# Patient Record
Sex: FEMALE | Race: WHITE | NOT HISPANIC OR LATINO | Employment: UNEMPLOYED | URBAN - METROPOLITAN AREA
[De-identification: names, ages, dates, MRNs, and addresses within clinical notes are randomized per-mention and may not be internally consistent; named-entity substitution may affect disease eponyms.]

---

## 2017-05-11 ENCOUNTER — OFFICE VISIT (OUTPATIENT)
Dept: NEUROLOGY | Facility: CLINIC | Age: 47
End: 2017-05-11

## 2017-05-11 VITALS
BODY MASS INDEX: 22.2 KG/M2 | HEART RATE: 61 BPM | WEIGHT: 130 LBS | HEIGHT: 64 IN | OXYGEN SATURATION: 99 % | DIASTOLIC BLOOD PRESSURE: 78 MMHG | SYSTOLIC BLOOD PRESSURE: 118 MMHG

## 2017-05-11 DIAGNOSIS — G40.909 SEIZURE DISORDER (HCC): ICD-10-CM

## 2017-05-11 DIAGNOSIS — G43.009 MIGRAINE WITHOUT AURA AND WITHOUT STATUS MIGRAINOSUS, NOT INTRACTABLE: Primary | ICD-10-CM

## 2017-05-11 PROCEDURE — 99213 OFFICE O/P EST LOW 20 MIN: CPT | Performed by: PSYCHIATRY & NEUROLOGY

## 2017-05-11 RX ORDER — SUMATRIPTAN 100 MG/1
100 TABLET, FILM COATED ORAL ONCE AS NEEDED
Qty: 9 TABLET | Refills: 11 | Status: SHIPPED | OUTPATIENT
Start: 2017-05-11 | End: 2018-05-16 | Stop reason: SDUPTHER

## 2017-05-11 RX ORDER — PANTOPRAZOLE SODIUM 40 MG/1
TABLET, DELAYED RELEASE ORAL
Refills: 0 | COMMUNITY
Start: 2017-04-19 | End: 2018-11-06

## 2017-05-11 RX ORDER — TOPIRAMATE 100 MG/1
100 TABLET, FILM COATED ORAL DAILY
Qty: 30 TABLET | Refills: 11 | Status: SHIPPED | OUTPATIENT
Start: 2017-05-11 | End: 2018-05-16 | Stop reason: SDUPTHER

## 2017-05-11 RX ORDER — ESTRADIOL 2 MG/1
TABLET ORAL
Refills: 1 | COMMUNITY
Start: 2017-05-07 | End: 2017-06-30 | Stop reason: SDUPTHER

## 2017-05-11 RX ORDER — LEVETIRACETAM 500 MG/1
500 TABLET ORAL 2 TIMES DAILY
Qty: 60 TABLET | Refills: 11 | Status: SHIPPED | OUTPATIENT
Start: 2017-05-11 | End: 2018-05-16 | Stop reason: SDUPTHER

## 2017-05-25 ENCOUNTER — OFFICE VISIT (OUTPATIENT)
Dept: FAMILY MEDICINE CLINIC | Facility: CLINIC | Age: 47
End: 2017-05-25

## 2017-05-25 VITALS
TEMPERATURE: 98.4 F | HEIGHT: 64 IN | HEART RATE: 76 BPM | SYSTOLIC BLOOD PRESSURE: 117 MMHG | RESPIRATION RATE: 16 BRPM | WEIGHT: 137 LBS | DIASTOLIC BLOOD PRESSURE: 65 MMHG | OXYGEN SATURATION: 100 % | BODY MASS INDEX: 23.39 KG/M2

## 2017-05-25 DIAGNOSIS — H10.32 ACUTE BACTERIAL CONJUNCTIVITIS OF LEFT EYE: Primary | ICD-10-CM

## 2017-05-25 PROCEDURE — 99213 OFFICE O/P EST LOW 20 MIN: CPT | Performed by: INTERNAL MEDICINE

## 2017-05-25 RX ORDER — POLYMYXIN B SULFATE AND TRIMETHOPRIM 1; 10000 MG/ML; [USP'U]/ML
1 SOLUTION OPHTHALMIC EVERY 4 HOURS
Qty: 10 ML | Refills: 0 | Status: SHIPPED | OUTPATIENT
Start: 2017-05-25 | End: 2017-11-15

## 2017-06-30 ENCOUNTER — TELEPHONE (OUTPATIENT)
Dept: OBSTETRICS AND GYNECOLOGY | Facility: CLINIC | Age: 47
End: 2017-06-30

## 2017-06-30 RX ORDER — ESTRADIOL 2 MG/1
2 TABLET ORAL DAILY
Qty: 30 TABLET | Refills: 2 | Status: SHIPPED | OUTPATIENT
Start: 2017-06-30 | End: 2017-09-07

## 2017-06-30 NOTE — TELEPHONE ENCOUNTER
----- Message from Modesta Villanueva sent at 6/30/2017  9:39 AM EDT -----  Contact: patient  Pt called and needed refills on Estradiol 2mg. I scheduled her annual for September. She will be out of pills on Monday.      [place]      [facility]

## 2017-09-07 ENCOUNTER — OFFICE VISIT (OUTPATIENT)
Dept: OBSTETRICS AND GYNECOLOGY | Facility: CLINIC | Age: 47
End: 2017-09-07

## 2017-09-07 VITALS
HEIGHT: 64 IN | WEIGHT: 138 LBS | SYSTOLIC BLOOD PRESSURE: 108 MMHG | BODY MASS INDEX: 23.56 KG/M2 | DIASTOLIC BLOOD PRESSURE: 70 MMHG

## 2017-09-07 DIAGNOSIS — N95.1 MENOPAUSAL SYMPTOMS: Primary | ICD-10-CM

## 2017-09-07 DIAGNOSIS — Z01.419 ENCOUNTER FOR GYNECOLOGICAL EXAMINATION WITHOUT ABNORMAL FINDING: ICD-10-CM

## 2017-09-07 PROCEDURE — 99396 PREV VISIT EST AGE 40-64: CPT | Performed by: OBSTETRICS & GYNECOLOGY

## 2017-09-07 RX ORDER — ESTRADIOL 0.1 MG/D
1 PATCH, EXTENDED RELEASE TRANSDERMAL 2 TIMES WEEKLY
Qty: 8 PATCH | Refills: 12 | Status: SHIPPED | OUTPATIENT
Start: 2017-09-07 | End: 2017-11-07

## 2017-09-07 NOTE — PROGRESS NOTES
Subjective  Chief Complaint   Patient presents with   • Gynecologic Exam     Last pap 2014 WNL   Last Mammogram 2016      Patient is 47 y.o.  here for annual examination.  Pt doing well other than complaints of anxiety, hot flashes, night sweats, and insomnia.  Pt taking estradiol in the morning.  Pt has tried taking at night but reports worsening of anxiety during day.  Pt had been on patch in past and did well.  Pt due for MMG.    History  Past Medical History:   Diagnosis Date   • Abdominal pain, epigastric    • Acute sinusitis    • Acute UTI (urinary tract infection)    • Angina, intestinal    • Anxiety    • Arthritis    • Back pain    • Breast mass, right    • Chronic hepatitis C virus infection    • Depression    • Diarrhea    • Elevated LFTs    • Fatigue    • History of endometriosis    • History of Papanicolaou smear of cervix 2014    NORMAL    • IBS (irritable bowel syndrome)    • Insomnia    • Menopausal symptoms    • Migraine headache    • Nausea & vomiting    • Neck pain    • Pelvic adhesive disease    • Radicular pain of left lower extremity    • Restless leg syndrome    • Seizure disorder    • Tobacco abuse    • Trochanteric bursitis    • Vitamin B 12 deficiency      Current Outpatient Prescriptions on File Prior to Visit   Medication Sig Dispense Refill   • dicyclomine (BENTYL) 20 MG tablet Take  by mouth.     • levETIRAcetam (KEPPRA) 500 MG tablet Take 1 tablet by mouth 2 (Two) Times a Day. 60 tablet 11   • oxyCODONE-acetaminophen (PERCOCET)  MG per tablet Take  by mouth every 12 (twelve) hours.     • pantoprazole (PROTONIX) 40 MG EC tablet take 1 tablet by mouth every morning  0   • SUMAtriptan (IMITREX) 100 MG tablet Take 1 tablet by mouth 1 (One) Time As Needed for migraine for up to 1 dose. 9 tablet 11   • topiramate (TOPAMAX) 100 MG tablet Take 1 tablet by mouth Daily. 30 tablet 11   • trimethoprim-polymyxin b (POLYTRIM) 92180-6.1 UNIT/ML-% ophthalmic solution Administer  1 drop into the left eye Every 4 (Four) Hours. 10 mL 0   • [DISCONTINUED] estradiol (ESTRACE) 2 MG tablet Take 1 tablet by mouth Daily. 30 tablet 2   • cyanocobalamin (VITAMIN B-12) 2500 MCG tablet tablet Place 1 tablet under the tongue daily.     • [DISCONTINUED] clonazePAM (KlonoPIN) 1 MG tablet Take 1 tablet by mouth 2 (two) times a day as needed for seizures. 60 tablet 2   • [DISCONTINUED] diclofenac (VOLTAREN) 1 % gel gel Place  on the skin.     • [DISCONTINUED] ergocalciferol (ERGOCALCIFEROL) 20610 UNITS capsule Take  by mouth.     • [DISCONTINUED] Ibuprofen 200 MG capsule Take  by mouth.       No current facility-administered medications on file prior to visit.      No Known Allergies  Past Surgical History:   Procedure Laterality Date   • BILATERAL SALPINGO OOPHORECTOMY  03/10/2015    DR NATALY THOMPSON   • BREAST LUMPECTOMY     •  SECTION     • HYSTERECTOMY  03/10/2015    [facility] (DR NATALY THOMPSON)   • INGUINAL HERNIA REPAIR     • LYSIS OF ABDOMINAL ADHESIONS  03/10/2015    DR NATALY THOMPSON   • TUBAL ABDOMINAL LIGATION       Family History   Problem Relation Age of Onset   • Alzheimer's disease Other    • Cancer Other    • Dementia Other    • Hypertension Other    • Parkinsonism Other    • Osteoporosis Mother    • Diabetes Mother    • Breast cancer Maternal Grandmother    • Osteoporosis Maternal Grandmother    • Diabetes Maternal Grandmother      Social History     Social History   • Marital status:      Spouse name: N/A   • Number of children: N/A   • Years of education: N/A     Social History Main Topics   • Smoking status: Current Every Day Smoker   • Smokeless tobacco: Never Used      Comment:  1/2 PACK A DAY   • Alcohol use Yes   • Drug use: No   • Sexual activity: Defer     Other Topics Concern   • None     Social History Narrative     Review of Systems  All systems were reviewed and negative except for:  Constitution:  positive for night sweats and trouble sleeping  Genitourinary: postivie for   "frequency and urinary urgency  Endocrine: positive for  hot flashes, night sweats, temperature intolerance     Objective  Vitals:    09/07/17 1155   BP: 108/70   Weight: 138 lb (62.6 kg)   Height: 64\" (162.6 cm)     Physical Exam:  General Appearance: alert, appears stated age and cooperative  Head: normocephalic, without obvious abnormality and atraumatic  Eyes: lids and lashes normal, conjunctivae and sclerae normal, no icterus, no pallor, corneas clear and PERRLA  Ears: ears appear intact with no abnormalities noted  Nose: nares normal, septum midline, mucosa normal and no drainage  Neck: suppple, trachea midline and no thyromegaly  Lungs: clear to auscultation, respirations regular, respirations even and respirations unlabored  Heart: regular rhythm and normal rate, normal S1, S2, no murmur, gallop, or rubs and no click  Breasts: Examined in supine position  Symmetric without masses or skin dimpling  Nipples normal without inversion, lesions or discharge  There are no palpable axillary nodes  Abdomen: normal bowel sounds, no masses, no hepatomegaly, no splenomegaly, soft non-tender, no guarding and no rebound tenderness  Pelvic: Clinical staff was present for exam  External genitalia:  normal appearance of the external genitalia including Bartholin's and [place]'s glands.  :  urethral meatus normal;  Vaginal:  normal pink mucosa without prolapse or lesions.  Cervix:  absent.  Uterus:  absent.  Adnexa:  normal bimanual exam of the adnexa.  Extremities: moves extremities well, no edema, no cyanosis and no redness  Skin: no bleeding, bruising or rash and no lesions noted  Lymph Nodes: no palpable adenopathy  Psych: normal mood and affect, oriented to person, time and place, thought content organized and appropriate judgment    Lab Review   No data reviewed    Imaging   No data reviewed    Assessment/Plan    Problem List Items Addressed This Visit     None      Visit Diagnoses     Menopausal symptoms    -  " Primary  Various options discussed with patient.  Plan trial with patch.  Rx given.  Instructions and precautions given.    Relevant Orders    Mammo Screening Digital Tomosynthesis Bilateral With CAD    Pap IG, Rfx HPV ASCU    Encounter for gynecological examination without abnormal finding      I explained to Marguerite that the Pap smears are no longer recommended in patients after hysterectomy unless the patient has a history of ERNA 2 or higher in the past 20 years or have a history of cervical cancer.    I stressed to Marguerite that she still should be seen to be seen yearly for a full physical including breast and pelvic exam. In women with no risk factors as noted, cytology screening has a small chance of detecting an abnormality and primary vaginal cancer is a rare gynecologic malignancy.  For this reason, Marguerite has elected pap smear screening this year.  It is recommended per ACOG, for women at average risk to start annual mammogram screening at the age of 40 until the age of 75 and an individualized decision be made for women after age 75.  She was encouraged to continue getting yearly mammograms.  She should report any palpable breast lump(s) or skin changes regardless of mammographic findings.  I explained to Marguerite that notification regarding her mammogram results will come from the center performing the study.  Our office will not be routinely calling with mammogram results.  It is her responsibility to make sure that the results from the mammogram are communicated to her by the breast center.  If she has any questions about the results, she is welcome to call our office anytime.  Patient to schedule.        Follow up as directed     This note was electronically signed.  Sandra Rangel M.D.

## 2017-09-21 DIAGNOSIS — N95.1 MENOPAUSAL SYMPTOMS: ICD-10-CM

## 2017-09-25 ENCOUNTER — TELEPHONE (OUTPATIENT)
Dept: OBSTETRICS AND GYNECOLOGY | Facility: CLINIC | Age: 47
End: 2017-09-25

## 2017-09-25 ENCOUNTER — HOSPITAL ENCOUNTER (EMERGENCY)
Facility: HOSPITAL | Age: 47
Discharge: HOME | End: 2017-09-25
Attending: EMERGENCY MEDICINE | Admitting: EMERGENCY MEDICINE

## 2017-09-25 VITALS
HEIGHT: 64 IN | RESPIRATION RATE: 18 BRPM | SYSTOLIC BLOOD PRESSURE: 118 MMHG | HEART RATE: 59 BPM | BODY MASS INDEX: 23.56 KG/M2 | OXYGEN SATURATION: 96 % | DIASTOLIC BLOOD PRESSURE: 62 MMHG | TEMPERATURE: 98.6 F | WEIGHT: 138 LBS

## 2017-09-25 DIAGNOSIS — H10.31 ACUTE CONJUNCTIVITIS OF RIGHT EYE, UNSPECIFIED ACUTE CONJUNCTIVITIS TYPE: Primary | ICD-10-CM

## 2017-09-25 PROCEDURE — 99283 EMERGENCY DEPT VISIT LOW MDM: CPT

## 2017-09-25 RX ORDER — METRONIDAZOLE 500 MG/1
TABLET ORAL
Qty: 4 TABLET | Refills: 0 | Status: SHIPPED | OUTPATIENT
Start: 2017-09-25 | End: 2017-11-15

## 2017-09-25 RX ORDER — TETRACAINE HYDROCHLORIDE 5 MG/ML
2 SOLUTION OPHTHALMIC ONCE
Status: COMPLETED | OUTPATIENT
Start: 2017-09-25 | End: 2017-09-25

## 2017-09-25 RX ORDER — CIPROFLOXACIN HYDROCHLORIDE 3.5 MG/ML
1 SOLUTION/ DROPS TOPICAL EVERY 4 HOURS
Qty: 1 EACH | Refills: 0 | Status: SHIPPED | OUTPATIENT
Start: 2017-09-25 | End: 2017-10-02

## 2017-09-25 RX ADMIN — TETRACAINE HYDROCHLORIDE 2 DROP: 5 SOLUTION OPHTHALMIC at 16:04

## 2017-09-25 RX ADMIN — FLUORESCEIN SODIUM 1 STRIP: 1 STRIP OPHTHALMIC at 16:04

## 2017-09-25 NOTE — ED PROVIDER NOTES
Subjective   History of Present Illness  This is a 47-year-old she started having some irritation to her right eye and felt so she was getting conjunctivitis.  She states that the pain continued and progressively got worse throughout the day.  She removed her contact and started using antibiotic ointment that she had from an old infection.  She states that she has used it for the last few days and has not seen any results.  She denies any Ever stations on her lashes.  She denies any morning thickened secretions from her eye.  She does report pain, blurred vision, and clear discharge.  She also reports minimal swelling around the eye.  She denies any fever.  Review of Systems   Constitutional: Negative.    HENT: Negative.    Eyes: Positive for photophobia, pain, discharge, redness, itching and visual disturbance.   Respiratory: Negative.    Cardiovascular: Negative.    Gastrointestinal: Negative.    Genitourinary: Negative.    Skin: Negative.    Neurological: Negative.    Psychiatric/Behavioral: Negative.    All other systems reviewed and are negative.      Past Medical History:   Diagnosis Date   • Abdominal pain, epigastric    • Acute sinusitis    • Acute UTI (urinary tract infection)    • Angina, intestinal    • Anxiety    • Arthritis    • Back pain    • Breast mass, right    • Chronic hepatitis C virus infection    • Depression    • Diarrhea    • Elevated LFTs    • Fatigue    • History of endometriosis    • History of Papanicolaou smear of cervix 04/02/2014    NORMAL    • IBS (irritable bowel syndrome)    • Insomnia    • Menopausal symptoms    • Migraine headache    • Nausea & vomiting    • Neck pain    • Pelvic adhesive disease    • Radicular pain of left lower extremity    • Restless leg syndrome    • Seizure disorder    • Tobacco abuse    • Trochanteric bursitis    • Vitamin B 12 deficiency        No Known Allergies    Past Surgical History:   Procedure Laterality Date   • BILATERAL SALPINGO OOPHORECTOMY   03/10/2015    DR NATALY THOMPSON   • BREAST LUMPECTOMY     •  SECTION     • HYSTERECTOMY  03/10/2015    [facility] (DR NATALY THOMPSON)   • INGUINAL HERNIA REPAIR     • LYSIS OF ABDOMINAL ADHESIONS  03/10/2015    DR NATALY THOMPSON   • TUBAL ABDOMINAL LIGATION         Family History   Problem Relation Age of Onset   • Alzheimer's disease Other    • Cancer Other    • Dementia Other    • Hypertension Other    • Parkinsonism Other    • Osteoporosis Mother    • Diabetes Mother    • Breast cancer Maternal Grandmother    • Osteoporosis Maternal Grandmother    • Diabetes Maternal Grandmother        Social History     Social History   • Marital status:      Spouse name: N/A   • Number of children: N/A   • Years of education: N/A     Social History Main Topics   • Smoking status: Current Every Day Smoker   • Smokeless tobacco: Never Used      Comment:  1/2 PACK A DAY   • Alcohol use Yes   • Drug use: No   • Sexual activity: Defer     Other Topics Concern   • None     Social History Narrative           Objective   Physical Exam   Constitutional: She appears well-developed and well-nourished.   Eyes: EOM are normal. Pupils are equal, round, and reactive to light. Right eye exhibits discharge. Right eye exhibits no exudate and no hordeolum. No foreign body present in the right eye. Right conjunctiva is injected. Right conjunctiva has no hemorrhage. Left conjunctiva is injected. Left conjunctiva has a hemorrhage. No scleral icterus.   Nursing note and vitals reviewed.  GEN: No acute distress  Head: Normocephalic, atraumatic  Eyes: Pupils equal round reactive to light  ENT: Posterior pharynx normal in appearance, oral mucosa is moist  Chest: Nontender to palpation  Cardiovascular: Regular rate  Lungs: Clear to auscultation bilaterally  Abdomen: Soft, nontender, nondistended, no peritoneal signs  Extremities: No edema, normal appearance  Neuro: GCS 15  Psych: Mood and affect are appropriate      Procedures  Eye exam. Tetracaine drops to  right eye. Immediate relief of symptoms. fluorescein stain and evaluate with woods lamp. No abrasions , lesions or fb or globe rupture noted. Tolerated well.       ED Course  ED Course                  MDM  Number of Diagnoses or Management Options      Final diagnoses:   Acute conjunctivitis of right eye, unspecified acute conjunctivitis type            Reshma Oneil, APRN  09/25/17 1606

## 2017-09-25 NOTE — TELEPHONE ENCOUNTER
----- Message from Sandra Rangel MD sent at 9/25/2017  8:56 AM EDT -----  Okay to inform pt pap normal other than trich.  Needs rx flagyl 2 grams po now and partner needs to be treated.

## 2017-11-07 ENCOUNTER — TELEPHONE (OUTPATIENT)
Dept: OBSTETRICS AND GYNECOLOGY | Facility: CLINIC | Age: 47
End: 2017-11-07

## 2017-11-07 RX ORDER — ESTRADIOL 0.1 MG/D
1 FILM, EXTENDED RELEASE TRANSDERMAL 2 TIMES WEEKLY
Qty: 8 PATCH | Refills: 11 | Status: SHIPPED | OUTPATIENT
Start: 2017-11-09 | End: 2018-11-06

## 2017-11-07 NOTE — TELEPHONE ENCOUNTER
----- Message from Modesta Villanueva sent at 11/7/2017  9:09 AM EST -----  Contact: patient  Pt called and wants to have her patch changed Estradiol patch 0.1. Vivelle is a 30.00 and the estradiol is only 10.00    [facility]

## 2017-11-13 ENCOUNTER — HOSPITAL ENCOUNTER (EMERGENCY)
Facility: HOSPITAL | Age: 47
Discharge: HOME | End: 2017-11-13
Attending: EMERGENCY MEDICINE | Admitting: EMERGENCY MEDICINE

## 2017-11-13 VITALS
RESPIRATION RATE: 19 BRPM | SYSTOLIC BLOOD PRESSURE: 106 MMHG | DIASTOLIC BLOOD PRESSURE: 64 MMHG | BODY MASS INDEX: 24.41 KG/M2 | TEMPERATURE: 97.6 F | HEIGHT: 64 IN | OXYGEN SATURATION: 98 % | HEART RATE: 52 BPM | WEIGHT: 143 LBS

## 2017-11-13 DIAGNOSIS — R42 GIDDINESS: Primary | ICD-10-CM

## 2017-11-13 DIAGNOSIS — R07.89 CHEST HEAVINESS: ICD-10-CM

## 2017-11-13 LAB
ALBUMIN SERPL-MCNC: 4.2 G/DL (ref 3.5–5)
ALBUMIN/GLOB SERPL: 1.6 G/DL (ref 1–2)
ALP SERPL-CCNC: 53 U/L (ref 38–126)
ALT SERPL W P-5'-P-CCNC: 24 U/L (ref 13–69)
ANION GAP SERPL CALCULATED.3IONS-SCNC: 13.9 MMOL/L
AST SERPL-CCNC: 17 U/L (ref 15–46)
BACTERIA UR QL AUTO: ABNORMAL /HPF
BASOPHILS # BLD AUTO: 0.05 10*3/MM3 (ref 0–0.2)
BASOPHILS NFR BLD AUTO: 0.5 % (ref 0–2.5)
BILIRUB SERPL-MCNC: 0.3 MG/DL (ref 0.2–1.3)
BILIRUB UR QL STRIP: NEGATIVE
BUN BLD-MCNC: 9 MG/DL (ref 7–20)
BUN/CREAT SERPL: 15 (ref 7.1–23.5)
CALCIUM SPEC-SCNC: 8.9 MG/DL (ref 8.4–10.2)
CHLORIDE SERPL-SCNC: 109 MMOL/L (ref 98–107)
CLARITY UR: CLEAR
CO2 SERPL-SCNC: 22 MMOL/L (ref 26–30)
COLOR UR: YELLOW
CREAT BLD-MCNC: 0.6 MG/DL (ref 0.6–1.3)
DEPRECATED RDW RBC AUTO: 43.5 FL (ref 37–54)
EOSINOPHIL # BLD AUTO: 0.15 10*3/MM3 (ref 0–0.7)
EOSINOPHIL NFR BLD AUTO: 1.4 % (ref 0–7)
ERYTHROCYTE [DISTWIDTH] IN BLOOD BY AUTOMATED COUNT: 12.3 % (ref 11.5–14.5)
GFR SERPL CREATININE-BSD FRML MDRD: 107 ML/MIN/1.73
GLOBULIN UR ELPH-MCNC: 2.6 GM/DL
GLUCOSE BLD-MCNC: 103 MG/DL (ref 74–98)
GLUCOSE UR STRIP-MCNC: NEGATIVE MG/DL
HCT VFR BLD AUTO: 39.5 % (ref 37–47)
HGB BLD-MCNC: 13.2 G/DL (ref 12–16)
HGB UR QL STRIP.AUTO: ABNORMAL
HYALINE CASTS UR QL AUTO: ABNORMAL /LPF
IMM GRANULOCYTES # BLD: 0.03 10*3/MM3 (ref 0–0.06)
IMM GRANULOCYTES NFR BLD: 0.3 % (ref 0–0.6)
KETONES UR QL STRIP: NEGATIVE
LEUKOCYTE ESTERASE UR QL STRIP.AUTO: NEGATIVE
LYMPHOCYTES # BLD AUTO: 2.59 10*3/MM3 (ref 0.6–3.4)
LYMPHOCYTES NFR BLD AUTO: 24.6 % (ref 10–50)
MCH RBC QN AUTO: 31.7 PG (ref 27–31)
MCHC RBC AUTO-ENTMCNC: 33.4 G/DL (ref 30–37)
MCV RBC AUTO: 95 FL (ref 81–99)
MONOCYTES # BLD AUTO: 0.51 10*3/MM3 (ref 0–0.9)
MONOCYTES NFR BLD AUTO: 4.8 % (ref 0–12)
MUCOUS THREADS URNS QL MICRO: ABNORMAL /HPF
NEUTROPHILS # BLD AUTO: 7.19 10*3/MM3 (ref 2–6.9)
NEUTROPHILS NFR BLD AUTO: 68.4 % (ref 37–80)
NITRITE UR QL STRIP: NEGATIVE
NRBC BLD MANUAL-RTO: 0 /100 WBC (ref 0–0)
PH UR STRIP.AUTO: 5.5 [PH] (ref 5–8)
PLATELET # BLD AUTO: 323 10*3/MM3 (ref 130–400)
PMV BLD AUTO: 9.2 FL (ref 6–12)
POTASSIUM BLD-SCNC: 3.9 MMOL/L (ref 3.5–5.1)
PROT SERPL-MCNC: 6.8 G/DL (ref 6.3–8.2)
PROT UR QL STRIP: NEGATIVE
RBC # BLD AUTO: 4.16 10*6/MM3 (ref 4.2–5.4)
RBC # UR: ABNORMAL /HPF
REF LAB TEST METHOD: ABNORMAL
SODIUM BLD-SCNC: 141 MMOL/L (ref 137–145)
SP GR UR STRIP: >=1.03 (ref 1–1.03)
SQUAMOUS #/AREA URNS HPF: ABNORMAL /HPF
TROPONIN I SERPL-MCNC: <0.012 NG/ML (ref 0–0.03)
TROPONIN I SERPL-MCNC: <0.012 NG/ML (ref 0–0.03)
TSH SERPL DL<=0.05 MIU/L-ACNC: 1.11 MIU/ML (ref 0.47–4.68)
UROBILINOGEN UR QL STRIP: ABNORMAL
WBC NRBC COR # BLD: 10.52 10*3/MM3 (ref 4.8–10.8)
WBC UR QL AUTO: ABNORMAL /HPF

## 2017-11-13 PROCEDURE — 93005 ELECTROCARDIOGRAM TRACING: CPT | Performed by: NURSE PRACTITIONER

## 2017-11-13 PROCEDURE — 80053 COMPREHEN METABOLIC PANEL: CPT | Performed by: NURSE PRACTITIONER

## 2017-11-13 PROCEDURE — 81001 URINALYSIS AUTO W/SCOPE: CPT | Performed by: NURSE PRACTITIONER

## 2017-11-13 PROCEDURE — 84484 ASSAY OF TROPONIN QUANT: CPT | Performed by: NURSE PRACTITIONER

## 2017-11-13 PROCEDURE — 84443 ASSAY THYROID STIM HORMONE: CPT | Performed by: NURSE PRACTITIONER

## 2017-11-13 PROCEDURE — 99283 EMERGENCY DEPT VISIT LOW MDM: CPT

## 2017-11-13 PROCEDURE — 85025 COMPLETE CBC W/AUTO DIFF WBC: CPT | Performed by: NURSE PRACTITIONER

## 2017-11-13 RX ORDER — NITROGLYCERIN 0.4 MG/1
0.4 TABLET SUBLINGUAL
Status: DISCONTINUED | OUTPATIENT
Start: 2017-11-13 | End: 2017-11-13 | Stop reason: HOSPADM

## 2017-11-13 RX ORDER — SODIUM CHLORIDE 0.9 % (FLUSH) 0.9 %
10 SYRINGE (ML) INJECTION AS NEEDED
Status: DISCONTINUED | OUTPATIENT
Start: 2017-11-13 | End: 2017-11-13 | Stop reason: HOSPADM

## 2017-11-13 RX ORDER — ASPIRIN 325 MG
325 TABLET ORAL ONCE
Status: COMPLETED | OUTPATIENT
Start: 2017-11-13 | End: 2017-11-13

## 2017-11-13 RX ORDER — HYDROXYZINE PAMOATE 50 MG/1
50 CAPSULE ORAL ONCE
Status: COMPLETED | OUTPATIENT
Start: 2017-11-13 | End: 2017-11-13

## 2017-11-13 RX ORDER — HYDROXYZINE PAMOATE 50 MG/1
50 CAPSULE ORAL 3 TIMES DAILY PRN
Qty: 10 CAPSULE | Refills: 0 | Status: SHIPPED | OUTPATIENT
Start: 2017-11-13 | End: 2018-11-06

## 2017-11-13 RX ADMIN — ASPIRIN 325 MG ORAL TABLET 325 MG: 325 PILL ORAL at 14:26

## 2017-11-13 RX ADMIN — HYDROXYZINE PAMOATE 50 MG: 50 CAPSULE ORAL at 16:38

## 2017-11-13 RX ADMIN — NITROGLYCERIN 0.4 MG: 0.4 TABLET SUBLINGUAL at 14:27

## 2017-11-15 ENCOUNTER — OFFICE VISIT (OUTPATIENT)
Dept: FAMILY MEDICINE CLINIC | Facility: CLINIC | Age: 47
End: 2017-11-15

## 2017-11-15 VITALS
SYSTOLIC BLOOD PRESSURE: 117 MMHG | RESPIRATION RATE: 16 BRPM | BODY MASS INDEX: 24.92 KG/M2 | HEIGHT: 64 IN | OXYGEN SATURATION: 100 % | WEIGHT: 146 LBS | TEMPERATURE: 97.9 F | HEART RATE: 60 BPM | DIASTOLIC BLOOD PRESSURE: 69 MMHG

## 2017-11-15 DIAGNOSIS — R07.89 OTHER CHEST PAIN: ICD-10-CM

## 2017-11-15 DIAGNOSIS — Z79.890 POSTMENOPAUSAL HRT (HORMONE REPLACEMENT THERAPY): ICD-10-CM

## 2017-11-15 DIAGNOSIS — E53.8 COBALAMIN DEFICIENCY: Primary | ICD-10-CM

## 2017-11-15 DIAGNOSIS — F17.200 TOBACCO DEPENDENCE SYNDROME: ICD-10-CM

## 2017-11-15 DIAGNOSIS — I10 ESSENTIAL HYPERTENSION: ICD-10-CM

## 2017-11-15 PROCEDURE — 99214 OFFICE O/P EST MOD 30 MIN: CPT | Performed by: INTERNAL MEDICINE

## 2017-11-15 RX ORDER — ATORVASTATIN CALCIUM 10 MG/1
10 TABLET, FILM COATED ORAL DAILY
Qty: 30 TABLET | Refills: 11 | Status: SHIPPED | OUTPATIENT
Start: 2017-11-15 | End: 2019-08-16

## 2017-11-15 RX ORDER — LISINOPRIL 10 MG/1
10 TABLET ORAL DAILY
Qty: 90 TABLET | Refills: 3 | Status: SHIPPED | OUTPATIENT
Start: 2017-11-15 | End: 2018-12-04 | Stop reason: SDUPTHER

## 2017-11-15 RX ORDER — ASPIRIN 81 MG/1
81 TABLET ORAL DAILY
Qty: 90 TABLET | Refills: 3 | Status: SHIPPED | OUTPATIENT
Start: 2017-11-15 | End: 2018-12-19

## 2017-11-15 RX ORDER — VARENICLINE TARTRATE 1 MG/1
1 TABLET, FILM COATED ORAL 2 TIMES DAILY
Qty: 60 TABLET | Refills: 2 | Status: SHIPPED | OUTPATIENT
Start: 2017-11-15 | End: 2018-11-06

## 2017-11-15 NOTE — PROGRESS NOTES
Subjective     Patient ID: Marguerite Fink is a 47 y.o. female. Patient is here for management of multiple medical problems.     Chief Complaint   Patient presents with   • ER follow-up     patient went to the ER on 11/13/17 for chest pain, patient states she is still having some anxious feelings even after taking the Vistaril this morning at 9:00 am.           • Shortness of Breath     patient states she is still having shortness of breath and chest tightness, difficulty getting a deep breath     History of Present Illness   Cp and er visit on 11/13/17.  bp 144/87 and running a bit higher.  No increased stress.       The following portions of the patient's history were reviewed and updated as appropriate: allergies, current medications, past family history, past medical history, past social history, past surgical history and problem list.    Review of Systems   Constitutional: Positive for fatigue.   Respiratory: Positive for shortness of breath.    Cardiovascular: Positive for chest pain. Negative for palpitations.   All other systems reviewed and are negative.      Current Outpatient Prescriptions:   •  dicyclomine (BENTYL) 20 MG tablet, Take  by mouth., Disp: , Rfl:   •  estradiol (MINIVELLE, VIVELLE-DOT) 0.1 MG/24HR patch, Place 1 patch on the skin 2 (Two) Times a Week., Disp: 8 patch, Rfl: 11  •  hydrOXYzine (VISTARIL) 50 MG capsule, Take 1 capsule by mouth 3 (Three) Times a Day As Needed for Itching or Anxiety., Disp: 10 capsule, Rfl: 0  •  levETIRAcetam (KEPPRA) 500 MG tablet, Take 1 tablet by mouth 2 (Two) Times a Day., Disp: 60 tablet, Rfl: 11  •  oxyCODONE-acetaminophen (PERCOCET)  MG per tablet, Take  by mouth every 12 (twelve) hours., Disp: , Rfl:   •  pantoprazole (PROTONIX) 40 MG EC tablet, take 1 tablet by mouth every morning, Disp: , Rfl: 0  •  topiramate (TOPAMAX) 100 MG tablet, Take 1 tablet by mouth Daily., Disp: 30 tablet, Rfl: 11  •  aspirin 81 MG EC tablet, Take 1 tablet by mouth Daily.,  "Disp: 90 tablet, Rfl: 3  •  atorvastatin (LIPITOR) 10 MG tablet, Take 1 tablet by mouth Daily., Disp: 30 tablet, Rfl: 11  •  lisinopril (PRINIVIL,ZESTRIL) 10 MG tablet, Take 1 tablet by mouth Daily., Disp: 90 tablet, Rfl: 3  •  SUMAtriptan (IMITREX) 100 MG tablet, Take 1 tablet by mouth 1 (One) Time As Needed for migraine for up to 1 dose., Disp: 9 tablet, Rfl: 11  •  varenicline (CHANTIX CONTINUING MONTH PAK) 1 MG tablet, Take 1 tablet by mouth 2 (Two) Times a Day., Disp: 60 tablet, Rfl: 2  •  varenicline (CHANTIX STARTING MONTH PAK) 0.5 MG X 11 & 1 MG X 42 tablet, Take 0.5 mg one daily on days 1-2 and 0.5 mg twice daily on days 4-7. Then 1 mg twice daily for a total of 12 weeks., Disp: 53 tablet, Rfl: 0    Objective      Blood pressure 117/69, pulse 60, temperature 97.9 °F (36.6 °C), temperature source Temporal Artery , resp. rate 16, height 64\" (162.6 cm), weight 146 lb (66.2 kg), SpO2 100 %.    Physical Exam     General Appearance:    Alert, cooperative, no distress, appears stated age   Head:    Normocephalic, without obvious abnormality, atraumatic   Eyes:    PERRL, conjunctiva/corneas clear, EOM's intact   Ears:    Normal TM's and external ear canals, both ears   Nose:   Nares normal, septum midline, mucosa normal, no drainage   or sinus tenderness   Throat:   Lips, mucosa, and tongue normal; teeth and gums normal   Neck:   Supple, symmetrical, trachea midline, no adenopathy;        thyroid:  No enlargement/tenderness/nodules; no carotid    bruit or JVD   Back:     Symmetric, no curvature, ROM normal, no CVA tenderness   Lungs:     Clear to auscultation bilaterally, respirations unlabored   Chest wall:    No tenderness or deformity   Heart:    Regular rate and rhythm, S1 and S2 normal, no murmur,        rub or gallop   Abdomen:     Soft, non-tender, bowel sounds active all four quadrants,     no masses, no organomegaly   Extremities:   Extremities normal, atraumatic, no cyanosis or edema   Pulses:   2+ and " symmetric all extremities   Skin:   Skin color, texture, turgor normal, no rashes or lesions   Lymph nodes:   Cervical, supraclavicular, and axillary nodes normal   Neurologic:   CNII-XII intact. Normal strength, sensation and reflexes       throughout      Results for orders placed or performed during the hospital encounter of 11/13/17   Comprehensive Metabolic Panel   Result Value Ref Range    Glucose 103 (H) 74 - 98 mg/dL    BUN 9 7 - 20 mg/dL    Creatinine 0.60 0.60 - 1.30 mg/dL    Sodium 141 137 - 145 mmol/L    Potassium 3.9 3.5 - 5.1 mmol/L    Chloride 109 (H) 98 - 107 mmol/L    CO2 22.0 (L) 26.0 - 30.0 mmol/L    Calcium 8.9 8.4 - 10.2 mg/dL    Total Protein 6.8 6.3 - 8.2 g/dL    Albumin 4.20 3.50 - 5.00 g/dL    ALT (SGPT) 24 13 - 69 U/L    AST (SGOT) 17 15 - 46 U/L    Alkaline Phosphatase 53 38 - 126 U/L    Total Bilirubin 0.3 0.2 - 1.3 mg/dL    eGFR Non African Amer 107 >60 mL/min/1.73    Globulin 2.6 gm/dL    A/G Ratio 1.6 1.0 - 2.0 g/dL    BUN/Creatinine Ratio 15.0 7.1 - 23.5    Anion Gap 13.9 mmol/L   Urinalysis With / Culture If Indicated - Urine, Clean Catch   Result Value Ref Range    Color, UA Yellow Yellow, Straw    Appearance, UA Clear Clear    pH, UA 5.5 5.0 - 8.0    Specific Gravity, UA >=1.030 1.005 - 1.030    Glucose, UA Negative Negative    Ketones, UA Negative Negative    Bilirubin, UA Negative Negative    Blood, UA Small (1+) (A) Negative    Protein, UA Negative Negative    Leuk Esterase, UA Negative Negative    Nitrite, UA Negative Negative    Urobilinogen, UA 0.2 E.U./dL 0.2 - 1.0 E.U./dL   Troponin   Result Value Ref Range    Troponin I <0.012 0.000 - 0.034 ng/mL   TSH   Result Value Ref Range    TSH 1.110 0.470 - 4.680 mIU/mL   CBC Auto Differential   Result Value Ref Range    WBC 10.52 4.80 - 10.80 10*3/mm3    RBC 4.16 (L) 4.20 - 5.40 10*6/mm3    Hemoglobin 13.2 12.0 - 16.0 g/dL    Hematocrit 39.5 37.0 - 47.0 %    MCV 95.0 81.0 - 99.0 fL    MCH 31.7 (H) 27.0 - 31.0 pg    MCHC 33.4 30.0 -  37.0 g/dL    RDW 12.3 11.5 - 14.5 %    RDW-SD 43.5 37.0 - 54.0 fl    MPV 9.2 6.0 - 12.0 fL    Platelets 323 130 - 400 10*3/mm3    Neutrophil % 68.4 37.0 - 80.0 %    Lymphocyte % 24.6 10.0 - 50.0 %    Monocyte % 4.8 0.0 - 12.0 %    Eosinophil % 1.4 0.0 - 7.0 %    Basophil % 0.5 0.0 - 2.5 %    Immature Grans % 0.3 0.0 - 0.6 %    Neutrophils, Absolute 7.19 (H) 2.00 - 6.90 10*3/mm3    Lymphocytes, Absolute 2.59 0.60 - 3.40 10*3/mm3    Monocytes, Absolute 0.51 0.00 - 0.90 10*3/mm3    Eosinophils, Absolute 0.15 0.00 - 0.70 10*3/mm3    Basophils, Absolute 0.05 0.00 - 0.20 10*3/mm3    Immature Grans, Absolute 0.03 0.00 - 0.06 10*3/mm3    nRBC 0.0 0.0 - 0.0 /100 WBC   Urinalysis, Microscopic Only - Urine, Clean Catch   Result Value Ref Range    RBC, UA 6-12 (A) None Seen /HPF    WBC, UA None Seen None Seen /HPF    Bacteria, UA None Seen None Seen /HPF    Squamous Epithelial Cells, UA None Seen None Seen, 0-2 /HPF    Hyaline Casts, UA None Seen None Seen /LPF    Mucus, UA Moderate/2+ (A) None Seen, Trace /HPF    Methodology Manual Light Microscopy    Troponin   Result Value Ref Range    Troponin I <0.012 0.000 - 0.034 ng/mL         Assessment/Plan       Marguerite was seen today for er follow-up and shortness of breath.    Diagnoses and all orders for this visit:    Cobalamin deficiency    Tobacco dependence syndrome  -     varenicline (CHANTIX CONTINUING MONTH NISSA) 1 MG tablet; Take 1 tablet by mouth 2 (Two) Times a Day.    Other chest pain  -     aspirin 81 MG EC tablet; Take 1 tablet by mouth Daily.    Postmenopausal HRT (hormone replacement therapy)    Essential hypertension  -     atorvastatin (LIPITOR) 10 MG tablet; Take 1 tablet by mouth Daily.  -     lisinopril (PRINIVIL,ZESTRIL) 10 MG tablet; Take 1 tablet by mouth Daily.    Other orders  -     varenicline (CHANTIX STARTING MONTH NISSA) 0.5 MG X 11 & 1 MG X 42 tablet; Take 0.5 mg one daily on days 1-2 and 0.5 mg twice daily on days 4-7. Then 1 mg twice daily for a total of  12 weeks.    Return in about 8 weeks (around 1/10/2018).          There are no Patient Instructions on file for this visit.     Juan Clemens MD    Assessment/Plan

## 2018-01-31 ENCOUNTER — TELEPHONE (OUTPATIENT)
Dept: OBSTETRICS AND GYNECOLOGY | Facility: CLINIC | Age: 48
End: 2018-01-31

## 2018-02-01 RX ORDER — ESTRADIOL 2 MG/1
2 TABLET ORAL DAILY
Qty: 60 TABLET | Refills: 1 | Status: SHIPPED | OUTPATIENT
Start: 2018-02-01 | End: 2018-06-02 | Stop reason: SDUPTHER

## 2018-05-16 ENCOUNTER — OFFICE VISIT (OUTPATIENT)
Dept: NEUROLOGY | Facility: CLINIC | Age: 48
End: 2018-05-16

## 2018-05-16 ENCOUNTER — APPOINTMENT (OUTPATIENT)
Dept: LAB | Facility: HOSPITAL | Age: 48
End: 2018-05-16

## 2018-05-16 VITALS
SYSTOLIC BLOOD PRESSURE: 108 MMHG | WEIGHT: 145 LBS | DIASTOLIC BLOOD PRESSURE: 60 MMHG | OXYGEN SATURATION: 99 % | BODY MASS INDEX: 24.75 KG/M2 | HEIGHT: 64 IN | HEART RATE: 64 BPM

## 2018-05-16 DIAGNOSIS — G40.909 SEIZURE DISORDER (HCC): ICD-10-CM

## 2018-05-16 DIAGNOSIS — G43.009 MIGRAINE WITHOUT AURA AND WITHOUT STATUS MIGRAINOSUS, NOT INTRACTABLE: ICD-10-CM

## 2018-05-16 DIAGNOSIS — J30.2 CHRONIC SEASONAL ALLERGIC RHINITIS, UNSPECIFIED TRIGGER: ICD-10-CM

## 2018-05-16 LAB
ALBUMIN SERPL-MCNC: 4.1 G/DL (ref 3.2–4.8)
ALBUMIN/GLOB SERPL: 2 G/DL (ref 1.5–2.5)
ALP SERPL-CCNC: 53 U/L (ref 25–100)
ALT SERPL W P-5'-P-CCNC: 12 U/L (ref 7–40)
ANION GAP SERPL CALCULATED.3IONS-SCNC: 3 MMOL/L (ref 3–11)
AST SERPL-CCNC: 17 U/L (ref 0–33)
BASOPHILS # BLD AUTO: 0.05 10*3/MM3 (ref 0–0.2)
BASOPHILS NFR BLD AUTO: 0.5 % (ref 0–1)
BILIRUB SERPL-MCNC: 0.3 MG/DL (ref 0.3–1.2)
BUN BLD-MCNC: 6 MG/DL (ref 9–23)
BUN/CREAT SERPL: 10 (ref 7–25)
CALCIUM SPEC-SCNC: 8.7 MG/DL (ref 8.7–10.4)
CHLORIDE SERPL-SCNC: 109 MMOL/L (ref 99–109)
CO2 SERPL-SCNC: 30 MMOL/L (ref 20–31)
CREAT BLD-MCNC: 0.6 MG/DL (ref 0.6–1.3)
DEPRECATED RDW RBC AUTO: 45.2 FL (ref 37–54)
EOSINOPHIL # BLD AUTO: 0.17 10*3/MM3 (ref 0–0.3)
EOSINOPHIL NFR BLD AUTO: 1.9 % (ref 0–3)
ERYTHROCYTE [DISTWIDTH] IN BLOOD BY AUTOMATED COUNT: 12.9 % (ref 11.3–14.5)
GFR SERPL CREATININE-BSD FRML MDRD: 107 ML/MIN/1.73
GLOBULIN UR ELPH-MCNC: 2.1 GM/DL
GLUCOSE BLD-MCNC: 78 MG/DL (ref 70–100)
HCT VFR BLD AUTO: 39.5 % (ref 34.5–44)
HGB BLD-MCNC: 13 G/DL (ref 11.5–15.5)
IMM GRANULOCYTES # BLD: 0.02 10*3/MM3 (ref 0–0.03)
IMM GRANULOCYTES NFR BLD: 0.2 % (ref 0–0.6)
LYMPHOCYTES # BLD AUTO: 3.47 10*3/MM3 (ref 0.6–4.8)
LYMPHOCYTES NFR BLD AUTO: 37.8 % (ref 24–44)
MCH RBC QN AUTO: 31.4 PG (ref 27–31)
MCHC RBC AUTO-ENTMCNC: 32.9 G/DL (ref 32–36)
MCV RBC AUTO: 95.4 FL (ref 80–99)
MONOCYTES # BLD AUTO: 0.51 10*3/MM3 (ref 0–1)
MONOCYTES NFR BLD AUTO: 5.6 % (ref 0–12)
NEUTROPHILS # BLD AUTO: 4.98 10*3/MM3 (ref 1.5–8.3)
NEUTROPHILS NFR BLD AUTO: 54.2 % (ref 41–71)
PLATELET # BLD AUTO: 256 10*3/MM3 (ref 150–450)
PMV BLD AUTO: 10.3 FL (ref 6–12)
POTASSIUM BLD-SCNC: 4 MMOL/L (ref 3.5–5.5)
PROT SERPL-MCNC: 6.2 G/DL (ref 5.7–8.2)
RBC # BLD AUTO: 4.14 10*6/MM3 (ref 3.89–5.14)
SODIUM BLD-SCNC: 142 MMOL/L (ref 132–146)
WBC NRBC COR # BLD: 9.18 10*3/MM3 (ref 3.5–10.8)

## 2018-05-16 PROCEDURE — 80177 DRUG SCRN QUAN LEVETIRACETAM: CPT | Performed by: NURSE PRACTITIONER

## 2018-05-16 PROCEDURE — 36415 COLL VENOUS BLD VENIPUNCTURE: CPT | Performed by: NURSE PRACTITIONER

## 2018-05-16 PROCEDURE — 99214 OFFICE O/P EST MOD 30 MIN: CPT | Performed by: NURSE PRACTITIONER

## 2018-05-16 PROCEDURE — 80053 COMPREHEN METABOLIC PANEL: CPT | Performed by: NURSE PRACTITIONER

## 2018-05-16 PROCEDURE — 85025 COMPLETE CBC W/AUTO DIFF WBC: CPT | Performed by: NURSE PRACTITIONER

## 2018-05-16 RX ORDER — LEVETIRACETAM 500 MG/1
500 TABLET ORAL EVERY 12 HOURS SCHEDULED
Qty: 60 TABLET | Refills: 11 | Status: SHIPPED | OUTPATIENT
Start: 2018-05-16 | End: 2019-03-27 | Stop reason: SDUPTHER

## 2018-05-16 RX ORDER — FLUTICASONE PROPIONATE 50 MCG
1 SPRAY, SUSPENSION (ML) NASAL DAILY
Qty: 1 BOTTLE | Refills: 2 | Status: SHIPPED | OUTPATIENT
Start: 2018-05-16 | End: 2019-05-16

## 2018-05-16 RX ORDER — TOPIRAMATE 100 MG/1
100 TABLET, FILM COATED ORAL DAILY
Qty: 30 TABLET | Refills: 11 | Status: SHIPPED | OUTPATIENT
Start: 2018-05-16 | End: 2019-03-27 | Stop reason: SDUPTHER

## 2018-05-16 RX ORDER — SUMATRIPTAN 100 MG/1
100 TABLET, FILM COATED ORAL ONCE AS NEEDED
Qty: 9 TABLET | Refills: 11 | Status: SHIPPED | OUTPATIENT
Start: 2018-05-16 | End: 2019-03-27 | Stop reason: SDUPTHER

## 2018-05-16 NOTE — PROGRESS NOTES
Subjective:     Patient ID: Marguerite Fink is a 48 y.o. female.    CC:   Chief Complaint   Patient presents with   • Migraine       HPI:   History of Present Illness     Ms Fink is here today for follow-up on her migraines and seizure disorder.  She reports she's having increased episodes of spacing out over the past 6-7 months, she has noticed a strange smoky smell before these episodes.  She is currently taking Keppra 500 mg twice a day as well as Topamax 100 mg per day.  She is also noticed a slight increase in her headaches in the last month or so, she has been struggling with some allergies and chronic nasal congestion as well.  The following portions of the patient's history were reviewed and updated as appropriate: allergies, current medications, past family history, past medical history, past social history, past surgical history and problem list.    Past Medical History:   Diagnosis Date   • Abdominal pain, epigastric    • Acute sinusitis    • Acute UTI (urinary tract infection)    • Angina, intestinal    • Anxiety    • Arthritis    • Back pain    • Breast mass, right    • Chronic hepatitis C virus infection    • Depression    • Diarrhea    • Elevated LFTs    • Fatigue    • History of endometriosis    • History of Papanicolaou smear of cervix 2014    NORMAL    • IBS (irritable bowel syndrome)    • Insomnia    • Menopausal symptoms    • Migraine headache    • Nausea & vomiting    • Neck pain    • Pelvic adhesive disease    • Radicular pain of left lower extremity    • Restless leg syndrome    • Seizure disorder    • Tobacco abuse    • Trochanteric bursitis    • Vitamin B 12 deficiency        Past Surgical History:   Procedure Laterality Date   • BILATERAL SALPINGO OOPHORECTOMY  03/10/2015    DR NATALY THOMPSON   • BREAST LUMPECTOMY     •  SECTION     • HYSTERECTOMY  03/10/2015    [facility] (DR NATALY THOMPSON)   • INGUINAL HERNIA REPAIR     • LYSIS OF ABDOMINAL ADHESIONS  03/10/2015    DR NATALY THOMPSON   • TUBAL  ABDOMINAL LIGATION         Social History     Social History   • Marital status:      Spouse name: N/A   • Number of children: N/A   • Years of education: N/A     Occupational History   • Not on file.     Social History Main Topics   • Smoking status: Current Every Day Smoker     Packs/day: 0.50     Types: Cigarettes   • Smokeless tobacco: Never Used      Comment:  1/2 PACK A DAY   • Alcohol use Yes   • Drug use: No   • Sexual activity: Defer     Other Topics Concern   • Not on file     Social History Narrative   • No narrative on file       Family History   Problem Relation Age of Onset   • Alzheimer's disease Other    • Cancer Other    • Dementia Other    • Hypertension Other    • Parkinsonism Other    • Osteoporosis Mother    • Diabetes Mother    • Breast cancer Maternal Grandmother    • Osteoporosis Maternal Grandmother    • Diabetes Maternal Grandmother         Review of Systems   Constitutional: Positive for unexpected weight change.   Eyes: Negative.    Respiratory: Negative.    Cardiovascular: Negative.    Gastrointestinal: Negative.    Endocrine: Negative.    Genitourinary: Negative.    Musculoskeletal: Negative.    Skin: Negative.    Allergic/Immunologic: Negative.    Neurological: Positive for seizures (staring spells, notonic-clonic seizure activity), numbness and headaches. Negative for dizziness, tremors, syncope, facial asymmetry, speech difficulty, weakness and light-headedness.   Hematological: Negative.    Psychiatric/Behavioral: Positive for decreased concentration and sleep disturbance. The patient is nervous/anxious.         Objective:    Neurologic Exam     Mental Status   Oriented to person, place, and time.   Attention: normal. Concentration: normal.   Speech: speech is normal   Level of consciousness: alert  Knowledge: consistent with education.   Able to read. Able to write. Normal comprehension.     Cranial Nerves   Cranial nerves II through XII intact.     CN III, IV, VI   Pupils  are equal, round, and reactive to light.  Extraocular motions are normal.     Motor Exam   Muscle bulk: normal  Overall muscle tone: normal    Strength   Strength 5/5 throughout.     Gait, Coordination, and Reflexes     Gait  Gait: normal    Coordination   Romberg: negative  Finger to nose coordination: normal    Reflexes   Right brachioradialis: 2+  Left brachioradialis: 2+  Right biceps: 2+  Left biceps: 2+  Right triceps: 2+  Left triceps: 2+  Right patellar: 2+  Left patellar: 2+  Right achilles: 2+  Left achilles: 2+  Right : 2+  Left : 2+      Physical Exam   Constitutional: She is oriented to person, place, and time. She appears well-developed and well-nourished. No distress.   HENT:   Head: Normocephalic and atraumatic.   Nasal turbinate swelling   Eyes: EOM are normal. Pupils are equal, round, and reactive to light.   Neck: Normal range of motion. Neck supple.   Neurological: She is alert and oriented to person, place, and time. She has normal strength. She displays normal reflexes. No cranial nerve deficit or sensory deficit. She exhibits normal muscle tone. She has a normal Finger-Nose-Finger Test and a normal Romberg Test. Gait normal. Coordination normal.   Reflex Scores:       Tricep reflexes are 2+ on the right side and 2+ on the left side.       Bicep reflexes are 2+ on the right side and 2+ on the left side.       Brachioradialis reflexes are 2+ on the right side and 2+ on the left side.       Patellar reflexes are 2+ on the right side and 2+ on the left side.       Achilles reflexes are 2+ on the right side and 2+ on the left side.  Skin: Skin is warm.   Psychiatric: She has a normal mood and affect. Her speech is normal and behavior is normal. Judgment and thought content normal.   Vitals reviewed.      Assessment/Plan:       Marguerite was seen today for migraine.    Diagnoses and all orders for this visit:    Migraine without aura and without status migrainosus, not intractable  -      SUMAtriptan (IMITREX) 100 MG tablet; Take 1 tablet by mouth 1 (One) Time As Needed for Migraine for up to 1 dose.    Seizure disorder  -     levETIRAcetam (KEPPRA) 500 MG tablet; Take 1 tablet by mouth Every 12 (Twelve) Hours.  -     Levetiracetam Level (Keppra)  -     CBC & Differential  -     Comprehensive Metabolic Panel  -     CBC Auto Differential  -     EEG Awake or Drowsy Routine    Chronic seasonal allergic rhinitis, unspecified trigger    Other orders  -     fluticasone (FLONASE) 50 MCG/ACT nasal spray; 1 spray into each nostril Daily.  -     topiramate (TOPAMAX) 100 MG tablet; Take 1 tablet by mouth Daily.    Mrs. Fink is reporting episodic staring with a smoke olfactory aura.  We will obtain Keppra levels today, she admits that she has missed a few dosages of Topamax so this was not drawn today.  EEG is pending as well as blood work as noted above.  She is complaining of a mild increase in headaches with recent weather changes, she struggling with some allergies as well, I have send her in Flonase, if these headaches do not improve she will let us know.  Reviewed medications, potential side effects and signs and symptoms to report. Discussed risk versus benefits of treatment plan with patient and/or family-including medications, labs and radiology that may be ordered. Addressed questions and concerns during visit. Patient and/or family verbalized understanding and agree with plan.  Patient instructions include: No driving or operating heavy machinery for 3 months from onset of most recent seizure. Minimize stress as much as possible. Recommended 7-8 hours of sleep each night. Abstain from alcohol intake. Educated on Antiepileptic medications with possible side effects and signs and symptoms to report if prescribed during visit. Instructed to take seizure medication daily if prescribed. Reviewed potential seizure risk factors. Instructed to call 911 or our office if another seizure does occur.  EMR  Dragon/Transcription Disclaimer:  Much of this encounter note is an electronic transcription of spoken language to printed text. Electronic transcription of spoken language may permit erroneous words or phrases to be inadvertently transcribed. Although I have reviewed the note for such errors, some may still exist in this documentation.           Ginny Pace, APRN  5/17/2018

## 2018-05-20 LAB — LEVETIRACETAM SERPL-MCNC: 11.7 UG/ML (ref 10–40)

## 2018-05-21 ENCOUNTER — TELEPHONE (OUTPATIENT)
Dept: NEUROLOGY | Facility: CLINIC | Age: 48
End: 2018-05-21

## 2018-05-21 NOTE — TELEPHONE ENCOUNTER
----- Message from CHRIS Motley sent at 5/19/2018  3:04 PM EDT -----  Let her know labs stable so far, keppra level is still pending

## 2018-05-22 ENCOUNTER — TELEPHONE (OUTPATIENT)
Dept: NEUROLOGY | Facility: CLINIC | Age: 48
End: 2018-05-22

## 2018-05-22 NOTE — PROGRESS NOTES
Marybeth let her know her Keppra level was in normal range, low end of normal but normal.  We will get EEG as ordered and go from there.  Keep follow-up

## 2018-05-22 NOTE — TELEPHONE ENCOUNTER
----- Message from CHRIS Motley sent at 5/22/2018  2:53 PM EDT -----  Marybeth let her know her Keppra level was in normal range, low end of normal but normal.  We will get EEG as ordered and go from there.  Keep follow-up

## 2018-06-04 RX ORDER — ESTRADIOL 2 MG/1
TABLET ORAL
Qty: 60 TABLET | Refills: 1 | Status: SHIPPED | OUTPATIENT
Start: 2018-06-04 | End: 2018-12-19

## 2018-11-06 ENCOUNTER — OFFICE VISIT (OUTPATIENT)
Dept: INTERNAL MEDICINE | Facility: CLINIC | Age: 48
End: 2018-11-06

## 2018-11-06 VITALS
TEMPERATURE: 98.1 F | RESPIRATION RATE: 16 BRPM | SYSTOLIC BLOOD PRESSURE: 103 MMHG | WEIGHT: 143 LBS | OXYGEN SATURATION: 100 % | DIASTOLIC BLOOD PRESSURE: 68 MMHG | HEART RATE: 67 BPM | HEIGHT: 64 IN | BODY MASS INDEX: 24.41 KG/M2

## 2018-11-06 DIAGNOSIS — E78.00 HYPERCHOLESTEREMIA: ICD-10-CM

## 2018-11-06 DIAGNOSIS — Z00.00 ROUTINE GENERAL MEDICAL EXAMINATION AT A HEALTH CARE FACILITY: ICD-10-CM

## 2018-11-06 DIAGNOSIS — F41.9 ANXIETY: ICD-10-CM

## 2018-11-06 DIAGNOSIS — R00.2 PALPITATION: ICD-10-CM

## 2018-11-06 DIAGNOSIS — R06.02 SHORT OF BREATH ON EXERTION: Primary | ICD-10-CM

## 2018-11-06 DIAGNOSIS — R56.9 SEIZURE (HCC): ICD-10-CM

## 2018-11-06 DIAGNOSIS — K29.40 ATROPHIC GASTRITIS WITHOUT HEMORRHAGE: ICD-10-CM

## 2018-11-06 DIAGNOSIS — B18.2 CHRONIC HEPATITIS C WITHOUT HEPATIC COMA (HCC): ICD-10-CM

## 2018-11-06 DIAGNOSIS — Z72.0 TOBACCO ABUSE: ICD-10-CM

## 2018-11-06 DIAGNOSIS — C44.202 CANCER OF SKIN OF RIGHT EAR: ICD-10-CM

## 2018-11-06 DIAGNOSIS — J43.1 PANLOBULAR EMPHYSEMA (HCC): ICD-10-CM

## 2018-11-06 DIAGNOSIS — Z23 NEED FOR VACCINATION: ICD-10-CM

## 2018-11-06 PROCEDURE — 90472 IMMUNIZATION ADMIN EACH ADD: CPT | Performed by: INTERNAL MEDICINE

## 2018-11-06 PROCEDURE — 90632 HEPA VACCINE ADULT IM: CPT | Performed by: INTERNAL MEDICINE

## 2018-11-06 PROCEDURE — 90674 CCIIV4 VAC NO PRSV 0.5 ML IM: CPT | Performed by: INTERNAL MEDICINE

## 2018-11-06 PROCEDURE — 90471 IMMUNIZATION ADMIN: CPT | Performed by: INTERNAL MEDICINE

## 2018-11-06 PROCEDURE — 99214 OFFICE O/P EST MOD 30 MIN: CPT | Performed by: INTERNAL MEDICINE

## 2018-11-06 RX ORDER — ESCITALOPRAM OXALATE 10 MG/1
10 TABLET ORAL DAILY
Qty: 30 TABLET | Refills: 11 | Status: SHIPPED | OUTPATIENT
Start: 2018-11-06 | End: 2019-11-25

## 2018-11-06 RX ORDER — CHOLECALCIFEROL (VITAMIN D3) 125 MCG
1 CAPSULE ORAL DAILY
Qty: 90 TABLET | Refills: 3 | Status: SHIPPED | OUTPATIENT
Start: 2018-11-06 | End: 2019-11-25 | Stop reason: SDDI

## 2018-11-06 RX ORDER — VARENICLINE TARTRATE 1 MG/1
1 TABLET, FILM COATED ORAL
Qty: 60 TABLET | Refills: 2 | Status: SHIPPED | OUTPATIENT
Start: 2018-11-06 | End: 2018-12-19

## 2018-11-06 NOTE — PROGRESS NOTES
Subjective     Patient ID: Marguerite Fink is a 48 y.o. female. Patient is here for management of multiple medical problems.     Chief Complaint   Patient presents with   • Increased Anxiety     patient states she has been having anxiety issues,  her blood pressure has been elevated, sweating, shaking, nausea, vomiting, heart racing, increased anxiety   • Insomnia     patient also having difficulty sleeping     History of Present Illness     PT thinks panic attacks getting worse.   More noticeable and causing problems.  Palpitation with sweats and knots in stomach.    Increased episodes of vomiting.   Yesterday on the way to work had anxiety and loss of bowel. Diarrhea. Had to stay home from work after that.   Last vistit with me 11/2017.      Tried to get her to quit smoking in 11/2017 visit.  Pt still estrogen till wed and ran out.    Short of breath with panic attacks.  Some soa with exercise. Tries to walk daily but only 2-3 x a week.  Minimal daily activity.  Wt is up.  Wakes up tires if getts sleep. Poor sleep quality.                The following portions of the patient's history were reviewed and updated as appropriate: allergies, current medications, past family history, past medical history, past social history, past surgical history and problem list.    Review of Systems   Constitutional: Positive for fatigue.   HENT: Negative for congestion and rhinorrhea.    Gastrointestinal: Negative for abdominal distention and abdominal pain.   Musculoskeletal: Negative for arthralgias, back pain, gait problem, joint swelling and myalgias.   Psychiatric/Behavioral: Negative for sleep disturbance.       Current Outpatient Prescriptions:   •  aspirin 81 MG EC tablet, Take 1 tablet by mouth Daily., Disp: 90 tablet, Rfl: 3  •  atorvastatin (LIPITOR) 10 MG tablet, Take 1 tablet by mouth Daily., Disp: 30 tablet, Rfl: 11  •  estradiol (ESTRACE) 2 MG tablet, take 1 tablet by mouth once daily, Disp: 60 tablet, Rfl: 1  •   "fluticasone (FLONASE) 50 MCG/ACT nasal spray, 1 spray into each nostril Daily., Disp: 1 bottle, Rfl: 2  •  levETIRAcetam (KEPPRA) 500 MG tablet, Take 1 tablet by mouth Every 12 (Twelve) Hours., Disp: 60 tablet, Rfl: 11  •  lisinopril (PRINIVIL,ZESTRIL) 10 MG tablet, Take 1 tablet by mouth Daily., Disp: 90 tablet, Rfl: 3  •  oxyCODONE-acetaminophen (PERCOCET)  MG per tablet, Take  by mouth every 12 (twelve) hours., Disp: , Rfl:   •  SUMAtriptan (IMITREX) 100 MG tablet, Take 1 tablet by mouth 1 (One) Time As Needed for Migraine for up to 1 dose., Disp: 9 tablet, Rfl: 11  •  topiramate (TOPAMAX) 100 MG tablet, Take 1 tablet by mouth Daily., Disp: 30 tablet, Rfl: 11  •  B-12, Methylcobalamin, 1000 MCG sublingual tablet, Place 1 tablet under the tongue Daily., Disp: 90 tablet, Rfl: 3  •  escitalopram (LEXAPRO) 10 MG tablet, Take 1 tablet by mouth Daily., Disp: 30 tablet, Rfl: 11  •  varenicline (CHANTIX CONTINUING MONTH PAK) 1 MG tablet, Take 1 tablet by mouth 2 (Two) Times a Day., Disp: 60 tablet, Rfl: 2  •  varenicline (CHANTIX STARTING MONTH PAK) 0.5 MG X 11 & 1 MG X 42 tablet, Take 0.5 mg one daily on days 1-3 and and 0.5 mg twice daily on days 4-7.Then 1 mg twice daily for a total of 12 weeks., Disp: 53 tablet, Rfl: 0    Objective      Blood pressure 103/68, pulse 67, temperature 98.1 °F (36.7 °C), temperature source Oral, resp. rate 16, height 162.6 cm (64\"), weight 64.9 kg (143 lb), SpO2 100 %.    Physical Exam     General Appearance:    Alert, cooperative, no distress, appears stated age   Head:    Normocephalic, without obvious abnormality, atraumatic   Eyes:    PERRL, conjunctiva/corneas clear, EOM's intact   Ears:    Normal TM's and external ear canals, both ears   Nose:   Nares normal, septum midline, mucosa normal, no drainage   or sinus tenderness   Throat:   Lips, mucosa, and tongue normal; teeth and gums normal   Neck:   Supple, symmetrical, trachea midline, no adenopathy;        thyroid:  No " enlargement/tenderness/nodules; no carotid    bruit or JVD   Back:     Symmetric, no curvature, ROM normal, no CVA tenderness   Lungs:     Hyperinflated. Clear to auscultation bilaterally, respirations unlabored   Chest wall:    No tenderness or deformity   Heart:    Regular rate and rhythm, S1 and S2 normal, no murmur,        rub or gallop   Abdomen:     Soft, non-tender, bowel sounds active all four quadrants,     no masses, no organomegaly   Extremities:   Extremities normal, atraumatic, no cyanosis or edema   Pulses:   2+ and symmetric all extremities   Skin:   Large skin lesion on top of right ear.     Lymph nodes:   Cervical, supraclavicular, and axillary nodes normal   Neurologic:   CNII-XII intact. Normal strength, sensation and reflexes       throughout      Results for orders placed or performed in visit on 05/16/18   Levetiracetam Level (Keppra)   Result Value Ref Range    Levetiracetam 11.7 10.0 - 40.0 ug/mL   Comprehensive Metabolic Panel   Result Value Ref Range    Glucose 78 70 - 100 mg/dL    BUN 6 (L) 9 - 23 mg/dL    Creatinine 0.60 0.60 - 1.30 mg/dL    Sodium 142 132 - 146 mmol/L    Potassium 4.0 3.5 - 5.5 mmol/L    Chloride 109 99 - 109 mmol/L    CO2 30.0 20.0 - 31.0 mmol/L    Calcium 8.7 8.7 - 10.4 mg/dL    Total Protein 6.2 5.7 - 8.2 g/dL    Albumin 4.10 3.20 - 4.80 g/dL    ALT (SGPT) 12 7 - 40 U/L    AST (SGOT) 17 0 - 33 U/L    Alkaline Phosphatase 53 25 - 100 U/L    Total Bilirubin 0.3 0.3 - 1.2 mg/dL    eGFR Non African Amer 107 >60 mL/min/1.73    Globulin 2.1 gm/dL    A/G Ratio 2.0 1.5 - 2.5 g/dL    BUN/Creatinine Ratio 10.0 7.0 - 25.0    Anion Gap 3.0 3.0 - 11.0 mmol/L   CBC Auto Differential   Result Value Ref Range    WBC 9.18 3.50 - 10.80 10*3/mm3    RBC 4.14 3.89 - 5.14 10*6/mm3    Hemoglobin 13.0 11.5 - 15.5 g/dL    Hematocrit 39.5 34.5 - 44.0 %    MCV 95.4 80.0 - 99.0 fL    MCH 31.4 (H) 27.0 - 31.0 pg    MCHC 32.9 32.0 - 36.0 g/dL    RDW 12.9 11.3 - 14.5 %    RDW-SD 45.2 37.0 - 54.0 fl     MPV 10.3 6.0 - 12.0 fL    Platelets 256 150 - 450 10*3/mm3    Neutrophil % 54.2 41.0 - 71.0 %    Lymphocyte % 37.8 24.0 - 44.0 %    Monocyte % 5.6 0.0 - 12.0 %    Eosinophil % 1.9 0.0 - 3.0 %    Basophil % 0.5 0.0 - 1.0 %    Immature Grans % 0.2 0.0 - 0.6 %    Neutrophils, Absolute 4.98 1.50 - 8.30 10*3/mm3    Lymphocytes, Absolute 3.47 0.60 - 4.80 10*3/mm3    Monocytes, Absolute 0.51 0.00 - 1.00 10*3/mm3    Eosinophils, Absolute 0.17 0.00 - 0.30 10*3/mm3    Basophils, Absolute 0.05 0.00 - 0.20 10*3/mm3    Immature Grans, Absolute 0.02 0.00 - 0.03 10*3/mm3         Assessment/Plan     Causes to gastritis: smoking and opiates. Consider other etiology.        Marguerite was seen today for increased anxiety and insomnia.    Diagnoses and all orders for this visit:    Short of breath on exertion  -     Overnight Sleep Oximetry Study; Future  -     Ambulatory Referral to Cardiology    Chronic hepatitis C without hepatic coma (CMS/HCC)  -     Hepatitis C RNA, Quantitative, PCR (graph)    Seizure (CMS/HCC)    Tobacco abuse  -     varenicline (CHANTIX STARTING MONTH NISSA) 0.5 MG X 11 & 1 MG X 42 tablet; Take 0.5 mg one daily on days 1-3 and and 0.5 mg twice daily on days 4-7.Then 1 mg twice daily for a total of 12 weeks.  -     varenicline (CHANTIX CONTINUING MONTH NISSA) 1 MG tablet; Take 1 tablet by mouth 2 (Two) Times a Day.    Anxiety    Panlobular emphysema (CMS/HCC)  -     Overnight Sleep Oximetry Study; Future    Cancer of skin of right ear  -     Ambulatory Referral to Dermatology    Atrophic gastritis without hemorrhage  -     Hepatitis C RNA, Quantitative, PCR (graph)  -     H. Pylori Antibody, IgA  -     IgG  -     IgM  -     IgA  -     H. Pylori Antibody, IgG    Hypercholesteremia  -     TSH  -     T4, Free  -     Comprehensive Metabolic Panel  -     Vitamin B12  -     CBC & Differential  -     Lipid Panel    Routine general medical examination at a health care facility  -     Hepatitis A Vaccine Adult  IM    Palpitation  -     Ambulatory Referral to Cardiology    Other orders  -     B-12, Methylcobalamin, 1000 MCG sublingual tablet; Place 1 tablet under the tongue Daily.  -     escitalopram (LEXAPRO) 10 MG tablet; Take 1 tablet by mouth Daily.      Return in about 4 weeks (around 12/4/2018).          There are no Patient Instructions on file for this visit.     Juan Clemens MD    Assessment/Plan        fall

## 2018-11-20 ENCOUNTER — CONSULT (OUTPATIENT)
Dept: CARDIOLOGY | Facility: CLINIC | Age: 48
End: 2018-11-20

## 2018-11-20 VITALS
HEIGHT: 64 IN | OXYGEN SATURATION: 99 % | DIASTOLIC BLOOD PRESSURE: 60 MMHG | BODY MASS INDEX: 24.14 KG/M2 | SYSTOLIC BLOOD PRESSURE: 104 MMHG | HEART RATE: 80 BPM | WEIGHT: 141.4 LBS | RESPIRATION RATE: 18 BRPM

## 2018-11-20 DIAGNOSIS — Z72.0 TOBACCO ABUSE: ICD-10-CM

## 2018-11-20 DIAGNOSIS — I20.8 ANGINAL EQUIVALENT (HCC): Primary | ICD-10-CM

## 2018-11-20 DIAGNOSIS — J43.1 PANLOBULAR EMPHYSEMA (HCC): ICD-10-CM

## 2018-11-20 DIAGNOSIS — E78.49 OTHER HYPERLIPIDEMIA: ICD-10-CM

## 2018-11-20 DIAGNOSIS — I10 ESSENTIAL HYPERTENSION: ICD-10-CM

## 2018-11-20 DIAGNOSIS — R00.2 PALPITATIONS: ICD-10-CM

## 2018-11-20 DIAGNOSIS — B18.2 CHRONIC HEPATITIS C WITHOUT HEPATIC COMA (HCC): ICD-10-CM

## 2018-11-20 DIAGNOSIS — R06.02 SHORT OF BREATH ON EXERTION: ICD-10-CM

## 2018-11-20 PROBLEM — I20.89 ANGINAL EQUIVALENT: Status: ACTIVE | Noted: 2018-11-20

## 2018-11-20 PROCEDURE — 99243 OFF/OP CNSLTJ NEW/EST LOW 30: CPT | Performed by: INTERNAL MEDICINE

## 2018-11-20 PROCEDURE — 93000 ELECTROCARDIOGRAM COMPLETE: CPT | Performed by: INTERNAL MEDICINE

## 2018-11-20 NOTE — PROGRESS NOTES
"    Subjective:     Encounter Date:11/20/2018      Patient ID: Marguerite Fink is a 48 y.o. female.    Chief Complaint: Palpitations  HPI  This is a 48-year-old female patient who began having palpitations in January 2018.  They seem to get better spontaneously and have subsequently worsened over the last one month.  She describes it as a sense that her heart is \"racing\".  It is associated with nausea dizziness lightheadedness vomiting diaphoresis and shortness of breath.  There is no associated syncope.  The patient has no history of documented arrhythmia.  The palpitations occur proximally 3-4 times per week and will last 5-10 minutes per episode.  She cannot identify any precipitating aggravating or alleviating features.  She has also noticed swelling of her feet and ankles if she's been on her feet for prolonged periods of time.  She's noticed increased fatigue.  She has a history of hepatitis C but has been treated with pharmacologic cure.  She has a history of hypertension and elevated cholesterol both of which are appropriately treated.  She is currently smoking one half pack cigarettes per day but is attempting to quit.  She has no chest discomfort at rest or with activity.  She does have shortness of breath with activity.  This occurs with activities such as climbing a flight of stairs and is relieved with rest.  Her family history is remarkable for premature coronary disease.  She has no personal history of diabetes.  There is no orthopnea or PND.  She has no history of syncope.  She has no history of myocardial infarction or coronary revascularization.  She's never had ischemic testing.  The following portions of the patient's history were reviewed and updated as appropriate: allergies, current medications, past family history, past medical history, past social history, past surgical history and problem  Review of Systems   Constitution: Positive for weakness and malaise/fatigue. Negative for chills, " diaphoresis, fever, weight gain and weight loss.   HENT: Negative for ear discharge, hearing loss, hoarse voice and nosebleeds.    Eyes: Negative for discharge, double vision, pain and photophobia.   Cardiovascular: Positive for dyspnea on exertion, leg swelling and palpitations. Negative for chest pain, claudication, cyanosis, irregular heartbeat, near-syncope, orthopnea, paroxysmal nocturnal dyspnea and syncope.   Respiratory: Positive for shortness of breath. Negative for cough, hemoptysis, sputum production and wheezing.    Endocrine: Negative for cold intolerance, heat intolerance, polydipsia, polyphagia and polyuria.   Hematologic/Lymphatic: Negative for adenopathy and bleeding problem. Does not bruise/bleed easily.   Skin: Negative for color change, flushing, itching and rash.   Musculoskeletal: Negative for muscle cramps, muscle weakness, myalgias and stiffness.   Gastrointestinal: Negative for abdominal pain, diarrhea, hematemesis, hematochezia, nausea and vomiting.   Genitourinary: Negative for dysuria, frequency and nocturia.   Neurological: Positive for dizziness and light-headedness. Negative for focal weakness, loss of balance, numbness, paresthesias and seizures.   Psychiatric/Behavioral: Negative for altered mental status, hallucinations and suicidal ideas.   Allergic/Immunologic: Negative for HIV exposure, hives and persistent infections.           Current Outpatient Medications:   •  aspirin 81 MG EC tablet, Take 1 tablet by mouth Daily., Disp: 90 tablet, Rfl: 3  •  atorvastatin (LIPITOR) 10 MG tablet, Take 1 tablet by mouth Daily., Disp: 30 tablet, Rfl: 11  •  B-12, Methylcobalamin, 1000 MCG sublingual tablet, Place 1 tablet under the tongue Daily., Disp: 90 tablet, Rfl: 3  •  escitalopram (LEXAPRO) 10 MG tablet, Take 1 tablet by mouth Daily., Disp: 30 tablet, Rfl: 11  •  estradiol (ESTRACE) 2 MG tablet, take 1 tablet by mouth once daily, Disp: 60 tablet, Rfl: 1  •  fluticasone (FLONASE) 50  MCG/ACT nasal spray, 1 spray into each nostril Daily., Disp: 1 bottle, Rfl: 2  •  levETIRAcetam (KEPPRA) 500 MG tablet, Take 1 tablet by mouth Every 12 (Twelve) Hours., Disp: 60 tablet, Rfl: 11  •  lisinopril (PRINIVIL,ZESTRIL) 10 MG tablet, Take 1 tablet by mouth Daily., Disp: 90 tablet, Rfl: 3  •  oxyCODONE-acetaminophen (PERCOCET)  MG per tablet, Take  by mouth every 12 (twelve) hours., Disp: , Rfl:   •  SUMAtriptan (IMITREX) 100 MG tablet, Take 1 tablet by mouth 1 (One) Time As Needed for Migraine for up to 1 dose., Disp: 9 tablet, Rfl: 11  •  topiramate (TOPAMAX) 100 MG tablet, Take 1 tablet by mouth Daily., Disp: 30 tablet, Rfl: 11  •  varenicline (CHANTIX CONTINUING MONTH NISSA) 1 MG tablet, Take 1 tablet by mouth 2 (Two) Times a Day., Disp: 60 tablet, Rfl: 2  •  varenicline (CHANTIX STARTING MONTH NISSA) 0.5 MG X 11 & 1 MG X 42 tablet, Take 0.5 mg one daily on days 1-3 and and 0.5 mg twice daily on days 4-7.Then 1 mg twice daily for a total of 12 weeks., Disp: 53 tablet, Rfl: 0     Objective:     Physical Exam   Constitutional: She is oriented to person, place, and time. She appears well-developed and well-nourished. No distress.   HENT:   Head: Normocephalic and atraumatic.   Mouth/Throat: Oropharynx is clear and moist.   Eyes: Conjunctivae and EOM are normal. Pupils are equal, round, and reactive to light. No scleral icterus.   Neck: Normal range of motion. Neck supple. No JVD present. No tracheal deviation present. No thyromegaly present.   Cardiovascular: Normal rate, regular rhythm, S1 normal, S2 normal, normal heart sounds, intact distal pulses and normal pulses. PMI is not displaced. Exam reveals no gallop and no friction rub.   No murmur heard.  Pulmonary/Chest: Effort normal and breath sounds normal. No respiratory distress. She has no wheezes. She has no rales.   Abdominal: Soft. Bowel sounds are normal. She exhibits no distension and no mass. There is no tenderness. There is no rebound and no  "guarding.   Musculoskeletal: Normal range of motion. She exhibits no edema or deformity.   Neurological: She is alert and oriented to person, place, and time. She displays normal reflexes. No cranial nerve deficit. Coordination normal.   Skin: Skin is warm and dry. No rash noted. She is not diaphoretic. No erythema.   Psychiatric: She has a normal mood and affect. Her behavior is normal. Thought content normal.     Blood pressure 104/60, pulse 80, resp. rate 18, height 162.6 cm (64\"), weight 64.1 kg (141 lb 6.4 oz), SpO2 99 %.   Lab Review:       Assessment:         1. Panlobular emphysema (CMS/HCC)  Tobacco-related lung disease.    2. Short of breath on exertion  This is multifactorial in etiology.  Some is certainly related to the patient's aerobic deconditioning.  There is an element of tobacco-related lung disease with ongoing tobacco abuse.  There may be an element related to hypertensive cardiac disease.  There may be an element of unrecognized congestive heart failure.  This could also represent an angina equivalent.  - Treadmill Stress Test  - Adult Transthoracic Echo Complete W/ Cont if Necessary Per Protocol    3. Chronic hepatitis C without hepatic coma (CMS/HCC)  The patient has been appropriately treated and is apparently now virus free.    4. Palpitations  Some of the patient's symptoms could be due to underlying arrhythmia and/or ectopy.  She has never worn an outpatient heart monitor.  - Adult Transthoracic Echo Complete W/ Cont if Necessary Per Protocol  - Thyroid Panel With TSH; Future  - Holter Monitor - 72 Hour Up To 21 Days    5. Essential hypertension  Excellent blood pressure control.  - Adult Transthoracic Echo Complete W/ Cont if Necessary Per Protocol    6. Other hyperlipidemia  On appropriate statin based cholesterol-lowering therapy.    7. Tobacco abuse  The patient is trying to quit.  She has been given a prescription for Chantix.    8. Anginal equivalent (CMS/HCC)  Shortness of breath " with activity that is relieved with rest is typical of the diagnosis of angina pectoris.  - Treadmill Stress Test  - Adult Transthoracic Echo Complete W/ Cont if Necessary Per Protocol      ECG 12 Lead  Date/Time: 11/20/2018 9:07 AM  Performed by: West Buckner MD  Authorized by: West Buckner MD   Rhythm: sinus rhythm  Rate: normal  QRS axis: normal  Clinical impression: normal ECG             Plan:       I have recommended a treadmill exercise stress test as well as an echocardiogram.  I have recommended an outpatient cardiac monitor.  I have recommended thyroid function studies.  The patient has been counseled regarding the essential need to discontinue cigarette smoking.  No changes to her medication profile have been made at today's visit.  Further recommendations will be predicated on the results of her outpatient testing.

## 2018-12-04 DIAGNOSIS — I10 ESSENTIAL HYPERTENSION: ICD-10-CM

## 2018-12-04 RX ORDER — LISINOPRIL 10 MG/1
10 TABLET ORAL DAILY
Qty: 90 TABLET | Refills: 3 | Status: SHIPPED | OUTPATIENT
Start: 2018-12-04 | End: 2019-08-16

## 2018-12-13 ENCOUNTER — TELEPHONE (OUTPATIENT)
Dept: CARDIOLOGY | Facility: CLINIC | Age: 48
End: 2018-12-13

## 2018-12-13 LAB
BH CV ECHO MEAS - % IVS THICK: 26.3 %
BH CV ECHO MEAS - % LVPW THICK: 53.8 %
BH CV ECHO MEAS - AO MAX PG (FULL): 1.8 MMHG
BH CV ECHO MEAS - AO MAX PG: 5 MMHG
BH CV ECHO MEAS - AO MEAN PG (FULL): 2 MMHG
BH CV ECHO MEAS - AO MEAN PG: 3 MMHG
BH CV ECHO MEAS - AO ROOT AREA: 6.6 CM^2
BH CV ECHO MEAS - AO ROOT DIAM: 2.9 CM
BH CV ECHO MEAS - AO V2 MAX: 116 CM/SEC
BH CV ECHO MEAS - AO V2 MEAN: 76.4 CM/SEC
BH CV ECHO MEAS - AO V2 VTI: 18.5 CM
BH CV ECHO MEAS - AVA(I,A): 2.7 CM^2
BH CV ECHO MEAS - AVA(I,D): 2.7 CM^2
BH CV ECHO MEAS - AVA(V,A): 2.7 CM^2
BH CV ECHO MEAS - AVA(V,D): 2.7 CM^2
BH CV ECHO MEAS - EDV(CUBED): 100.5 ML
BH CV ECHO MEAS - EDV(MOD-SP4): 76 ML
BH CV ECHO MEAS - EDV(TEICH): 99.8 ML
BH CV ECHO MEAS - EF(CUBED): 78.2 %
BH CV ECHO MEAS - EF(MOD-BP): 68 %
BH CV ECHO MEAS - EF(MOD-SP4): 59.2 %
BH CV ECHO MEAS - EF(TEICH): 70.4 %
BH CV ECHO MEAS - ESV(CUBED): 22 ML
BH CV ECHO MEAS - ESV(MOD-SP4): 31 ML
BH CV ECHO MEAS - ESV(TEICH): 29.6 ML
BH CV ECHO MEAS - FS: 39.8 %
BH CV ECHO MEAS - IVS/LVPW: 1.5
BH CV ECHO MEAS - IVSD: 0.95 CM
BH CV ECHO MEAS - IVSS: 1.2 CM
BH CV ECHO MEAS - LA DIMENSION: 3 CM
BH CV ECHO MEAS - LA/AO: 1
BH CV ECHO MEAS - LAT PEAK E' VEL: 11.9 CM/SEC
BH CV ECHO MEAS - LATERAL E/E' RATIO: 3.9
BH CV ECHO MEAS - LV MASS(C)D: 120.1 GRAMS
BH CV ECHO MEAS - LV MASS(C)S: 86.3 GRAMS
BH CV ECHO MEAS - LV MAX PG: 3.2 MMHG
BH CV ECHO MEAS - LV MEAN PG: 1 MMHG
BH CV ECHO MEAS - LV V1 MAX: 89.1 CM/SEC
BH CV ECHO MEAS - LV V1 MEAN: 50.9 CM/SEC
BH CV ECHO MEAS - LV V1 VTI: 14.3 CM
BH CV ECHO MEAS - LVIDD: 4.7 CM
BH CV ECHO MEAS - LVIDS: 2.8 CM
BH CV ECHO MEAS - LVLD AP4: 8.2 CM
BH CV ECHO MEAS - LVLS AP4: 6.8 CM
BH CV ECHO MEAS - LVOT AREA (M): 3.5 CM^2
BH CV ECHO MEAS - LVOT AREA: 3.5 CM^2
BH CV ECHO MEAS - LVOT DIAM: 2.1 CM
BH CV ECHO MEAS - LVPWD: 0.65 CM
BH CV ECHO MEAS - LVPWS: 1 CM
BH CV ECHO MEAS - MED PEAK E' VEL: 9.4 CM/SEC
BH CV ECHO MEAS - MEDIAL E/E' RATIO: 4.9
BH CV ECHO MEAS - MV A MAX VEL: 57.8 CM/SEC
BH CV ECHO MEAS - MV DEC SLOPE: 194 CM/SEC^2
BH CV ECHO MEAS - MV DEC TIME: 0.23 SEC
BH CV ECHO MEAS - MV E MAX VEL: 46.1 CM/SEC
BH CV ECHO MEAS - MV E/A: 0.8
BH CV ECHO MEAS - MV MAX PG: 1.8 MMHG
BH CV ECHO MEAS - MV MEAN PG: 1 MMHG
BH CV ECHO MEAS - MV P1/2T MAX VEL: 46.8 CM/SEC
BH CV ECHO MEAS - MV P1/2T: 70.7 MSEC
BH CV ECHO MEAS - MV V2 MAX: 66.2 CM/SEC
BH CV ECHO MEAS - MV V2 MEAN: 47.3 CM/SEC
BH CV ECHO MEAS - MV V2 VTI: 16.1 CM
BH CV ECHO MEAS - MVA P1/2T LCG: 4.7 CM^2
BH CV ECHO MEAS - MVA(P1/2T): 3.1 CM^2
BH CV ECHO MEAS - MVA(VTI): 3.1 CM^2
BH CV ECHO MEAS - PA MAX PG: 2.1 MMHG
BH CV ECHO MEAS - PA V2 MAX: 72.9 CM/SEC
BH CV ECHO MEAS - RAP SYSTOLE: 5 MMHG
BH CV ECHO MEAS - RVSP: 30 MMHG
BH CV ECHO MEAS - SV(AO): 122.2 ML
BH CV ECHO MEAS - SV(CUBED): 78.6 ML
BH CV ECHO MEAS - SV(LVOT): 49.5 ML
BH CV ECHO MEAS - SV(MOD-SP4): 45 ML
BH CV ECHO MEAS - SV(TEICH): 70.3 ML
BH CV ECHO MEAS - TR MAX PG: 25 MMHG
BH CV ECHO MEAS - TR MAX VEL: 251 CM/SEC
BH CV ECHO MEAS - TV MAX PG: 0.98 MMHG
BH CV ECHO MEAS - TV V2 MAX: 49.5 CM/SEC
BH CV ECHO MEASUREMENTS AVERAGE E/E' RATIO: 4.33
LV EF 2D ECHO EST: 68 %

## 2018-12-19 ENCOUNTER — OFFICE VISIT (OUTPATIENT)
Dept: INTERNAL MEDICINE | Facility: CLINIC | Age: 48
End: 2018-12-19

## 2018-12-19 ENCOUNTER — HOSPITAL ENCOUNTER (OUTPATIENT)
Dept: MRI IMAGING | Facility: HOSPITAL | Age: 48
Discharge: HOME | End: 2018-12-19
Attending: INTERNAL MEDICINE | Admitting: INTERNAL MEDICINE

## 2018-12-19 ENCOUNTER — APPOINTMENT (OUTPATIENT)
Dept: LAB | Facility: HOSPITAL | Age: 48
End: 2018-12-19
Attending: INTERNAL MEDICINE

## 2018-12-19 VITALS
BODY MASS INDEX: 23.9 KG/M2 | WEIGHT: 140 LBS | TEMPERATURE: 98.4 F | SYSTOLIC BLOOD PRESSURE: 136 MMHG | DIASTOLIC BLOOD PRESSURE: 79 MMHG | HEIGHT: 64 IN | RESPIRATION RATE: 16 BRPM | HEART RATE: 66 BPM | OXYGEN SATURATION: 99 %

## 2018-12-19 DIAGNOSIS — Z72.0 TOBACCO ABUSE: ICD-10-CM

## 2018-12-19 DIAGNOSIS — R07.89 CHEST TIGHTNESS: ICD-10-CM

## 2018-12-19 DIAGNOSIS — I20.8 ANGINA AT REST (HCC): ICD-10-CM

## 2018-12-19 DIAGNOSIS — J44.1 COPD EXACERBATION (HCC): ICD-10-CM

## 2018-12-19 DIAGNOSIS — J43.1 PANLOBULAR EMPHYSEMA (HCC): Primary | ICD-10-CM

## 2018-12-19 DIAGNOSIS — G45.9 TIA (TRANSIENT ISCHEMIC ATTACK): ICD-10-CM

## 2018-12-19 LAB
ALBUMIN SERPL-MCNC: 4.7 G/DL (ref 3.5–5)
ALBUMIN/GLOB SERPL: 1.7 G/DL (ref 1–2)
ALP SERPL-CCNC: 52 U/L (ref 38–126)
ALT SERPL W P-5'-P-CCNC: 32 U/L (ref 13–69)
ANION GAP SERPL CALCULATED.3IONS-SCNC: 10.7 MMOL/L (ref 10–20)
AST SERPL-CCNC: 26 U/L (ref 15–46)
BASOPHILS # BLD AUTO: 0.06 10*3/MM3 (ref 0–0.2)
BASOPHILS NFR BLD AUTO: 0.6 % (ref 0–2.5)
BILIRUB SERPL-MCNC: 0.4 MG/DL (ref 0.2–1.3)
BUN BLD-MCNC: 12 MG/DL (ref 7–20)
BUN/CREAT SERPL: 15 (ref 7.1–23.5)
CALCIUM SPEC-SCNC: 9.5 MG/DL (ref 8.4–10.2)
CHLORIDE SERPL-SCNC: 108 MMOL/L (ref 98–107)
CHOLEST SERPL-MCNC: 177 MG/DL (ref 0–199)
CO2 SERPL-SCNC: 26 MMOL/L (ref 26–30)
CREAT BLD-MCNC: 0.8 MG/DL (ref 0.6–1.3)
DEPRECATED RDW RBC AUTO: 43.1 FL (ref 37–54)
EOSINOPHIL # BLD AUTO: 0.08 10*3/MM3 (ref 0–0.7)
EOSINOPHIL NFR BLD AUTO: 0.8 % (ref 0–7)
ERYTHROCYTE [DISTWIDTH] IN BLOOD BY AUTOMATED COUNT: 12.4 % (ref 11.5–14.5)
GFR SERPL CREATININE-BSD FRML MDRD: 77 ML/MIN/1.73
GLOBULIN UR ELPH-MCNC: 2.7 GM/DL
GLUCOSE BLD-MCNC: 99 MG/DL (ref 74–98)
HCT VFR BLD AUTO: 40.4 % (ref 37–47)
HDLC SERPL-MCNC: 56 MG/DL (ref 40–60)
HGB BLD-MCNC: 13.8 G/DL (ref 12–16)
IMM GRANULOCYTES # BLD: 0.03 10*3/MM3 (ref 0–0.06)
IMM GRANULOCYTES NFR BLD: 0.3 % (ref 0–0.6)
LDLC SERPL CALC-MCNC: 108 MG/DL (ref 0–99)
LDLC/HDLC SERPL: 1.93 {RATIO}
LYMPHOCYTES # BLD AUTO: 3.62 10*3/MM3 (ref 0.6–3.4)
LYMPHOCYTES NFR BLD AUTO: 36.8 % (ref 10–50)
MCH RBC QN AUTO: 32 PG (ref 27–31)
MCHC RBC AUTO-ENTMCNC: 34.2 G/DL (ref 30–37)
MCV RBC AUTO: 93.7 FL (ref 81–99)
MONOCYTES # BLD AUTO: 0.47 10*3/MM3 (ref 0–0.9)
MONOCYTES NFR BLD AUTO: 4.8 % (ref 0–12)
NEUTROPHILS # BLD AUTO: 5.58 10*3/MM3 (ref 2–6.9)
NEUTROPHILS NFR BLD AUTO: 56.7 % (ref 37–80)
NRBC BLD MANUAL-RTO: 0 /100 WBC (ref 0–0)
PLATELET # BLD AUTO: 328 10*3/MM3 (ref 130–400)
PMV BLD AUTO: 9.6 FL (ref 6–12)
POTASSIUM BLD-SCNC: 4.7 MMOL/L (ref 3.5–5.1)
PROT SERPL-MCNC: 7.4 G/DL (ref 6.3–8.2)
RBC # BLD AUTO: 4.31 10*6/MM3 (ref 4.2–5.4)
SODIUM BLD-SCNC: 140 MMOL/L (ref 137–145)
T4 FREE SERPL-MCNC: 0.75 NG/DL (ref 0.78–2.19)
TRIGL SERPL-MCNC: 66 MG/DL
TSH SERPL DL<=0.05 MIU/L-ACNC: 1.86 MIU/ML (ref 0.47–4.68)
VIT B12 BLD-MCNC: 354 PG/ML (ref 239–931)
VLDLC SERPL-MCNC: 13.2 MG/DL
WBC NRBC COR # BLD: 9.84 10*3/MM3 (ref 4.8–10.8)

## 2018-12-19 PROCEDURE — 82784 ASSAY IGA/IGD/IGG/IGM EACH: CPT | Performed by: INTERNAL MEDICINE

## 2018-12-19 PROCEDURE — 84439 ASSAY OF FREE THYROXINE: CPT | Performed by: INTERNAL MEDICINE

## 2018-12-19 PROCEDURE — 84443 ASSAY THYROID STIM HORMONE: CPT | Performed by: INTERNAL MEDICINE

## 2018-12-19 PROCEDURE — 70551 MRI BRAIN STEM W/O DYE: CPT

## 2018-12-19 PROCEDURE — 80053 COMPREHEN METABOLIC PANEL: CPT | Performed by: INTERNAL MEDICINE

## 2018-12-19 PROCEDURE — 85025 COMPLETE CBC W/AUTO DIFF WBC: CPT | Performed by: INTERNAL MEDICINE

## 2018-12-19 PROCEDURE — 86677 HELICOBACTER PYLORI ANTIBODY: CPT | Performed by: INTERNAL MEDICINE

## 2018-12-19 PROCEDURE — 99214 OFFICE O/P EST MOD 30 MIN: CPT | Performed by: INTERNAL MEDICINE

## 2018-12-19 PROCEDURE — 36415 COLL VENOUS BLD VENIPUNCTURE: CPT | Performed by: INTERNAL MEDICINE

## 2018-12-19 PROCEDURE — 80061 LIPID PANEL: CPT | Performed by: INTERNAL MEDICINE

## 2018-12-19 PROCEDURE — 87522 HEPATITIS C REVRS TRNSCRPJ: CPT | Performed by: INTERNAL MEDICINE

## 2018-12-19 PROCEDURE — 82607 VITAMIN B-12: CPT | Performed by: INTERNAL MEDICINE

## 2018-12-19 RX ORDER — DILTIAZEM HYDROCHLORIDE 120 MG/1
120 CAPSULE, EXTENDED RELEASE ORAL 2 TIMES DAILY
Qty: 60 CAPSULE | Refills: 5 | Status: SHIPPED | OUTPATIENT
Start: 2018-12-19 | End: 2019-09-18

## 2018-12-19 NOTE — PROGRESS NOTES
Subjective     Patient ID: Marguerite Fink is a 48 y.o. female. Patient is here for management of multiple medical problems.     Chief Complaint   Patient presents with   • Anxiety     patient having issues with anxiety causing chest pain and chest pressure, patient wearing a heart monitor,    • Cancer     patient diagnosed with Basal Cell Carcinoma on her right ear, she may be loosing a large part of her ear    • Short Term Memory     patient concerned she can't remember names of coworkers     History of Present Illness     lexapro was helping.   admitted to [facility] for chest pain and chest tightness.  Seen Dr Buckner.  Has heart monitor.     Major chest tightness. heaviness on chest. Hard to get deep breath.      Pt had loss of train of though and difficulty finding words.    Pt still smoking.  Pt did well last hospitalization.      Overnight o2 sat check never occurred. Pt is hard to get a hold of.        The following portions of the patient's history were reviewed and updated as appropriate: allergies, current medications, past family history, past medical history, past social history, past surgical history and problem list.    Review of Systems   Constitutional: Negative for diaphoresis, fatigue and fever.   HENT: Negative for ear discharge and sinus pressure.    Cardiovascular: Negative for chest pain and leg swelling.       Current Outpatient Medications:   •  atorvastatin (LIPITOR) 10 MG tablet, Take 1 tablet by mouth Daily., Disp: 30 tablet, Rfl: 11  •  B-12, Methylcobalamin, 1000 MCG sublingual tablet, Place 1 tablet under the tongue Daily., Disp: 90 tablet, Rfl: 3  •  escitalopram (LEXAPRO) 10 MG tablet, Take 1 tablet by mouth Daily., Disp: 30 tablet, Rfl: 11  •  fluticasone (FLONASE) 50 MCG/ACT nasal spray, 1 spray into each nostril Daily., Disp: 1 bottle, Rfl: 2  •  levETIRAcetam (KEPPRA) 500 MG tablet, Take 1 tablet by mouth Every 12 (Twelve) Hours., Disp: 60 tablet, Rfl: 11  •  lisinopril  "(PRINIVIL,ZESTRIL) 10 MG tablet, Take 1 tablet by mouth Daily., Disp: 90 tablet, Rfl: 3  •  oxyCODONE-acetaminophen (PERCOCET)  MG per tablet, Take  by mouth every 12 (twelve) hours., Disp: , Rfl:   •  SUMAtriptan (IMITREX) 100 MG tablet, Take 1 tablet by mouth 1 (One) Time As Needed for Migraine for up to 1 dose., Disp: 9 tablet, Rfl: 11  •  topiramate (TOPAMAX) 100 MG tablet, Take 1 tablet by mouth Daily., Disp: 30 tablet, Rfl: 11  •  diltiaZEM SR (CARDIZEM SR) 120 MG 12 hr capsule, Take 1 capsule by mouth 2 (Two) Times a Day., Disp: 60 capsule, Rfl: 5  •  umeclidinium-vilanterol (ANORO ELLIPTA) 62.5-25 MCG/INH aerosol powder  inhaler, Inhale 1 puff Daily., Disp: 1 each, Rfl: 11    Objective      Blood pressure 136/79, pulse 66, temperature 98.4 °F (36.9 °C), temperature source Oral, resp. rate 16, height 162.6 cm (64\"), weight 63.5 kg (140 lb), SpO2 99 %.    Physical Exam     General Appearance:    Alert, cooperative, no distress, appears stated age   Head:    Normocephalic, without obvious abnormality, atraumatic   Eyes:    PERRL, conjunctiva/corneas clear, EOM's intact   Ears:    Normal TM's and external ear canals, both ears   Nose:   Nares normal, septum midline, mucosa normal, no drainage   or sinus tenderness   Throat:   Lips, mucosa, and tongue normal; teeth and gums normal   Neck:   Supple, symmetrical, trachea midline, no adenopathy;        thyroid:  No enlargement/tenderness/nodules; no carotid    bruit or JVD   Back:     Symmetric, no curvature, ROM normal, no CVA tenderness   Lungs:     Wheezing endexpiratory  to auscultation bilaterally, respirations unlabored   Chest wall:    No tenderness or deformity   Heart:    Regular rate and rhythm, S1 and S2 normal, no murmur,        rub or gallop   Abdomen:     Soft, non-tender, bowel sounds active all four quadrants,     no masses, no organomegaly   Extremities:   Extremities normal, atraumatic, no cyanosis or edema   Pulses:   2+ and symmetric all " extremities   Skin:   Skin color, texture, turgor normal, no rashes or lesions   Lymph nodes:   Cervical, supraclavicular, and axillary nodes normal   Neurologic:   CNII-XII intact. Normal strength, sensation and reflexes       Throughout.  Pt alert with no trouble work finding.          Results for orders placed or performed during the hospital encounter of 12/13/18   Treadmill Stress Test   Result Value Ref Range    BH CV STRESS PROTOCOL 1 Ron     Stage 1 1     Duration Min Stage 1 3     Duration Sec Stage 1 0     Grade Stage 1 10     Speed Stage 1 1.7     BH CV STRESS METS STAGE 1 5     Baseline HR 86 bpm    Baseline BP 98/60 mmHg    O2 sat rest 99 %    Target HR (85%) 146 bpm    Max. Pred. HR (100%) 172 bpm    HR Stage 1 111     BP Stage 1 106/64     O2 Stage 1 99     Stage 2 2     HR Stage 2 123     BP Stage 2 108/64     O2 Stage 2 100     Duration Min Stage 2 3     Duration Sec Stage 2 0     Grade Stage 2 12     Speed Stage 2 2.5     BH CV STRESS METS STAGE 2 7.5     Stage 3 3     HR Stage 3 156     BP Stage 3 124/64     O2 Stage 3 100     Duration Min Stage 3 3     Duration Sec Stage 3 0     Grade Stage 3 14     Speed Stage 3 3.4     BH CV STRESS METS STAGE 3 10.0     Peak  bpm    Percent Max Pred HR 90.70 %    Percent Target  %    Peak /64 mmHg    O2 sat peak 100 %    Recovery HR 87 bpm    Recovery BP 82/58 mmHg    Recovery O2 98 %    Exercise duration (min) 9 min    Estimated workload 10.1 METS         Assessment/Plan   complex hx. Hx of sz do. High risk for CVA, mi.  Consider Prinzmetal angina.   May need heart cath.  Last stress stress test 2 days ago.    Nitro low bp.    Pt will get on nicotine patches.    Insurance not covering chantix.     Marguerite was seen today for anxiety, cancer and short term memory.    Diagnoses and all orders for this visit:    Panlobular emphysema (CMS/HCC)  -     Overnight Sleep Oximetry Study    Chest tightness  -     diltiaZEM SR (CARDIZEM SR) 120 MG 12 hr  capsule; Take 1 capsule by mouth 2 (Two) Times a Day.    TIA (transient ischemic attack)  -     MRI Brain Without Contrast    Angina at rest (CMS/HCC)  -     diltiaZEM SR (CARDIZEM SR) 120 MG 12 hr capsule; Take 1 capsule by mouth 2 (Two) Times a Day.    Tobacco abuse  -     umeclidinium-vilanterol (ANORO ELLIPTA) 62.5-25 MCG/INH aerosol powder  inhaler; Inhale 1 puff Daily.    COPD exacerbation (CMS/[facility])  -     umeclidinium-vilanterol (ANORO ELLIPTA) 62.5-25 MCG/INH aerosol powder  inhaler; Inhale 1 puff Daily.      Return in about 4 weeks (around 1/16/2019).          There are no Patient Instructions on file for this visit.     Juan Clemens MD    Assessment/Plan

## 2018-12-20 LAB
H PYLORI IGA SER IA-ACNC: <9 UNITS (ref 0–8.9)
H PYLORI IGG SER IA-ACNC: 0.35 INDEX VALUE (ref 0–0.79)
IGA SERPL-MCNC: 153 MG/DL (ref 87–352)
IGG SERPL-MCNC: 775 MG/DL (ref 700–1600)
IGM SERPL-MCNC: 93 MG/DL (ref 26–217)

## 2018-12-21 LAB
HCV RNA SERPL NAA+PROBE-ACNC: NORMAL IU/ML
TEST INFORMATION: NORMAL

## 2018-12-27 ENCOUNTER — TELEPHONE (OUTPATIENT)
Dept: OBSTETRICS AND GYNECOLOGY | Facility: CLINIC | Age: 48
End: 2018-12-27

## 2018-12-27 RX ORDER — ESTRADIOL 2 MG/1
2 TABLET ORAL DAILY
Qty: 30 TABLET | Refills: 0 | Status: SHIPPED | OUTPATIENT
Start: 2018-12-27 | End: 2019-02-12

## 2018-12-27 NOTE — TELEPHONE ENCOUNTER
----- Message from Vida Dickey sent at 12/27/2018  2:29 PM EST -----  Contact: PT  THIS IS DR THOMPSON'S PT.  SHE MADE APPT FOR 1/4/19.  PLEASE SEND REFILL OF ESTRADIOL 2MG TABLET TO [facility] IN [place].  THANKS

## 2019-01-04 ENCOUNTER — TELEPHONE (OUTPATIENT)
Dept: INTERNAL MEDICINE | Facility: CLINIC | Age: 49
End: 2019-01-04

## 2019-01-04 NOTE — TELEPHONE ENCOUNTER
Patient called, and the connection had made it difficult to understand, but she states that she was able to use a cpap machine last night and she had to turn it back in today. She wanted to let the doctor know that it had worked really well for her and just wanted to report back to him.

## 2019-01-16 ENCOUNTER — OFFICE VISIT (OUTPATIENT)
Dept: INTERNAL MEDICINE | Facility: CLINIC | Age: 49
End: 2019-01-16

## 2019-01-16 VITALS
SYSTOLIC BLOOD PRESSURE: 110 MMHG | HEART RATE: 64 BPM | OXYGEN SATURATION: 100 % | HEIGHT: 64 IN | WEIGHT: 143 LBS | TEMPERATURE: 98.3 F | RESPIRATION RATE: 16 BRPM | BODY MASS INDEX: 24.41 KG/M2 | DIASTOLIC BLOOD PRESSURE: 61 MMHG

## 2019-01-16 DIAGNOSIS — B02.8 HERPES ZOSTER WITH OTHER COMPLICATION: Primary | ICD-10-CM

## 2019-01-16 PROCEDURE — 99213 OFFICE O/P EST LOW 20 MIN: CPT | Performed by: INTERNAL MEDICINE

## 2019-01-16 RX ORDER — VALACYCLOVIR HYDROCHLORIDE 1 G/1
1000 TABLET, FILM COATED ORAL 2 TIMES DAILY
Qty: 20 TABLET | Refills: 0 | Status: SHIPPED | OUTPATIENT
Start: 2019-01-16 | End: 2019-04-30

## 2019-01-16 NOTE — PROGRESS NOTES
Subjective     Patient ID: Marguerite Fink is a 48 y.o. female. Patient is here for management of multiple medical problems.     Chief Complaint   Patient presents with   • Panlobular Emphysema     4 week follow-up   • Rash     patient states she has a rash with blisters that sting and burn on her right buttock, rash very painful x 1 week, hurts to sit or lay down        • Sleep Apnea     patient here to discuss results of Overnight sleep study     History of Present Illness         Painful rash right butt.  Started last week wed.    Blister.  Pain and tingling prior to that.  Diarrhea and run down all prior to any of the problems.    Fever blister the prior to the other issues.  Started after zbx fo r ear surgery.    Using bandaids.          The following portions of the patient's history were reviewed and updated as appropriate: allergies, current medications, past family history, past medical history, past social history, past surgical history and problem list.    Review of Systems   Constitutional: Positive for fatigue.   Psychiatric/Behavioral: Negative for sleep disturbance. The patient is not nervous/anxious and is not hyperactive.    All other systems reviewed and are negative.      Current Outpatient Medications:   •  atorvastatin (LIPITOR) 10 MG tablet, Take 1 tablet by mouth Daily., Disp: 30 tablet, Rfl: 11  •  B-12, Methylcobalamin, 1000 MCG sublingual tablet, Place 1 tablet under the tongue Daily. (Patient taking differently: Place 2,000 mg under the tongue Daily.), Disp: 90 tablet, Rfl: 3  •  diltiaZEM SR (CARDIZEM SR) 120 MG 12 hr capsule, Take 1 capsule by mouth 2 (Two) Times a Day., Disp: 60 capsule, Rfl: 5  •  escitalopram (LEXAPRO) 10 MG tablet, Take 1 tablet by mouth Daily., Disp: 30 tablet, Rfl: 11  •  estradiol (ESTRACE) 2 MG tablet, Take 1 tablet by mouth Daily., Disp: 30 tablet, Rfl: 0  •  fluticasone (FLONASE) 50 MCG/ACT nasal spray, 1 spray into each nostril Daily., Disp: 1 bottle, Rfl:  "2  •  levETIRAcetam (KEPPRA) 500 MG tablet, Take 1 tablet by mouth Every 12 (Twelve) Hours., Disp: 60 tablet, Rfl: 11  •  lisinopril (PRINIVIL,ZESTRIL) 10 MG tablet, Take 1 tablet by mouth Daily., Disp: 90 tablet, Rfl: 3  •  SUMAtriptan (IMITREX) 100 MG tablet, Take 1 tablet by mouth 1 (One) Time As Needed for Migraine for up to 1 dose., Disp: 9 tablet, Rfl: 11  •  topiramate (TOPAMAX) 100 MG tablet, Take 1 tablet by mouth Daily., Disp: 30 tablet, Rfl: 11  •  umeclidinium-vilanterol (ANORO ELLIPTA) 62.5-25 MCG/INH aerosol powder  inhaler, Inhale 1 puff Daily., Disp: 1 each, Rfl: 11  •  valACYclovir (VALTREX) 1000 MG tablet, Take 1 tablet by mouth 2 (Two) Times a Day., Disp: 20 tablet, Rfl: 0    Objective      Blood pressure 110/61, pulse 64, temperature 98.3 °F (36.8 °C), temperature source Oral, resp. rate 16, height 162.6 cm (64\"), weight 64.9 kg (143 lb), SpO2 100 %.    Physical Exam     General Appearance:    Alert, cooperative, no distress, appears stated age   Head:    Normocephalic, without obvious abnormality, atraumatic   Eyes:    PERRL, conjunctiva/corneas clear, EOM's intact   Ears:    Normal TM's and external ear canals, both ears   Nose:   Nares normal, septum midline, mucosa normal, no drainage   or sinus tenderness   Throat:   Lips, mucosa, and tongue normal; teeth and gums normal   Neck:   Supple, symmetrical, trachea midline, no adenopathy;        thyroid:  No enlargement/tenderness/nodules; no carotid    bruit or JVD   Back:     Symmetric, no curvature, ROM normal, no CVA tenderness   Lungs:     Clear to auscultation bilaterally, respirations unlabored   Chest wall:    No tenderness or deformity   Heart:    Regular rate and rhythm, S1 and S2 normal, no murmur,        rub or gallop   Abdomen:     Soft, non-tender, bowel sounds active all four quadrants,     no masses, no organomegaly   Extremities:   Extremities normal, atraumatic, no cyanosis or edema   Pulses:   2+ and symmetric all extremities "   Skin:   Open bilster. Dark red involve dermatome over right buttox.     Lymph nodes:   Cervical, supraclavicular, and axillary nodes normal   Neurologic:   CNII-XII intact. Normal strength, sensation and reflexes       throughout      Results for orders placed or performed during the hospital encounter of 12/13/18   Treadmill Stress Test   Result Value Ref Range    BH CV STRESS PROTOCOL 1 Ron     Stage 1 1     Duration Min Stage 1 3     Duration Sec Stage 1 0     Grade Stage 1 10     Speed Stage 1 1.7     BH CV STRESS METS STAGE 1 5     Baseline HR 86 bpm    Baseline BP 98/60 mmHg    O2 sat rest 99 %    Target HR (85%) 146 bpm    Max. Pred. HR (100%) 172 bpm    HR Stage 1 111     BP Stage 1 106/64     O2 Stage 1 99     Stage 2 2     HR Stage 2 123     BP Stage 2 108/64     O2 Stage 2 100     Duration Min Stage 2 3     Duration Sec Stage 2 0     Grade Stage 2 12     Speed Stage 2 2.5     BH CV STRESS METS STAGE 2 7.5     Stage 3 3     HR Stage 3 156     BP Stage 3 124/64     O2 Stage 3 100     Duration Min Stage 3 3     Duration Sec Stage 3 0     Grade Stage 3 14     Speed Stage 3 3.4     BH CV STRESS METS STAGE 3 10.0     Peak  bpm    Percent Max Pred HR 90.70 %    Percent Target  %    Peak /64 mmHg    O2 sat peak 100 %    Recovery HR 87 bpm    Recovery BP 82/58 mmHg    Recovery O2 98 %    Exercise duration (min) 9 min    Estimated workload 10.1 METS         Assessment/Plan     shingels cream rx sent to [facility]    Marguerite was seen today for panlobular emphysema, rash and sleep apnea.    Diagnoses and all orders for this visit:    Herpes zoster with other complication    Other orders  -     valACYclovir (VALTREX) 1000 MG tablet; Take 1 tablet by mouth 2 (Two) Times a Day.      Return in about 4 weeks (around 2/13/2019).          There are no Patient Instructions on file for this visit.     Juan Clemens MD    Assessment/Plan

## 2019-01-17 ENCOUNTER — TELEPHONE (OUTPATIENT)
Dept: INTERNAL MEDICINE | Facility: CLINIC | Age: 49
End: 2019-01-17

## 2019-01-17 NOTE — TELEPHONE ENCOUNTER
Patient was in the office to see Dr. Clemens yesterday and has called to request her work excuse be revised for two days off work instead of the one.   The antibiotics she is taking for her shingles  is causing diarrhea.. She said this is normal when she is not use to taking a certain antibiotic. She didn't think about it yesterday but had to call into work today as well due to the side effect she is experiencing.   Please call patient when or if the note is ready.

## 2019-01-25 ENCOUNTER — OFFICE VISIT (OUTPATIENT)
Dept: OBSTETRICS AND GYNECOLOGY | Facility: CLINIC | Age: 49
End: 2019-01-25

## 2019-01-25 VITALS
WEIGHT: 143 LBS | HEIGHT: 64 IN | SYSTOLIC BLOOD PRESSURE: 116 MMHG | BODY MASS INDEX: 24.41 KG/M2 | DIASTOLIC BLOOD PRESSURE: 60 MMHG

## 2019-01-25 DIAGNOSIS — F41.9 ANXIETY: ICD-10-CM

## 2019-01-25 DIAGNOSIS — N95.1 MENOPAUSAL SYMPTOMS: Primary | ICD-10-CM

## 2019-01-25 PROCEDURE — 99214 OFFICE O/P EST MOD 30 MIN: CPT | Performed by: OBSTETRICS & GYNECOLOGY

## 2019-01-25 RX ORDER — ESTRADIOL 0.1 MG/D
1 FILM, EXTENDED RELEASE TRANSDERMAL 2 TIMES WEEKLY
Qty: 8 PATCH | Refills: 12 | Status: SHIPPED | OUTPATIENT
Start: 2019-01-28 | End: 2020-03-02 | Stop reason: SDUPTHER

## 2019-01-25 RX ORDER — PANTOPRAZOLE SODIUM 40 MG/1
40 TABLET, DELAYED RELEASE ORAL
COMMUNITY
End: 2019-04-30

## 2019-01-25 RX ORDER — CLONAZEPAM 1 MG/1
1 TABLET ORAL
COMMUNITY
Start: 2014-08-29 | End: 2019-04-30

## 2019-01-25 RX ORDER — OXYCODONE AND ACETAMINOPHEN 10; 325 MG/1; MG/1
1 TABLET ORAL DAILY
COMMUNITY
End: 2021-09-14 | Stop reason: ALTCHOICE

## 2019-01-25 RX ORDER — ROPINIROLE 0.25 MG/1
0.25 TABLET, FILM COATED ORAL
COMMUNITY
End: 2019-04-30

## 2019-01-25 NOTE — PROGRESS NOTES
Subjective  Chief Complaint   Patient presents with   • Consult     DISCUSS HORMONE REPLACEMENT THERAPY, PATIENT ADVISED HAVING A LOT OF HOT FLASHES, DIFFICULTY SLEEPING.      Patient is 49 y.o.  here for follow-up and discussion of her hormonal issues.  The patient had previously been on estrogen patch for approximately 1 year the patient then went off her hormone replacement therapy secondary to insurance issues.  The patient was off for 1-2 months with resumption of her menopausal symptoms.  The patient then was placed on estradiol 2 mg.  The patient reports having continued hot flashes, night sweats, and difficulty sleeping.  The patient also reports having significant anxiety.  The patient reports having palpitations as well as shortness of breath.  The patient denies any chest pain, dyspnea with exertion, or diaphoresis.  The patient currently sees Dr. Tripathi for her primary care.  The patient is also under the care of a psychiatrist.  The patient is on multiple medications as noted and reviewed today.    History  Past Medical History:   Diagnosis Date   • Abdominal pain, epigastric    • Acute sinusitis    • Acute UTI (urinary tract infection)    • Angina, intestinal (CMS/HCC)    • Anxiety    • Arthritis    • Back pain    • Breast mass, right    • Chronic hepatitis C virus infection (CMS/HCC)    • Depression    • Diarrhea    • Elevated LFTs    • Fatigue    • History of endometriosis    • History of Papanicolaou smear of cervix 2014    NORMAL    • HTN (hypertension)    • IBS (irritable bowel syndrome)    • Insomnia    • Menopausal symptoms    • Migraine headache    • Nausea & vomiting    • Neck pain    • Ovarian cyst    • Pelvic adhesive disease    • Radicular pain of left lower extremity    • Restless leg syndrome    • Seizure disorder (CMS/HCC)    • Tobacco abuse    • Trochanteric bursitis    • Vitamin B 12 deficiency      Current Outpatient Medications on File Prior to Visit   Medication Sig  Dispense Refill   • atorvastatin (LIPITOR) 10 MG tablet Take 1 tablet by mouth Daily. 30 tablet 11   • B-12, Methylcobalamin, 1000 MCG sublingual tablet Place 1 tablet under the tongue Daily. (Patient taking differently: Place 2,000 mg under the tongue Daily.) 90 tablet 3   • clonazePAM (KlonoPIN) 1 MG tablet Take 1 mg by mouth.     • diltiaZEM SR (CARDIZEM SR) 120 MG 12 hr capsule Take 1 capsule by mouth 2 (Two) Times a Day. 60 capsule 5   • escitalopram (LEXAPRO) 10 MG tablet Take 1 tablet by mouth Daily. 30 tablet 11   • estradiol (ESTRACE) 2 MG tablet Take 1 tablet by mouth Daily. 30 tablet 0   • fluticasone (FLONASE) 50 MCG/ACT nasal spray 1 spray into each nostril Daily. 1 bottle 2   • levETIRAcetam (KEPPRA) 500 MG tablet Take 1 tablet by mouth Every 12 (Twelve) Hours. 60 tablet 11   • lisinopril (PRINIVIL,ZESTRIL) 10 MG tablet Take 1 tablet by mouth Daily. 90 tablet 3   • oxyCODONE-acetaminophen (ENDOCET)  MG per tablet Every 6 (Six) Hours.     • pantoprazole (PROTONIX) 40 MG EC tablet Take 40 mg by mouth.     • rOPINIRole (REQUIP) 0.25 MG tablet Take 0.25 mg by mouth.     • SUMAtriptan (IMITREX) 100 MG tablet Take 1 tablet by mouth 1 (One) Time As Needed for Migraine for up to 1 dose. 9 tablet 11   • topiramate (TOPAMAX) 100 MG tablet Take 1 tablet by mouth Daily. 30 tablet 11   • umeclidinium-vilanterol (ANORO ELLIPTA) 62.5-25 MCG/INH aerosol powder  inhaler Inhale 1 puff Daily. 1 each 11   • valACYclovir (VALTREX) 1000 MG tablet Take 1 tablet by mouth 2 (Two) Times a Day. 20 tablet 0     No current facility-administered medications on file prior to visit.      No Known Allergies  Past Surgical History:   Procedure Laterality Date   • BILATERAL SALPINGO OOPHORECTOMY  03/10/2015    DR NATALY THOMPSON   • BREAST LUMPECTOMY     •  SECTION     • HYSTERECTOMY  03/10/2015    [facility] (DR NATALY THOMPSON)   • INGUINAL HERNIA REPAIR Right    • LYSIS OF ABDOMINAL ADHESIONS  03/10/2015    DR NATALY THOMPSON   • TUBAL  "ABDOMINAL LIGATION  2010     Family History   Problem Relation Age of Onset   • Alzheimer's disease Other    • Cancer Other    • Dementia Other    • Hypertension Other    • Parkinsonism Other    • Osteoporosis Mother    • Diabetes Mother    • Hypertension Mother    • Obesity Mother    • Breast cancer Maternal Grandmother    • Osteoporosis Maternal Grandmother    • Diabetes Maternal Grandmother      Social History     Socioeconomic History   • Marital status:      Spouse name: Not on file   • Number of children: Not on file   • Years of education: Not on file   • Highest education level: Not on file   Tobacco Use   • Smoking status: Current Every Day Smoker     Packs/day: 0.50     Types: Cigarettes   • Smokeless tobacco: Never Used   • Tobacco comment:  1/2 PACK A DAY   Substance and Sexual Activity   • Alcohol use: Yes   • Drug use: No   • Sexual activity: Defer     Birth control/protection: Surgical     Review of Systems  All systems were reviewed and negative except for:  Constitution:  positive for trouble sleeping  Behavioral/Psych: positive for  anxiety  Endocrine: positive for  hot flashes     Objective  Vitals:    01/25/19 1034   BP: 116/60   Weight: 64.9 kg (143 lb)   Height: 162.6 cm (64\")     Physical Exam:  General Appearance: alert, appears stated age and cooperative  Head: normocephalic, without obvious abnormality and atraumatic  Eyes: lids and lashes normal, conjunctivae and sclerae normal, no icterus, no pallor, corneas clear and PERRLA  Ears: ears appear intact with no abnormalities noted  Nose: nares normal, septum midline, mucosa normal and no drainage  Neck: suppple, trachea midline and no thyromegaly  Lungs: clear to auscultation, respirations regular, respirations even and respirations unlabored  Heart: regular rhythm and normal rate, normal S1, S2, no murmur, gallop, or rubs and no click  Breasts: Not performed.  Abdomen: normal bowel sounds, no masses, no hepatomegaly, no splenomegaly, " soft non-tender, no guarding and no rebound tenderness  Pelvic: Not performed.  Extremities: moves extremities well, no edema, no cyanosis and no redness  Skin: no bleeding, bruising or rash and no lesions noted  Lymph Nodes: no palpable adenopathy  Neuro: CN II-X grossly intact; sensation intact  Psych: normal mood and affect, oriented to person, time and place, thought content organized and appropriate judgment  Lab Review   No data reviewed    Imaging   No data reviewed    Assessment/Plan  Problem List Items Addressed This Visit        Other    Anxiety Worsening  The patient is to follow-up with Dr. Tripathi as well as her psychiatrist for worsening of her anxiety.  Instructions and precautions have been given. See plan below regarding hormone replacement.        Other Visit Diagnoses     Menopausal symptoms    -  Primary Worsening  The various options were discussed with the patient regarding treatment of her menopausal symptoms.  The patient would like to resume her previous transdermal regimen.  A prescription is given as noted.  The patient is to discontinue her oral medication.  The risk, complications, versus benefits were discussed with the patient.  The patient is to call if no improvement and/or worsening of her symptoms over the next several weeks.          Follow up as discussed/scheduled  This note was electronically signed.  Sandra Rangel M.D.

## 2019-02-12 ENCOUNTER — OFFICE VISIT (OUTPATIENT)
Dept: INTERNAL MEDICINE | Facility: CLINIC | Age: 49
End: 2019-02-12

## 2019-02-12 VITALS
DIASTOLIC BLOOD PRESSURE: 47 MMHG | OXYGEN SATURATION: 100 % | WEIGHT: 141 LBS | HEIGHT: 64 IN | RESPIRATION RATE: 16 BRPM | BODY MASS INDEX: 24.07 KG/M2 | SYSTOLIC BLOOD PRESSURE: 119 MMHG | HEART RATE: 62 BPM | TEMPERATURE: 98.3 F

## 2019-02-12 DIAGNOSIS — J40 BRONCHITIS: ICD-10-CM

## 2019-02-12 DIAGNOSIS — J45.40 MODERATE PERSISTENT ASTHMA, UNSPECIFIED WHETHER COMPLICATED: ICD-10-CM

## 2019-02-12 DIAGNOSIS — R50.9 FEVER AND CHILLS: Primary | ICD-10-CM

## 2019-02-12 LAB
EXPIRATION DATE: NORMAL
FLUAV AG NPH QL: NEGATIVE
FLUBV AG NPH QL: NEGATIVE
INTERNAL CONTROL: NORMAL
Lab: NORMAL

## 2019-02-12 PROCEDURE — 87804 INFLUENZA ASSAY W/OPTIC: CPT | Performed by: INTERNAL MEDICINE

## 2019-02-12 PROCEDURE — 99213 OFFICE O/P EST LOW 20 MIN: CPT | Performed by: INTERNAL MEDICINE

## 2019-02-12 RX ORDER — DOXYCYCLINE 100 MG/1
100 CAPSULE ORAL EVERY 12 HOURS SCHEDULED
Qty: 20 CAPSULE | Refills: 0 | Status: SHIPPED | OUTPATIENT
Start: 2019-02-12 | End: 2019-03-25

## 2019-02-12 RX ORDER — DEXTROMETHORPHAN HYDROBROMIDE AND PROMETHAZINE HYDROCHLORIDE 15; 6.25 MG/5ML; MG/5ML
5 SYRUP ORAL 4 TIMES DAILY PRN
Qty: 240 ML | Refills: 0 | Status: SHIPPED | OUTPATIENT
Start: 2019-02-12 | End: 2019-03-25

## 2019-03-22 ENCOUNTER — HOSPITAL ENCOUNTER (EMERGENCY)
Facility: HOSPITAL | Age: 49
Discharge: HOME | End: 2019-03-22
Attending: STUDENT IN AN ORGANIZED HEALTH CARE EDUCATION/TRAINING PROGRAM | Admitting: STUDENT IN AN ORGANIZED HEALTH CARE EDUCATION/TRAINING PROGRAM

## 2019-03-22 VITALS
SYSTOLIC BLOOD PRESSURE: 120 MMHG | TEMPERATURE: 98.7 F | OXYGEN SATURATION: 99 % | HEIGHT: 64 IN | BODY MASS INDEX: 24.82 KG/M2 | WEIGHT: 145.38 LBS | HEART RATE: 69 BPM | RESPIRATION RATE: 17 BRPM | DIASTOLIC BLOOD PRESSURE: 76 MMHG

## 2019-03-22 DIAGNOSIS — R20.2 BILATERAL LEG PARESTHESIA: Primary | ICD-10-CM

## 2019-03-22 PROCEDURE — 99283 EMERGENCY DEPT VISIT LOW MDM: CPT

## 2019-03-22 PROCEDURE — 96372 THER/PROPH/DIAG INJ SC/IM: CPT

## 2019-03-22 PROCEDURE — 25010000002 DEXAMETHASONE PER 1 MG: Performed by: STUDENT IN AN ORGANIZED HEALTH CARE EDUCATION/TRAINING PROGRAM

## 2019-03-22 RX ORDER — DEXAMETHASONE SODIUM PHOSPHATE 10 MG/ML
10 INJECTION INTRAMUSCULAR; INTRAVENOUS ONCE
Status: COMPLETED | OUTPATIENT
Start: 2019-03-22 | End: 2019-03-22

## 2019-03-22 RX ADMIN — DEXAMETHASONE SODIUM PHOSPHATE 10 MG: 10 INJECTION INTRAMUSCULAR; INTRAVENOUS at 12:15

## 2019-03-25 ENCOUNTER — OFFICE VISIT (OUTPATIENT)
Dept: INTERNAL MEDICINE | Facility: CLINIC | Age: 49
End: 2019-03-25

## 2019-03-25 ENCOUNTER — HOSPITAL ENCOUNTER (OUTPATIENT)
Dept: GENERAL RADIOLOGY | Facility: HOSPITAL | Age: 49
Discharge: HOME | End: 2019-03-25
Admitting: INTERNAL MEDICINE

## 2019-03-25 VITALS
HEART RATE: 60 BPM | OXYGEN SATURATION: 100 % | DIASTOLIC BLOOD PRESSURE: 62 MMHG | RESPIRATION RATE: 16 BRPM | SYSTOLIC BLOOD PRESSURE: 117 MMHG | BODY MASS INDEX: 25.1 KG/M2 | WEIGHT: 147 LBS | HEIGHT: 64 IN | TEMPERATURE: 98.1 F

## 2019-03-25 DIAGNOSIS — M70.62 TROCHANTERIC BURSITIS OF BOTH HIPS: ICD-10-CM

## 2019-03-25 DIAGNOSIS — M25.552 PAIN OF BOTH HIP JOINTS: Primary | ICD-10-CM

## 2019-03-25 DIAGNOSIS — M54.6 THORACIC SPINE PAIN: ICD-10-CM

## 2019-03-25 DIAGNOSIS — M25.551 PAIN OF BOTH HIP JOINTS: ICD-10-CM

## 2019-03-25 DIAGNOSIS — M25.551 PAIN OF BOTH HIP JOINTS: Primary | ICD-10-CM

## 2019-03-25 DIAGNOSIS — G95.19 NEUROGENIC CLAUDICATION: ICD-10-CM

## 2019-03-25 DIAGNOSIS — M70.61 TROCHANTERIC BURSITIS OF BOTH HIPS: ICD-10-CM

## 2019-03-25 DIAGNOSIS — M25.552 PAIN OF BOTH HIP JOINTS: ICD-10-CM

## 2019-03-25 PROCEDURE — 73521 X-RAY EXAM HIPS BI 2 VIEWS: CPT

## 2019-03-25 PROCEDURE — 99214 OFFICE O/P EST MOD 30 MIN: CPT | Performed by: INTERNAL MEDICINE

## 2019-03-25 NOTE — PROGRESS NOTES
Subjective     Patient ID: Marguerite Fink is a 49 y.o. female. Patient is here for management of multiple medical problems.     Chief Complaint   Patient presents with   • Depression     patient states she feels like she is falling apart     History of Present Illness       In Er :Last Friday.  3 days ago. Lower back pain and radiculopathy with right worse than left.  Got steroid inj. Better now.      Pt see's Dr Schwartz for pain treatment. Dr Trent for Sz active.  Still does't have  licens.  Seen Dr Woodard in the past.  Dr Schwartz inj lower back recently. This set off the numbness.  Pt was supposed to be injected in the neck.  Pt with increase pain in T-spine.  Has disc herniation in c-spine and DDDof lumbar spine.  Increase pain in t spine.   Increase numbness in right leg.  Pt not getting better with inj.  Failing conservative management.    Waking down hill, standing to long, sitting on motorcycle worsens back pain.          The following portions of the patient's history were reviewed and updated as appropriate: allergies, current medications, past family history, past medical history, past social history, past surgical history and problem list.    Review of Systems   Constitutional: Positive for fatigue.   Musculoskeletal: Negative for arthralgias, back pain, gait problem and joint swelling.   All other systems reviewed and are negative.      Current Outpatient Medications:   •  atorvastatin (LIPITOR) 10 MG tablet, Take 1 tablet by mouth Daily., Disp: 30 tablet, Rfl: 11  •  B-12, Methylcobalamin, 1000 MCG sublingual tablet, Place 1 tablet under the tongue Daily. (Patient taking differently: Place 2,000 mg under the tongue Daily.), Disp: 90 tablet, Rfl: 3  •  clonazePAM (KlonoPIN) 1 MG tablet, Take 1 mg by mouth., Disp: , Rfl:   •  diltiaZEM SR (CARDIZEM SR) 120 MG 12 hr capsule, Take 1 capsule by mouth 2 (Two) Times a Day., Disp: 60 capsule, Rfl: 5  •  escitalopram (LEXAPRO) 10 MG tablet, Take 1 tablet by  "mouth Daily., Disp: 30 tablet, Rfl: 11  •  estradiol (VIVELLE-DOT) 0.1 MG/24HR patch, Place 1 patch on the skin as directed by provider 2 (Two) Times a Week., Disp: 8 patch, Rfl: 12  •  fluticasone (FLONASE) 50 MCG/ACT nasal spray, 1 spray into each nostril Daily., Disp: 1 bottle, Rfl: 2  •  levETIRAcetam (KEPPRA) 500 MG tablet, Take 1 tablet by mouth Every 12 (Twelve) Hours., Disp: 60 tablet, Rfl: 11  •  lisinopril (PRINIVIL,ZESTRIL) 10 MG tablet, Take 1 tablet by mouth Daily., Disp: 90 tablet, Rfl: 3  •  oxyCODONE-acetaminophen (ENDOCET)  MG per tablet, Every 6 (Six) Hours., Disp: , Rfl:   •  pantoprazole (PROTONIX) 40 MG EC tablet, Take 40 mg by mouth., Disp: , Rfl:   •  rOPINIRole (REQUIP) 0.25 MG tablet, Take 0.25 mg by mouth., Disp: , Rfl:   •  SUMAtriptan (IMITREX) 100 MG tablet, Take 1 tablet by mouth 1 (One) Time As Needed for Migraine for up to 1 dose., Disp: 9 tablet, Rfl: 11  •  topiramate (TOPAMAX) 100 MG tablet, Take 1 tablet by mouth Daily., Disp: 30 tablet, Rfl: 11  •  umeclidinium-vilanterol (ANORO ELLIPTA) 62.5-25 MCG/INH aerosol powder  inhaler, Inhale 1 puff Daily., Disp: 1 each, Rfl: 11  •  valACYclovir (VALTREX) 1000 MG tablet, Take 1 tablet by mouth 2 (Two) Times a Day., Disp: 20 tablet, Rfl: 0    Objective      Blood pressure 117/62, pulse 60, temperature 98.1 °F (36.7 °C), temperature source Oral, resp. rate 16, height 162.6 cm (64\"), weight 66.7 kg (147 lb), SpO2 100 %, not currently breastfeeding.    Physical Exam     General Appearance:    Alert, cooperative, no distress, appears stated age   Head:    Normocephalic, without obvious abnormality, atraumatic   Eyes:    PERRL, conjunctiva/corneas clear, EOM's intact   Ears:    Normal TM's and external ear canals, both ears   Nose:   Nares normal, septum midline, mucosa normal, no drainage   or sinus tenderness   Throat:   Lips, mucosa, and tongue normal; teeth and gums normal   Neck:   Supple, symmetrical, trachea midline, no " adenopathy;        thyroid:  No enlargement/tenderness/nodules; no carotid    bruit or JVD   Back:     Symmetric, no curvature, ROM normal, no CVA tenderness   Lungs:     Clear to auscultation bilaterally, respirations unlabored   Chest wall:    No tenderness or deformity   Heart:    Regular rate and rhythm, S1 and S2 normal, no murmur,        rub or gallop   Abdomen:     Soft, non-tender, bowel sounds active all four quadrants,     no masses, no organomegaly   Extremities:   Unable to walk on tip toes. Weak extension B/L/ ttp b/l troch. + slr on left. ttp t-L spine.  dtr symetrical.        Extremities normal, atraumatic, no cyanosis or edema   Pulses:   2+ and symmetric all extremities   Skin:   Skin color, texture, turgor normal, no rashes or lesions   Lymph nodes:   Cervical, supraclavicular, and axillary nodes normal   Neurologic:   CNII-XII intact. Weak extension in both feet.  strength, abnormal vibration  in right foot and normal in left foot sensation. Light touch intackt and + 2 platellareflexes       throughout      Results for orders placed or performed in visit on 02/12/19   POCT Influenza A/B   Result Value Ref Range    Rapid Influenza A Ag Negative Negative    Rapid Influenza B Ag Negative Negative    Internal Control Passed Passed    Lot Number [id number]     Expiration Date 09/21/2021          Assessment/Plan       Marguerite was seen today for depression.    Diagnoses and all orders for this visit:    Pain of both hip joints  -     XR Hips Bilateral With or Without Pelvis 2 View; Future    Trochanteric bursitis of both hips  -     XR Hips Bilateral With or Without Pelvis 2 View; Future    Thoracic spine pain  -     MRI Thoracic Spine Without Contrast; Future    Neurogenic claudication  -     MRI Lumbar Spine Without Contrast; Future      Return in about 4 weeks (around 4/22/2019).          There are no Patient Instructions on file for this visit.     Juan Clemens MD    Assessment/Plan

## 2019-03-26 ENCOUNTER — TELEPHONE (OUTPATIENT)
Dept: INTERNAL MEDICINE | Facility: CLINIC | Age: 49
End: 2019-03-26

## 2019-03-26 DIAGNOSIS — M25.551 PAIN OF BOTH HIP JOINTS: Primary | ICD-10-CM

## 2019-03-26 DIAGNOSIS — M25.552 PAIN OF BOTH HIP JOINTS: Primary | ICD-10-CM

## 2019-03-26 NOTE — TELEPHONE ENCOUNTER
Dr. Clemens, I notified Marguerite of Xray results, she states it does not matter to her, whoever you recommend, she also stated that her insurance will be changing to [employer] (Medicaid) at the end of the month since she is leaving her job.

## 2019-03-26 NOTE — PROGRESS NOTES
Please let them know the the hip have arthritis.  Will need to see ortho.  Who would she like to see. Does she want to stay local.

## 2019-03-27 ENCOUNTER — TELEPHONE (OUTPATIENT)
Dept: NEUROLOGY | Facility: CLINIC | Age: 49
End: 2019-03-27

## 2019-03-27 ENCOUNTER — OFFICE VISIT (OUTPATIENT)
Dept: NEUROLOGY | Facility: CLINIC | Age: 49
End: 2019-03-27

## 2019-03-27 VITALS
OXYGEN SATURATION: 98 % | DIASTOLIC BLOOD PRESSURE: 70 MMHG | SYSTOLIC BLOOD PRESSURE: 134 MMHG | HEART RATE: 86 BPM | BODY MASS INDEX: 24.24 KG/M2 | HEIGHT: 64 IN | WEIGHT: 142 LBS

## 2019-03-27 DIAGNOSIS — G40.909 SEIZURE DISORDER (HCC): Primary | ICD-10-CM

## 2019-03-27 DIAGNOSIS — G43.009 MIGRAINE WITHOUT AURA AND WITHOUT STATUS MIGRAINOSUS, NOT INTRACTABLE: ICD-10-CM

## 2019-03-27 PROCEDURE — 99214 OFFICE O/P EST MOD 30 MIN: CPT | Performed by: NURSE PRACTITIONER

## 2019-03-27 RX ORDER — TOPIRAMATE 100 MG/1
100 TABLET, FILM COATED ORAL NIGHTLY
Qty: 30 TABLET | Refills: 5 | Status: SHIPPED | OUTPATIENT
Start: 2019-03-27 | End: 2019-06-27 | Stop reason: SDUPTHER

## 2019-03-27 RX ORDER — LEVETIRACETAM 500 MG/1
750 TABLET ORAL EVERY 12 HOURS SCHEDULED
Qty: 90 TABLET | Refills: 5 | Status: SHIPPED | OUTPATIENT
Start: 2019-03-27 | End: 2019-05-29 | Stop reason: SDUPTHER

## 2019-03-27 RX ORDER — SUMATRIPTAN 100 MG/1
100 TABLET, FILM COATED ORAL ONCE AS NEEDED
Qty: 9 TABLET | Refills: 5 | Status: SHIPPED | OUTPATIENT
Start: 2019-03-27 | End: 2019-12-02 | Stop reason: SDUPTHER

## 2019-03-27 NOTE — TELEPHONE ENCOUNTER
Pt states that she had a seizure yesterday at work.  It lasted over five minutes.  Pt didn't eeg that was ordered at last visit.   She wants to be worked in asap.   I have called for er visit.

## 2019-03-27 NOTE — PROGRESS NOTES
Subjective:     Patient ID: Marguerite Fink is a 49 y.o. female.    CC:   Chief Complaint   Patient presents with   • Migraine   • Seizures       HPI:   History of Present Illness     This is a 49-year-old female who presents for urgent for an appointment for a generalized seizure on 3/26/2019.  She had a friend drive her here to her appointment today.  She does have a history of generalized seizures but her last grand mal seizure was in 2013; has some partial seizures still.  She is normally followed by Dr. Ho in our office.  She has not been seen in about 8 to 10 months.  She tells me yesterday she was at work and she just did not feel well.  She really cannot pinpoint the issue.  She tells me her arm is in the past for seizures have been burning wires are glitter in her eyes.  She tells me that she did have a headache and she has a history of migraines.  She tells me she was sitting at work and her coworker witnessed a generalized seizure and they took her by ambulance to [facility].  I was able to review her records today and she tells me that she has had some confusion since that time.  Labs were obtained including a CBC and BMP and these were essentially within normal limits.  They sent her home and recommend that she follow-up here.  She currently is on Keppra 500 mg twice a day as well as Topamax 100 mg at night.  She tells me she takes Imitrex as needed for migraines.  She only has 1-3 migraines a month.  She does me that she feels like her seizure was brought on by stress and sleep deprivation.  She has a lot of chronic pain and she has had testing to evaluate this and to better treat this.  She tells me she does not want to take prescription medication for her insomnia and has tried some melatonin in the past but is not sure she has been taking the right dose or taking this correctly.  She has done really well on the Keppra and Topamax as far as tolerance.  She previously took  "Dilantin which caused severe side effects.  She drinks a little bit of water but probably has not been very well-hydrated lately as well.  She does feel slightly confused and \"out of it\" today.  She has been out of work since yesterday.  She is wondering when she can go back to work.  She does have an MRI of the brain without contrast on 2018 and this was unremarkable per radiologist reading and we reviewed this today in office and shows a large right maxillary retention cyst with maybe 1-2 punctate white matter changes on the brain but otherwise is unremarkable.    The following portions of the patient's history were reviewed and updated as appropriate: allergies, current medications, past family history, past medical history, past social history, past surgical history and problem list.    Past Medical History:   Diagnosis Date   • Abdominal pain, epigastric    • Acute sinusitis    • Acute UTI (urinary tract infection)    • Angina, intestinal (CMS/HCC)    • Anxiety    • Arthritis    • Back pain    • Breast mass, right    • Chronic hepatitis C virus infection (CMS/HCC)    • Depression    • Diarrhea    • Elevated LFTs    • Fatigue    • History of endometriosis    • History of Papanicolaou smear of cervix 2014    NORMAL    • HTN (hypertension)    • IBS (irritable bowel syndrome)    • Insomnia    • Menopausal symptoms    • Migraine headache    • Nausea & vomiting    • Neck pain    • Ovarian cyst    • Pelvic adhesive disease    • Radicular pain of left lower extremity    • Restless leg syndrome    • Seizure disorder (CMS/HCC)    • Tobacco abuse    • Trochanteric bursitis    • Vitamin B 12 deficiency        Past Surgical History:   Procedure Laterality Date   • BILATERAL SALPINGO OOPHORECTOMY  03/10/2015    DR NATALY THOMPSON   • BREAST LUMPECTOMY     •  SECTION     • HYSTERECTOMY  03/10/2015    [facility] (DR NATALY THOMPSON)   • INGUINAL HERNIA REPAIR Right    • LYSIS OF ABDOMINAL ADHESIONS  03/10/2015    DR" NATALY JAY   • TUBAL ABDOMINAL LIGATION  2010       Social History     Socioeconomic History   • Marital status:      Spouse name: Not on file   • Number of children: Not on file   • Years of education: Not on file   • Highest education level: Not on file   Tobacco Use   • Smoking status: Current Every Day Smoker     Packs/day: 0.50     Types: Cigarettes   • Smokeless tobacco: Never Used   • Tobacco comment:  1/2 PACK A DAY   Substance and Sexual Activity   • Alcohol use: Yes   • Drug use: No   • Sexual activity: Defer     Birth control/protection: Surgical       Family History   Problem Relation Age of Onset   • Alzheimer's disease Other    • Cancer Other    • Dementia Other    • Hypertension Other    • Parkinsonism Other    • Osteoporosis Mother    • Diabetes Mother    • Hypertension Mother    • Obesity Mother    • Breast cancer Maternal Grandmother    • Osteoporosis Maternal Grandmother    • Diabetes Maternal Grandmother         Review of Systems   Constitutional: Negative for chills, fatigue, fever and unexpected weight change.   HENT: Negative for ear pain, hearing loss, nosebleeds, rhinorrhea and sore throat.    Eyes: Negative for photophobia, pain, discharge, itching and visual disturbance.   Respiratory: Negative for cough, chest tightness, shortness of breath and wheezing.    Cardiovascular: Negative for chest pain, palpitations and leg swelling.   Gastrointestinal: Negative for abdominal pain, blood in stool, constipation, diarrhea, nausea and vomiting.   Genitourinary: Negative for dysuria, frequency, hematuria and urgency.   Musculoskeletal: Negative for arthralgias, back pain, gait problem, joint swelling, myalgias, neck pain and neck stiffness.   Skin: Negative for rash and wound.   Allergic/Immunologic: Negative for environmental allergies and food allergies.   Neurological: Positive for headaches. Negative for dizziness, tremors, seizures, syncope, speech difficulty, weakness, light-headedness  and numbness.   Hematological: Negative for adenopathy. Does not bruise/bleed easily.   Psychiatric/Behavioral: Positive for confusion, decreased concentration, dysphoric mood and sleep disturbance. Negative for agitation, hallucinations and suicidal ideas. The patient is nervous/anxious.    All other systems reviewed and are negative.       Objective:    Neurologic Exam     Mental Status   Oriented to person, place, and time.   Level of consciousness: alert    Cranial Nerves   Cranial nerves II through XII intact.     Motor Exam   Muscle bulk: normal  Overall muscle tone: normal    Strength   Strength 5/5 throughout.     Gait, Coordination, and Reflexes     Gait  Gait: normal    Coordination   Finger to nose coordination: normal    Tremor   Resting tremor: absent  Intention tremor: absent  Action tremor: absent    Reflexes   Right brachioradialis: 2+  Left brachioradialis: 2+  Right biceps: 2+  Left biceps: 2+  Right triceps: 2+  Left triceps: 2+  Right patellar: 2+  Left patellar: 2+  Right achilles: 2+  Left achilles: 2+  Right : 2+  Left : 2+      Physical Exam   Constitutional: She is oriented to person, place, and time.   Neurological: She is oriented to person, place, and time. She has normal strength. She has a normal Finger-Nose-Finger Test. Gait normal.   Reflex Scores:       Tricep reflexes are 2+ on the right side and 2+ on the left side.       Bicep reflexes are 2+ on the right side and 2+ on the left side.       Brachioradialis reflexes are 2+ on the right side and 2+ on the left side.       Patellar reflexes are 2+ on the right side and 2+ on the left side.       Achilles reflexes are 2+ on the right side and 2+ on the left side.      Assessment/Plan:       Marguerite was seen today for migraine and seizures.    Diagnoses and all orders for this visit:    Seizure disorder (CMS/[facility])  -     EEG Awake or Drowsy Routine; Future  -     levETIRAcetam (KEPPRA) 500 MG tablet; Take 1.5 tablets by mouth  Every 12 (Twelve) Hours.  -     topiramate (TOPAMAX) 100 MG tablet; Take 1 tablet by mouth Every Night.    Migraine without aura and without status migrainosus, not intractable  -     topiramate (TOPAMAX) 100 MG tablet; Take 1 tablet by mouth Every Night.  -     SUMAtriptan (IMITREX) 100 MG tablet; Take 1 tablet by mouth 1 (One) Time As Needed for Migraine for up to 1 dose.         We will increase her levetiracetam to 750 mg twice a day.  Continue Topamax 100 mg at night.  Refill Imitrex.  EEG recommended.  She has canceled these multiple times in the past.  I have encouraged her to keep this appointment.  I recommended she decrease her stress, improve her sleep and she can take melatonin 5 to 10 mg every evening 1 to 2 hours before desired hour of sleep.  Recommend she follow-up here in 8 weeks. Reviewed medications, potential side effects and signs and symptoms to report. Discussed risk versus benefits of treatment plan with patient and/or family-including medications, labs and radiology that may be ordered. Addressed questions and concerns during visit. Patient and/or family verbalized understanding and agree with plan. She may return to work this Friday 3/29/19.    Patient instructions include: No driving or operating heavy machinery for 3 months from onset of most recent seizure. Minimize stress as much as possible. Recommended 7-8 hours of sleep each night. Abstain from alcohol intake. Educated on Antiepileptic medications with possible side effects and signs and symptoms to report if prescribed during visit. Instructed to take seizure medication daily if prescribed. Reviewed potential seizure risk factors. Instructed to call 911 or our office if another seizure does occur.    During this visit the following were done:  Labs Reviewed [x]    Labs Ordered []    Radiology Reports Reviewed [x]    Radiology Ordered [x]  EEG  PCP Records Reviewed []    Referring Provider Records Reviewed []    ER Records Reviewed  [x]  [facility]  Hospital Records Reviewed []    History Obtained From Family []    Radiology Images Reviewed [x]    Other Reviewed []    Records Requested []      EMR Dragon/Transcription Disclaimer:  Much of this encounter note is an electronic transcription of spoken language to printed text. Electronic transcription of spoken language may permit erroneous words or phrases to be inadvertently transcribed. Although I have reviewed the note for such errors, some may still exist in this documentation.      Sherlyn Wilson, APRN  3/27/2019

## 2019-03-28 ENCOUNTER — HOSPITAL ENCOUNTER (OUTPATIENT)
Dept: MRI IMAGING | Facility: HOSPITAL | Age: 49
Discharge: HOME | End: 2019-03-28

## 2019-03-28 ENCOUNTER — HOSPITAL ENCOUNTER (OUTPATIENT)
Dept: MRI IMAGING | Facility: HOSPITAL | Age: 49
Discharge: HOME | End: 2019-03-28
Admitting: INTERNAL MEDICINE

## 2019-03-28 DIAGNOSIS — M54.6 THORACIC SPINE PAIN: ICD-10-CM

## 2019-03-28 DIAGNOSIS — G95.19 NEUROGENIC CLAUDICATION: ICD-10-CM

## 2019-03-28 PROCEDURE — 72148 MRI LUMBAR SPINE W/O DYE: CPT

## 2019-03-28 PROCEDURE — 72146 MRI CHEST SPINE W/O DYE: CPT

## 2019-04-05 ENCOUNTER — OFFICE VISIT (OUTPATIENT)
Dept: ORTHOPEDIC SURGERY | Facility: CLINIC | Age: 49
End: 2019-04-05

## 2019-04-05 VITALS — HEIGHT: 64 IN | BODY MASS INDEX: 24.52 KG/M2 | WEIGHT: 143.6 LBS | RESPIRATION RATE: 15 BRPM

## 2019-04-05 DIAGNOSIS — M70.61 GREATER TROCHANTERIC BURSITIS OF RIGHT HIP: ICD-10-CM

## 2019-04-05 DIAGNOSIS — M16.0 PRIMARY OSTEOARTHRITIS OF BOTH HIPS: ICD-10-CM

## 2019-04-05 DIAGNOSIS — M25.551 ARTHRALGIA OF RIGHT HIP: Primary | ICD-10-CM

## 2019-04-05 DIAGNOSIS — M47.816 LUMBAR FACET ARTHROPATHY: ICD-10-CM

## 2019-04-05 PROCEDURE — 20610 DRAIN/INJ JOINT/BURSA W/O US: CPT | Performed by: PHYSICIAN ASSISTANT

## 2019-04-05 PROCEDURE — 99242 OFF/OP CONSLTJ NEW/EST SF 20: CPT | Performed by: PHYSICIAN ASSISTANT

## 2019-04-05 RX ORDER — LIDOCAINE HYDROCHLORIDE 10 MG/ML
2 INJECTION, SOLUTION INFILTRATION; PERINEURAL
Status: COMPLETED | OUTPATIENT
Start: 2019-04-05 | End: 2019-04-05

## 2019-04-05 RX ORDER — METHYLPREDNISOLONE ACETATE 40 MG/ML
40 INJECTION, SUSPENSION INTRA-ARTICULAR; INTRALESIONAL; INTRAMUSCULAR; SOFT TISSUE
Status: COMPLETED | OUTPATIENT
Start: 2019-04-05 | End: 2019-04-05

## 2019-04-05 RX ADMIN — LIDOCAINE HYDROCHLORIDE 2 ML: 10 INJECTION, SOLUTION INFILTRATION; PERINEURAL at 09:57

## 2019-04-05 RX ADMIN — METHYLPREDNISOLONE ACETATE 40 MG: 40 INJECTION, SUSPENSION INTRA-ARTICULAR; INTRALESIONAL; INTRAMUSCULAR; SOFT TISSUE at 09:57

## 2019-04-05 NOTE — PROGRESS NOTES
Subjective   Patient ID: Marguerite Fink is a 49 y.o. right hand dominant female  Pain of the Left Hip (Pt states that she has not injured her hips but has had pain after child birth 28 years ago.  ) and Pain of the Right Hip (Pt states that she has not injured her hips but has had pain after child birth 28 years ago.)     Patient is being seen in consultation at the request of Dr. Juan Clemens for the evaluation of bilateral hip pain.      History of Present Illness  Patient presents as a new patient with complaints of chronic low back pain as well as bilateral hip pain.  She denies any injury or trauma.  She states the pain began initially in 1991 and has progressed.  She does see pain management and  takes oxycodone 3 times a day, she also receives periodic fluoroscopy guided lumbar injections.  Patient reports the right hip is more tender than the left.  The pain keeps her from resting well at night  Patient also reports she has been using topical anti-inflammatory gel.  She has completed physical therapy for her lumbar spine in the past with no improvement   Pain Score: 7  Pain Location: Hip  Pain Orientation: Right, Left  Pain Radiating Towards: Down right leg  Pain Descriptors: Aching  Pain Frequency: Constant/continuous  Pain Onset: Gradual  Date Pain First Started: 09/05/04  Clinical Progression: Gradually worsening  Aggravating Factors: Walking(Sitting too long)        Pain Intervention(s): Rest, Heat applied, Cold applied  Result of Injury: No  Work-Related Injury: No    Past Medical History:   Diagnosis Date   • Abdominal pain, epigastric    • Acute sinusitis    • Acute UTI (urinary tract infection)    • Angina, intestinal (CMS/HCC)    • Anxiety    • Arthritis    • Back pain    • Breast mass, right    • Chronic hepatitis C virus infection (CMS/HCC)    • Depression    • Diarrhea    • Elevated LFTs    • Fatigue    • History of endometriosis    • History of Papanicolaou smear of cervix 04/02/2014    NORMAL     • HTN (hypertension)    • IBS (irritable bowel syndrome)    • Insomnia    • Menopausal symptoms    • Migraine headache    • Nausea & vomiting    • Neck pain    • Ovarian cyst    • Pelvic adhesive disease    • Radicular pain of left lower extremity    • Restless leg syndrome    • Seizure disorder (CMS/HCC)    • Tobacco abuse    • Trochanteric bursitis    • Vitamin B 12 deficiency         Past Surgical History:   Procedure Laterality Date   • BILATERAL SALPINGO OOPHORECTOMY  03/10/2015    DR NATALY THOMPSON   • BREAST LUMPECTOMY     •  SECTION     • HYSTERECTOMY  03/10/2015    [facility] (DR NATALY THOMPSON)   • INGUINAL HERNIA REPAIR Right    • LYSIS OF ABDOMINAL ADHESIONS  03/10/2015    DR NATALY THOMPSON   • TUBAL ABDOMINAL LIGATION         Family History   Problem Relation Age of Onset   • Alzheimer's disease Other    • Cancer Other    • Dementia Other    • Hypertension Other    • Parkinsonism Other    • Osteoporosis Mother    • Diabetes Mother    • Hypertension Mother    • Obesity Mother    • Breast cancer Maternal Grandmother    • Osteoporosis Maternal Grandmother    • Diabetes Maternal Grandmother        Social History     Socioeconomic History   • Marital status:      Spouse name: Not on file   • Number of children: Not on file   • Years of education: Not on file   • Highest education level: Not on file   Tobacco Use   • Smoking status: Current Every Day Smoker     Packs/day: 0.50     Types: Cigarettes   • Smokeless tobacco: Never Used   • Tobacco comment:  1/2 PACK A DAY   Substance and Sexual Activity   • Alcohol use: Yes   • Drug use: No   • Sexual activity: Defer     Birth control/protection: Surgical         Current Outpatient Medications:   •  atorvastatin (LIPITOR) 10 MG tablet, Take 1 tablet by mouth Daily., Disp: 30 tablet, Rfl: 11  •  B-12, Methylcobalamin, 1000 MCG sublingual tablet, Place 1 tablet under the tongue Daily. (Patient taking differently: Place 2,000 mg under the tongue Daily.),  Disp: 90 tablet, Rfl: 3  •  clonazePAM (KlonoPIN) 1 MG tablet, Take 1 mg by mouth., Disp: , Rfl:   •  diclofenac (VOLTAREN) 1 % gel gel, Apply 4 g topically to the appropriate area as directed 4 (Four) Times a Day As Needed (pain)., Disp: 100 g, Rfl: 11  •  diltiaZEM SR (CARDIZEM SR) 120 MG 12 hr capsule, Take 1 capsule by mouth 2 (Two) Times a Day., Disp: 60 capsule, Rfl: 5  •  escitalopram (LEXAPRO) 10 MG tablet, Take 1 tablet by mouth Daily., Disp: 30 tablet, Rfl: 11  •  estradiol (VIVELLE-DOT) 0.1 MG/24HR patch, Place 1 patch on the skin as directed by provider 2 (Two) Times a Week., Disp: 8 patch, Rfl: 12  •  fluticasone (FLONASE) 50 MCG/ACT nasal spray, 1 spray into each nostril Daily., Disp: 1 bottle, Rfl: 2  •  levETIRAcetam (KEPPRA) 500 MG tablet, Take 1.5 tablets by mouth Every 12 (Twelve) Hours., Disp: 90 tablet, Rfl: 5  •  lisinopril (PRINIVIL,ZESTRIL) 10 MG tablet, Take 1 tablet by mouth Daily., Disp: 90 tablet, Rfl: 3  •  oxyCODONE-acetaminophen (ENDOCET)  MG per tablet, Every 6 (Six) Hours., Disp: , Rfl:   •  pantoprazole (PROTONIX) 40 MG EC tablet, Take 40 mg by mouth., Disp: , Rfl:   •  rOPINIRole (REQUIP) 0.25 MG tablet, Take 0.25 mg by mouth., Disp: , Rfl:   •  SUMAtriptan (IMITREX) 100 MG tablet, Take 1 tablet by mouth 1 (One) Time As Needed for Migraine for up to 1 dose., Disp: 9 tablet, Rfl: 5  •  topiramate (TOPAMAX) 100 MG tablet, Take 1 tablet by mouth Every Night., Disp: 30 tablet, Rfl: 5  •  umeclidinium-vilanterol (ANORO ELLIPTA) 62.5-25 MCG/INH aerosol powder  inhaler, Inhale 1 puff Daily., Disp: 1 each, Rfl: 11  •  valACYclovir (VALTREX) 1000 MG tablet, Take 1 tablet by mouth 2 (Two) Times a Day., Disp: 20 tablet, Rfl: 0    No Known Allergies    Review of Systems   Constitutional: Negative for fatigue.   HENT: Negative for voice change.    Eyes: Negative for visual disturbance.   Respiratory: Negative for shortness of breath.    Gastrointestinal: Negative for abdominal pain.  "  Genitourinary: Negative for dysuria.   Musculoskeletal: Positive for arthralgias (Bilateral Hips). Negative for gait problem and joint swelling.   Skin: Negative.    Neurological: Negative for speech difficulty.   Hematological: Does not bruise/bleed easily.   Psychiatric/Behavioral: Negative for confusion.       Objective   Resp 15   Ht 162.6 cm (64\")   Wt 65.1 kg (143 lb 9.6 oz)   BMI 24.65 kg/m²    Physical Exam   Constitutional: She is oriented to person, place, and time. She appears well-nourished.   Pulmonary/Chest: No respiratory distress.   Musculoskeletal:        Right hip: She exhibits tenderness (GT bursa). She exhibits no swelling and no crepitus.        Left hip: She exhibits decreased strength and tenderness. She exhibits no bony tenderness, no swelling and no deformity.        Lumbar back: She exhibits decreased range of motion, tenderness, bony tenderness and pain.   Neurological: She is alert and oriented to person, place, and time.   Skin: Capillary refill takes less than 2 seconds.   Psychiatric: She has a normal mood and affect.   Vitals reviewed.    Right Hip Exam     Tenderness   The patient is experiencing tenderness in the greater trochanter.    Range of Motion   Abduction: 45   Flexion: 110     Tests   Dulce Maria: positive    Other   Erythema: absent  Sensation: normal      Left Hip Exam     Range of Motion   Abduction: normal   Flexion: normal            Extremity DVT signs are Negative by clinical screen.   Neurologic Exam     Mental Status   Oriented to person, place, and time.              Assessment/Plan   Independent Review of Radiographic Studies:    No new imaging done today.  I did review x-ray imaging of the bilateral hip performed at [facility].  There is no acute fracture.  There is mild degenerative hip changes.  I also reviewed an MRI of the lumbar spine which revealed facet arthropathy at L4-L5 and L5-S1    Large Joint Arthrocentesis: R hip joint  Date/Time: " 4/5/2019 9:57 AM  Consent given by: patient  Site marked: site marked  Timeout: Immediately prior to procedure a time out was called to verify the correct patient, procedure, equipment, support staff and site/side marked as required   Supporting Documentation  Indications: pain   Procedure Details  Location: hip - R hip joint  Preparation: Patient was prepped and draped in the usual sterile fashion  Needle gauge: 21g.  Medications administered: 2 mL lidocaine 1 %; 40 mg methylPREDNISolone acetate 40 MG/ML               Marguerite was seen today for pain and pain.    Diagnoses and all orders for this visit:    Arthralgia of right hip  -     Large Joint Arthrocentesis: R hip joint  -     FL Guided Pain Management Large Joint    Primary osteoarthritis of both hips    Lumbar facet arthropathy    Greater trochanteric bursitis of right hip       Orthopedic activities reviewed and patient expressed appreciation  Discussion of orthopedic goals  Risk, benefits, and merits of treatment alternatives reviewed with the patient and questions answered  Call or notify for any adverse effect from injection therapy  Weight bearing parameters reviewed  Avoid offending activity  Ice, heat, and/or modalities as beneficial    Recommendations/Plan:  Exercise, medications, injections, other patient advice, and return appointment as noted.  Patient is encouraged to call or return for any issues or concerns.  I would like for the patient to try bilateral fluoroscopy guided hip injections as well as a right greater hip bursa injection.  If she does not obtain relief of her symptoms from this treatment standpoint, I may refer her to Dr. Olman Woodard- neurosurgeon.  She states she has seen him in the past for her cervical spine.    FU in 3 months    Patient agreeable to call or return sooner for any concerns.

## 2019-04-16 ENCOUNTER — TELEPHONE (OUTPATIENT)
Dept: NEUROLOGY | Facility: CLINIC | Age: 49
End: 2019-04-16

## 2019-04-23 ENCOUNTER — HOSPITAL ENCOUNTER (OUTPATIENT)
Dept: SLEEP MEDICINE | Facility: HOSPITAL | Age: 49
Discharge: HOME | End: 2019-04-23
Admitting: NURSE PRACTITIONER

## 2019-04-23 DIAGNOSIS — G40.909 SEIZURE DISORDER (HCC): ICD-10-CM

## 2019-04-23 PROCEDURE — 95816 EEG AWAKE AND DROWSY: CPT | Performed by: PSYCHIATRY & NEUROLOGY

## 2019-04-23 PROCEDURE — 95816 EEG AWAKE AND DROWSY: CPT

## 2019-04-29 ENCOUNTER — TELEPHONE (OUTPATIENT)
Dept: NEUROLOGY | Facility: CLINIC | Age: 49
End: 2019-04-29

## 2019-04-29 NOTE — PROGRESS NOTES
Please notify patient EEG shows no active seizure focus or epilepsy activity, however this does not mean she isn't having seizures. I recommend she continue the increase keppra dosage along with her topamax, no driving for 90 days and we will f/u as scheduled in office. Thanks, CHRIS Hinds   please fax EEG to pcp

## 2019-04-29 NOTE — TELEPHONE ENCOUNTER
----- Message from CHRIS Adrian sent at 4/29/2019  7:49 AM EDT -----  Please notify patient EEG shows no active seizure focus or epilepsy activity, however this does not mean she isn't having seizures. I recommend she continue the increase keppra dosage along with her topamax, no driving for 90 days and we will f/u as scheduled in office. Thanks, CHRIS Hinds   please fax EEG to pcp

## 2019-04-30 ENCOUNTER — OFFICE VISIT (OUTPATIENT)
Dept: INTERNAL MEDICINE | Facility: CLINIC | Age: 49
End: 2019-04-30

## 2019-04-30 ENCOUNTER — TELEPHONE (OUTPATIENT)
Dept: NEUROLOGY | Facility: CLINIC | Age: 49
End: 2019-04-30

## 2019-04-30 VITALS
DIASTOLIC BLOOD PRESSURE: 56 MMHG | BODY MASS INDEX: 24.37 KG/M2 | OXYGEN SATURATION: 100 % | HEART RATE: 76 BPM | TEMPERATURE: 98.9 F | RESPIRATION RATE: 16 BRPM | WEIGHT: 142 LBS | SYSTOLIC BLOOD PRESSURE: 112 MMHG

## 2019-04-30 DIAGNOSIS — Z91.09 ENVIRONMENTAL ALLERGIES: Primary | ICD-10-CM

## 2019-04-30 PROCEDURE — 99213 OFFICE O/P EST LOW 20 MIN: CPT | Performed by: PHYSICIAN ASSISTANT

## 2019-04-30 RX ORDER — MONTELUKAST SODIUM 10 MG/1
10 TABLET ORAL NIGHTLY
Qty: 30 TABLET | Refills: 5 | Status: SHIPPED | OUTPATIENT
Start: 2019-04-30 | End: 2019-12-27 | Stop reason: SDUPTHER

## 2019-04-30 RX ORDER — GABAPENTIN 300 MG/1
300 CAPSULE ORAL
Refills: 0 | COMMUNITY
Start: 2019-04-24 | End: 2021-09-14 | Stop reason: ALTCHOICE

## 2019-05-16 ENCOUNTER — APPOINTMENT (OUTPATIENT)
Dept: NEUROLOGY | Facility: HOSPITAL | Age: 49
End: 2019-05-16

## 2019-05-17 ENCOUNTER — HOSPITAL ENCOUNTER (OUTPATIENT)
Dept: GENERAL RADIOLOGY | Facility: HOSPITAL | Age: 49
Discharge: HOME | End: 2019-05-17
Admitting: ORTHOPAEDIC SURGERY

## 2019-05-17 PROCEDURE — 77002 NEEDLE LOCALIZATION BY XRAY: CPT

## 2019-05-17 PROCEDURE — 25010000002 METHYLPREDNISOLONE PER 80 MG: Performed by: PHYSICIAN ASSISTANT

## 2019-05-17 RX ORDER — METHYLPREDNISOLONE ACETATE 80 MG/ML
80 INJECTION, SUSPENSION INTRA-ARTICULAR; INTRALESIONAL; INTRAMUSCULAR; SOFT TISSUE ONCE
Status: DISCONTINUED | OUTPATIENT
Start: 2019-05-17 | End: 2019-05-17

## 2019-05-17 RX ORDER — METHYLPREDNISOLONE ACETATE 80 MG/ML
80 INJECTION, SUSPENSION INTRA-ARTICULAR; INTRALESIONAL; INTRAMUSCULAR; SOFT TISSUE ONCE
Status: COMPLETED | OUTPATIENT
Start: 2019-05-17 | End: 2019-05-17

## 2019-05-17 RX ORDER — LIDOCAINE HYDROCHLORIDE 10 MG/ML
9 INJECTION, SOLUTION INFILTRATION; PERINEURAL ONCE
Status: COMPLETED | OUTPATIENT
Start: 2019-05-17 | End: 2019-05-17

## 2019-05-17 RX ADMIN — METHYLPREDNISOLONE ACETATE 80 MG: 80 INJECTION, SUSPENSION INTRA-ARTICULAR; INTRALESIONAL; INTRAMUSCULAR; SOFT TISSUE at 13:32

## 2019-05-17 RX ADMIN — LIDOCAINE HYDROCHLORIDE 18 ML: 10 INJECTION, SOLUTION INFILTRATION; PERINEURAL at 13:23

## 2019-05-17 RX ADMIN — METHYLPREDNISOLONE ACETATE 80 MG: 80 INJECTION, SUSPENSION INTRA-ARTICULAR; INTRALESIONAL; INTRAMUSCULAR; SOFT TISSUE at 13:38

## 2019-05-17 NOTE — POST-PROCEDURE NOTE
[facility]  [address], [address]  [place] [address]  [phone]          PROCEDURE REPORT           DIAGNOSIS:    Bilateral advanced hip osteoarthritis, symptomatic     PROCEDURE:  Bilateral hip joint injection under flouroscopy        Marguerite Fink with date of birth 1970 presents to [facility] Radiology Department today for injection therapy.          Patient presents to [facility] Radiology Department Flouroscopy Suite on 5/17/2019 for planned elective bilateral hip injections under flouroscopy for symptomatic osteoarthritis.     Procedure:      After consent was obtained, and using ethyl chloride topical local anesthetic, each hip was then prepped and draped with sterile technique. With an anterior hip approach and flouroscopy guidance, and care to stay lateral of the femoral artery, each hip joint was sequentially injected via a 20 gauge spinal needle with a mixture of 80 mg in one ml of methylprednisolone plus 9 ml of 1% plain Lidocaine. After each injection the needle was withdrawn. The procedures were well tolerated and without complication. The patient noted relief of focal hip joint pain. The patient did remain stable and with baseline ambulation. The patient can rest the joint with routine activities for a few more days before resuming full regular activities and exercise. It may be painful for the first few days. Watch for fever, skin issues, increased swelling or persistent pain in the joint. Call or return to clinic if such symptoms occur, other concerns or if there is lack of improvement as anticipated.     Impression: Symptomatic bilateral advanced hip osteoarthritis.      Recommendations/Plan:      Treatment and patient advice as noted here and in office visit report.  Orthopedic activities reviewed and patient expressed appreciation.  Discussion of orthopedic goals.   Risk, benefits, and merits of treatment options reviewed and questions answered.  Call or  notify for any adverse effect from injection therapy.     Exercise: As tolerated. No strenuous activity for a few days as appropriate.  Brace:  No brace was given at today's visit  Referral: No referrals made at today's visit  Studies: No additional studies ordered.  Surgery: No surgery proposed at this visit.  Activity:  May perform usual activities as tolerated.      Patient will return to our clinic at scheduled appointment.  Patient agreeable to call or return sooner for any concerns.

## 2019-05-20 ENCOUNTER — TELEPHONE (OUTPATIENT)
Dept: ORTHOPEDIC SURGERY | Facility: CLINIC | Age: 49
End: 2019-05-20

## 2019-05-20 NOTE — TELEPHONE ENCOUNTER
Had bilateral hip fluoro injection on 5/17/19. She states Sat and Sun am, and even thisd morning when she got up in the mornings, pain is worst. Hip seems to have catch in it. She has tried to be active but rest as well, but she doesn't feel she has overdone it. She is still in an awful lot of pain. She is not sure if this is normal, or if steroid hasn't had time to work. She would like a return call.

## 2019-05-22 ENCOUNTER — OFFICE VISIT (OUTPATIENT)
Dept: NEUROLOGY | Facility: CLINIC | Age: 49
End: 2019-05-22

## 2019-05-22 VITALS
DIASTOLIC BLOOD PRESSURE: 58 MMHG | BODY MASS INDEX: 25.44 KG/M2 | HEIGHT: 64 IN | WEIGHT: 149 LBS | SYSTOLIC BLOOD PRESSURE: 108 MMHG | OXYGEN SATURATION: 99 % | HEART RATE: 64 BPM

## 2019-05-22 DIAGNOSIS — G40.909 SEIZURE DISORDER (HCC): ICD-10-CM

## 2019-05-22 DIAGNOSIS — Z79.899 MEDICATION MANAGEMENT: Primary | ICD-10-CM

## 2019-05-22 PROCEDURE — 36415 COLL VENOUS BLD VENIPUNCTURE: CPT | Performed by: NURSE PRACTITIONER

## 2019-05-22 PROCEDURE — 80201 ASSAY OF TOPIRAMATE: CPT | Performed by: NURSE PRACTITIONER

## 2019-05-22 PROCEDURE — 99213 OFFICE O/P EST LOW 20 MIN: CPT | Performed by: NURSE PRACTITIONER

## 2019-05-22 PROCEDURE — 80177 DRUG SCRN QUAN LEVETIRACETAM: CPT | Performed by: NURSE PRACTITIONER

## 2019-05-22 NOTE — PROGRESS NOTES
Subjective:     Patient ID: Marguerite Fink is a 49 y.o. female.    CC:   Chief Complaint   Patient presents with   • Migraine       HPI:   History of Present Illness     Ms Fink is here today for follow-up.  She is a long-term patient of Dr. Ho seen for seizures and migraines.  She last saw Sherlyn Wilson in March after having a reported grand mal seizure, at that time actually did increase her Keppra to 750 mg twice daily.  She tells me that she was unable to tolerate this however and she is only been taking 500 in the morning with 750 at night.  She reports that she is had one episode since she was seen here last however it was only about a week and half after she saw Sherlyn.  She reportedly had a mild seizure.  She is also taking Topamax 100 mg at night as well as gabapentin.  She reports however she is only taking gabapentin every 2 to 3 days due to side effects.  The recently performed was negative for epileptiform activity  Her migraines are reportedly under control.  The following portions of the patient's history were reviewed and updated as appropriate: allergies, current medications, past family history, past medical history, past social history, past surgical history and problem list.    Past Medical History:   Diagnosis Date   • Abdominal pain, epigastric    • Acute sinusitis    • Acute UTI (urinary tract infection)    • Angina, intestinal (CMS/HCC)    • Anxiety    • Arthritis    • Back pain    • Breast mass, right    • Chronic hepatitis C virus infection (CMS/HCC)    • Depression    • Diarrhea    • Elevated LFTs    • Fatigue    • History of endometriosis    • History of Papanicolaou smear of cervix 04/02/2014    NORMAL    • HTN (hypertension)    • IBS (irritable bowel syndrome)    • Insomnia    • Menopausal symptoms    • Migraine headache    • Nausea & vomiting    • Neck pain    • Ovarian cyst    • Pelvic adhesive disease    • Radicular pain of left lower extremity    • Restless leg syndrome    •  Seizure disorder (CMS/HCC)    • Tobacco abuse    • Trochanteric bursitis    • Vitamin B 12 deficiency        Past Surgical History:   Procedure Laterality Date   • BILATERAL SALPINGO OOPHORECTOMY  03/10/2015    DR NATALY THOMPSON   • BREAST LUMPECTOMY     •  SECTION     • HYSTERECTOMY  03/10/2015    [facility] (DR NATALY THOMPSON)   • INGUINAL HERNIA REPAIR Right    • LYSIS OF ABDOMINAL ADHESIONS  03/10/2015    DR NATALY THOMPSON   • TUBAL ABDOMINAL LIGATION         Social History     Socioeconomic History   • Marital status:      Spouse name: Not on file   • Number of children: Not on file   • Years of education: Not on file   • Highest education level: Not on file   Tobacco Use   • Smoking status: Current Every Day Smoker     Packs/day: 0.50     Types: Cigarettes   • Smokeless tobacco: Never Used   • Tobacco comment:  1/2 PACK A DAY   Substance and Sexual Activity   • Alcohol use: Yes   • Drug use: No   • Sexual activity: Defer     Birth control/protection: Surgical       Family History   Problem Relation Age of Onset   • Alzheimer's disease Other    • Cancer Other    • Dementia Other    • Hypertension Other    • Parkinsonism Other    • Osteoporosis Mother    • Diabetes Mother    • Hypertension Mother    • Obesity Mother    • Breast cancer Maternal Grandmother    • Osteoporosis Maternal Grandmother    • Diabetes Maternal Grandmother         Review of Systems   Constitutional: Negative.    HENT: Negative for tinnitus, trouble swallowing and voice change.    Eyes: Negative.    Respiratory: Negative.    Cardiovascular: Negative.    Gastrointestinal: Negative.    Endocrine: Negative.    Genitourinary: Negative.    Musculoskeletal: Positive for neck pain.   Skin: Negative.    Allergic/Immunologic: Negative.    Neurological: Positive for seizures and headaches. Negative for dizziness, tremors, syncope, facial asymmetry, speech difficulty, weakness, light-headedness and numbness.   Hematological: Negative.     Psychiatric/Behavioral: Positive for confusion.        Objective:    Neurologic Exam     Mental Status   Oriented to person, place, and time.   Attention: normal. Concentration: normal.   Speech: speech is normal   Level of consciousness: alert  Knowledge: consistent with education.   Able to read. Able to write. Normal comprehension.     Cranial Nerves   Cranial nerves II through XII intact.     CN II   Visual fields full to confrontation.   Right visual field deficit: none  Left visual field deficit: none     CN III, IV, VI   Pupils are equal, round, and reactive to light.  Extraocular motions are normal.   Right pupil: Shape: regular. Reactivity: brisk. Consensual response: intact. Accommodation: intact.   Left pupil: Shape: regular. Reactivity: brisk. Consensual response: intact. Accommodation: intact.   CN III: no CN III palsy  CN VI: no CN VI palsy  Nystagmus: none   Upgaze: normal  Downgaze: normal    CN V   Facial sensation intact.     CN VII   Facial expression full, symmetric.     CN VIII   CN VIII normal.     CN IX, X   CN IX normal.   CN X normal.     CN XI   CN XI normal.     CN XII   CN XII normal.     Motor Exam   Muscle bulk: normal  Overall muscle tone: normal  Right arm tone: normal  Left arm tone: normal  Right arm pronator drift: absent  Left arm pronator drift: absent  Right leg tone: normal  Left leg tone: normal    Strength   Strength 5/5 throughout.     Sensory Exam   Light touch normal.     Gait, Coordination, and Reflexes     Gait  Gait: normal    Coordination   Romberg: negative  Finger to nose coordination: normal  Heel to shin coordination: normal  Tandem walking coordination: normal    Tremor   Resting tremor: absent  Intention tremor: absent  Action tremor: absent    Reflexes   Reflexes 2+ except as noted.   Right plantar: normal  Left plantar: normal  Right Anand: absent  Left Anand: absent      Physical Exam   Constitutional: She is oriented to person, place, and time. She  appears well-developed and well-nourished. No distress.   HENT:   Head: Normocephalic and atraumatic.   Eyes: Conjunctivae and EOM are normal. Pupils are equal, round, and reactive to light. No scleral icterus.   Neck: Normal range of motion. Neck supple.   Pulmonary/Chest: Effort normal. No respiratory distress.   Neurological: She is alert and oriented to person, place, and time. She has normal strength. She has a normal Finger-Nose-Finger Test, a normal Heel to Shin Test, a normal Romberg Test and a normal Tandem Gait Test. Gait normal.   Skin: Skin is warm.   Psychiatric: She has a normal mood and affect. Her speech is normal and behavior is normal. Judgment and thought content normal.   Vitals reviewed.      Assessment/Plan:       Marguerite was seen today for migraine.    Diagnoses and all orders for this visit:    Medication management  -     Levetiracetam Level (Keppra)  -     Topiramate Level    Seizure disorder (CMS/HCC)  Comments:  That she increase the Keppra back to 750 mg twice daily, drug levels are pending today.  No driving for 90 days from last seizure activity    Reviewed medications, potential side effects and signs and symptoms to report. Discussed risk versus benefits of treatment plan with patient and/or family-including medications, labs and radiology that may be ordered. Addressed questions and concerns during visit. Patient and/or family verbalized understanding and agree with plan.  Patient instructions include: No driving or operating heavy machinery for 3 months from onset of most recent seizure. Minimize stress as much as possible. Recommended 7-8 hours of sleep each night. Abstain from alcohol intake. Educated on Antiepileptic medications with possible side effects and signs and symptoms to report if prescribed during visit. Instructed to take seizure medication daily if prescribed. Reviewed potential seizure risk factors. Instructed to call 911 or our office if another seizure does  occur.  EMR Dragon/Transcription Disclaimer:  Much of this encounter note is an electronic transcription of spoken language to printed text. Electronic transcription of spoken language may permit erroneous words or phrases to be inadvertently transcribed. Although I have reviewed the note for such errors, some may still exist in this documentation.           Ginny Pace, APRN  5/27/2019

## 2019-05-26 LAB — LEVETIRACETAM SERPL-MCNC: 6.6 UG/ML (ref 10–40)

## 2019-05-28 LAB — TOPIRAMATE, SERUM: 3.5 UG/ML (ref 2–25)

## 2019-05-28 NOTE — PROGRESS NOTES
Please let her know her keppra level is low, I would recommend that she try to add back the extra half dose to the morning (to total 750 mg BID)

## 2019-05-29 DIAGNOSIS — G40.909 SEIZURE DISORDER (HCC): ICD-10-CM

## 2019-05-29 RX ORDER — LEVETIRACETAM 500 MG/1
750 TABLET ORAL EVERY 12 HOURS SCHEDULED
Qty: 90 TABLET | Refills: 5 | Status: SHIPPED | OUTPATIENT
Start: 2019-05-29 | End: 2019-06-13 | Stop reason: SDUPTHER

## 2019-06-03 ENCOUNTER — OFFICE VISIT (OUTPATIENT)
Dept: INTERNAL MEDICINE | Facility: CLINIC | Age: 49
End: 2019-06-03

## 2019-06-03 VITALS
BODY MASS INDEX: 23.9 KG/M2 | TEMPERATURE: 97.8 F | DIASTOLIC BLOOD PRESSURE: 57 MMHG | HEART RATE: 55 BPM | OXYGEN SATURATION: 100 % | WEIGHT: 140 LBS | SYSTOLIC BLOOD PRESSURE: 139 MMHG | HEIGHT: 64 IN

## 2019-06-03 DIAGNOSIS — G57.02 PIRIFORMIS SYNDROME OF LEFT SIDE: Primary | ICD-10-CM

## 2019-06-03 PROCEDURE — 99213 OFFICE O/P EST LOW 20 MIN: CPT | Performed by: INTERNAL MEDICINE

## 2019-06-03 RX ORDER — MELOXICAM 15 MG/1
15 TABLET ORAL DAILY
Qty: 20 TABLET | Refills: 0 | Status: SHIPPED | OUTPATIENT
Start: 2019-06-03 | End: 2019-08-09

## 2019-06-03 NOTE — PATIENT INSTRUCTIONS
Piriformis Syndrome Rehab  Ask your health care provider which exercises are safe for you. Do exercises exactly as told by your health care provider and adjust them as directed. It is normal to feel mild stretching, pulling, tightness, or discomfort as you do these exercises, but you should stop right away if you feel sudden pain or your pain gets worse. Do not begin these exercises until told by your health care provider.  Stretching and range of motion exercises  These exercises warm up your muscles and joints and improve the movement and flexibility of your hip and pelvis. These exercises also help to relieve pain, numbness, and tingling.  Exercise A: Hip rotators    1. Lie on your back on a firm surface.  2. Pull your left / right knee toward your same shoulder with your left / right hand until your knee is pointing toward the ceiling. Hold your left / right ankle with your other hand.  3. Keeping your knee steady, gently pull your left / right ankle toward your other shoulder until you feel a stretch in your buttocks.  4. Hold this position for __________ seconds.  Repeat __________ times. Complete this stretch __________ times a day.  Exercise B: Hip extensors  1. Lie on your back on a firm surface. Both of your legs should be straight.  2. Pull your left / right knee to your chest. Hold your leg in this position by holding onto the back of your thigh or the front of your knee.  3. Hold this position for __________ seconds.  4. Slowly return to the starting position.  Repeat __________ times. Complete this stretch __________ times a day.  Strengthening exercises  These exercises build strength and endurance in your hip and thigh muscles. Endurance is the ability to use your muscles for a long time, even after they get tired.  Exercise C: Straight leg raises (  hip abductors)  1. Lie on your side with your left / right leg in the top position. Lie so your head, shoulder, knee, and hip line up. Bend your bottom  knee to help you balance.  2. Lift your top leg up 4-6 inches (10-15 cm), keeping your toes pointed straight ahead.  3. Hold this position for __________ seconds.  4. Slowly lower your leg to the starting position. Let your muscles relax completely.  Repeat __________ times. Complete this exercise__________ times a day.  Exercise D: Hip abductors and rotators, quadruped    1. Get on your hands and knees on a firm, lightly padded surface. Your hands should be directly below your shoulders, and your knees should be directly below your hips.  2. Lift your left / right knee out to the side. Keep your knee bent. Do not twist your body.  3. Hold this position for __________ seconds.  4. Slowly lower your leg.  Repeat __________ times. Complete this exercise__________ times a day.  Exercise E: Straight leg raises (  hip extensors)  1. Lie on your abdomen on a bed or a firm surface with a pillow under your hips.  2. Squeeze your buttock muscles and lift your left / right thigh off the bed. Do not let your back arch.  3. Hold this position for __________ seconds.  4. Slowly return to the starting position. Let your muscles relax completely before doing another repetition.  Repeat __________ times. Complete this exercise__________ times a day.  This information is not intended to replace advice given to you by your health care provider. Make sure you discuss any questions you have with your health care provider.  Document Released: 12/18/2006 Document Revised: 08/22/2017 Document Reviewed: 11/29/2016  [employer] Interactive Patient Education © 2019 [employer]

## 2019-06-03 NOTE — PROGRESS NOTES
Subjective     Patient ID: Marguerite Fink is a 49 y.o. female. Patient is here for management of multiple medical problems.     Chief Complaint   Patient presents with   • Pain     left sided pain; worsened s/p fall; mid back pain radiating down LLE     History of Present Illness     B/l hip inj on 5/17.  Had great results. Was dancing last Saturday slip fell on [place]tox.  Seen Dr Diaz in past for rhizotomy.  No procedure on left side yet.    Now with left lower back pain and radiation down left leg.        The following portions of the patient's history were reviewed and updated as appropriate: allergies, current medications, past family history, past medical history, past social history, past surgical history and problem list.    Review of Systems   Constitutional: Negative for fatigue.   Cardiovascular: Negative for chest pain, palpitations and leg swelling.   Musculoskeletal: Positive for back pain, gait problem and myalgias. Negative for arthralgias and joint swelling.   Neurological: Positive for weakness.   All other systems reviewed and are negative.      Current Outpatient Medications:   •  atorvastatin (LIPITOR) 10 MG tablet, Take 1 tablet by mouth Daily., Disp: 30 tablet, Rfl: 11  •  B-12, Methylcobalamin, 1000 MCG sublingual tablet, Place 1 tablet under the tongue Daily. (Patient taking differently: Place 2,000 mg under the tongue Daily.), Disp: 90 tablet, Rfl: 3  •  diclofenac (VOLTAREN) 1 % gel gel, Apply 4 g topically to the appropriate area as directed 4 (Four) Times a Day As Needed (pain)., Disp: 100 g, Rfl: 11  •  diltiaZEM SR (CARDIZEM SR) 120 MG 12 hr capsule, Take 1 capsule by mouth 2 (Two) Times a Day., Disp: 60 capsule, Rfl: 5  •  escitalopram (LEXAPRO) 10 MG tablet, Take 1 tablet by mouth Daily., Disp: 30 tablet, Rfl: 11  •  estradiol (VIVELLE-DOT) 0.1 MG/24HR patch, Place 1 patch on the skin as directed by provider 2 (Two) Times a Week., Disp: 8 patch, Rfl: 12  •  gabapentin (NEURONTIN) 300  "MG capsule, Take 300 mg by mouth every night at bedtime., Disp: , Rfl: 0  •  levETIRAcetam (KEPPRA) 500 MG tablet, Take 1.5 tablets by mouth Every 12 (Twelve) Hours., Disp: 90 tablet, Rfl: 5  •  lisinopril (PRINIVIL,ZESTRIL) 10 MG tablet, Take 1 tablet by mouth Daily., Disp: 90 tablet, Rfl: 3  •  loratadine-pseudoephedrine (CLARITIN-D 12 HOUR) 5-120 MG per 12 hr tablet, Take 1 tablet by mouth 2 (Two) Times a Day., Disp: 14 tablet, Rfl: 0  •  meloxicam (MOBIC) 15 MG tablet, Take 1 tablet by mouth Daily., Disp: 20 tablet, Rfl: 0  •  montelukast (SINGULAIR) 10 MG tablet, Take 1 tablet by mouth Every Night., Disp: 30 tablet, Rfl: 5  •  oxyCODONE-acetaminophen (ENDOCET)  MG per tablet, Every 6 (Six) Hours., Disp: , Rfl:   •  SUMAtriptan (IMITREX) 100 MG tablet, Take 1 tablet by mouth 1 (One) Time As Needed for Migraine for up to 1 dose., Disp: 9 tablet, Rfl: 5  •  topiramate (TOPAMAX) 100 MG tablet, Take 1 tablet by mouth Every Night., Disp: 30 tablet, Rfl: 5  •  umeclidinium-vilanterol (ANORO ELLIPTA) 62.5-25 MCG/INH aerosol powder  inhaler, Inhale 1 puff Daily., Disp: 1 each, Rfl: 11    Objective      Blood pressure 139/57, pulse 55, temperature 97.8 °F (36.6 °C), height 162.6 cm (64\"), weight 63.5 kg (140 lb), SpO2 100 %, not currently breastfeeding.    Physical Exam     General Appearance:    Alert, cooperative, no distress, appears stated age   Head:    Normocephalic, without obvious abnormality, atraumatic   Eyes:    PERRL, conjunctiva/corneas clear, EOM's intact   Ears:    Normal TM's and external ear canals, both ears   Nose:   Nares normal, septum midline, mucosa normal, no drainage   or sinus tenderness   Throat:   Lips, mucosa, and tongue normal; teeth and gums normal   Neck:   Supple, symmetrical, trachea midline, no adenopathy;        thyroid:  No enlargement/tenderness/nodules; no carotid    bruit or JVD   Back:     Symmetric, no curvature, ROM normal, no CVA tenderness   Lungs:     Clear to " auscultation bilaterally, respirations unlabored   Chest wall:    No tenderness or deformity   Heart:    Regular rate and rhythm, S1 and S2 normal, no murmur,        rub or gallop   Abdomen:     Soft, non-tender, bowel sounds active all four quadrants,     no masses, no organomegaly   Extremities:   ttp over pirfomis left side.     Pulses:   2+ and symmetric all extremities   Skin:   Skin color, texture, turgor normal, no rashes or lesions   Lymph nodes:   Cervical, supraclavicular, and axillary nodes normal   Neurologic:   CNII-XII intact. Normal strength, sensation and reflexes       throughout      Results for orders placed or performed in visit on 05/22/19   Levetiracetam Level (Keppra)   Result Value Ref Range    Levetiracetam 6.6 (L) 10.0 - 40.0 ug/mL   Topiramate Level   Result Value Ref Range    Topiramate 3.5 2.0 - 25.0 ug/mL         Assessment/Plan       Marguerite was seen today for pain.    Diagnoses and all orders for this visit:    Piriformis syndrome of left side  -     meloxicam (MOBIC) 15 MG tablet; Take 1 tablet by mouth Daily.      Return if symptoms worsen or fail to improve.          Patient Instructions   Piriformis Syndrome Rehab  Ask your health care provider which exercises are safe for you. Do exercises exactly as told by your health care provider and adjust them as directed. It is normal to feel mild stretching, pulling, tightness, or discomfort as you do these exercises, but you should stop right away if you feel sudden pain or your pain gets worse. Do not begin these exercises until told by your health care provider.  Stretching and range of motion exercises  These exercises warm up your muscles and joints and improve the movement and flexibility of your hip and pelvis. These exercises also help to relieve pain, numbness, and tingling.  Exercise A: Hip rotators    1. Lie on your back on a firm surface.  2. Pull your left / right knee toward your same shoulder with your left / right hand until  your knee is pointing toward the ceiling. Hold your left / right ankle with your other hand.  3. Keeping your knee steady, gently pull your left / right ankle toward your other shoulder until you feel a stretch in your buttocks.  4. Hold this position for __________ seconds.  Repeat __________ times. Complete this stretch __________ times a day.  Exercise B: Hip extensors  1. Lie on your back on a firm surface. Both of your legs should be straight.  2. Pull your left / right knee to your chest. Hold your leg in this position by holding onto the back of your thigh or the front of your knee.  3. Hold this position for __________ seconds.  4. Slowly return to the starting position.  Repeat __________ times. Complete this stretch __________ times a day.  Strengthening exercises  These exercises build strength and endurance in your hip and thigh muscles. Endurance is the ability to use your muscles for a long time, even after they get tired.  Exercise C: Straight leg raises (  hip abductors)  1. Lie on your side with your left / right leg in the top position. Lie so your head, shoulder, knee, and hip line up. Bend your bottom knee to help you balance.  2. Lift your top leg up 4-6 inches (10-15 cm), keeping your toes pointed straight ahead.  3. Hold this position for __________ seconds.  4. Slowly lower your leg to the starting position. Let your muscles relax completely.  Repeat __________ times. Complete this exercise__________ times a day.  Exercise D: Hip abductors and rotators, quadruped    1. Get on your hands and knees on a firm, lightly padded surface. Your hands should be directly below your shoulders, and your knees should be directly below your hips.  2. Lift your left / right knee out to the side. Keep your knee bent. Do not twist your body.  3. Hold this position for __________ seconds.  4. Slowly lower your leg.  Repeat __________ times. Complete this exercise__________ times a day.  Exercise E: Straight leg  raises (  hip extensors)  1. Lie on your abdomen on a bed or a firm surface with a pillow under your hips.  2. Squeeze your buttock muscles and lift your left / right thigh off the bed. Do not let your back arch.  3. Hold this position for __________ seconds.  4. Slowly return to the starting position. Let your muscles relax completely before doing another repetition.  Repeat __________ times. Complete this exercise__________ times a day.  This information is not intended to replace advice given to you by your health care provider. Make sure you discuss any questions you have with your health care provider.  Document Released: 12/18/2006 Document Revised: 08/22/2017 Document Reviewed: 11/29/2016  [employer] Interactive Patient Education © 2019 [employer]         Juan Clemens MD    Assessment/Plan

## 2019-06-06 ENCOUNTER — TELEPHONE (OUTPATIENT)
Dept: INTERNAL MEDICINE | Facility: CLINIC | Age: 49
End: 2019-06-06
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
## 2019-06-13 ENCOUNTER — OFFICE VISIT (OUTPATIENT)
Dept: NEUROLOGY | Facility: CLINIC | Age: 49
End: 2019-06-13

## 2019-06-13 VITALS
BODY MASS INDEX: 23.9 KG/M2 | SYSTOLIC BLOOD PRESSURE: 130 MMHG | DIASTOLIC BLOOD PRESSURE: 76 MMHG | HEIGHT: 64 IN | WEIGHT: 140 LBS

## 2019-06-13 DIAGNOSIS — G47.61 PERIODIC LIMB MOVEMENT DISORDER (PLMD): ICD-10-CM

## 2019-06-13 DIAGNOSIS — F51.04 CHRONIC INSOMNIA: ICD-10-CM

## 2019-06-13 DIAGNOSIS — G40.909 SEIZURE DISORDER (HCC): Primary | ICD-10-CM

## 2019-06-13 DIAGNOSIS — G43.009 MIGRAINE WITHOUT AURA AND WITHOUT STATUS MIGRAINOSUS, NOT INTRACTABLE: ICD-10-CM

## 2019-06-13 PROCEDURE — 99214 OFFICE O/P EST MOD 30 MIN: CPT | Performed by: PSYCHIATRY & NEUROLOGY

## 2019-06-13 RX ORDER — LEVETIRACETAM 500 MG/1
1000 TABLET ORAL 2 TIMES DAILY
Qty: 90 TABLET | Refills: 5 | Status: SHIPPED | OUTPATIENT
Start: 2019-06-13 | End: 2020-03-09

## 2019-06-13 RX ORDER — ROPINIROLE 0.5 MG/1
0.5 TABLET, FILM COATED ORAL NIGHTLY
Qty: 30 TABLET | Refills: 11 | Status: SHIPPED | OUTPATIENT
Start: 2019-06-13 | End: 2020-10-13 | Stop reason: ALTCHOICE
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
## 2019-06-17 RX ORDER — TOPIRAMATE 100 MG/1
TABLET, FILM COATED ORAL
Qty: 30 TABLET | Refills: 11 | OUTPATIENT
Start: 2019-06-17

## 2019-06-24 ENCOUNTER — TELEPHONE (OUTPATIENT)
Dept: NEUROLOGY | Facility: CLINIC | Age: 49
End: 2019-06-24

## 2019-06-27 DIAGNOSIS — G43.009 MIGRAINE WITHOUT AURA AND WITHOUT STATUS MIGRAINOSUS, NOT INTRACTABLE: ICD-10-CM

## 2019-06-27 DIAGNOSIS — G40.909 SEIZURE DISORDER (HCC): Primary | ICD-10-CM

## 2019-06-27 RX ORDER — TOPIRAMATE 100 MG/1
100 TABLET, FILM COATED ORAL 2 TIMES DAILY
Qty: 60 TABLET | Refills: 5 | Status: SHIPPED | OUTPATIENT
Start: 2019-06-27 | End: 2020-02-27

## 2019-07-15 ENCOUNTER — OFFICE VISIT (OUTPATIENT)
Dept: NEUROLOGY | Facility: CLINIC | Age: 49
End: 2019-07-15

## 2019-07-15 VITALS
BODY MASS INDEX: 23.56 KG/M2 | SYSTOLIC BLOOD PRESSURE: 108 MMHG | DIASTOLIC BLOOD PRESSURE: 68 MMHG | HEIGHT: 64 IN | WEIGHT: 138 LBS | OXYGEN SATURATION: 99 % | HEART RATE: 53 BPM

## 2019-07-15 DIAGNOSIS — G40.909 SEIZURE DISORDER (HCC): Primary | ICD-10-CM

## 2019-07-15 PROCEDURE — 99214 OFFICE O/P EST MOD 30 MIN: CPT | Performed by: NURSE PRACTITIONER

## 2019-07-15 NOTE — PROGRESS NOTES
Subjective:     Patient ID: Marguerite Fink is a 49 y.o. female.    CC:   Chief Complaint   Patient presents with   • Migraine       HPI:   History of Present Illness     Ms. Fink is here today for follow-up.  She reportedly has had an increase in her seizures since March.  She had an EEG in April which was read as normal.  She has been given several instructions to increase Keppra over the last few months however she has not fully titrated up to the therapeutic dose of 1000 mg twice daily as instructed at last visit.  She tells me that she is still taking 750 mg in the morning and 500 mg at night of Keppra.  Dr. Ho did increase her Topamax to 100 mg twice a day at last visit.  She reports that she still having episodes of amnesia, she is losing time, she is losing memory for up to 10 minutes at a time.  She has had no grand mal seizures per report.  Migraines are stable, she feels like since the increase in Topamax that she cannot think straight, pain management doctor has also added gabapentin recently.  I instructed her that gabapentin along with increased Topamax may cause some word finding difficulties.  She is not driving, she is reportedly very anxious and depressed as she is fearful to be left alone due to increase in her seizure activity.  The following portions of the patient's history were reviewed and updated as appropriate: allergies, current medications, past family history, past medical history, past social history, past surgical history and problem list.    Past Medical History:   Diagnosis Date   • Abdominal pain, epigastric    • Acute sinusitis    • Acute UTI (urinary tract infection)    • Angina, intestinal (CMS/HCC)    • Anxiety    • Arthritis    • Back pain    • Breast mass, right    • Chronic hepatitis C virus infection (CMS/HCC)    • Depression    • Diarrhea    • Elevated LFTs    • Fatigue    • History of endometriosis    • History of Papanicolaou smear of cervix 04/02/2014    NORMAL     • HTN (hypertension)    • IBS (irritable bowel syndrome)    • Insomnia    • Menopausal symptoms    • Migraine headache    • Nausea & vomiting    • Neck pain    • Ovarian cyst    • Pelvic adhesive disease    • Radicular pain of left lower extremity    • Restless leg syndrome    • Seizure disorder (CMS/HCC)    • Tobacco abuse    • Trochanteric bursitis    • Vitamin B 12 deficiency        Past Surgical History:   Procedure Laterality Date   • BILATERAL SALPINGO OOPHORECTOMY  03/10/2015    DR NATALY THOMPSON   • BREAST LUMPECTOMY     •  SECTION     • HYSTERECTOMY  03/10/2015    [facility] (DR NATALY THOMPSON)   • INGUINAL HERNIA REPAIR Right    • LYSIS OF ABDOMINAL ADHESIONS  03/10/2015    DR NATALY THOMPSON   • TUBAL ABDOMINAL LIGATION         Social History     Socioeconomic History   • Marital status:      Spouse name: Not on file   • Number of children: Not on file   • Years of education: Not on file   • Highest education level: Not on file   Tobacco Use   • Smoking status: Current Every Day Smoker     Packs/day: 0.50     Types: Cigarettes   • Smokeless tobacco: Never Used   • Tobacco comment:  1/2 PACK A DAY   Substance and Sexual Activity   • Alcohol use: Yes   • Drug use: No   • Sexual activity: Defer     Birth control/protection: Surgical       Family History   Problem Relation Age of Onset   • Alzheimer's disease Other    • Cancer Other    • Dementia Other    • Hypertension Other    • Parkinsonism Other    • Osteoporosis Mother    • Diabetes Mother    • Hypertension Mother    • Obesity Mother    • Breast cancer Maternal Grandmother    • Osteoporosis Maternal Grandmother    • Diabetes Maternal Grandmother         Review of Systems   Constitutional: Negative.    Eyes: Negative.    Respiratory: Negative.    Cardiovascular: Negative.    Gastrointestinal: Negative.    Endocrine: Negative.    Genitourinary: Negative.    Musculoskeletal: Negative.    Skin: Negative.    Allergic/Immunologic: Negative.     Neurological: Positive for seizures and headaches. Negative for dizziness, tremors, syncope, facial asymmetry, speech difficulty, weakness, light-headedness and numbness.   Hematological: Negative.    Psychiatric/Behavioral: Positive for dysphoric mood. The patient is nervous/anxious.         Objective:    Neurologic Exam     Mental Status   Oriented to person, place, and time.   Attention: normal. Concentration: normal.   Speech: speech is normal   Level of consciousness: alert    Cranial Nerves   Cranial nerves II through XII intact.     CN III, IV, VI   Pupils are equal, round, and reactive to light.  Extraocular motions are normal.     Motor Exam   Muscle bulk: normal  Overall muscle tone: normal  Right arm pronator drift: absent  Left arm pronator drift: absent    Strength   Strength 5/5 throughout.     Sensory Exam   Light touch normal.     Gait, Coordination, and Reflexes     Gait  Gait: normal    Coordination   Romberg: negative  Finger to nose coordination: normal    Tremor   Resting tremor: absent  Intention tremor: absent  Action tremor: absent    Reflexes   Reflexes 2+ except as noted.   Right Anand: absent  Left Anand: absent      Physical Exam   Constitutional: She is oriented to person, place, and time. She appears well-developed and well-nourished. No distress.   HENT:   Head: Normocephalic and atraumatic.   Eyes: Conjunctivae and EOM are normal. Pupils are equal, round, and reactive to light. No scleral icterus.   Neck: Normal range of motion. Neck supple.   Pulmonary/Chest: Effort normal. No respiratory distress.   Neurological: She is alert and oriented to person, place, and time. She has normal strength. She has a normal Finger-Nose-Finger Test and a normal Romberg Test. Gait normal.   Skin: Skin is warm. Capillary refill takes less than 2 seconds.   Psychiatric: She has a normal mood and affect. Her speech is normal and behavior is normal. Judgment and thought content normal.   Vitals  reviewed.      Assessment/Plan:       Marguerite was seen today for migraine.    Diagnoses and all orders for this visit:    Seizure disorder (CMS/[facility])  -     Ambulatory Referral to Neurology  -     MRI Brain With & Without Contrast    I have again instructed her to maximize her Keppra and increase to 1000 mg twice daily, this prescription was sent in the last time she was seen by Dr. Ho.  She is now taking 100 mg twice daily of Topamax as well.  She has seen Dr. Cross in the past, due to the changes in her seizures and the most recent months I do feel it necessary for her to have inpatient monitoring as outpatient EEG was negative.  She is aware that she is unable to drive, recommend that she avoid tub baths or swimming.  She will remain off work until seen by Dr. Cross.  I would like to get an MRI of her brain due to the change in her seizure pattern over the last few months to rule out tumor or lesion.  Reviewed medications, potential side effects and signs and symptoms to report. Discussed risk versus benefits of treatment plan with patient and/or family-including medications, labs and radiology that may be ordered. Addressed questions and concerns during visit. Patient and/or family verbalized understanding and agree with plan.  Patient instructions include: No driving or operating heavy machinery for 3 months from onset of most recent seizure. Minimize stress as much as possible. Recommended 7-8 hours of sleep each night. Abstain from alcohol intake. Educated on Antiepileptic medications with possible side effects and signs and symptoms to report if prescribed during visit. Instructed to take seizure medication daily if prescribed. Reviewed potential seizure risk factors. Instructed to call 911 or our office if another seizure does occur.  EMR Dragon/Transcription Disclaimer:  Much of this encounter note is an electronic transcription of spoken language to printed text. Electronic transcription of  spoken language may permit erroneous words or phrases to be inadvertently transcribed. Although I have reviewed the note for such errors, some may still exist in this documentation.           Ginny Pace, APRN  7/15/2019

## 2019-07-29 ENCOUNTER — HOSPITAL ENCOUNTER (OUTPATIENT)
Dept: MRI IMAGING | Facility: HOSPITAL | Age: 49
Discharge: HOME | End: 2019-07-29
Admitting: NURSE PRACTITIONER

## 2019-07-29 PROCEDURE — 70553 MRI BRAIN STEM W/O & W/DYE: CPT

## 2019-07-29 PROCEDURE — A9577 INJ MULTIHANCE: HCPCS | Performed by: NURSE PRACTITIONER

## 2019-07-29 PROCEDURE — 0 GADOBENATE DIMEGLUMINE 529 MG/ML SOLUTION: Performed by: NURSE PRACTITIONER

## 2019-07-29 RX ADMIN — GADOBENATE DIMEGLUMINE 13 ML: 529 INJECTION, SOLUTION INTRAVENOUS at 11:08

## 2019-08-01 ENCOUNTER — TELEPHONE (OUTPATIENT)
Dept: NEUROLOGY | Facility: CLINIC | Age: 49
End: 2019-08-01

## 2019-08-01 NOTE — TELEPHONE ENCOUNTER
Patient called and is wondering about her paperwork for her work. She stated that her employer had faxed them sometime around June 14th. Please give her a call at [phone]

## 2019-08-02 RX ORDER — VALACYCLOVIR HYDROCHLORIDE 1 G/1
TABLET, FILM COATED ORAL
Qty: 20 TABLET | Refills: 0 | Status: SHIPPED | OUTPATIENT
Start: 2019-08-02 | End: 2019-08-16

## 2019-08-06 NOTE — TELEPHONE ENCOUNTER
Called and informed the patient that we do not have the patients paperwork. She is going to call her employer and have them resend.

## 2019-08-09 ENCOUNTER — OFFICE VISIT (OUTPATIENT)
Dept: INTERNAL MEDICINE | Facility: CLINIC | Age: 49
End: 2019-08-09

## 2019-08-09 ENCOUNTER — OFFICE VISIT (OUTPATIENT)
Dept: SURGERY | Facility: CLINIC | Age: 49
End: 2019-08-09

## 2019-08-09 VITALS
HEIGHT: 64 IN | DIASTOLIC BLOOD PRESSURE: 70 MMHG | TEMPERATURE: 98.2 F | HEART RATE: 78 BPM | OXYGEN SATURATION: 98 % | BODY MASS INDEX: 23.08 KG/M2 | SYSTOLIC BLOOD PRESSURE: 120 MMHG | WEIGHT: 135.2 LBS

## 2019-08-09 VITALS
SYSTOLIC BLOOD PRESSURE: 115 MMHG | WEIGHT: 135.4 LBS | OXYGEN SATURATION: 99 % | DIASTOLIC BLOOD PRESSURE: 66 MMHG | TEMPERATURE: 98.7 F | HEART RATE: 72 BPM | HEIGHT: 64 IN | BODY MASS INDEX: 23.12 KG/M2 | RESPIRATION RATE: 16 BRPM

## 2019-08-09 DIAGNOSIS — S80.812A CAT SCRATCH OF LEFT LOWER LEG, INITIAL ENCOUNTER: ICD-10-CM

## 2019-08-09 DIAGNOSIS — S80.12XA HEMATOMA OF LEFT LOWER EXTREMITY, INITIAL ENCOUNTER: Primary | ICD-10-CM

## 2019-08-09 DIAGNOSIS — L02.416 ABSCESS OF LEFT LEG: Primary | ICD-10-CM
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
## 2019-08-09 DIAGNOSIS — H61.21 CERUMEN DEBRIS ON TYMPANIC MEMBRANE OF RIGHT EAR: ICD-10-CM

## 2019-08-09 PROCEDURE — 99214 OFFICE O/P EST MOD 30 MIN: CPT | Performed by: INTERNAL MEDICINE

## 2019-08-09 PROCEDURE — 99203 OFFICE O/P NEW LOW 30 MIN: CPT | Performed by: SURGERY

## 2019-08-09 RX ORDER — AMOXICILLIN AND CLAVULANATE POTASSIUM 875; 125 MG/1; MG/1
1 TABLET, FILM COATED ORAL EVERY 12 HOURS SCHEDULED
Qty: 20 TABLET | Refills: 0 | Status: SHIPPED | OUTPATIENT
Start: 2019-08-09 | End: 2019-08-16

## 2019-08-09 RX ORDER — AZITHROMYCIN 250 MG/1
TABLET, FILM COATED ORAL
Qty: 6 TABLET | Refills: 0 | Status: SHIPPED | OUTPATIENT
Start: 2019-08-09 | End: 2019-08-16

## 2019-08-09 NOTE — PROGRESS NOTES
Subjective     Patient ID: Marguerite Fink is a 49 y.o. female. Patient is here for management of multiple medical problems.     Chief Complaint   Patient presents with   • Otitis Media     patient having earache x 10 to 12 days, patient having increased dizziness   • Herpes Zoster     patient asking how soon after having the shingles outbreak can she get the vaccine.     History of Present Illness         Right ear pain and pressure since July 31.  Getting worse. Has swimming pool at home. Water in ear getting traped. Tried heating pad.  Peroxide flush.    Pt buring needle in leg and tyring to drain mass on left lower leg.   Cat scratching extensive on left lower.      The following portions of the patient's history were reviewed and updated as appropriate: allergies, current medications, past family history, past medical history, past social history, past surgical history and problem list.    Review of Systems   Constitutional: Positive for fatigue.   HENT: Negative for congestion and dental problem.    Cardiovascular: Negative for chest pain, palpitations and leg swelling.   Musculoskeletal: Negative for arthralgias, back pain and joint swelling.   All other systems reviewed and are negative.      Current Outpatient Medications:   •  atorvastatin (LIPITOR) 10 MG tablet, Take 1 tablet by mouth Daily., Disp: 30 tablet, Rfl: 11  •  B-12, Methylcobalamin, 1000 MCG sublingual tablet, Place 1 tablet under the tongue Daily. (Patient taking differently: Place 2,000 mg under the tongue Daily.), Disp: 90 tablet, Rfl: 3  •  diclofenac (VOLTAREN) 1 % gel gel, Apply 4 g topically to the appropriate area as directed 4 (Four) Times a Day As Needed (pain)., Disp: 100 g, Rfl: 11  •  escitalopram (LEXAPRO) 10 MG tablet, Take 1 tablet by mouth Daily., Disp: 30 tablet, Rfl: 11  •  estradiol (VIVELLE-DOT) 0.1 MG/24HR patch, Place 1 patch on the skin as directed by provider 2 (Two) Times a Week., Disp: 8 patch, Rfl: 12  •  gabapentin  (NEURONTIN) 100 MG capsule, Take 100 mg by mouth every night at bedtime. Up to two pills at night, Disp: , Rfl: 0  •  levETIRAcetam (KEPPRA) 500 MG tablet, Take 2 tablets by mouth 2 (Two) Times a Day., Disp: 90 tablet, Rfl: 5  •  lisinopril (PRINIVIL,ZESTRIL) 10 MG tablet, Take 1 tablet by mouth Daily., Disp: 90 tablet, Rfl: 3  •  loratadine-pseudoephedrine (CLARITIN-D 12 HOUR) 5-120 MG per 12 hr tablet, Take 1 tablet by mouth 2 (Two) Times a Day., Disp: 14 tablet, Rfl: 0  •  montelukast (SINGULAIR) 10 MG tablet, Take 1 tablet by mouth Every Night., Disp: 30 tablet, Rfl: 5  •  oxyCODONE-acetaminophen (ENDOCET)  MG per tablet, Every 6 (Six) Hours., Disp: , Rfl:   •  rOPINIRole (REQUIP) 0.5 MG tablet, Take 1 tablet by mouth Every Night. Take 1 hour before bedtime., Disp: 30 tablet, Rfl: 11  •  SUMAtriptan (IMITREX) 100 MG tablet, Take 1 tablet by mouth 1 (One) Time As Needed for Migraine for up to 1 dose., Disp: 9 tablet, Rfl: 5  •  topiramate (TOPAMAX) 100 MG tablet, Take 1 tablet by mouth 2 (Two) Times a Day., Disp: 60 tablet, Rfl: 5  •  umeclidinium-vilanterol (ANORO ELLIPTA) 62.5-25 MCG/INH aerosol powder  inhaler, Inhale 1 puff Daily., Disp: 1 each, Rfl: 11  •  valACYclovir (VALTREX) 1000 MG tablet, take 1 tablet by mouth twice a day, Disp: 20 tablet, Rfl: 0  •  amoxicillin-clavulanate (AUGMENTIN) 875-125 MG per tablet, Take 1 tablet by mouth Every 12 (Twelve) Hours., Disp: 20 tablet, Rfl: 0  •  azithromycin (ZITHROMAX Z-NISSA) 250 MG tablet, Take 2 tablets the first day, then 1 tablet daily for 4 days., Disp: 6 tablet, Rfl: 0  •  diltiaZEM SR (CARDIZEM SR) 120 MG 12 hr capsule, Take 1 capsule by mouth 2 (Two) Times a Day., Disp: 60 capsule, Rfl: 5  •  Zoster Vac Recomb Adjuvanted (SHINGRIX) 50 MCG/0.5ML reconstituted suspension, Inject 50 mcg into the appropriate muscle as directed by prescriber 1 (One) Time for 1 dose., Disp: 1 each, Rfl: 1    Objective      Blood pressure 115/66, pulse 72, temperature 98.7  "°F (37.1 °C), temperature source Oral, resp. rate 16, height 162.6 cm (64\"), weight 61.4 kg (135 lb 6.4 oz), SpO2 99 %, not currently breastfeeding.    Physical Exam     General Appearance:    Alert, cooperative, no distress, appears stated age   Head:    Normocephalic, without obvious abnormality, atraumatic   Eyes:    PERRL, conjunctiva/corneas clear, EOM's intact   Ears:    cerumen impactions in right ear.     Nose:   Nares normal, septum midline, mucosa normal, no drainage   or sinus tenderness   Throat:   Lips, mucosa, and tongue normal; teeth and gums normal   Neck:   Supple, symmetrical, trachea midline, no adenopathy;        thyroid:  No enlargement/tenderness/nodules; no carotid    bruit or JVD   Back:     Symmetric, no curvature, ROM normal, no CVA tenderness   Lungs:     Clear to auscultation bilaterally, respirations unlabored   Chest wall:    No tenderness or deformity   Heart:    Regular rate and rhythm, S1 and S2 normal, no murmur,        rub or gallop   Abdomen:     Soft, non-tender, bowel sounds active all four quadrants,     no masses, no organomegaly   Extremities:   Large mass on left lower left.     Pulses:   2+ and symmetric all extremities   Skin:   Cat scratchs and puncture wounds on left lower.  Mass on leg not red or increased heat.       Lymph nodes:   Cervical, supraclavicular, and axillary nodes normal   Neurologic:   CNII-XII intact. Normal strength, sensation and reflexes       throughout      Results for orders placed or performed in visit on 05/22/19   Levetiracetam Level (Keppra)   Result Value Ref Range    Levetiracetam 6.6 (L) 10.0 - 40.0 ug/mL   Topiramate Level   Result Value Ref Range    Topiramate 3.5 2.0 - 25.0 ug/mL         Assessment/Plan       Marguerite was seen today for otitis media and herpes zoster.    Diagnoses and all orders for this visit:    Abscess of left leg  -     Ambulatory Referral to General Surgery    Cat scratch of left lower leg, initial encounter  -     " Ambulatory Referral to General Surgery    Cerumen debris on tympanic membrane of right ear  -     Ambulatory Referral to ENT (Otolaryngology)    Other orders  -     Zoster Vac Recomb Adjuvanted (SHINGRIX) 50 MCG/0.5ML reconstituted suspension; Inject 50 mcg into the appropriate muscle as directed by prescriber 1 (One) Time for 1 dose.  -     amoxicillin-clavulanate (AUGMENTIN) 875-125 MG per tablet; Take 1 tablet by mouth Every 12 (Twelve) Hours.  -     azithromycin (ZITHROMAX Z-NISSA) 250 MG tablet; Take 2 tablets the first day, then 1 tablet daily for 4 days.      Return in about 1 week (around 8/16/2019).          There are no Patient Instructions on file for this visit.     Juan Clemens MD    Assessment/Plan

## 2019-08-09 NOTE — PROGRESS NOTES
Patient: Marguerite Fink    YOB: 1970    Date: 08/09/2019    Primary Care Provider: Juan Clemens MD    Chief Complaint   Patient presents with   • Mass     mass @ [facility]       SUBJECTIVE:    History of present illness: Patient suffered an injury to her left leg by her cat 1 week ago.  Apparently the cat was startled by dogs and aggressively clung to the patient's left leg and caused both scratches, puncture wound and bruising.  She now has residual mass-effect in the area of bruising and was told to come here for further evaluation.  She states that the bruising has improved in the left leg.  It causes her minimal pain at this time.  She complains of no fever or chills.    The following portions of the patient's history were reviewed and updated as appropriate: allergies, current medications, past family history, past medical history, past social history, past surgical history and problem list.       Review of Systems   Constitutional: Negative for chills, fever and unexpected weight change.   HENT: Negative for hearing loss, trouble swallowing and voice change.    Eyes: Negative for visual disturbance.   Respiratory: Negative for apnea, cough, chest tightness, shortness of breath and wheezing.    Cardiovascular: Negative for chest pain, palpitations and leg swelling.   Gastrointestinal: Negative for abdominal distention, abdominal pain, anal bleeding, blood in stool, constipation, diarrhea, nausea, rectal pain and vomiting.   Endocrine: Negative for cold intolerance and heat intolerance.   Genitourinary: Negative for difficulty urinating, dysuria and flank pain.   Musculoskeletal: Negative for back pain and gait problem.   Skin: Positive for color change and wound. Negative for rash.   Neurological: Negative for dizziness, syncope, speech difficulty, weakness, light-headedness, numbness and headaches.   Hematological: Negative for adenopathy. Does not bruise/bleed easily.   Psychiatric/Behavioral:  Negative for confusion. The patient is not nervous/anxious.        Allergies:  No Known Allergies    Medications:    Current Outpatient Medications:   •  amoxicillin-clavulanate (AUGMENTIN) 875-125 MG per tablet, Take 1 tablet by mouth Every 12 (Twelve) Hours., Disp: 20 tablet, Rfl: 0  •  atorvastatin (LIPITOR) 10 MG tablet, Take 1 tablet by mouth Daily., Disp: 30 tablet, Rfl: 11  •  azithromycin (ZITHROMAX Z-NISSA) 250 MG tablet, Take 2 tablets the first day, then 1 tablet daily for 4 days., Disp: 6 tablet, Rfl: 0  •  B-12, Methylcobalamin, 1000 MCG sublingual tablet, Place 1 tablet under the tongue Daily. (Patient taking differently: Place 2,000 mg under the tongue Daily.), Disp: 90 tablet, Rfl: 3  •  diclofenac (VOLTAREN) 1 % gel gel, Apply 4 g topically to the appropriate area as directed 4 (Four) Times a Day As Needed (pain)., Disp: 100 g, Rfl: 11  •  diltiaZEM SR (CARDIZEM SR) 120 MG 12 hr capsule, Take 1 capsule by mouth 2 (Two) Times a Day., Disp: 60 capsule, Rfl: 5  •  escitalopram (LEXAPRO) 10 MG tablet, Take 1 tablet by mouth Daily., Disp: 30 tablet, Rfl: 11  •  estradiol (VIVELLE-DOT) 0.1 MG/24HR patch, Place 1 patch on the skin as directed by provider 2 (Two) Times a Week., Disp: 8 patch, Rfl: 12  •  gabapentin (NEURONTIN) 100 MG capsule, Take 100 mg by mouth every night at bedtime. Up to two pills at night, Disp: , Rfl: 0  •  levETIRAcetam (KEPPRA) 500 MG tablet, Take 2 tablets by mouth 2 (Two) Times a Day., Disp: 90 tablet, Rfl: 5  •  lisinopril (PRINIVIL,ZESTRIL) 10 MG tablet, Take 1 tablet by mouth Daily., Disp: 90 tablet, Rfl: 3  •  loratadine-pseudoephedrine (CLARITIN-D 12 HOUR) 5-120 MG per 12 hr tablet, Take 1 tablet by mouth 2 (Two) Times a Day., Disp: 14 tablet, Rfl: 0  •  montelukast (SINGULAIR) 10 MG tablet, Take 1 tablet by mouth Every Night., Disp: 30 tablet, Rfl: 5  •  oxyCODONE-acetaminophen (ENDOCET)  MG per tablet, Every 6 (Six) Hours., Disp: , Rfl:   •  rOPINIRole (REQUIP) 0.5 MG  tablet, Take 1 tablet by mouth Every Night. Take 1 hour before bedtime., Disp: 30 tablet, Rfl: 11  •  SUMAtriptan (IMITREX) 100 MG tablet, Take 1 tablet by mouth 1 (One) Time As Needed for Migraine for up to 1 dose., Disp: 9 tablet, Rfl: 5  •  topiramate (TOPAMAX) 100 MG tablet, Take 1 tablet by mouth 2 (Two) Times a Day., Disp: 60 tablet, Rfl: 5  •  umeclidinium-vilanterol (ANORO ELLIPTA) 62.5-25 MCG/INH aerosol powder  inhaler, Inhale 1 puff Daily., Disp: 1 each, Rfl: 11  •  valACYclovir (VALTREX) 1000 MG tablet, take 1 tablet by mouth twice a day, Disp: 20 tablet, Rfl: 0  •  Zoster Vac Recomb Adjuvanted (SHINGRIX) 50 MCG/0.5ML reconstituted suspension, Inject 50 mcg into the appropriate muscle as directed by prescriber 1 (One) Time for 1 dose., Disp: 1 each, Rfl: 1    History:  Past Medical History:   Diagnosis Date   • Abdominal pain, epigastric    • Acute sinusitis    • Acute UTI (urinary tract infection)    • Angina, intestinal (CMS/HCC)    • Anxiety    • Arthritis    • Back pain    • Breast mass, right    • Chronic hepatitis C virus infection (CMS/HCC)    • Depression    • Diarrhea    • Elevated LFTs    • Fatigue    • History of endometriosis    • History of Papanicolaou smear of cervix 2014    NORMAL    • HTN (hypertension)    • IBS (irritable bowel syndrome)    • Insomnia    • Menopausal symptoms    • Migraine headache    • Nausea & vomiting    • Neck pain    • Ovarian cyst    • Pelvic adhesive disease    • Radicular pain of left lower extremity    • Restless leg syndrome    • Seizure disorder (CMS/HCC)    • Tobacco abuse    • Trochanteric bursitis    • Vitamin B 12 deficiency        Past Surgical History:   Procedure Laterality Date   • BILATERAL SALPINGO OOPHORECTOMY  03/10/2015    DR NATALY THOMPSON   • BREAST LUMPECTOMY     •  SECTION     • HYSTERECTOMY  03/10/2015    [facility] (DR NATALY THOMPSON)   • INGUINAL HERNIA REPAIR Right    • LYSIS OF ABDOMINAL ADHESIONS  03/10/2015    DR NATALY THOMPSON   •  "TUBAL ABDOMINAL LIGATION  2010       Family History   Problem Relation Age of Onset   • Alzheimer's disease Other    • Cancer Other    • Dementia Other    • Hypertension Other    • Parkinsonism Other    • Osteoporosis Mother    • Diabetes Mother    • Hypertension Mother    • Obesity Mother    • Breast cancer Maternal Grandmother    • Osteoporosis Maternal Grandmother    • Diabetes Maternal Grandmother        Social History     Tobacco Use   • Smoking status: Current Every Day Smoker     Packs/day: 0.50     Types: Cigarettes   • Smokeless tobacco: Never Used   • Tobacco comment:  1/2 PACK A DAY   Substance Use Topics   • Alcohol use: Yes   • Drug use: No        OBJECTIVE:    Vital Signs:   Vitals:    08/09/19 1012   BP: 120/70   Pulse: 78   Temp: 98.2 °F (36.8 °C)   SpO2: 98%   Weight: 61.3 kg (135 lb 3.2 oz)   Height: 162.6 cm (64\")       Physical Exam:   General Appearance:    Alert, cooperative, in no acute distress   Head:    Normocephalic, without obvious abnormality, atraumatic   Eyes:            Normal.  No scleral icterus.  PERRLA    Lungs:     Clear to auscultation,respirations regular, even and                  unlabored    Heart:    Regular rhythm and normal rate, normal S1 and S2, no            murmur   Abdomen:     Normal bowel sounds, no masses, no organomegaly, soft        non-tender, non-distended, no guarding,    Extremities:   Moves all extremities well, no edema, no cyanosis, no             redness   Skin:  Multiple scratches in the left leg.  Also has a hematoma in the medial calf in the left leg.  No ecchymosis at this time.  Minimal if any tenderness.  Adjacent puncture wound.  There is no erythema.  No drainage.  No evidence of infection.   Neurologic:   Normal without gross deficits.   Psychiatric: No evidence of depression or anxiety        Results Review:   None    ASSESSMENT/PLAN:    1. Hematoma of left lower extremity, initial encounter        Patient with resolving hematoma left medial " calf.  No evidence of infection.  No intervention recommended.  This should resolve over the next few weeks.  Certainly if there is any developing redness or drainage she will follow-up with me.    Electronically signed by Juan Bentley MD  08/09/19      .  Portions of this note have been scribed for Juan Bentley MD by Sindhu Alatorre. 8/9/2019  10:53 AM

## 2019-08-16 ENCOUNTER — OFFICE VISIT (OUTPATIENT)
Dept: INTERNAL MEDICINE | Facility: CLINIC | Age: 49
End: 2019-08-16

## 2019-08-16 VITALS
TEMPERATURE: 97.7 F | SYSTOLIC BLOOD PRESSURE: 112 MMHG | HEIGHT: 64 IN | HEART RATE: 62 BPM | BODY MASS INDEX: 23.05 KG/M2 | RESPIRATION RATE: 18 BRPM | DIASTOLIC BLOOD PRESSURE: 70 MMHG | OXYGEN SATURATION: 97 % | WEIGHT: 135 LBS

## 2019-08-16 DIAGNOSIS — R19.7 DIARRHEA, UNSPECIFIED TYPE: Primary | ICD-10-CM

## 2019-08-16 DIAGNOSIS — R22.42 LEG MASS, LEFT: ICD-10-CM

## 2019-08-16 DIAGNOSIS — H61.22 CERUMINOSIS, LEFT: ICD-10-CM

## 2019-08-16 DIAGNOSIS — N76.0 ACUTE VAGINITIS: ICD-10-CM

## 2019-08-16 DIAGNOSIS — Z78.0 POSTMENOPAUSAL: ICD-10-CM

## 2019-08-16 PROCEDURE — 90471 IMMUNIZATION ADMIN: CPT | Performed by: INTERNAL MEDICINE

## 2019-08-16 PROCEDURE — 99396 PREV VISIT EST AGE 40-64: CPT | Performed by: INTERNAL MEDICINE

## 2019-08-16 PROCEDURE — 90632 HEPA VACCINE ADULT IM: CPT | Performed by: INTERNAL MEDICINE

## 2019-08-16 RX ORDER — FLUCONAZOLE 150 MG/1
150 TABLET ORAL ONCE
Qty: 1 TABLET | Refills: 0 | Status: SHIPPED | OUTPATIENT
Start: 2019-08-16 | End: 2019-08-16

## 2019-08-16 NOTE — PROGRESS NOTES
Subjective     Patient ID: Marguerite Fink is a 49 y.o. female. Patient is here for management of multiple medical problems.     Chief Complaint   Patient presents with   • Abscess     1 week follow up, patient states the antibiotics she has been on has given her a lot of diarrhea.     History of Present Illness     Pt seen Dr Bower.  Mass in leg with sig reduction in size.  Pt still on abx.     Pt finished valtrex, z pack and about done with Augmentin.  Pt feeling.    6-10 bm's a day.  Watery diarrhea only.    Not green color or slime.    Request diflucan.    Pt in need for yearly annual exam.      The following portions of the patient's history were reviewed and updated as appropriate: allergies, current medications, past family history, past medical history, past social history, past surgical history and problem list.    Review of Systems   Constitutional: Positive for fatigue.   Gastrointestinal: Positive for diarrhea. Negative for abdominal distention, abdominal pain, anal bleeding, blood in stool and constipation.   All other systems reviewed and are negative.      Current Outpatient Medications:   •  B-12, Methylcobalamin, 1000 MCG sublingual tablet, Place 1 tablet under the tongue Daily. (Patient taking differently: Place 2,000 mg under the tongue Daily.), Disp: 90 tablet, Rfl: 3  •  diclofenac (VOLTAREN) 1 % gel gel, Apply 4 g topically to the appropriate area as directed 4 (Four) Times a Day As Needed (pain)., Disp: 100 g, Rfl: 11  •  escitalopram (LEXAPRO) 10 MG tablet, Take 1 tablet by mouth Daily., Disp: 30 tablet, Rfl: 11  •  estradiol (VIVELLE-DOT) 0.1 MG/24HR patch, Place 1 patch on the skin as directed by provider 2 (Two) Times a Week., Disp: 8 patch, Rfl: 12  •  gabapentin (NEURONTIN) 100 MG capsule, Take 100 mg by mouth every night at bedtime. Up to two pills at night, Disp: , Rfl: 0  •  levETIRAcetam (KEPPRA) 500 MG tablet, Take 2 tablets by mouth 2 (Two) Times a Day., Disp: 90 tablet, Rfl: 5  •  " loratadine-pseudoephedrine (CLARITIN-D 12 HOUR) 5-120 MG per 12 hr tablet, Take 1 tablet by mouth 2 (Two) Times a Day., Disp: 14 tablet, Rfl: 0  •  montelukast (SINGULAIR) 10 MG tablet, Take 1 tablet by mouth Every Night., Disp: 30 tablet, Rfl: 5  •  oxyCODONE-acetaminophen (ENDOCET)  MG per tablet, Every 6 (Six) Hours., Disp: , Rfl:   •  rOPINIRole (REQUIP) 0.5 MG tablet, Take 1 tablet by mouth Every Night. Take 1 hour before bedtime., Disp: 30 tablet, Rfl: 11  •  SUMAtriptan (IMITREX) 100 MG tablet, Take 1 tablet by mouth 1 (One) Time As Needed for Migraine for up to 1 dose., Disp: 9 tablet, Rfl: 5  •  topiramate (TOPAMAX) 100 MG tablet, Take 1 tablet by mouth 2 (Two) Times a Day., Disp: 60 tablet, Rfl: 5  •  umeclidinium-vilanterol (ANORO ELLIPTA) 62.5-25 MCG/INH aerosol powder  inhaler, Inhale 1 puff Daily., Disp: 1 each, Rfl: 11  •  diltiaZEM SR (CARDIZEM SR) 120 MG 12 hr capsule, Take 1 capsule by mouth 2 (Two) Times a Day., Disp: 60 capsule, Rfl: 5  •  fluconazole (DIFLUCAN) 150 MG tablet, Take 1 tablet by mouth 1 (One) Time for 1 dose., Disp: 1 tablet, Rfl: 0    Objective      Blood pressure 112/70, pulse 62, temperature 97.7 °F (36.5 °C), resp. rate 18, height 162.6 cm (64\"), weight 61.2 kg (135 lb), SpO2 97 %, not currently breastfeeding.    Physical Exam     General Appearance:    Alert, cooperative, no distress, appears stated age   Head:    Normocephalic, without obvious abnormality, atraumatic   Eyes:    PERRL, conjunctiva/corneas clear, EOM's intact   Ears:    Impacted left ear canal.     Nose:   Nares normal, septum midline, mucosa normal, no drainage   or sinus tenderness   Throat:   Lips, mucosa, and tongue normal; teeth and gums normal   Neck:   Supple, symmetrical, trachea midline, no adenopathy;        thyroid:  No enlargement/tenderness/nodules; no carotid    bruit or JVD   Back:     Symmetric, no curvature, ROM normal, no CVA tenderness   Lungs:     Clear to auscultation bilaterally, " respirations unlabored   Chest wall:    No tenderness or deformity   Heart:    Regular rate and rhythm, S1 and S2 normal, no murmur,        rub or gallop   Abdomen:     Soft, non-tender, bowel sounds active all four quadrants,     no masses, no organomegaly   Extremities:  reduced size in mass of leg.   Extremities normal, atraumatic, no cyanosis or edema   Pulses:   2+ and symmetric all extremities   Skin:   Skin color, texture, turgor normal, no rashes or lesions   Lymph nodes:   Cervical, supraclavicular, and axillary nodes normal   Neurologic:   CNII-XII intact. Normal strength, sensation and reflexes       throughout      Results for orders placed or performed in visit on 05/22/19   Levetiracetam Level (Keppra)   Result Value Ref Range    Levetiracetam 6.6 (L) 10.0 - 40.0 ug/mL   Topiramate Level   Result Value Ref Range    Topiramate 3.5 2.0 - 25.0 ug/mL         Assessment/Plan   Diet going well.   Exercise going swimming more. Doing well.    Wearing set belts.    Will treat diarrhea if worsing. Stop abx first.    Pt with rtc apt with ent. Using seat oil for ear.        Marguerite was seen today for abscess.    Diagnoses and all orders for this visit:    Diarrhea, unspecified type    Leg mass, left    Ceruminosis, left    Postmenopausal  -     DEXA Bone Density Axial    Acute vaginitis  -     fluconazole (DIFLUCAN) 150 MG tablet; Take 1 tablet by mouth 1 (One) Time for 1 dose.    Other orders  -     Hepatitis A Vaccine Adult IM      Return in about 6 months (around 2/16/2020).          There are no Patient Instructions on file for this visit.     Juan Clemens MD    Assessment/Plan

## 2019-08-21 ENCOUNTER — APPOINTMENT (OUTPATIENT)
Dept: BONE DENSITY | Facility: HOSPITAL | Age: 49
End: 2019-08-21

## 2019-08-21 PROCEDURE — 77080 DXA BONE DENSITY AXIAL: CPT

## 2019-08-21 NOTE — PROGRESS NOTES
Please let them know the bmd is with osteopenia of hip.  Set up rtc apt to consider starting meds for this.

## 2019-08-22 ENCOUNTER — APPOINTMENT (OUTPATIENT)
Dept: BONE DENSITY | Facility: HOSPITAL | Age: 49
End: 2019-08-22

## 2019-09-17 ENCOUNTER — OFFICE VISIT (OUTPATIENT)
Dept: NEUROLOGY | Facility: CLINIC | Age: 49
End: 2019-09-17

## 2019-09-17 VITALS
SYSTOLIC BLOOD PRESSURE: 118 MMHG | WEIGHT: 137 LBS | DIASTOLIC BLOOD PRESSURE: 72 MMHG | OXYGEN SATURATION: 98 % | BODY MASS INDEX: 23.39 KG/M2 | HEART RATE: 67 BPM | HEIGHT: 64 IN

## 2019-09-17 DIAGNOSIS — F41.9 ANXIETY: ICD-10-CM

## 2019-09-17 DIAGNOSIS — G43.009 MIGRAINE WITHOUT AURA AND WITHOUT STATUS MIGRAINOSUS, NOT INTRACTABLE: Primary | ICD-10-CM

## 2019-09-17 PROCEDURE — 99213 OFFICE O/P EST LOW 20 MIN: CPT | Performed by: NURSE PRACTITIONER

## 2019-09-17 RX ORDER — SUMATRIPTAN 20 MG/1
1 SPRAY NASAL
Qty: 4 EACH | Refills: 2 | Status: CANCELLED | OUTPATIENT
Start: 2019-09-17

## 2019-09-18 ENCOUNTER — TELEPHONE (OUTPATIENT)
Dept: NEUROLOGY | Facility: CLINIC | Age: 49
End: 2019-09-18

## 2019-09-18 ENCOUNTER — OFFICE VISIT (OUTPATIENT)
Dept: INTERNAL MEDICINE | Facility: CLINIC | Age: 49
End: 2019-09-18

## 2019-09-18 VITALS
SYSTOLIC BLOOD PRESSURE: 116 MMHG | TEMPERATURE: 97.8 F | HEART RATE: 57 BPM | DIASTOLIC BLOOD PRESSURE: 66 MMHG | WEIGHT: 135 LBS | RESPIRATION RATE: 15 BRPM | OXYGEN SATURATION: 100 % | HEIGHT: 64 IN | BODY MASS INDEX: 23.05 KG/M2

## 2019-09-18 DIAGNOSIS — E53.8 VITAMIN B12 DEFICIENCY: ICD-10-CM

## 2019-09-18 DIAGNOSIS — F33.0 MILD EPISODE OF RECURRENT MAJOR DEPRESSIVE DISORDER (HCC): ICD-10-CM

## 2019-09-18 DIAGNOSIS — R56.9 SEIZURE (HCC): ICD-10-CM

## 2019-09-18 DIAGNOSIS — R53.83 FATIGUE, UNSPECIFIED TYPE: ICD-10-CM

## 2019-09-18 DIAGNOSIS — M85.851 OSTEOPENIA OF NECKS OF BOTH FEMURS: ICD-10-CM

## 2019-09-18 DIAGNOSIS — M85.852 OSTEOPENIA OF NECKS OF BOTH FEMURS: ICD-10-CM

## 2019-09-18 DIAGNOSIS — Z91.410 HISTORY OF ADULT DOMESTIC PHYSICAL ABUSE: ICD-10-CM

## 2019-09-18 DIAGNOSIS — Z72.0 TOBACCO ABUSE: ICD-10-CM

## 2019-09-18 DIAGNOSIS — F41.9 ANXIETY: Primary | ICD-10-CM

## 2019-09-18 PROBLEM — M50.30 DEGENERATIVE DISC DISEASE, CERVICAL: Status: ACTIVE | Noted: 2019-09-18

## 2019-09-18 PROBLEM — B19.20 HEPATITIS C: Status: ACTIVE | Noted: 2019-09-18

## 2019-09-18 PROBLEM — N80.9 ENDOMETRIOSIS: Status: ACTIVE | Noted: 2019-09-18

## 2019-09-18 PROBLEM — G89.29 CHRONIC BACK PAIN: Status: ACTIVE | Noted: 2019-09-18

## 2019-09-18 PROBLEM — M54.9 CHRONIC BACK PAIN: Status: ACTIVE | Noted: 2019-09-18

## 2019-09-18 PROBLEM — M51.36 DEGENERATIVE DISC DISEASE, LUMBAR: Status: ACTIVE | Noted: 2019-09-18

## 2019-09-18 PROCEDURE — 99214 OFFICE O/P EST MOD 30 MIN: CPT | Performed by: NURSE PRACTITIONER

## 2019-09-18 RX ORDER — SUMATRIPTAN 20 MG/1
SPRAY NASAL
Qty: 9 EACH | Refills: 1 | Status: SHIPPED | OUTPATIENT
Start: 2019-09-18 | End: 2019-12-01 | Stop reason: SDUPTHER
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
## 2019-09-18 NOTE — TELEPHONE ENCOUNTER
Pt wanted to let us know that pcp sent her to psychiatry.   She also wanted to make sure that we send in her nasal Imitrex.

## 2019-09-21 LAB
25(OH)D3+25(OH)D2 SERPL-MCNC: 16.7 NG/ML (ref 30–100)
VIT B12 SERPL-MCNC: 353 PG/ML (ref 211–946)
VIT B6 SERPL-MCNC: 4.1 UG/L (ref 2–32.8)

## 2019-09-26 RX ORDER — ERGOCALCIFEROL 1.25 MG/1
50000 CAPSULE ORAL WEEKLY
Qty: 12 CAPSULE | Refills: 0 | Status: SHIPPED | OUTPATIENT
Start: 2019-09-26 | End: 2020-01-30

## 2019-09-26 NOTE — PROGRESS NOTES
Please contact patient and let her know results of vitamin B12 353 which is on the lower end of normal vitamin D is insufficient and I recommend her taking vitamin D3 50,000 x 12 weeks and then daily supplement of 5000 once prescription is completed.  Also let patient know that vitamin B6 is in normal range but on the lower end of normal.  Recommend dietary modifications.

## 2019-11-04 ENCOUNTER — TELEPHONE (OUTPATIENT)
Dept: INTERNAL MEDICINE | Facility: CLINIC | Age: 49
End: 2019-11-04

## 2019-11-04 NOTE — TELEPHONE ENCOUNTER
Pt left a VM today @ 10AM stating that Anoro is not covered by her insurance and she would like for us to change to Advair inhaler. Please advise

## 2019-11-25 ENCOUNTER — TELEPHONE (OUTPATIENT)
Dept: PSYCHIATRY | Facility: CLINIC | Age: 49
End: 2019-11-25

## 2019-11-25 ENCOUNTER — OFFICE VISIT (OUTPATIENT)
Dept: PSYCHIATRY | Facility: CLINIC | Age: 49
End: 2019-11-25

## 2019-11-25 VITALS
HEART RATE: 62 BPM | DIASTOLIC BLOOD PRESSURE: 66 MMHG | BODY MASS INDEX: 21.68 KG/M2 | HEIGHT: 64 IN | SYSTOLIC BLOOD PRESSURE: 118 MMHG | WEIGHT: 127 LBS

## 2019-11-25 DIAGNOSIS — F33.1 MODERATE EPISODE OF RECURRENT MAJOR DEPRESSIVE DISORDER (HCC): Primary | ICD-10-CM

## 2019-11-25 DIAGNOSIS — F41.0 PANIC DISORDER: ICD-10-CM

## 2019-11-25 DIAGNOSIS — G47.00 INSOMNIA, UNSPECIFIED TYPE: ICD-10-CM

## 2019-11-25 PROCEDURE — 90792 PSYCH DIAG EVAL W/MED SRVCS: CPT | Performed by: NURSE PRACTITIONER

## 2019-11-25 RX ORDER — HYDROXYZINE HYDROCHLORIDE 10 MG/1
TABLET, FILM COATED ORAL
Qty: 60 TABLET | Refills: 0 | Status: SHIPPED | OUTPATIENT
Start: 2019-11-25 | End: 2019-12-26 | Stop reason: SDUPTHER

## 2019-11-25 RX ORDER — INFLUENZA A VIRUS A/SINGAPORE/GP1908/2015 IVR-180 (H1N1) ANTIGEN (MDCK CELL DERIVED, PROPIOLACTONE INACTIVATED), INFLUENZA A VIRUS A/NORTH CAROLINA/04/2016 (H3N2) HEMAGGLUTININ ANTIGEN (MDCK CELL DERIVED, PROPIOLACTONE INACTIVATED), INFLUENZA B VIRUS B/IOWA/06/2017 HEMAGGLUTININ ANTIGEN (MDCK CELL DERIVED, PROPIOLACTONE INACTIVATED), INFLUENZA B VIRUS B/SINGAPORE/INFTT-16-0610/2016 HEMAGGLUTININ ANTIGEN (MDCK CELL DERIVED, PROPIOLACTONE INACTIVATED) 15; 15; 15; 15 UG/.5ML; UG/.5ML; UG/.5ML; UG/.5ML
INJECTION, SUSPENSION INTRAMUSCULAR
Refills: 0 | COMMUNITY
Start: 2019-10-27 | End: 2020-01-30

## 2019-11-25 RX ORDER — VITAMIN B COMPLEX
CAPSULE ORAL
COMMUNITY
End: 2022-10-19

## 2019-11-25 RX ORDER — SALIVA STIMULANT COMB. NO.7
GEL (GRAM) MUCOUS MEMBRANE AS NEEDED
COMMUNITY
End: 2020-01-30

## 2019-11-25 RX ORDER — TRAZODONE HYDROCHLORIDE 50 MG/1
TABLET ORAL
Qty: 30 TABLET | Refills: 0 | Status: SHIPPED | OUTPATIENT
Start: 2019-11-25 | End: 2019-12-26 | Stop reason: SDUPTHER
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
## 2019-12-02 ENCOUNTER — OFFICE VISIT (OUTPATIENT)
Dept: NEUROLOGY | Facility: CLINIC | Age: 49
End: 2019-12-02

## 2019-12-02 VITALS
OXYGEN SATURATION: 98 % | SYSTOLIC BLOOD PRESSURE: 100 MMHG | HEART RATE: 62 BPM | DIASTOLIC BLOOD PRESSURE: 60 MMHG | WEIGHT: 132 LBS | HEIGHT: 64 IN | BODY MASS INDEX: 22.53 KG/M2

## 2019-12-02 DIAGNOSIS — G43.009 MIGRAINE WITHOUT AURA AND WITHOUT STATUS MIGRAINOSUS, NOT INTRACTABLE: ICD-10-CM

## 2019-12-02 DIAGNOSIS — R56.9 SEIZURES (HCC): Primary | ICD-10-CM

## 2019-12-02 PROCEDURE — 99213 OFFICE O/P EST LOW 20 MIN: CPT | Performed by: NURSE PRACTITIONER

## 2019-12-02 RX ORDER — SUMATRIPTAN 100 MG/1
100 TABLET, FILM COATED ORAL ONCE AS NEEDED
Qty: 9 TABLET | Refills: 5 | Status: SHIPPED | OUTPATIENT
Start: 2019-12-02 | End: 2020-03-09

## 2019-12-02 RX ORDER — SUMATRIPTAN 20 MG/1
SPRAY NASAL
Qty: 18 EACH | Refills: 0 | Status: SHIPPED | OUTPATIENT
Start: 2019-12-02 | End: 2020-03-16

## 2019-12-02 NOTE — PROGRESS NOTES
Subjective:     Patient ID: Marguerite Fink is a 49 y.o. female.    CC:   Chief Complaint   Patient presents with   • Migraine   • Seizures       HPI:   History of Present Illness     Ms. Fink is here today for follow-up.      She reportedly has had an increase in her seizures since March. The spells she mentioned were staring spells vs her past of tonic clonic seizures.  She has not had a GTC seizure in over a year. She had an EEG in April which was read as normal.  She has been given several instructions to increase Keppra over the last few months however she has not fully titrated up to the therapeutic dose of 1000 mg twice daily as instructed at last visit.  Dr. Ho did increase her Topamax to 100 mg twice a day at last visit.  She reportd last visit  that she still having episodes of amnesia, she is losing time, she is losing memory for up to 10 minutes at a time.  She has had no grand mal seizures per report.  Migraines are stable, she feels like since the increase in Topamax that she cannot think straight, pain management doctor has also added gabapentin recently.  I instructed her that gabapentin along with increased Topamax may cause some word finding difficulties.  She is not driving, she is reportedly very anxious and depressed as she is fearful to be left alone due to increase in her seizure activity     She had inpt monitoring with Dr. Cross, she has one confusional type spell with no associated EEG changes.  He suggested her small spells where psycogenic in nature and epilepsy was controlled with current treatments.  He did decrease her Keppra back to 750 mg BID and kept topamax at 100 mg BID for both seizures and migraines.    She is here today for follow-up.  She needs refills on sumatriptan.  She continues to have significant migraines in spite of Keppra, Topamax gabapentin.  She is had no further spells tonic-clonic seizures.    The following portions of the patient's history were reviewed  and updated as appropriate: allergies, current medications, past family history, past medical history, past social history, past surgical history and problem list.    Past Medical History:   Diagnosis Date   • Abdominal pain, epigastric    • Acute sinusitis    • Acute UTI (urinary tract infection)    • Angina, intestinal (CMS/HCC)    • Anxiety    • Arthritis    • Back pain    • Breast mass, right    • Chronic hepatitis C virus infection (CMS/HCC)    • Depression    • Diarrhea    • Elevated LFTs    • Fatigue    • History of endometriosis    • History of Papanicolaou smear of cervix 2014    NORMAL    • HTN (hypertension)    • IBS (irritable bowel syndrome)    • Insomnia    • Menopausal symptoms    • Migraine headache    • Nausea & vomiting    • Neck pain    • Ovarian cyst    • Pelvic adhesive disease    • Radicular pain of left lower extremity    • Restless leg syndrome    • Seizure disorder (CMS/HCC)    • Tobacco abuse    • Trochanteric bursitis    • Vitamin B 12 deficiency        Past Surgical History:   Procedure Laterality Date   • BILATERAL SALPINGO OOPHORECTOMY  03/10/2015    DR NATALY THOMPSON   • BREAST LUMPECTOMY     •  SECTION     • HYSTERECTOMY  03/10/2015    [facility] (DR NATALY THOMPSON)   • INGUINAL HERNIA REPAIR Right    • LYSIS OF ABDOMINAL ADHESIONS  03/10/2015    DR NATALY THOMPSON   • TUBAL ABDOMINAL LIGATION         Social History     Socioeconomic History   • Marital status:      Spouse name: Not on file   • Number of children: 1   • Years of education: Not on file   • Highest education level: High school graduate   Tobacco Use   • Smoking status: Current Every Day Smoker     Packs/day: 0.50     Types: Cigarettes   • Smokeless tobacco: Never Used   • Tobacco comment:  1/2 PACK A DAY   Substance and Sexual Activity   • Alcohol use: Yes     Comment: ON OCCASION   • Drug use: No   • Sexual activity: Defer     Birth control/protection: Surgical       Family History   Problem Relation Age of  "Onset   • Alzheimer's disease Other    • Cancer Other    • Dementia Other    • Hypertension Other    • Parkinsonism Other    • Osteoporosis Mother    • Diabetes Mother    • Hypertension Mother    • Obesity Mother    • Breast cancer Maternal Grandmother    • Osteoporosis Maternal Grandmother    • Diabetes Maternal Grandmother         Review of Systems   Constitutional: Negative.    Eyes: Negative.    Respiratory: Negative.    Cardiovascular: Negative.    Gastrointestinal: Negative.    Endocrine: Negative.    Genitourinary: Negative.    Musculoskeletal: Negative.    Skin: Negative.    Allergic/Immunologic: Negative.    Neurological: Positive for seizures, numbness and headaches. Negative for dizziness, tremors, syncope, facial asymmetry, speech difficulty, weakness and light-headedness.   Hematological: Negative.    Psychiatric/Behavioral: Negative.         Objective:  /60   Pulse 62   Ht 162.6 cm (64\")   Wt 59.9 kg (132 lb)   SpO2 98%   BMI 22.66 kg/m²     Neurologic Exam     Mental Status   Oriented to person, place, and time.   Attention: normal. Concentration: normal.   Speech: speech is normal   Level of consciousness: alert    Cranial Nerves   Cranial nerves II through XII intact.     CN III, IV, VI   Pupils are equal, round, and reactive to light.  Extraocular motions are normal.     Motor Exam   Muscle bulk: normal  Overall muscle tone: normal  Right arm pronator drift: absent  Left arm pronator drift: absent    Strength   Strength 5/5 throughout.     Gait, Coordination, and Reflexes     Gait  Gait: normal    Coordination   Romberg: negative  Finger to nose coordination: normal    Tremor   Resting tremor: absent  Intention tremor: absent  Action tremor: absent    Reflexes   Reflexes 2+ except as noted.       Physical Exam   Constitutional: She is oriented to person, place, and time. She appears well-developed and well-nourished. No distress.   HENT:   Head: Normocephalic and atraumatic.   Eyes: " Pupils are equal, round, and reactive to light. EOM are normal. No scleral icterus.   Neck: Normal range of motion. Neck supple.   Pulmonary/Chest: Effort normal.   Neurological: She is alert and oriented to person, place, and time. She has normal strength. She has a normal Finger-Nose-Finger Test and a normal Romberg Test. Gait normal.   Psychiatric: Her speech is normal.   Vitals reviewed.      Assessment/Plan:       Marguerite was seen today for migraine and seizures.    Diagnoses and all orders for this visit:    Seizures (CMS/[facility])  Comments:  Stable    Migraine without aura and without status migrainosus, not intractable  -     SUMAtriptan (IMITREX) 100 MG tablet; Take 1 tablet by mouth 1 (One) Time As Needed for Migraine for up to 1 dose.    This patient experiences 15 /30 headache days per month with at least 8  days being severe migraine headaches. Migraine headaches last >4hour/day and have been present for greater than 6 consecutive months. Characteristics of migraines include at least 2 of the following:  moderate to severe intensity,, photophobia, phonophobia, nausea and/or vomiting. Patient has tried and failed medications for migraine prevention for 3 months or greater: Examples: Topamax, Amitriptyline, , Propranolol, , Maxalt, SSRIs, keppra, gabapentin, NSAIDs,. I have recommended Botox using FDA approved protocol for chronic migraine prevention resistant to prior regimens as listed above. We have discussed risk and benefits of this procedure and common side effects including headache, neck pain, neck stiffness or weakness, ptosis, flu-like symptoms as well as more serious possible adverse effects including possible dysphagia, respiratory distress or even death (death has only been reported once with adults for Botox for migraines in another state when mixed with lidocaine solution which we do not use lidocaine solution in our practice for mixing Botox). Verbalizes understanding, accepts risks and agrees  with moving forward with Botox injections for chronic migraine prevention.  Reviewed medications, potential side effects and signs and symptoms to report. Discussed risk versus benefits of treatment plan with patient and/or family-including medications, labs and radiology that may be ordered. Addressed questions and concerns during visit. Patient and/or family verbalized understanding and agree with plan.  We reviewed possible headache triggers, stress relief techniques, and alternative treatments for headaches. The patient was in understanding and all questions were addressed. We reviewed the diagnosis and treatment plan and medication side effect profile. The patient will follow up as scheduled.  EMR Dragon/Transcription Disclaimer:  Much of this encounter note is an electronic transcription of spoken language to printed text. Electronic transcription of spoken language may permit erroneous words or phrases to be inadvertently transcribed. Although I have reviewed the note for such errors, some may still exist in this documentation.           Ginny Pace, APRN  12/8/2019

## 2019-12-08 ENCOUNTER — TELEPHONE (OUTPATIENT)
Dept: NEUROLOGY | Facility: CLINIC | Age: 49
End: 2019-12-08

## 2019-12-17 ENCOUNTER — OFFICE VISIT (OUTPATIENT)
Dept: PSYCHIATRY | Facility: CLINIC | Age: 49
End: 2019-12-17

## 2019-12-17 DIAGNOSIS — F33.1 MODERATE EPISODE OF RECURRENT MAJOR DEPRESSIVE DISORDER (HCC): Primary | ICD-10-CM

## 2019-12-17 DIAGNOSIS — F41.0 PANIC DISORDER: ICD-10-CM

## 2019-12-17 DIAGNOSIS — G47.00 INSOMNIA, UNSPECIFIED TYPE: ICD-10-CM

## 2019-12-17 PROCEDURE — 90837 PSYTX W PT 60 MINUTES: CPT | Performed by: SOCIAL WORKER

## 2019-12-17 NOTE — PROGRESS NOTES
Date of Service: December 17, 2019  Time In: 230  Time Out: 327      PROGRESS NOTE  Data:  Marguerite Fink is a 49 y.o. female who met with the undersigned for a regularly scheduled individual outpatient therapy session at [facility].  Patient was referred by Ana Rosa nurse practitioner in this office.  Patient discussed she was hospitalized in 2018 for what they thought was cardiac issues and ruled out cardiac and diagnosed her with anxiety and panic disorder.  Patient discussed the Trintellix is helping her mood.  Patient discussed feeling nauseous and has been feels that she may be a bit edgy at times.  Patient discussed her first  and the father of her child dying due to addiction/cirrhosis of the liver and multiple health conditions.  Patient discussed she has a 28-year-old son that lives in [place].  Patient explained she has been  to her second  for 7 years.  Patient discussed being diagnosed with a seizure disorder, unable to work and unable to drive.  Patient discussed having complications due to lapses in time and gave examples of running into [employer] for 1 thing and being there for 45 minutes and not knowing what she was doing and having her mother call her and tell her to come out.  Patient discussed her thoughts are a trigger.  Patient discussed social settings overwhelm her.  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     HPI: Depression, anxiety, panic attacks, chronic pain and seizure disorder      Clinical Maneuvering/Intervention:  Assisted patient in processing above session content; acknowledged and normalized patient’s thoughts, feelings, and concerns.  Assisted patient processing her thoughts and feelings related to mood, anxiety, panic attacks, chronic pain and seizure disorder.  Patient sees psychiatric  nurse practitioner in this office.  Patient is to take her medications as prescribed and contact the nurse practitioner in this office for questions and concerns.  Patient denies SI, HI and self-harm.  Educated on panic attacks and anxiety.  Educated on conflict resolution and the importance of creating a schedule.  Validated patient's thoughts and feelings of needing to complete paperwork for disability application that becomes overwhelmed.  Encourage patient to work on creating a relaxing environment and gave examples of putting on relaxation music and setting a timer and working on it 30 minutes at a time encourage patient to practice for do it 30 minutes a day with a timer between now and next session.  Attempting to build rapport and relationship with this patient.  We will see patient every 2 weeks and as needed.  Educated on crisis visits as needed.    Allowed patient to freely discuss issues without interruption or judgment. Provided safe, confidential environment to facilitate the development of positive therapeutic relationship and encourage open, honest communication. Assisted patient in identifying risk factors which would indicate the need for higher level of care including thoughts to harm self or others and/or self-harming behavior and encouraged patient to contact this office, call 911, or present to the nearest emergency room should any of these events occur. Discussed crisis intervention services and means to access.  Patient adamantly and convincingly denies current suicidal or homicidal ideation or perceptual disturbance.    Assessment     Diagnoses and all orders for this visit:    Moderate episode of recurrent major depressive disorder (CMS/HCC)    Panic disorder    Insomnia, unspecified type               REVIEW OF SYSTEMS:  Review of Systems       Mental Status Exam  Hygiene:   fair  Cooperation:  Cooperative  Eye Contact:  Good  Psychomotor Behavior:  Appropriate  Affect:   Appropriate  Hopelessness: 4  Speech:  Normal  Thought Process:  Goal directed  Thought Content:  Normal  Suicidal:  None  Homicidal:  None  Hallucinations:  None  Delusion:  None  Memory:  Deficits  Orientation:  Person, Place, Time and Situation  Reliability:  fair  Insight:  Good  Judgement:  Good  Impulse Control:  Good  Physical/Medical Issues:  Yes Chronic pain and seizure disorder    Patient's Support Network Includes:  , son, mother and extended family    Progress toward goal: Not at goal    Functional Status: Moderate impairment     Prognosis: Fair with Ongoing Treatment       Plan         Patient will adhere to medication regimen as prescribed and report any side effects. Patient will contact this office, call 911 or present to the nearest emergency room should suicidal or homicidal ideations occur. Provide Cognitive Behavioral Therapy and Integrative Therapy to improve functioning, maintain stability, and avoid decompensation and the need for higher level of care.          Return in about 2 weeks (around 12/31/2019), or if symptoms worsen or fail to improve.      This document is signed me Angela Gonzalez LCSW,   December 17, 2019 3:40 PM

## 2019-12-26 ENCOUNTER — OFFICE VISIT (OUTPATIENT)
Dept: PSYCHIATRY | Facility: CLINIC | Age: 49
End: 2019-12-26

## 2019-12-26 VITALS
SYSTOLIC BLOOD PRESSURE: 104 MMHG | BODY MASS INDEX: 20.49 KG/M2 | HEIGHT: 64 IN | DIASTOLIC BLOOD PRESSURE: 60 MMHG | HEART RATE: 74 BPM | WEIGHT: 120 LBS

## 2019-12-26 DIAGNOSIS — G47.00 INSOMNIA, UNSPECIFIED TYPE: ICD-10-CM

## 2019-12-26 DIAGNOSIS — F41.0 PANIC DISORDER: ICD-10-CM

## 2019-12-26 DIAGNOSIS — F33.1 MODERATE EPISODE OF RECURRENT MAJOR DEPRESSIVE DISORDER (HCC): Primary | ICD-10-CM

## 2019-12-26 PROCEDURE — 99214 OFFICE O/P EST MOD 30 MIN: CPT | Performed by: NURSE PRACTITIONER

## 2019-12-26 RX ORDER — ONDANSETRON 4 MG/1
TABLET, FILM COATED ORAL
Qty: 30 TABLET | Refills: 0 | Status: SHIPPED | OUTPATIENT
Start: 2019-12-26 | End: 2020-03-09

## 2019-12-26 RX ORDER — TRAZODONE HYDROCHLORIDE 50 MG/1
TABLET ORAL
Qty: 30 TABLET | Refills: 1 | Status: SHIPPED | OUTPATIENT
Start: 2019-12-26 | End: 2020-01-28 | Stop reason: SDUPTHER

## 2019-12-26 RX ORDER — HYDROXYZINE HYDROCHLORIDE 10 MG/1
TABLET, FILM COATED ORAL
Qty: 60 TABLET | Refills: 1 | Status: SHIPPED | OUTPATIENT
Start: 2019-12-26 | End: 2020-02-20

## 2019-12-26 NOTE — PROGRESS NOTES
Marguerite Fink is a 49 y.o. female is here today for medication management follow-up.    Chief Complaint:      ICD-10-CM ICD-9-CM   1. Moderate episode of recurrent major depressive disorder (CMS/[facility]) F33.1 296.32   2. Panic disorder F41.0 300.01   3. Insomnia, unspecified type G47.00 780.52       History of Present Illness:  Pt presents for follow up visit and medication management of depressive symptoms. Pt is currently taking Trintellix 5 mg; states she still feels depressed but she has clearer thinking. States she did make [employer] diner for the first time in many years. States she has still been experiencing nausea even with taking medication with food; states that the nausea is not every day. Reports that Trazodone has been helpful for her insomnia and she has been having less grand mal seizures. Hydroxyzine has been helpful for panic episodes.    Pt reports the presence/absence of the following depressive symptoms: (+) depressed mood, (-) reduced appetite, (-) increased appetite, (-) poor concentration, (-) hopelessness, (-) worthlessness, (-) insomnia, (-) hypersomnia, (-) psychomotor agitation, (-) irritability, (+) anhedonia, (-) amotivation, (-) fatigue, (-) suicidal ideation, (-) suicidal plan, (-) suicidal intent, (-) recurrent thoughts about death, and (-) homicidal ideation.    The following portions of the patient's history were reviewed and updated as appropriate: allergies, current medications, past family history, past medical history, past social history, past surgical history and problem list.    Review of Systems;;  Review of Systems   Constitutional: Negative.  Negative for activity change, appetite change, unexpected weight gain and unexpected weight loss.   Respiratory: Negative.    Cardiovascular: Negative.  Negative for chest pain.   Gastrointestinal: Negative.  Negative for diarrhea, nausea and vomiting.   Genitourinary: Negative.    Musculoskeletal: Negative.    Skin: Negative for rash  "and bruise.   Neurological: Positive for seizures. Negative for dizziness, speech difficulty, headache and confusion.   Psychiatric/Behavioral: Positive for dysphoric mood. Negative for agitation, behavioral problems, decreased concentration, hallucinations, self-injury, sleep disturbance, suicidal ideas, negative for hyperactivity, depressed mood and stress. The patient is not nervous/anxious.        Physical Exam;;  Physical Exam  Blood pressure 104/60, pulse 74, height 162.6 cm (64\"), weight 54.4 kg (120 lb), not currently breastfeeding.    Current Medications;;    Current Outpatient Medications:   •  B Complex Vitamins (VITAMIN B COMPLEX) capsule capsule, Take  by mouth., Disp: , Rfl:   •  diclofenac (VOLTAREN) 1 % gel gel, Apply 4 g topically to the appropriate area as directed 4 (Four) Times a Day As Needed (pain)., Disp: 100 g, Rfl: 11  •  dry mouth gel (BIOTENE ORALBALANCE) gel, Apply  to the mouth or throat As Needed for Dry Mouth., Disp: , Rfl:   •  estradiol (VIVELLE-DOT) 0.1 MG/24HR patch, Place 1 patch on the skin as directed by provider 2 (Two) Times a Week., Disp: 8 patch, Rfl: 12  •  FLUCELVAX QUADRIVALENT 0.5 ML suspension prefilled syringe injection, inject 0.5 milliliters intramuscularly, Disp: , Rfl: 0  •  fluticasone-salmeterol (ADVAIR HFA) 115-21 MCG/ACT inhaler, Inhale 2 puffs 2 (Two) Times a Day., Disp: 12 g, Rfl: 11  •  fluticasone-salmeterol (ADVAIR) 100-50 MCG/DOSE DISKUS, Inhale 2 (Two) Times a Day., Disp: , Rfl:   •  gabapentin (NEURONTIN) 100 MG capsule, Take 100 mg by mouth every night at bedtime. Up to two pills at night, Disp: , Rfl: 0  •  hydrOXYzine (ATARAX) 10 MG tablet, Take 1 to 2 tablets PO daily prn panic, Disp: 60 tablet, Rfl: 1  •  levETIRAcetam (KEPPRA) 500 MG tablet, Take 2 tablets by mouth 2 (Two) Times a Day. (Patient taking differently: Take 750 mg by mouth 2 (Two) Times a Day.), Disp: 90 tablet, Rfl: 5  •  loratadine-pseudoephedrine (CLARITIN-D 12 HOUR) 5-120 MG per 12 " hr tablet, Take 1 tablet by mouth 2 (Two) Times a Day., Disp: 14 tablet, Rfl: 0  •  montelukast (SINGULAIR) 10 MG tablet, Take 1 tablet by mouth Every Night., Disp: 30 tablet, Rfl: 5  •  oxyCODONE-acetaminophen (ENDOCET)  MG per tablet, Every 6 (Six) Hours., Disp: , Rfl:   •  rOPINIRole (REQUIP) 0.5 MG tablet, Take 1 tablet by mouth Every Night. Take 1 hour before bedtime., Disp: 30 tablet, Rfl: 11  •  SUMAtriptan (IMITREX) 100 MG tablet, Take 1 tablet by mouth 1 (One) Time As Needed for Migraine for up to 1 dose., Disp: 9 tablet, Rfl: 5  •  SUMAtriptan (IMITREX) 20 MG/ACT nasal spray, INSTILL 1 SPRAY IN THE NOSTRIL 1 TIME AS NEEDED FOR MIGRAINES, Disp: 18 each, Rfl: 0  •  topiramate (TOPAMAX) 100 MG tablet, Take 1 tablet by mouth 2 (Two) Times a Day., Disp: 60 tablet, Rfl: 5  •  traZODone (DESYREL) 50 MG tablet, Take 1/2 to 1 tablet PO qhs, Disp: 30 tablet, Rfl: 1  •  vitamin D (ERGOCALCIFEROL) 78039 units capsule capsule, Take 1 capsule by mouth 1 (One) Time Per Week., Disp: 12 capsule, Rfl: 0  •  ondansetron (ZOFRAN) 4 MG tablet, Take 1 tablet PO BID prn, Disp: 30 tablet, Rfl: 0  •  Vortioxetine HBr (TRINTELLIX) 10 MG tablet, Take 10 mg by mouth Daily., Disp: 30 tablet, Rfl: 1    Lab Results:       Mental Status Exam:   Hygiene:   good  Cooperation:  Cooperative  Eye Contact:  Good  Psychomotor Behavior:  Appropriate  Mood:euthymic  Affect:  Appropriate  Hopelessness: Denies  Speech:  Normal  Thought Process:  Goal directed  Thought Content:  Normal  Suicidal:  None  Homicidal:  None  Hallucinations:  None  Delusion:  None  Memory:  Intact  Orientation:  Person, Place, Time and Situation  Reliability:  good  Insight:  Fair  Judgement:  Fair  Impulse Control:  Fair  Physical/Medical Issues:  No         Assessment Plan;;  Diagnoses and all orders for this visit:    Moderate episode of recurrent major depressive disorder (CMS/HCC)  -     Vortioxetine HBr (TRINTELLIX) 10 MG tablet; Take 10 mg by mouth  Daily.    Panic disorder  -     hydrOXYzine (ATARAX) 10 MG tablet; Take 1 to 2 tablets PO daily prn panic    Insomnia, unspecified type  -     traZODone (DESYREL) 50 MG tablet; Take 1/2 to 1 tablet PO qhs    Other orders  -     ondansetron (ZOFRAN) 4 MG tablet; Take 1 tablet PO BID prn    Treatment Plan        Problem: Depression/Insmonia/Panic D/O      Intervention: The prescription and adjustment of medications and monitoring of side effects. Increase Trintellix. Add Zofran prn nausea. Continue current dose of Trazodone and Hydroxyzine prn.  Continue therapy with Angela Gonzalez LCSW    Long term Goal: Overall improvement in mood and functioning per patient self -report      Short term Goal: Medication adherence. Improvement in symptoms per patient self-report.       A psychological evaluation was conducted in order to assess past and current level of functioning. Areas assessed included, but were not limited to: perception of social support, perception of ability to face and deal with challenges in life (positive functioning), anxiety symptoms, depressive symptoms, perspective on beliefs/belief system, coping skills for stress, intelligence level,  Therapeutic rapport was established. Interventions conducted today were geared towards incorporating medication management along with support for continued therapy. Education was also provided as to the med management with this provider and what to expect in subsequent sessions.    We discussed risks, benefits,goals and side effects of the above medication and the patient was agreeable with the plan.Patient was educated on the importance of compliance with treatment and follow-up appointments. Patient is aware to contact the [facility] with any worsening of symptoms. To call for questions or concerns and return early if necessary. Patent is agreeable to go to the Emergency Department or call 911 should they begin SI/HI.         Return in about 4 weeks (around  1/23/2020) for Follow Up.    Ana Rosa Osorio, MASSIMO, APRN, PMHNP-BC, FNP-C

## 2019-12-27 DIAGNOSIS — Z91.09 ENVIRONMENTAL ALLERGIES: ICD-10-CM

## 2019-12-27 RX ORDER — ERGOCALCIFEROL 1.25 MG/1
50000 CAPSULE ORAL WEEKLY
Qty: 12 CAPSULE | Refills: 0 | OUTPATIENT
Start: 2019-12-27

## 2019-12-27 RX ORDER — ERGOCALCIFEROL 1.25 MG/1
CAPSULE ORAL
Qty: 12 CAPSULE | Refills: 0 | OUTPATIENT
Start: 2019-12-27

## 2019-12-27 RX ORDER — MONTELUKAST SODIUM 10 MG/1
10 TABLET ORAL NIGHTLY
Qty: 30 TABLET | Refills: 5 | Status: SHIPPED | OUTPATIENT
Start: 2019-12-27 | End: 2020-07-16

## 2020-01-09 RX ORDER — ONDANSETRON 4 MG/1
TABLET, FILM COATED ORAL
Qty: 30 TABLET | Refills: 0 | OUTPATIENT
Start: 2020-01-09

## 2020-01-28 ENCOUNTER — OFFICE VISIT (OUTPATIENT)
Dept: PSYCHIATRY | Facility: CLINIC | Age: 50
End: 2020-01-28

## 2020-01-28 VITALS
HEART RATE: 63 BPM | BODY MASS INDEX: 20.83 KG/M2 | WEIGHT: 122 LBS | SYSTOLIC BLOOD PRESSURE: 114 MMHG | DIASTOLIC BLOOD PRESSURE: 60 MMHG | HEIGHT: 64 IN

## 2020-01-28 DIAGNOSIS — F33.1 MODERATE EPISODE OF RECURRENT MAJOR DEPRESSIVE DISORDER (HCC): Primary | ICD-10-CM

## 2020-01-28 DIAGNOSIS — F39 MODERATE MOOD DISORDER (HCC): ICD-10-CM

## 2020-01-28 DIAGNOSIS — G47.00 INSOMNIA, UNSPECIFIED TYPE: ICD-10-CM

## 2020-01-28 PROCEDURE — 99214 OFFICE O/P EST MOD 30 MIN: CPT | Performed by: NURSE PRACTITIONER

## 2020-01-28 RX ORDER — TRAZODONE HYDROCHLORIDE 50 MG/1
TABLET ORAL
Qty: 30 TABLET | Refills: 1 | Status: SHIPPED | OUTPATIENT
Start: 2020-01-28 | End: 2020-03-19 | Stop reason: SDUPTHER

## 2020-01-28 RX ORDER — ARIPIPRAZOLE 5 MG/1
5 TABLET ORAL DAILY
Qty: 30 TABLET | Refills: 0 | Status: SHIPPED | OUTPATIENT
Start: 2020-01-28 | End: 2020-02-20

## 2020-01-30 ENCOUNTER — RESULTS ENCOUNTER (OUTPATIENT)
Dept: INTERNAL MEDICINE | Facility: CLINIC | Age: 50
End: 2020-01-30

## 2020-01-30 ENCOUNTER — OFFICE VISIT (OUTPATIENT)
Dept: INTERNAL MEDICINE | Facility: CLINIC | Age: 50
End: 2020-01-30

## 2020-01-30 VITALS
RESPIRATION RATE: 15 BRPM | SYSTOLIC BLOOD PRESSURE: 107 MMHG | HEIGHT: 64 IN | BODY MASS INDEX: 20.49 KG/M2 | HEART RATE: 78 BPM | WEIGHT: 120 LBS | OXYGEN SATURATION: 100 % | TEMPERATURE: 97.7 F | DIASTOLIC BLOOD PRESSURE: 71 MMHG

## 2020-01-30 DIAGNOSIS — R10.32 LLQ PAIN: ICD-10-CM

## 2020-01-30 DIAGNOSIS — R63.4 WEIGHT LOSS: ICD-10-CM

## 2020-01-30 DIAGNOSIS — R19.7 DIARRHEA, UNSPECIFIED TYPE: ICD-10-CM

## 2020-01-30 DIAGNOSIS — K57.90 DIVERTICULOSIS: Primary | ICD-10-CM

## 2020-01-30 DIAGNOSIS — Z72.0 TOBACCO ABUSE: ICD-10-CM

## 2020-01-30 DIAGNOSIS — R82.4 KETONURIA: ICD-10-CM

## 2020-01-30 LAB
BILIRUB BLD-MCNC: NEGATIVE MG/DL
CLARITY, POC: ABNORMAL
COLOR UR: YELLOW
GLUCOSE UR STRIP-MCNC: NEGATIVE MG/DL
KETONES UR QL: ABNORMAL
LEUKOCYTE EST, POC: NEGATIVE
NITRITE UR-MCNC: NEGATIVE MG/ML
PH UR: 6 [PH] (ref 5–8)
PROT UR STRIP-MCNC: NEGATIVE MG/DL
RBC # UR STRIP: NEGATIVE /UL
SP GR UR: 1.02 (ref 1–1.03)
UROBILINOGEN UR QL: NORMAL

## 2020-01-30 PROCEDURE — 99214 OFFICE O/P EST MOD 30 MIN: CPT | Performed by: NURSE PRACTITIONER

## 2020-01-30 NOTE — PROGRESS NOTES
Chief Complaint / Reason:      Chief Complaint   Patient presents with   • Pelvic Pain     just like endomet. pain. it is waking her up. called dr arthur.        Subjective     HPI  Patient presents today with complaints of pelvic pain.  She states that it feels just like that endometriosis pain that she had previously but she has had a complete hysterectomy, lysis of adhesions and she did contact Dr. arthur's office and they indicated there was nothing that they could do if it was endometriosis and recommend that she talk to general surgery.  She states that she has seen Dr. magaña in the past but the pain is waking her up at night.  Does have diarrhea but denies any blood in stool or urine.  Patient did have colonoscopy in the past with Dr. Nye which showed diverticulosis and she is a current smoker but states that she has cut back.  She has had some weight loss but blames the unintentional weight loss on psych meds that she was previously on as she it made her nausea and decreased appetite.  Patient is also on Topamax.  Denies any injury or trauma to left groin pelvic area and states that she is still sexually active and sex is not uncomfortable.  She denies any abnormal vaginal bleeding.  History taken from: patient    PMH/FH/Social History were reviewed and updated appropriately in the electronic medical record.   Past Medical History:   Diagnosis Date   • Abdominal pain, epigastric    • Acute sinusitis    • Acute UTI (urinary tract infection)    • Angina, intestinal (CMS/HCC)    • Anxiety    • Arthritis    • Back pain    • Breast mass, right    • Chronic hepatitis C virus infection (CMS/HCC)    • Depression    • Diarrhea    • Elevated LFTs    • Fatigue    • History of endometriosis    • History of Papanicolaou smear of cervix 04/02/2014    NORMAL    • HTN (hypertension)    • IBS (irritable bowel syndrome)    • Insomnia    • Menopausal symptoms    • Migraine headache    • Nausea & vomiting    • Neck pain    •  Ovarian cyst    • Pelvic adhesive disease    • Radicular pain of left lower extremity    • Restless leg syndrome    • Seizure disorder (CMS/HCC)    • Tobacco abuse    • Trochanteric bursitis    • Vitamin B 12 deficiency      Past Surgical History:   Procedure Laterality Date   • BILATERAL SALPINGO OOPHORECTOMY  03/10/2015    DR NATALY THOMPSON   • BREAST LUMPECTOMY     •  SECTION     • HYSTERECTOMY  03/10/2015    [facility] (DR NATALY THOMPSON)   • INGUINAL HERNIA REPAIR Right    • LYSIS OF ABDOMINAL ADHESIONS  03/10/2015    DR NATALY THOMPSON   • TUBAL ABDOMINAL LIGATION       Social History     Socioeconomic History   • Marital status:      Spouse name: Not on file   • Number of children: 1   • Years of education: Not on file   • Highest education level: High school graduate   Tobacco Use   • Smoking status: Current Every Day Smoker     Packs/day: 0.50     Types: Cigarettes   • Smokeless tobacco: Never Used   • Tobacco comment:  1/2 PACK A DAY   Substance and Sexual Activity   • Alcohol use: Yes     Comment: ON OCCASION   • Drug use: No   • Sexual activity: Defer     Birth control/protection: Surgical     Family History   Problem Relation Age of Onset   • Alzheimer's disease Other    • Cancer Other    • Dementia Other    • Hypertension Other    • Parkinsonism Other    • Osteoporosis Mother    • Diabetes Mother    • Hypertension Mother    • Obesity Mother    • Depression Mother    • Breast cancer Maternal Grandmother    • Osteoporosis Maternal Grandmother    • Diabetes Maternal Grandmother    • Dementia Maternal Grandmother    • ADD / ADHD Sister    • Alcohol abuse Sister    • Anxiety disorder Sister    • Bipolar disorder Sister    • Depression Sister    • Drug abuse Sister    • OCD Neg Hx    • Paranoid behavior Neg Hx    • Schizophrenia Neg Hx    • Seizures Neg Hx    • Self-Injurious Behavior  Neg Hx    • Suicide Attempts Neg Hx        Review of Systems:   Review of Systems   Constitutional: Positive for  appetite change, fatigue and unexpected weight loss. Negative for chills, fever and unexpected weight gain.   Respiratory: Negative.  Negative for cough, chest tightness and shortness of breath.    Cardiovascular: Negative.  Negative for chest pain.   Gastrointestinal: Positive for abdominal pain and diarrhea. Negative for abdominal distention, blood in stool, constipation, nausea and vomiting.   Genitourinary: Negative for difficulty urinating and dysuria.   Musculoskeletal: Positive for arthralgias. Negative for back pain.   Skin: Negative.  Negative for color change and rash.   Allergic/Immunologic: Positive for environmental allergies. Negative for food allergies.   Psychiatric/Behavioral: Positive for sleep disturbance and stress.         All other systems were reviewed and are negative.  Exceptions are noted in the subjective or above.      Objective     Vital Signs  Vitals:    01/30/20 1316   BP: 107/71   Pulse: 78   Resp: 15   Temp: 97.7 °F (36.5 °C)   SpO2: 100%       Body mass index is 20.6 kg/m².    Physical Exam   Constitutional: She is oriented to person, place, and time. She appears well-developed and well-nourished.   HENT:   Mouth/Throat: Mucous membranes are pale and dry. Posterior oropharyngeal erythema present.   Cardiovascular: Normal rate, regular rhythm, normal heart sounds and intact distal pulses.   Pulmonary/Chest: Effort normal and breath sounds normal.   Abdominal: Soft. Bowel sounds are normal. She exhibits no mass. There is tenderness in the periumbilical area, suprapubic area and left lower quadrant. There is no rigidity, no rebound, no guarding, no CVA tenderness, no tenderness at McBurney's point and negative Cassidy's sign. No hernia.   Musculoskeletal: Normal range of motion.   Neurological: She is alert and oriented to person, place, and time. Coordination normal.   Skin: Skin is warm and dry. Capillary refill takes less than 2 seconds. Turgor is decreased.   Psychiatric: She has a  normal mood and affect. Her behavior is normal. Judgment and thought content normal.   Nursing note and vitals reviewed.           Results Review:    I reviewed the patient's new clinical results.   Office Visit on 01/30/2020   Component Date Value Ref Range Status   • Color 01/30/2020 Yellow  Yellow, Straw, Dark Yellow, Alissa Final   • Clarity, UA 01/30/2020 Cloudy* Clear Final   • Specific Gravity  01/30/2020 1.025  1.005 - 1.030 Final   • pH, Urine 01/30/2020 6.0  5.0 - 8.0 Final   • Leukocytes 01/30/2020 Negative  Negative Final   • Nitrite, UA 01/30/2020 Negative  Negative Final   • Protein, POC 01/30/2020 Negative  Negative mg/dL Final   • Glucose, UA 01/30/2020 Negative  Negative, 1000 mg/dL (3+) mg/dL Final   • Ketones, UA 01/30/2020 1+* Negative Final   • Urobilinogen, UA 01/30/2020 Normal  Normal Final   • Bilirubin 01/30/2020 Negative  Negative Final   • Blood, UA 01/30/2020 Negative  Negative Final         Medication Review:     Current Outpatient Medications:   •  ARIPiprazole (ABILIFY) 5 MG tablet, Take 1 tablet by mouth Daily., Disp: 30 tablet, Rfl: 0  •  B Complex Vitamins (VITAMIN B COMPLEX) capsule capsule, Take  by mouth., Disp: , Rfl:   •  diclofenac (VOLTAREN) 1 % gel gel, Apply 4 g topically to the appropriate area as directed 4 (Four) Times a Day As Needed (pain)., Disp: 100 g, Rfl: 11  •  estradiol (VIVELLE-DOT) 0.1 MG/24HR patch, Place 1 patch on the skin as directed by provider 2 (Two) Times a Week., Disp: 8 patch, Rfl: 12  •  fluticasone-salmeterol (ADVAIR HFA) 115-21 MCG/ACT inhaler, Inhale 2 puffs 2 (Two) Times a Day., Disp: 12 g, Rfl: 11  •  gabapentin (NEURONTIN) 100 MG capsule, Take 100 mg by mouth every night at bedtime. Up to two pills at night, Disp: , Rfl: 0  •  hydrOXYzine (ATARAX) 10 MG tablet, Take 1 to 2 tablets PO daily prn panic, Disp: 60 tablet, Rfl: 1  •  levETIRAcetam (KEPPRA) 500 MG tablet, Take 2 tablets by mouth 2 (Two) Times a Day. (Patient taking differently: Take  750 mg by mouth 2 (Two) Times a Day.), Disp: 90 tablet, Rfl: 5  •  montelukast (SINGULAIR) 10 MG tablet, Take 1 tablet by mouth Every Night., Disp: 30 tablet, Rfl: 5  •  oxyCODONE-acetaminophen (ENDOCET)  MG per tablet, Every 6 (Six) Hours., Disp: , Rfl:   •  rOPINIRole (REQUIP) 0.5 MG tablet, Take 1 tablet by mouth Every Night. Take 1 hour before bedtime., Disp: 30 tablet, Rfl: 11  •  SUMAtriptan (IMITREX) 100 MG tablet, Take 1 tablet by mouth 1 (One) Time As Needed for Migraine for up to 1 dose., Disp: 9 tablet, Rfl: 5  •  SUMAtriptan (IMITREX) 20 MG/ACT nasal spray, INSTILL 1 SPRAY IN THE NOSTRIL 1 TIME AS NEEDED FOR MIGRAINES, Disp: 18 each, Rfl: 0  •  topiramate (TOPAMAX) 100 MG tablet, Take 1 tablet by mouth 2 (Two) Times a Day., Disp: 60 tablet, Rfl: 5  •  traZODone (DESYREL) 50 MG tablet, Take 1/2 to 1 tablet PO qhs, Disp: 30 tablet, Rfl: 1  •  WIXELA INHUB 250-50 MCG/DOSE DISKUS, , Disp: , Rfl:   •  ondansetron (ZOFRAN) 4 MG tablet, Take 1 tablet PO BID prn, Disp: 30 tablet, Rfl: 0    Assessment/Plan   Marguerite was seen today for pelvic pain.    Diagnoses and all orders for this visit:    Diverticulosis  -     CT Abdomen Pelvis With & Without Contrast  Discussed differential diagnosis and reviewed previous results with patient may need treatment for diverticulitis if symptoms persist.  LLQ pain  -     CT Abdomen Pelvis With & Without Contrast  -     POCT urinalysis dipstick, automated  -     Urine Culture - Urine, Urine, Clean Catch; Future  Discussed worsening signs and symptoms discussed dietary modifications  Diarrhea, unspecified type  -     CT Abdomen Pelvis With & Without Contrast  Discussed dietary modifications and recommend patient avoid spicy or greasy foods  Weight loss  Advised patient to get adequate nutrition hydration and rest.  Tobacco abuse  Recommend smoking cessation  Ketonuria  Commend hydration nutrition and discussed worsening signs and symptoms with patient      Return if symptoms  worsen or fail to improve.    Kim Sweet, APRN  01/30/2020

## 2020-01-31 NOTE — PROGRESS NOTES
"Marguerite Fink is a 50 y.o. female is here today for medication management follow-up.    Chief Complaint:      ICD-10-CM ICD-9-CM   1. Moderate episode of recurrent major depressive disorder (CMS/HCC) F33.1 296.32   2. Moderate mood disorder (CMS/HCC) F39 296.90   3. Insomnia, unspecified type G47.00 780.52       History of Present Illness:  Pt presents for follow up visit and medication management of depressive symptoms. Pt is currently taking Trintellix 10 mg and continues to have nausea and depressive symptoms. States that on 1/15 she was \"very happy and motivated\"  then on 1/22 she felt very depressed and did not want to get off the couch. States that this past Friday and Saturday she slept only for 4 to 5 hours then did not sleep on Sunday night. Reports some symptoms consistent with hypomania. States her sister was diagnosed with Bipolar I disorder and states her symptoms are not like her sister's manic symptoms. Hydroxyzine has been helpful for panic episodes.    Pt reports the presence/absence of the following depressive symptoms: (+) depressed mood, (-) reduced appetite, (-) increased appetite, (-) poor concentration, (-) hopelessness, (-) worthlessness, (-) insomnia, (-) hypersomnia, (-) psychomotor agitation, (-) irritability, (+) anhedonia, (+) amotivation, (+) fatigue, (-) suicidal ideation, (-) suicidal plan, (-) suicidal intent, (-) recurrent thoughts about death, and (-) homicidal ideation.      Pt reports the presence/absence of  hypomanic symptoms: (+) distinct period of abnormally persistent elevated, expansive, irritable mood or increased goal-directed activity or energy, (+) inflated self-esteem/grandiosity, (+) decreased need for sleep, (-) hyper-talkative/pressured speech, (+) flights of ideas/racing thoughts, (+) distractibility, (+) increased goal directed activity, and (-) excessive involvement in activities with high potential for painful outcomes (reports excessive shopping).       The " "following portions of the patient's history were reviewed and updated as appropriate: allergies, current medications, past family history, past medical history, past social history, past surgical history and problem list.    Review of Systems;;  Review of Systems   Constitutional: Negative.  Negative for activity change, appetite change, unexpected weight gain and unexpected weight loss.   Respiratory: Negative.    Cardiovascular: Negative for chest pain.   Gastrointestinal: Positive for nausea. Negative for diarrhea and vomiting.   Genitourinary: Negative.    Musculoskeletal: Negative.    Skin: Negative for rash and bruise.   Neurological: Positive for seizures. Negative for dizziness, speech difficulty, headache and confusion.   Psychiatric/Behavioral: Positive for dysphoric mood and sleep disturbance. Negative for agitation, behavioral problems, decreased concentration, hallucinations, self-injury, suicidal ideas, negative for hyperactivity, depressed mood and stress. The patient is not nervous/anxious.        Physical Exam;;  Physical Exam  Blood pressure 114/60, pulse 63, height 162.6 cm (64\"), weight 55.3 kg (122 lb), not currently breastfeeding.    Current Medications;;    Current Outpatient Medications:   •  ARIPiprazole (ABILIFY) 5 MG tablet, Take 1 tablet by mouth Daily., Disp: 30 tablet, Rfl: 0  •  B Complex Vitamins (VITAMIN B COMPLEX) capsule capsule, Take  by mouth., Disp: , Rfl:   •  diclofenac (VOLTAREN) 1 % gel gel, Apply 4 g topically to the appropriate area as directed 4 (Four) Times a Day As Needed (pain)., Disp: 100 g, Rfl: 11  •  estradiol (VIVELLE-DOT) 0.1 MG/24HR patch, Place 1 patch on the skin as directed by provider 2 (Two) Times a Week., Disp: 8 patch, Rfl: 12  •  fluticasone-salmeterol (ADVAIR HFA) 115-21 MCG/ACT inhaler, Inhale 2 puffs 2 (Two) Times a Day., Disp: 12 g, Rfl: 11  •  gabapentin (NEURONTIN) 100 MG capsule, Take 100 mg by mouth every night at bedtime. Up to two pills at " night, Disp: , Rfl: 0  •  hydrOXYzine (ATARAX) 10 MG tablet, Take 1 to 2 tablets PO daily prn panic, Disp: 60 tablet, Rfl: 1  •  levETIRAcetam (KEPPRA) 500 MG tablet, Take 2 tablets by mouth 2 (Two) Times a Day. (Patient taking differently: Take 750 mg by mouth 2 (Two) Times a Day.), Disp: 90 tablet, Rfl: 5  •  montelukast (SINGULAIR) 10 MG tablet, Take 1 tablet by mouth Every Night., Disp: 30 tablet, Rfl: 5  •  ondansetron (ZOFRAN) 4 MG tablet, Take 1 tablet PO BID prn, Disp: 30 tablet, Rfl: 0  •  oxyCODONE-acetaminophen (ENDOCET)  MG per tablet, Every 6 (Six) Hours., Disp: , Rfl:   •  rOPINIRole (REQUIP) 0.5 MG tablet, Take 1 tablet by mouth Every Night. Take 1 hour before bedtime., Disp: 30 tablet, Rfl: 11  •  SUMAtriptan (IMITREX) 100 MG tablet, Take 1 tablet by mouth 1 (One) Time As Needed for Migraine for up to 1 dose., Disp: 9 tablet, Rfl: 5  •  SUMAtriptan (IMITREX) 20 MG/ACT nasal spray, INSTILL 1 SPRAY IN THE NOSTRIL 1 TIME AS NEEDED FOR MIGRAINES, Disp: 18 each, Rfl: 0  •  topiramate (TOPAMAX) 100 MG tablet, Take 1 tablet by mouth 2 (Two) Times a Day., Disp: 60 tablet, Rfl: 5  •  traZODone (DESYREL) 50 MG tablet, Take 1/2 to 1 tablet PO qhs, Disp: 30 tablet, Rfl: 1  •  WIXELA INHUB 250-50 MCG/DOSE DISKUS, , Disp: , Rfl:     Lab Results:       Mental Status Exam:   Hygiene:   good  Cooperation:  Cooperative  Eye Contact:  Good  Psychomotor Behavior:  Appropriate  Mood:euthymic  Affect:  Appropriate  Hopelessness: Denies  Speech:  Normal  Thought Process:  Goal directed  Thought Content:  Normal  Suicidal:  None  Homicidal:  None  Hallucinations:  None  Delusion:  None  Memory:  Intact  Orientation:  Person, Place, Time and Situation  Reliability:  good  Insight:  Fair  Judgement:  Fair  Impulse Control:  Fair  Physical/Medical Issues:  No         Assessment Plan;;  Diagnoses and all orders for this visit:    Moderate episode of recurrent major depressive disorder (CMS/HCC)  -     ARIPiprazole  (ABILIFY) 5 MG tablet; Take 1 tablet by mouth Daily.    Moderate mood disorder (CMS/HCC)  -     ARIPiprazole (ABILIFY) 5 MG tablet; Take 1 tablet by mouth Daily.    Insomnia, unspecified type  -     traZODone (DESYREL) 50 MG tablet; Take 1/2 to 1 tablet PO qhs    Treatment Plan        Problem: Depression/Insomnia/Panic D/O      Intervention: The prescription and adjustment of medications and monitoring of side effects. Add Abilify. Taper off and discontinue Trintellix. Continue current dose of Trazodone and Hydroxyzine prn. Continue therapy with Angela Gonzalez LCSW    Long term Goal: Overall improvement in mood and functioning per patient self -report      Short term Goal: Medication adherence. Improvement in symptoms per patient self-report.       A psychological evaluation was conducted in order to assess past and current level of functioning. Areas assessed included, but were not limited to: perception of social support, perception of ability to face and deal with challenges in life (positive functioning), anxiety symptoms, depressive symptoms, perspective on beliefs/belief system, coping skills for stress, intelligence level,  Therapeutic rapport was established. Interventions conducted today were geared towards incorporating medication management along with support for continued therapy. Education was also provided as to the med management with this provider and what to expect in subsequent sessions.    We discussed risks, benefits,goals and side effects of the above medication and the patient was agreeable with the plan.Patient was educated on the importance of compliance with treatment and follow-up appointments. Patient is aware to contact the [facility] with any worsening of symptoms. To call for questions or concerns and return early if necessary. Patent is agreeable to go to the Emergency Department or call 911 should they begin SI/HI.         Return in about 3 weeks (around 2/18/2020) for Follow  Up.    Ana Rosa Osorio, DNP, APRN, PMHNP-BC, FNP-C

## 2020-02-05 ENCOUNTER — HOSPITAL ENCOUNTER (OUTPATIENT)
Dept: CT IMAGING | Facility: HOSPITAL | Age: 50
Discharge: HOME | End: 2020-02-05
Admitting: NURSE PRACTITIONER

## 2020-02-05 LAB — CREAT BLDA-MCNC: 0.6 MG/DL (ref 0.6–1.3)

## 2020-02-05 PROCEDURE — 82565 ASSAY OF CREATININE: CPT

## 2020-02-05 PROCEDURE — 25010000002 IOPAMIDOL 61 % SOLUTION: Performed by: NURSE PRACTITIONER

## 2020-02-05 PROCEDURE — 74178 CT ABD&PLV WO CNTR FLWD CNTR: CPT

## 2020-02-05 RX ADMIN — IOPAMIDOL 100 ML: 612 INJECTION, SOLUTION INTRAVENOUS at 11:12

## 2020-02-10 ENCOUNTER — OFFICE VISIT (OUTPATIENT)
Dept: INTERNAL MEDICINE | Facility: CLINIC | Age: 50
End: 2020-02-10

## 2020-02-10 VITALS
TEMPERATURE: 98.5 F | BODY MASS INDEX: 21.17 KG/M2 | SYSTOLIC BLOOD PRESSURE: 120 MMHG | WEIGHT: 124 LBS | HEART RATE: 65 BPM | OXYGEN SATURATION: 100 % | RESPIRATION RATE: 16 BRPM | DIASTOLIC BLOOD PRESSURE: 73 MMHG | HEIGHT: 64 IN

## 2020-02-10 DIAGNOSIS — E03.9 ACQUIRED HYPOTHYROIDISM: ICD-10-CM

## 2020-02-10 DIAGNOSIS — K59.03 DRUG-INDUCED CONSTIPATION: Primary | ICD-10-CM

## 2020-02-10 DIAGNOSIS — E53.8 COBALAMIN DEFICIENCY: ICD-10-CM

## 2020-02-10 PROCEDURE — 99214 OFFICE O/P EST MOD 30 MIN: CPT | Performed by: INTERNAL MEDICINE

## 2020-02-10 NOTE — PROGRESS NOTES
Subjective     Patient ID: Marguerite Fink is a 50 y.o. female. Patient is here for management of multiple medical problems.     Chief Complaint   Patient presents with   • Pelvic Pain     patient continuing to have a dull aching pain on the left side x 3 months     History of Present Illness   Pull and snaps of apain the left lower pelvic area. Has had BSO with hyst.   Feels like endometriosis and ovaria cyst pain from the past. Had ct ab/pelvis. [facility] with [facility].    Constipation to diarrhea stools. Hard stools on 2nd day.   Some stool softness and has diarrhea so a bit hesitant to use.  No Dysuria.  Urinary frequency for many years.    Seen [facility]. Dx of diverticulitis.      After CT pt found to have a lot of stool in colon. Changed to drinking coconut water. Had massive amount of stool out. Pain waxes and wanes.    Using percocet tid.           The following portions of the patient's history were reviewed and updated as appropriate: allergies, current medications, past family history, past medical history, past social history, past surgical history and problem list.    Review of Systems   Constitutional: Positive for fatigue. Negative for chills, diaphoresis and fever.   HENT: Negative for congestion and dental problem.    Gastrointestinal: Positive for abdominal pain, constipation, diarrhea and nausea. Negative for abdominal distention, anal bleeding, blood in stool, rectal pain and vomiting.   Musculoskeletal: Negative for arthralgias, back pain and gait problem.   Psychiatric/Behavioral: Positive for sleep disturbance.   All other systems reviewed and are negative.      Current Outpatient Medications:   •  ARIPiprazole (ABILIFY) 5 MG tablet, Take 1 tablet by mouth Daily., Disp: 30 tablet, Rfl: 0  •  B Complex Vitamins (VITAMIN B COMPLEX) capsule capsule, Take  by mouth., Disp: , Rfl:   •  diclofenac (VOLTAREN) 1 % gel gel, Apply 4 g topically to the appropriate area as directed 4 (Four) Times a Day  "As Needed (pain)., Disp: 100 g, Rfl: 11  •  estradiol (VIVELLE-DOT) 0.1 MG/24HR patch, Place 1 patch on the skin as directed by provider 2 (Two) Times a Week., Disp: 8 patch, Rfl: 12  •  fluticasone-salmeterol (ADVAIR HFA) 115-21 MCG/ACT inhaler, Inhale 2 puffs 2 (Two) Times a Day., Disp: 12 g, Rfl: 11  •  gabapentin (NEURONTIN) 100 MG capsule, Take 100 mg by mouth every night at bedtime. Up to two pills at night, Disp: , Rfl: 0  •  hydrOXYzine (ATARAX) 10 MG tablet, Take 1 to 2 tablets PO daily prn panic, Disp: 60 tablet, Rfl: 1  •  levETIRAcetam (KEPPRA) 500 MG tablet, Take 2 tablets by mouth 2 (Two) Times a Day. (Patient taking differently: Take 750 mg by mouth 2 (Two) Times a Day.), Disp: 90 tablet, Rfl: 5  •  montelukast (SINGULAIR) 10 MG tablet, Take 1 tablet by mouth Every Night., Disp: 30 tablet, Rfl: 5  •  ondansetron (ZOFRAN) 4 MG tablet, Take 1 tablet PO BID prn, Disp: 30 tablet, Rfl: 0  •  oxyCODONE-acetaminophen (ENDOCET)  MG per tablet, Every 6 (Six) Hours., Disp: , Rfl:   •  rOPINIRole (REQUIP) 0.5 MG tablet, Take 1 tablet by mouth Every Night. Take 1 hour before bedtime., Disp: 30 tablet, Rfl: 11  •  SUMAtriptan (IMITREX) 100 MG tablet, Take 1 tablet by mouth 1 (One) Time As Needed for Migraine for up to 1 dose., Disp: 9 tablet, Rfl: 5  •  SUMAtriptan (IMITREX) 20 MG/ACT nasal spray, INSTILL 1 SPRAY IN THE NOSTRIL 1 TIME AS NEEDED FOR MIGRAINES, Disp: 18 each, Rfl: 0  •  topiramate (TOPAMAX) 100 MG tablet, Take 1 tablet by mouth 2 (Two) Times a Day., Disp: 60 tablet, Rfl: 5  •  traZODone (DESYREL) 50 MG tablet, Take 1/2 to 1 tablet PO qhs, Disp: 30 tablet, Rfl: 1  •  WIXELA INHUB 250-50 MCG/DOSE DISKUS, , Disp: , Rfl:   •  Methylnaltrexone Bromide (RELISTOR) 150 MG tablet, Take 150 mg by mouth Daily., Disp: 90 tablet, Rfl: 3    Objective      Blood pressure 120/73, pulse 65, temperature 98.5 °F (36.9 °C), temperature source Oral, resp. rate 16, height 162.6 cm (64\"), weight 56.2 kg (124 lb), " SpO2 100 %, not currently breastfeeding.    Physical Exam     General Appearance:    Alert, cooperative, no distress, appears stated age   Head:    Normocephalic, without obvious abnormality, atraumatic   Eyes:    PERRL, conjunctiva/corneas clear, EOM's intact   Ears:    Normal TM's and external ear canals, both ears   Nose:   Nares normal, septum midline, mucosa normal, no drainage   or sinus tenderness   Throat:   Lips, mucosa, and tongue normal; teeth and gums normal   Neck:   Supple, symmetrical, trachea midline, no adenopathy;        thyroid:  No enlargement/tenderness/nodules; no carotid    bruit or JVD   Back:     Symmetric, no curvature, ROM normal, no CVA tenderness   Lungs:     Clear to auscultation bilaterally, respirations unlabored   Chest wall:    No tenderness or deformity   Heart:    Regular rate and rhythm, S1 and S2 normal, no murmur,        rub or gallop   Abdomen:     Soft, non-tender, bowel sounds active all four quadrants,     no masses, no organomegaly   Extremities:   Extremities normal, atraumatic, no cyanosis or edema   Pulses:   2+ and symmetric all extremities   Skin:   Skin color, texture, turgor normal, no rashes or lesions   Lymph nodes:   Cervical, supraclavicular, and axillary nodes normal   Neurologic:   CNII-XII intact. Normal strength, sensation and reflexes       throughout      Results for orders placed or performed during the hospital encounter of 02/05/20   POC Creatinine   Result Value Ref Range    Creatinine 0.60 0.60 - 1.30 mg/dL         Assessment/Plan     Pt with low functioning thyroid. Excessive stool on ct. On percocet tid. Pt dry mucous membranes.      CT reviewed with pt.   Will work on go bowel cleaning.         Marguerite was seen today for pelvic pain.    Diagnoses and all orders for this visit:    Drug-induced constipation  -     Vitamin B12  -     CBC & Differential  -     Lipid Panel  -     Comprehensive Metabolic Panel  -     TSH  -     T4, Free  -     Hemoglobin  A1c    Cobalamin deficiency  -     Vitamin B12  -     CBC & Differential  -     Lipid Panel  -     Comprehensive Metabolic Panel  -     TSH  -     T4, Free  -     Hemoglobin A1c    Acquired hypothyroidism  -     Vitamin B12  -     CBC & Differential  -     Lipid Panel  -     Comprehensive Metabolic Panel  -     TSH  -     T4, Free  -     Hemoglobin A1c    Other orders  -     Methylnaltrexone Bromide (RELISTOR) 150 MG tablet; Take 150 mg by mouth Daily.      Return in about 2 weeks (around 2/24/2020).          There are no Patient Instructions on file for this visit.     Juan Clemens MD    Assessment/Plan

## 2020-02-17 ENCOUNTER — OFFICE VISIT (OUTPATIENT)
Dept: PSYCHIATRY | Facility: CLINIC | Age: 50
End: 2020-02-17

## 2020-02-17 DIAGNOSIS — F39 MOOD DISORDER (HCC): Primary | ICD-10-CM

## 2020-02-17 PROCEDURE — 90837 PSYTX W PT 60 MINUTES: CPT | Performed by: SOCIAL WORKER

## 2020-02-17 NOTE — PROGRESS NOTES
Date of Service: February 17, 2020  Time In: 1020  Time Out: 1120    PROGRESS NOTE  Data:  Marguerite Fink is a 50 y.o. female who met with the undersigned for a regularly scheduled individual outpatient therapy session at [facility].  Patient was referred by Ana Rosa nurse practitioner in this office.  Patient discussed having a manic episode beginning of this month and referred to February 4 and February 5.  Patient discussed she went 3 days without sleeping and spent $700 on a credit card when she has no income coming in.  Patient discussed her  is caring for her and pays all her bills.  Patient discussed she has been working on the handouts provided last session and realizes that mood disorder/bipolar disorder runs in her family.  Patient discussed her journal.  Patient discussed her marriage.  Patient explained her medication regimen and the changes after her manic episode earlier this month.     HPI: Depression, anxiety, panic attacks, chronic pain and seizure disorder      Clinical Maneuvering/Intervention:  Assisted patient in processing above session content; acknowledged and normalized patient’s thoughts, feelings, and concerns.  Assisted patient processing her thoughts and feelings related to mood, anxiety, panic attacks, chronic pain and seizure disorder.  Patient sees psychiatric nurse practitioner in this office.  Patient is to take her medications as prescribed and contact the nurse practitioner in this office for questions and concerns.  Patient denies SI, HI and self-harm.  Educated on panic attacks and anxiety.  Educated on conflict resolution and the importance of creating a schedule.  Validated patient's thoughts and feelings of needing to complete paperwork for disability application that becomes overwhelmed.  Educated patient on healthy boundaries with her mother as her mother is a trigger for increased stress.  Educated on bipolar disorder/mood  disorder.  Encourage patient to rate her mood and to put that in her journal for this therapist and psychiatric nurse practitioner to be aware of the future sessions.  Patient is to work on grounding techniques and educated encourage patient to work on self-awareness to be able to monitor her moods appropriately/adequately and to know when to use healthy coping skills/relaxation techniques.    Allowed patient to freely discuss issues without interruption or judgment. Provided safe, confidential environment to facilitate the development of positive therapeutic relationship and encourage open, honest communication. Assisted patient in identifying risk factors which would indicate the need for higher level of care including thoughts to harm self or others and/or self-harming behavior and encouraged patient to contact this office, call 911, or present to the nearest emergency room should any of these events occur. Discussed crisis intervention services and means to access.  Patient adamantly and convincingly denies current suicidal or homicidal ideation or perceptual disturbance.    Assessment     Diagnoses and all orders for this visit:    Mood disorder (CMS/[facility])               REVIEW OF SYSTEMS:  Review of Systems       Mental Status Exam  Hygiene:   fair  Cooperation:  Cooperative  Eye Contact:  Good  Psychomotor Behavior:  Appropriate  Affect:  Appropriate  Hopelessness: 4  Speech:  Normal  Thought Process:  Goal directed  Thought Content:  Normal  Suicidal:  None  Homicidal:  None  Hallucinations:  None  Delusion:  None  Memory:  Deficits  Orientation:  Person, Place, Time and Situation  Reliability:  fair  Insight:  Good  Judgement:  Good  Impulse Control:  Good  Physical/Medical Issues:  Yes Chronic pain and seizure disorder    Patient's Support Network Includes:  , son, mother and extended family    Progress toward goal: Not at goal    Functional Status: Moderate impairment     Prognosis: Fair with Ongoing  Treatment       Plan         Patient will adhere to medication regimen as prescribed and report any side effects. Patient will contact this office, call 911 or present to the nearest emergency room should suicidal or homicidal ideations occur. Provide Cognitive Behavioral Therapy and Integrative Therapy to improve functioning, maintain stability, and avoid decompensation and the need for higher level of care.          Return in about 2 weeks (around 3/2/2020), or if symptoms worsen or fail to improve.      This document is signed me Angela Gonzalez LCSW,   February 17, 2020 11:31 AM

## 2020-02-20 ENCOUNTER — OFFICE VISIT (OUTPATIENT)
Dept: PSYCHIATRY | Facility: CLINIC | Age: 50
End: 2020-02-20

## 2020-02-20 ENCOUNTER — TELEPHONE (OUTPATIENT)
Dept: NEUROLOGY | Facility: CLINIC | Age: 50
End: 2020-02-20

## 2020-02-20 VITALS
DIASTOLIC BLOOD PRESSURE: 62 MMHG | HEIGHT: 64 IN | BODY MASS INDEX: 20.32 KG/M2 | SYSTOLIC BLOOD PRESSURE: 114 MMHG | HEART RATE: 74 BPM | WEIGHT: 119 LBS

## 2020-02-20 DIAGNOSIS — F41.0 PANIC DISORDER: ICD-10-CM

## 2020-02-20 DIAGNOSIS — F33.1 MODERATE EPISODE OF RECURRENT MAJOR DEPRESSIVE DISORDER (HCC): ICD-10-CM

## 2020-02-20 DIAGNOSIS — F39 MODERATE MOOD DISORDER (HCC): Primary | ICD-10-CM

## 2020-02-20 PROCEDURE — 99214 OFFICE O/P EST MOD 30 MIN: CPT | Performed by: NURSE PRACTITIONER

## 2020-02-20 RX ORDER — ARIPIPRAZOLE 10 MG/1
10 TABLET ORAL DAILY
Qty: 30 TABLET | Refills: 0 | Status: SHIPPED | OUTPATIENT
Start: 2020-02-20 | End: 2020-03-19 | Stop reason: SDUPTHER

## 2020-02-20 RX ORDER — HYDROXYZINE PAMOATE 25 MG/1
25 CAPSULE ORAL 3 TIMES DAILY PRN
Qty: 90 CAPSULE | Refills: 1 | Status: SHIPPED | OUTPATIENT
Start: 2020-02-20 | End: 2020-03-19

## 2020-02-20 NOTE — TELEPHONE ENCOUNTER
Pt is calling to reschedule her Botox appointment that she had with CHRIS Pace 1/7/2020.  Pt Call back number : [phone]

## 2020-02-21 NOTE — TELEPHONE ENCOUNTER
I need to check on her INS Zulma still says she does not have coverage. She does not have any botox here.

## 2020-02-23 NOTE — PROGRESS NOTES
Marguerite Fink is a 50 y.o. female is here today for medication management follow-up.    Chief Complaint:      ICD-10-CM ICD-9-CM   1. Moderate mood disorder (CMS/[facility]) F39 296.90   2. Moderate episode of recurrent major depressive disorder (CMS/HCC) F33.1 296.32   3. Panic disorder F41.0 300.01       History of Present Illness:  Pt presents for follow up visit and medication management of depressive symptoms. Pt is currently taking Abilify 5 mg daily. Reports mild improvement with depressive symptoms and motivation. Pt reports an increase in anxiety symptoms and sates she experienced some manic symptoms when she first started taking Abilify. Reports that her last seizure was one month ago.     Pt reports the presence/absence of the following depressive symptoms: (+) depressed mood, (-) reduced appetite, (-) increased appetite, (-) poor concentration, (-) hopelessness, (-) worthlessness, (-) insomnia, (-) hypersomnia, (-) psychomotor agitation, (-) irritability, (+) anhedonia, (+) amotivation, (+) fatigue, (-) suicidal ideation, (-) suicidal plan, (-) suicidal intent, (-) recurrent thoughts about death, and (-) homicidal ideation.      Pt reports the presence/absence of  hypomanic symptoms: (+) distinct period of abnormally persistent elevated, expansive, irritable mood or increased goal-directed activity or energy, (+) inflated self-esteem/grandiosity, (+) decreased need for sleep, (-) hyper-talkative/pressured speech, (+) flights of ideas/racing thoughts, (+) distractibility, (+) increased goal directed activity, and (-) excessive involvement in activities with high potential for painful outcomes (reports excessive shopping).       The following portions of the patient's history were reviewed and updated as appropriate: allergies, current medications, past family history, past medical history, past social history, past surgical history and problem list.    Review of Systems;;  Review of Systems   Constitutional:  "Negative.  Negative for activity change, appetite change, unexpected weight gain and unexpected weight loss.   Respiratory: Negative.    Cardiovascular: Negative for chest pain.   Gastrointestinal: Negative.  Negative for diarrhea, nausea and vomiting.   Genitourinary: Negative.    Musculoskeletal: Negative.    Skin: Negative for rash and bruise.   Neurological: Negative.  Negative for dizziness, seizures and speech difficulty.   Psychiatric/Behavioral: Positive for dysphoric mood and stress. Negative for agitation, behavioral problems, decreased concentration, hallucinations, self-injury, sleep disturbance, suicidal ideas, negative for hyperactivity and depressed mood. The patient is nervous/anxious.        Physical Exam;;  Physical Exam  Blood pressure 114/62, pulse 74, height 162.6 cm (64\"), weight 54 kg (119 lb), not currently breastfeeding.    Current Medications;;    Current Outpatient Medications:   •  ARIPiprazole (ABILIFY) 10 MG tablet, Take 1 tablet by mouth Daily., Disp: 30 tablet, Rfl: 0  •  B Complex Vitamins (VITAMIN B COMPLEX) capsule capsule, Take  by mouth., Disp: , Rfl:   •  diclofenac (VOLTAREN) 1 % gel gel, Apply 4 g topically to the appropriate area as directed 4 (Four) Times a Day As Needed (pain)., Disp: 100 g, Rfl: 11  •  estradiol (VIVELLE-DOT) 0.1 MG/24HR patch, Place 1 patch on the skin as directed by provider 2 (Two) Times a Week., Disp: 8 patch, Rfl: 12  •  fluticasone-salmeterol (ADVAIR HFA) 115-21 MCG/ACT inhaler, Inhale 2 puffs 2 (Two) Times a Day., Disp: 12 g, Rfl: 11  •  gabapentin (NEURONTIN) 100 MG capsule, Take 100 mg by mouth every night at bedtime. Up to two pills at night, Disp: , Rfl: 0  •  hydrOXYzine pamoate (VISTARIL) 25 MG capsule, Take 1 capsule by mouth 3 (Three) Times a Day As Needed for Anxiety., Disp: 90 capsule, Rfl: 1  •  levETIRAcetam (KEPPRA) 500 MG tablet, Take 2 tablets by mouth 2 (Two) Times a Day. (Patient taking differently: Take 750 mg by mouth 2 (Two) " Times a Day.), Disp: 90 tablet, Rfl: 5  •  Methylnaltrexone Bromide (RELISTOR) 150 MG tablet, Take 150 mg by mouth Daily., Disp: 90 tablet, Rfl: 3  •  montelukast (SINGULAIR) 10 MG tablet, Take 1 tablet by mouth Every Night., Disp: 30 tablet, Rfl: 5  •  ondansetron (ZOFRAN) 4 MG tablet, Take 1 tablet PO BID prn, Disp: 30 tablet, Rfl: 0  •  oxyCODONE-acetaminophen (ENDOCET)  MG per tablet, Every 6 (Six) Hours., Disp: , Rfl:   •  rOPINIRole (REQUIP) 0.5 MG tablet, Take 1 tablet by mouth Every Night. Take 1 hour before bedtime., Disp: 30 tablet, Rfl: 11  •  SUMAtriptan (IMITREX) 100 MG tablet, Take 1 tablet by mouth 1 (One) Time As Needed for Migraine for up to 1 dose., Disp: 9 tablet, Rfl: 5  •  SUMAtriptan (IMITREX) 20 MG/ACT nasal spray, INSTILL 1 SPRAY IN THE NOSTRIL 1 TIME AS NEEDED FOR MIGRAINES, Disp: 18 each, Rfl: 0  •  topiramate (TOPAMAX) 100 MG tablet, Take 1 tablet by mouth 2 (Two) Times a Day., Disp: 60 tablet, Rfl: 5  •  traZODone (DESYREL) 50 MG tablet, Take 1/2 to 1 tablet PO qhs, Disp: 30 tablet, Rfl: 1  •  WIXELA INHUB 250-50 MCG/DOSE DISKUS, , Disp: , Rfl:     Lab Results:       Mental Status Exam:   Hygiene:   good  Cooperation:  Cooperative  Eye Contact:  Good  Psychomotor Behavior:  Appropriate  Mood: Dysthymic  Affect:  Appropriate  Hopelessness: Denies  Speech:  Normal  Thought Process:  Goal directed  Thought Content:  Normal  Suicidal:  None  Homicidal:  None  Hallucinations:  None  Delusion:  None  Memory:  Intact  Orientation:  Person, Place, Time and Situation  Reliability:  good  Insight:  Fair  Judgement:  Fair  Impulse Control:  Fair  Physical/Medical Issues:  No         Assessment Plan;;  Diagnoses and all orders for this visit:    Moderate mood disorder (CMS/HCC)  -     ARIPiprazole (ABILIFY) 10 MG tablet; Take 1 tablet by mouth Daily.    Moderate episode of recurrent major depressive disorder (CMS/HCC)  -     ARIPiprazole (ABILIFY) 10 MG tablet; Take 1 tablet by mouth  Daily.    Panic disorder  -     hydrOXYzine pamoate (VISTARIL) 25 MG capsule; Take 1 capsule by mouth 3 (Three) Times a Day As Needed for Anxiety.    Treatment Plan        Problem: Mood symptoms/ Insomnia      Intervention: The prescription and adjustment of medications and monitoring of side effects. Increase Abilify. Increase Hydroxyzine prn. Continue current dose of Trazodone. Continue therapy with Angela Gonzalez LCSW    Long term Goal: Overall improvement in mood and functioning per patient self -report      Short term Goal: Medication adherence. Improvement in symptoms per patient self-report.       A psychological evaluation was conducted in order to assess past and current level of functioning. Areas assessed included, but were not limited to: perception of social support, perception of ability to face and deal with challenges in life (positive functioning), anxiety symptoms, depressive symptoms, perspective on beliefs/belief system, coping skills for stress, intelligence level,  Therapeutic rapport was established. Interventions conducted today were geared towards incorporating medication management along with support for continued therapy. Education was also provided as to the med management with this provider and what to expect in subsequent sessions.    We discussed risks, benefits,goals and side effects of the above medication and the patient was agreeable with the plan.Patient was educated on the importance of compliance with treatment and follow-up appointments. Patient is aware to contact the [facility] with any worsening of symptoms. To call for questions or concerns and return early if necessary. Patent is agreeable to go to the Emergency Department or call 911 should they begin SI/HI.         Return in about 4 weeks (around 3/19/2020) for Follow Up.    Ana Rosa Osorio, DNP, APRN, PMHNP-BC, FNP-C

## 2020-02-24 ENCOUNTER — APPOINTMENT (OUTPATIENT)
Dept: LAB | Facility: HOSPITAL | Age: 50
End: 2020-02-24

## 2020-02-24 LAB
ALBUMIN SERPL-MCNC: 4.5 G/DL (ref 3.5–5.2)
ALBUMIN/GLOB SERPL: 1.9 G/DL
ALP SERPL-CCNC: 58 U/L (ref 39–117)
ALT SERPL W P-5'-P-CCNC: 12 U/L (ref 1–33)
ANION GAP SERPL CALCULATED.3IONS-SCNC: 13.4 MMOL/L (ref 5–15)
AST SERPL-CCNC: 16 U/L (ref 1–32)
BASOPHILS # BLD AUTO: 0.05 10*3/MM3 (ref 0–0.2)
BASOPHILS NFR BLD AUTO: 0.6 % (ref 0–1.5)
BILIRUB SERPL-MCNC: 0.2 MG/DL (ref 0.2–1.2)
BUN BLD-MCNC: 15 MG/DL (ref 6–20)
BUN/CREAT SERPL: 23.1 (ref 7–25)
CALCIUM SPEC-SCNC: 9.3 MG/DL (ref 8.6–10.5)
CHLORIDE SERPL-SCNC: 108 MMOL/L (ref 98–107)
CHOLEST SERPL-MCNC: 183 MG/DL (ref 0–200)
CO2 SERPL-SCNC: 20.6 MMOL/L (ref 22–29)
CREAT BLD-MCNC: 0.65 MG/DL (ref 0.57–1)
DEPRECATED RDW RBC AUTO: 42 FL (ref 37–54)
EOSINOPHIL # BLD AUTO: 0.08 10*3/MM3 (ref 0–0.4)
EOSINOPHIL NFR BLD AUTO: 0.9 % (ref 0.3–6.2)
ERYTHROCYTE [DISTWIDTH] IN BLOOD BY AUTOMATED COUNT: 12.1 % (ref 12.3–15.4)
GFR SERPL CREATININE-BSD FRML MDRD: 96 ML/MIN/1.73
GLOBULIN UR ELPH-MCNC: 2.4 GM/DL
GLUCOSE BLD-MCNC: 101 MG/DL (ref 65–99)
HBA1C MFR BLD: 5.6 % (ref 4.8–5.6)
HCT VFR BLD AUTO: 40 % (ref 34–46.6)
HDLC SERPL-MCNC: 60 MG/DL (ref 40–60)
HGB BLD-MCNC: 13.3 G/DL (ref 12–15.9)
IMM GRANULOCYTES # BLD AUTO: 0.03 10*3/MM3 (ref 0–0.05)
IMM GRANULOCYTES NFR BLD AUTO: 0.3 % (ref 0–0.5)
LDLC SERPL CALC-MCNC: 108 MG/DL (ref 0–100)
LDLC/HDLC SERPL: 1.8 {RATIO}
LYMPHOCYTES # BLD AUTO: 2.8 10*3/MM3 (ref 0.7–3.1)
LYMPHOCYTES NFR BLD AUTO: 31.4 % (ref 19.6–45.3)
MCH RBC QN AUTO: 31.4 PG (ref 26.6–33)
MCHC RBC AUTO-ENTMCNC: 33.3 G/DL (ref 31.5–35.7)
MCV RBC AUTO: 94.6 FL (ref 79–97)
MONOCYTES # BLD AUTO: 0.47 10*3/MM3 (ref 0.1–0.9)
MONOCYTES NFR BLD AUTO: 5.3 % (ref 5–12)
NEUTROPHILS # BLD AUTO: 5.49 10*3/MM3 (ref 1.7–7)
NEUTROPHILS NFR BLD AUTO: 61.5 % (ref 42.7–76)
NRBC BLD AUTO-RTO: 0 /100 WBC (ref 0–0.2)
PLATELET # BLD AUTO: 311 10*3/MM3 (ref 140–450)
PMV BLD AUTO: 9.7 FL (ref 6–12)
POTASSIUM BLD-SCNC: 4.1 MMOL/L (ref 3.5–5.2)
PROT SERPL-MCNC: 6.9 G/DL (ref 6–8.5)
RBC # BLD AUTO: 4.23 10*6/MM3 (ref 3.77–5.28)
SODIUM BLD-SCNC: 142 MMOL/L (ref 136–145)
TRIGL SERPL-MCNC: 74 MG/DL (ref 0–150)
VIT B12 BLD-MCNC: 315 PG/ML (ref 211–946)
VLDLC SERPL-MCNC: 14.8 MG/DL (ref 5–40)
WBC NRBC COR # BLD: 8.92 10*3/MM3 (ref 3.4–10.8)

## 2020-02-24 PROCEDURE — 36415 COLL VENOUS BLD VENIPUNCTURE: CPT | Performed by: INTERNAL MEDICINE

## 2020-02-24 PROCEDURE — 84443 ASSAY THYROID STIM HORMONE: CPT | Performed by: INTERNAL MEDICINE

## 2020-02-24 PROCEDURE — 83036 HEMOGLOBIN GLYCOSYLATED A1C: CPT | Performed by: INTERNAL MEDICINE

## 2020-02-24 PROCEDURE — 84439 ASSAY OF FREE THYROXINE: CPT | Performed by: INTERNAL MEDICINE

## 2020-02-24 PROCEDURE — 80053 COMPREHEN METABOLIC PANEL: CPT | Performed by: INTERNAL MEDICINE

## 2020-02-24 PROCEDURE — 85025 COMPLETE CBC W/AUTO DIFF WBC: CPT | Performed by: INTERNAL MEDICINE

## 2020-02-24 PROCEDURE — 80061 LIPID PANEL: CPT | Performed by: INTERNAL MEDICINE

## 2020-02-24 PROCEDURE — 82607 VITAMIN B-12: CPT | Performed by: INTERNAL MEDICINE

## 2020-02-24 NOTE — TELEPHONE ENCOUNTER
SPOKE TO MERISSA AND SHE HAS INSURANCE NOW AND IT IS IN THE SYSTEM. VERIFIED SHE HAS [employer] NOW. SHE SCHEDULED A FOLLOW UP TO DISCUSS WEATHER OR NOT SHE STILL NEEDS TO DO BOTOX.

## 2020-02-25 ENCOUNTER — TELEPHONE (OUTPATIENT)
Dept: NEUROLOGY | Facility: CLINIC | Age: 50
End: 2020-02-25

## 2020-02-25 LAB
T4 FREE SERPL-MCNC: 1.09 NG/DL (ref 0.93–1.7)
TSH SERPL DL<=0.05 MIU/L-ACNC: 1.24 UIU/ML (ref 0.27–4.2)

## 2020-02-25 NOTE — TELEPHONE ENCOUNTER
I spoke to georgiana and she is not sure if she needs the Botox now. She scheduled an appointment to come in and discuss this. Her Insurance does not cover the Imitrex tablets anymore. She is getting the spray and she is getting the quantity now that she should be and she is doing better.

## 2020-02-25 NOTE — TELEPHONE ENCOUNTER
----- Message from Annalee Braden CMA sent at 2/24/2020  2:40 PM EST -----  Contact: [phone]  Pt wants to make sure that she should be starting Botox.  She states that she has only been receiving a quantity of Imitrex 6 monthly instead of 9 monthly.  She is willing to come in if needed.  Please advise, thank you

## 2020-02-27 DIAGNOSIS — G43.009 MIGRAINE WITHOUT AURA AND WITHOUT STATUS MIGRAINOSUS, NOT INTRACTABLE: ICD-10-CM

## 2020-02-27 DIAGNOSIS — G40.909 SEIZURE DISORDER (HCC): ICD-10-CM

## 2020-02-27 RX ORDER — TOPIRAMATE 100 MG/1
TABLET, FILM COATED ORAL
Qty: 60 TABLET | Refills: 5 | Status: SHIPPED | OUTPATIENT
Start: 2020-02-27 | End: 2020-03-09 | Stop reason: SDUPTHER

## 2020-02-28 ENCOUNTER — OFFICE VISIT (OUTPATIENT)
Dept: INTERNAL MEDICINE | Facility: CLINIC | Age: 50
End: 2020-02-28

## 2020-02-28 VITALS
SYSTOLIC BLOOD PRESSURE: 112 MMHG | OXYGEN SATURATION: 98 % | DIASTOLIC BLOOD PRESSURE: 76 MMHG | HEART RATE: 79 BPM | TEMPERATURE: 98.6 F | HEIGHT: 64 IN | WEIGHT: 119 LBS | BODY MASS INDEX: 20.32 KG/M2

## 2020-02-28 DIAGNOSIS — E53.8 VITAMIN B12 DEFICIENCY: ICD-10-CM

## 2020-02-28 DIAGNOSIS — E51.9 VITAMIN B1 DEFICIENCY: ICD-10-CM

## 2020-02-28 DIAGNOSIS — M54.50 CHRONIC BILATERAL LOW BACK PAIN WITHOUT SCIATICA: ICD-10-CM

## 2020-02-28 DIAGNOSIS — R63.4 WEIGHT LOSS: Primary | ICD-10-CM

## 2020-02-28 DIAGNOSIS — E53.1: ICD-10-CM

## 2020-02-28 DIAGNOSIS — J44.9 CHRONIC OBSTRUCTIVE PULMONARY DISEASE, UNSPECIFIED COPD TYPE (HCC): ICD-10-CM

## 2020-02-28 DIAGNOSIS — G89.29 CHRONIC BILATERAL LOW BACK PAIN WITHOUT SCIATICA: ICD-10-CM

## 2020-02-28 DIAGNOSIS — M54.2 NECK PAIN: ICD-10-CM

## 2020-02-28 PROCEDURE — 99214 OFFICE O/P EST MOD 30 MIN: CPT | Performed by: INTERNAL MEDICINE

## 2020-02-28 NOTE — PROGRESS NOTES
Subjective     Patient ID: Marguerite Fink is a 50 y.o. female. Patient is here for management of multiple medical problems.     Chief Complaint   Patient presents with   • Constipation     follow up   • Prediabetes     follow up after blood work     History of Present Illness     Constipation.  Some improvement.  Poor diet.  Wt loss.    On topamax.  On abilify now.    Less night sweats with estodiol patch.      Smoking 1/2 ppd.    Vit b6 low normal. b12 low. Pt with mood do.  Pt on  b complex.    Pt got d/c for Dr Diaz. Didn't go in for last apt. And states he doesn't listen to her.  Had seen Ortho also for injections.  Pt wants options for pain management.  Pt was told to come in an hour. Pt couldn't arrange for a ride that fast.  Pt not interesteded in living on pain pills.      The following portions of the patient's history were reviewed and updated as appropriate: allergies, current medications, past family history, past medical history, past social history, past surgical history and problem list.    Review of Systems   Constitutional: Positive for fatigue and unexpected weight change.   Musculoskeletal: Negative for back pain, gait problem and joint swelling.   Psychiatric/Behavioral: Negative for self-injury and sleep disturbance. The patient is not nervous/anxious.    All other systems reviewed and are negative.      Current Outpatient Medications:   •  ARIPiprazole (ABILIFY) 10 MG tablet, Take 1 tablet by mouth Daily., Disp: 30 tablet, Rfl: 0  •  B Complex Vitamins (VITAMIN B COMPLEX) capsule capsule, Take  by mouth., Disp: , Rfl:   •  diclofenac (VOLTAREN) 1 % gel gel, Apply 4 g topically to the appropriate area as directed 4 (Four) Times a Day As Needed (pain)., Disp: 100 g, Rfl: 11  •  estradiol (VIVELLE-DOT) 0.1 MG/24HR patch, Place 1 patch on the skin as directed by provider 2 (Two) Times a Week., Disp: 8 patch, Rfl: 12  •  fluticasone-salmeterol (ADVAIR HFA) 115-21 MCG/ACT inhaler, Inhale 2 puffs 2  "(Two) Times a Day., Disp: 12 g, Rfl: 11  •  gabapentin (NEURONTIN) 100 MG capsule, Take 100 mg by mouth every night at bedtime. Up to two pills at night, Disp: , Rfl: 0  •  hydrOXYzine pamoate (VISTARIL) 25 MG capsule, Take 1 capsule by mouth 3 (Three) Times a Day As Needed for Anxiety., Disp: 90 capsule, Rfl: 1  •  levETIRAcetam (KEPPRA) 500 MG tablet, Take 2 tablets by mouth 2 (Two) Times a Day. (Patient taking differently: Take 750 mg by mouth 2 (Two) Times a Day.), Disp: 90 tablet, Rfl: 5  •  Methylnaltrexone Bromide (RELISTOR) 150 MG tablet, Take 150 mg by mouth Daily., Disp: 90 tablet, Rfl: 3  •  montelukast (SINGULAIR) 10 MG tablet, Take 1 tablet by mouth Every Night., Disp: 30 tablet, Rfl: 5  •  oxyCODONE-acetaminophen (ENDOCET)  MG per tablet, Every 6 (Six) Hours., Disp: , Rfl:   •  rOPINIRole (REQUIP) 0.5 MG tablet, Take 1 tablet by mouth Every Night. Take 1 hour before bedtime., Disp: 30 tablet, Rfl: 11  •  SUMAtriptan (IMITREX) 20 MG/ACT nasal spray, INSTILL 1 SPRAY IN THE NOSTRIL 1 TIME AS NEEDED FOR MIGRAINES, Disp: 18 each, Rfl: 0  •  topiramate (TOPAMAX) 100 MG tablet, TAKE 1 TABLET BY MOUTH TWICE A DAY, Disp: 60 tablet, Rfl: 5  •  traZODone (DESYREL) 50 MG tablet, Take 1/2 to 1 tablet PO qhs, Disp: 30 tablet, Rfl: 1  •  ondansetron (ZOFRAN) 4 MG tablet, Take 1 tablet PO BID prn, Disp: 30 tablet, Rfl: 0  •  SUMAtriptan (IMITREX) 100 MG tablet, Take 1 tablet by mouth 1 (One) Time As Needed for Migraine for up to 1 dose., Disp: 9 tablet, Rfl: 5  •  WIXELA INHUB 250-50 MCG/DOSE DISKUS, , Disp: , Rfl:     Objective      Blood pressure 112/76, pulse 79, temperature 98.6 °F (37 °C), height 162.6 cm (64\"), weight 54 kg (119 lb), SpO2 98 %, not currently breastfeeding.    Physical Exam     General Appearance:    Alert, cooperative, no distress, appears stated age   Head:    Normocephalic, without obvious abnormality, atraumatic   Eyes:    PERRL, conjunctiva/corneas clear, EOM's intact   Ears:    Normal " TM's and external ear canals, both ears   Nose:   Nares normal, septum midline, mucosa normal, no drainage   or sinus tenderness   Throat:   Lips, mucosa, and tongue normal; teeth and gums normal   Neck:   Supple, symmetrical, trachea midline, no adenopathy;        thyroid:  No enlargement/tenderness/nodules; no carotid    bruit or JVD   Back:     Symmetric, no curvature, ROM normal, no CVA tenderness   Lungs:     Clear to auscultation bilaterally, respirations unlabored   Chest wall:    No tenderness or deformity   Heart:    Regular rate and rhythm, S1 and S2 normal, no murmur,        rub or gallop   Abdomen:     Soft, non-tender, bowel sounds active all four quadrants,     no masses, no organomegaly   Extremities:   Extremities normal, atraumatic, no cyanosis or edema   Pulses:   2+ and symmetric all extremities   Skin:   Skin color, texture, turgor normal, no rashes or lesions   Lymph nodes:   Cervical, supraclavicular, and axillary nodes normal   Neurologic:   CNII-XII intact. Normal strength, sensation and reflexes       throughout      Results for orders placed or performed in visit on 02/10/20   Vitamin B12   Result Value Ref Range    Vitamin B-12 315 211 - 946 pg/mL   Lipid Panel   Result Value Ref Range    Total Cholesterol 183 0 - 200 mg/dL    Triglycerides 74 0 - 150 mg/dL    HDL Cholesterol 60 40 - 60 mg/dL    LDL Cholesterol  108 (H) 0 - 100 mg/dL    VLDL Cholesterol 14.8 5 - 40 mg/dL    LDL/HDL Ratio 1.80    Comprehensive Metabolic Panel   Result Value Ref Range    Glucose 101 (H) 65 - 99 mg/dL    BUN 15 6 - 20 mg/dL    Creatinine 0.65 0.57 - 1.00 mg/dL    Sodium 142 136 - 145 mmol/L    Potassium 4.1 3.5 - 5.2 mmol/L    Chloride 108 (H) 98 - 107 mmol/L    CO2 20.6 (L) 22.0 - 29.0 mmol/L    Calcium 9.3 8.6 - 10.5 mg/dL    Total Protein 6.9 6.0 - 8.5 g/dL    Albumin 4.50 3.50 - 5.20 g/dL    ALT (SGPT) 12 1 - 33 U/L    AST (SGOT) 16 1 - 32 U/L    Alkaline Phosphatase 58 39 - 117 U/L    Total Bilirubin 0.2  0.2 - 1.2 mg/dL    eGFR Non African Amer 96 >60 mL/min/1.73    Globulin 2.4 gm/dL    A/G Ratio 1.9 g/dL    BUN/Creatinine Ratio 23.1 7.0 - 25.0    Anion Gap 13.4 5.0 - 15.0 mmol/L   TSH   Result Value Ref Range    TSH 1.240 0.270 - 4.200 uIU/mL   T4, Free   Result Value Ref Range    Free T4 1.09 0.93 - 1.70 ng/dL   Hemoglobin A1c   Result Value Ref Range    Hemoglobin A1C 5.60 4.80 - 5.60 %   CBC Auto Differential   Result Value Ref Range    WBC 8.92 3.40 - 10.80 10*3/mm3    RBC 4.23 3.77 - 5.28 10*6/mm3    Hemoglobin 13.3 12.0 - 15.9 g/dL    Hematocrit 40.0 34.0 - 46.6 %    MCV 94.6 79.0 - 97.0 fL    MCH 31.4 26.6 - 33.0 pg    MCHC 33.3 31.5 - 35.7 g/dL    RDW 12.1 (L) 12.3 - 15.4 %    RDW-SD 42.0 37.0 - 54.0 fl    MPV 9.7 6.0 - 12.0 fL    Platelets 311 140 - 450 10*3/mm3    Neutrophil % 61.5 42.7 - 76.0 %    Lymphocyte % 31.4 19.6 - 45.3 %    Monocyte % 5.3 5.0 - 12.0 %    Eosinophil % 0.9 0.3 - 6.2 %    Basophil % 0.6 0.0 - 1.5 %    Immature Grans % 0.3 0.0 - 0.5 %    Neutrophils, Absolute 5.49 1.70 - 7.00 10*3/mm3    Lymphocytes, Absolute 2.80 0.70 - 3.10 10*3/mm3    Monocytes, Absolute 0.47 0.10 - 0.90 10*3/mm3    Eosinophils, Absolute 0.08 0.00 - 0.40 10*3/mm3    Basophils, Absolute 0.05 0.00 - 0.20 10*3/mm3    Immature Grans, Absolute 0.03 0.00 - 0.05 10*3/mm3    nRBC 0.0 0.0 - 0.2 /100 WBC         Assessment/Plan       Marguerite was seen today for constipation and prediabetes.    Diagnoses and all orders for this visit:    Weight loss  -     Cancel: CBC & Differential  -     Cancel: Lipid Panel  -     Vitamin B12  -     Cancel: Comprehensive Metabolic Panel  -     Cancel: TSH  -     Cancel: T4, Free  -     Vitamin B6  -     Vitamin B1, Whole Blood    Vitamin B6 deficiency syndrome  -     Cancel: CBC & Differential  -     Cancel: Lipid Panel  -     Vitamin B12  -     Cancel: Comprehensive Metabolic Panel  -     Cancel: TSH  -     Cancel: T4, Free  -     Vitamin B6  -     Vitamin B1, Whole Blood    Vitamin B12  deficiency  -     Cancel: CBC & Differential  -     Cancel: Lipid Panel  -     Vitamin B12  -     Cancel: Comprehensive Metabolic Panel  -     Cancel: TSH  -     Cancel: T4, Free  -     Vitamin B6  -     Vitamin B1, Whole Blood    Vitamin B1 deficiency  -     Cancel: CBC & Differential  -     Cancel: Lipid Panel  -     Vitamin B12  -     Cancel: Comprehensive Metabolic Panel  -     Cancel: TSH  -     Cancel: T4, Free  -     Vitamin B6  -     Vitamin B1, Whole Blood    Chronic obstructive pulmonary disease, unspecified COPD type (CMS/HCC)  -     Overnight Sleep Oximetry Study; Future  -     Ambulatory Referral to Pain Management    Neck pain  -     Ambulatory Referral to Pain Management    Chronic bilateral low back pain without sciatica      Return in about 3 months (around 5/28/2020).          There are no Patient Instructions on file for this visit.     Juan Clemens MD    Assessment/Plan       Answers for HPI/ROS submitted by the patient on 2/23/2020   What is the primary reason for your visit?: Other  Please describe your symptoms.: F/u from 2/17/20- abdominal pain CT scan results blood work was ordered and coming back to get results of blood work & my #'s  and see how meds worked & treatment plan if necessary  Have you had these symptoms before?: Yes  How long have you been having these symptoms?: 1-4 weeks ago  Please list any medications you are currently taking for this condition.: Relistor  Please describe any probable cause for these symptoms. : Constipation &/or colon issues

## 2020-03-02 DIAGNOSIS — N95.1 MENOPAUSAL SYMPTOMS: ICD-10-CM

## 2020-03-03 RX ORDER — ESTRADIOL 0.1 MG/D
1 FILM, EXTENDED RELEASE TRANSDERMAL 2 TIMES WEEKLY
Qty: 8 PATCH | Refills: 12 | Status: SHIPPED | OUTPATIENT
Start: 2020-03-05 | End: 2020-12-21 | Stop reason: SDUPTHER

## 2020-03-09 ENCOUNTER — OFFICE VISIT (OUTPATIENT)
Dept: NEUROLOGY | Facility: CLINIC | Age: 50
End: 2020-03-09

## 2020-03-09 VITALS
WEIGHT: 121 LBS | SYSTOLIC BLOOD PRESSURE: 110 MMHG | HEIGHT: 64 IN | BODY MASS INDEX: 20.66 KG/M2 | OXYGEN SATURATION: 98 % | DIASTOLIC BLOOD PRESSURE: 70 MMHG | HEART RATE: 56 BPM

## 2020-03-09 DIAGNOSIS — G43.009 MIGRAINE WITHOUT AURA AND WITHOUT STATUS MIGRAINOSUS, NOT INTRACTABLE: ICD-10-CM

## 2020-03-09 DIAGNOSIS — G40.909 SEIZURE DISORDER (HCC): ICD-10-CM

## 2020-03-09 DIAGNOSIS — H02.401 PTOSIS OF EYELID, RIGHT: Primary | ICD-10-CM

## 2020-03-09 PROCEDURE — 99214 OFFICE O/P EST MOD 30 MIN: CPT | Performed by: NURSE PRACTITIONER

## 2020-03-09 RX ORDER — LEVETIRACETAM 500 MG/1
TABLET ORAL
Qty: 75 TABLET | Refills: 11 | Status: SHIPPED | OUTPATIENT
Start: 2020-03-09 | End: 2021-01-22 | Stop reason: SDUPTHER

## 2020-03-09 RX ORDER — TOPIRAMATE 100 MG/1
100 TABLET, FILM COATED ORAL 2 TIMES DAILY
Qty: 60 TABLET | Refills: 11 | Status: SHIPPED | OUTPATIENT
Start: 2020-03-09 | End: 2021-04-27 | Stop reason: SDUPTHER

## 2020-03-09 NOTE — PROGRESS NOTES
Subjective:     Patient ID: Marguerite Fink is a 50 y.o. female.    CC:   Chief Complaint   Patient presents with   • Migraine   • Seizures       HPI:   History of Present Illness        Ms. Fink is here today for follow-up.       She reportedly has had an increase in her seizures since March 2019. The spells she mentioned were staring spells vs her past of tonic clonic seizures.  She has not had a GTC seizure in over a year. She had an EEG in April which was read as normal.   Dr. Ho did increase her Topamax to 100 mg twice a day at last visit.  She reportd last visit  that she still having episodes of amnesia, she is losing time, she is losing memory for up to 10 minutes at a time.  She has had no grand mal seizures per report.  Migraines are stable, she feels like since the increase in Topamax that she cannot think straight, pain management doctor has also added gabapentin recently.  I instructed her that gabapentin along with increased Topamax may cause some word finding difficulties.  She is not driving, she is reportedly very anxious and depressed as she is fearful to be left alone due to increase in her seizure activity     She had inpt monitoring with Dr. Cross, she has one confusional type spell with no associated EEG changes.  He suggested her small spells where psycogenic in nature and epilepsy was controlled with current treatments.  He did decrease her Keppra back to 750 mg BID and kept topamax at 100 mg BID for both seizures and migraines.     Since inpatient monitoring her seizure-like spells have significantly improved, she remains on Keppra 750 mg twice daily, she is needing refills on this today.  She is also on Topamax 100 mg twice daily, her headaches seem to be controlled fairly well at this time.  She continues to have headaches however they are much less intense  She has noticed a drooping of her right eyelid, she is noticed this over the past couple years but it seems to be more  significant in the last couple months, she is actually seeing an eye doctor soon as she feels like she may needed lid lift.  She denies any actual vision changes.  The following portions of the patient's history were reviewed and updated as appropriate: allergies, current medications, past family history, past medical history, past social history, past surgical history and problem list.    Past Medical History:   Diagnosis Date   • Abdominal pain, epigastric    • Acute sinusitis    • Acute UTI (urinary tract infection)    • Angina, intestinal (CMS/HCC)    • Anxiety    • Arthritis    • Back pain    • Breast mass, right    • Chronic hepatitis C virus infection (CMS/HCC)    • Depression    • Diarrhea    • Elevated LFTs    • Fatigue    • History of endometriosis    • History of Papanicolaou smear of cervix 2014    NORMAL    • HTN (hypertension)    • IBS (irritable bowel syndrome)    • Insomnia    • Menopausal symptoms    • Migraine headache    • Nausea & vomiting    • Neck pain    • Ovarian cyst    • Pelvic adhesive disease    • Radicular pain of left lower extremity    • Restless leg syndrome    • Seizure disorder (CMS/HCC)    • Tobacco abuse    • Trochanteric bursitis    • Vitamin B 12 deficiency        Past Surgical History:   Procedure Laterality Date   • BILATERAL SALPINGO OOPHORECTOMY  03/10/2015    DR NATALY THOMPSON   • BREAST LUMPECTOMY     •  SECTION     • HYSTERECTOMY  03/10/2015    [facility] (DR NATALY THOMPSON)   • INGUINAL HERNIA REPAIR Right    • LYSIS OF ABDOMINAL ADHESIONS  03/10/2015    DR NATALY THOMPSON   • TUBAL ABDOMINAL LIGATION         Social History     Socioeconomic History   • Marital status:      Spouse name: Not on file   • Number of children: 1   • Years of education: Not on file   • Highest education level: High school graduate   Tobacco Use   • Smoking status: Current Every Day Smoker     Packs/day: 0.50     Types: Cigarettes   • Smokeless tobacco: Never Used   • Tobacco  "comment:  1/2 PACK A DAY   Substance and Sexual Activity   • Alcohol use: Yes     Comment: ON OCCASION   • Drug use: No   • Sexual activity: Defer     Birth control/protection: Surgical       Family History   Problem Relation Age of Onset   • Alzheimer's disease Other    • Cancer Other    • Dementia Other    • Hypertension Other    • Parkinsonism Other    • Osteoporosis Mother    • Diabetes Mother    • Hypertension Mother    • Obesity Mother    • Depression Mother    • Breast cancer Maternal Grandmother    • Osteoporosis Maternal Grandmother    • Diabetes Maternal Grandmother    • Dementia Maternal Grandmother    • ADD / ADHD Sister    • Alcohol abuse Sister    • Anxiety disorder Sister    • Bipolar disorder Sister    • Depression Sister    • Drug abuse Sister    • OCD Neg Hx    • Paranoid behavior Neg Hx    • Schizophrenia Neg Hx    • Seizures Neg Hx    • Self-Injurious Behavior  Neg Hx    • Suicide Attempts Neg Hx         Review of Systems   Constitutional: Negative.    HENT: Negative for tinnitus, trouble swallowing and voice change.    Eyes: Negative.    Respiratory: Negative.    Cardiovascular: Negative.    Gastrointestinal: Negative.    Endocrine: Negative.    Genitourinary: Negative.    Musculoskeletal: Negative.    Skin: Negative.    Allergic/Immunologic: Negative.    Neurological: Positive for headaches. Negative for dizziness, tremors, seizures, syncope, facial asymmetry, speech difficulty, weakness, light-headedness and numbness.   Hematological: Negative.    Psychiatric/Behavioral: Negative.         Objective:  /70   Pulse 56   Ht 162.6 cm (64\")   Wt 54.9 kg (121 lb)   SpO2 98%   BMI 20.77 kg/m²     Neurologic Exam     Mental Status   Oriented to person, place, and time.   Attention: normal. Concentration: normal.   Speech: speech is normal   Level of consciousness: alert  Knowledge: consistent with education.   Able to read. Able to write. Normal comprehension.     Cranial Nerves   Cranial " nerves II through XII intact.     CN II   Visual fields full to confrontation.   Right visual field deficit: none  Left visual field deficit: none     CN III, IV, VI   Pupils are equal, round, and reactive to light.  Extraocular motions are normal.   Right pupil: Shape: regular. Reactivity: brisk. Consensual response: intact. Accommodation: intact.   Left pupil: Shape: regular. Reactivity: brisk. Consensual response: intact. Accommodation: intact.   CN III: no CN III palsy  CN VI: no CN VI palsy  Nystagmus: none   Upgaze: normal  Downgaze: normal    CN V   Facial sensation intact.     CN VII   Facial expression full, symmetric.     CN VIII   CN VIII normal.     CN IX, X   CN IX normal.   CN X normal.     CN XI   CN XI normal.     CN XII   CN XII normal.   She has a mild degree of ptosis of the right eye, asymmetry of the upper lids, EOMs are intact, no nystagmus, pupils equal round reactive to light     Motor Exam   Muscle bulk: normal  Overall muscle tone: normal  Right arm tone: normal  Left arm tone: normal  Right arm pronator drift: absent  Left arm pronator drift: absent  Right leg tone: normal  Left leg tone: normal    Strength   Strength 5/5 throughout.     Sensory Exam   Light touch normal.     Gait, Coordination, and Reflexes     Gait  Gait: normal    Coordination   Finger to nose coordination: normal    Tremor   Resting tremor: absent  Intention tremor: absent  Action tremor: absent    Reflexes   Reflexes 2+ except as noted.   Right Anand: absent  Left Anand: absent      Physical Exam   Constitutional: She is oriented to person, place, and time. She appears well-developed and well-nourished.   HENT:   Head: Normocephalic and atraumatic.   Eyes: Pupils are equal, round, and reactive to light. Conjunctivae and EOM are normal. No scleral icterus.   Neck: Normal range of motion. Neck supple.   Pulmonary/Chest: Effort normal. No respiratory distress.   Neurological: She is alert and oriented to person,  place, and time. She has normal strength. She has a normal Finger-Nose-Finger Test. Gait normal.   Skin: Skin is warm.   Psychiatric: She has a normal mood and affect. Her speech is normal and behavior is normal. Judgment and thought content normal.   Vitals reviewed.      Assessment/Plan:       Marguerite was seen today for migraine and seizures.    Diagnoses and all orders for this visit:    Ptosis of eyelid, right  -     CT Angiogram Head  -     CT Angiogram Neck  -     Myasthenia Gravis Full Panel; Future  -     Basic Metabolic Panel    Seizure disorder (CMS/HCC)  -     topiramate (TOPAMAX) 100 MG tablet; Take 1 tablet by mouth 2 (Two) Times a Day.    Migraine without aura and without status migrainosus, not intractable  -     topiramate (TOPAMAX) 100 MG tablet; Take 1 tablet by mouth 2 (Two) Times a Day.    Other orders  -     levETIRAcetam (KEPPRA) 500 MG tablet; Take 1.5 PO BID    Seizures are controlled, migraines are overall stable.  I did notice a mild degree of ptosis throughout exam, at the end of exam she did mention this to me and states she has an eye appointment in a couple days, she thinks she might need a lid lift.  There is a asymmetry of the upper lids, she has intact EOMs.  I would like to get vessel imaging of the head neck, she had an MRI last year, images were reviewed by Dr. Ho and felt to be stable.  Labs pending as noted above.  She will continue current medications at this time           Reviewed medications, potential side effects and signs and symptoms to report. Discussed risk versus benefits of treatment plan with patient and/or family-including medications, labs and radiology that may be ordered. Addressed questions and concerns during visit. Patient and/or family verbalized understanding and agree with plan.    During this visit the following were done:  Labs Reviewed [x]    Labs Ordered [x]    Radiology Reports Reviewed []    Radiology Ordered [x]    PCP Records Reviewed []     Referring Provider Records Reviewed []    ER Records Reviewed []    Hospital Records Reviewed []    History Obtained From Family []    Radiology Images Reviewed []    Other Reviewed []    Records Requested []      EMR Dragon/Transcription Disclaimer:  Much of this encounter note is an electronic transcription of spoken language to printed text. Electronic transcription of spoken language may permit erroneous words or phrases to be inadvertently transcribed. Although I have reviewed the note for such errors, some may still exist in this documentation.    Ginny Pace, APRN  3/9/2020

## 2020-03-10 ENCOUNTER — TELEPHONE (OUTPATIENT)
Dept: NEUROLOGY | Facility: CLINIC | Age: 50
End: 2020-03-10

## 2020-03-10 NOTE — TELEPHONE ENCOUNTER
Received a call from Marlene with [employer] who had questions about this patients migraines. She needed to know if the patients migraines last more than 4 hours per day and if she had migraines for at least 15 days or more of the month.     Can someone please reach out to Marlene and answer a few of her quesitons?     [phone] Marlene New

## 2020-03-11 ENCOUNTER — TELEPHONE (OUTPATIENT)
Dept: NEUROLOGY | Facility: CLINIC | Age: 50
End: 2020-03-11

## 2020-03-11 NOTE — TELEPHONE ENCOUNTER
Marguerite Fink  [phone]    Spoke to patient and she stated she was in the office last week and Christiane Pace had ordered a CT of Neck and head.  The patient wanted to see if the order could be sent to an facility in [place] instead of [place].     If possible can you please resend the order to a [place] facility?     Thank you

## 2020-03-14 ENCOUNTER — RESULTS ENCOUNTER (OUTPATIENT)
Dept: NEUROLOGY | Facility: CLINIC | Age: 50
End: 2020-03-14

## 2020-03-14 DIAGNOSIS — H02.401 PTOSIS OF EYELID, RIGHT: ICD-10-CM

## 2020-03-16 ENCOUNTER — HOSPITAL ENCOUNTER (OUTPATIENT)
Dept: CT IMAGING | Facility: HOSPITAL | Age: 50
Discharge: HOME | End: 2020-03-16
Admitting: NURSE PRACTITIONER

## 2020-03-16 PROCEDURE — 0 IOPAMIDOL PER 1 ML: Performed by: NURSE PRACTITIONER

## 2020-03-16 PROCEDURE — 70496 CT ANGIOGRAPHY HEAD: CPT

## 2020-03-16 PROCEDURE — 70498 CT ANGIOGRAPHY NECK: CPT

## 2020-03-16 RX ORDER — SUMATRIPTAN 20 MG/1
SPRAY NASAL
Qty: 18 EACH | Refills: 0 | Status: SHIPPED | OUTPATIENT
Start: 2020-03-16 | End: 2020-07-21

## 2020-03-16 RX ADMIN — IOPAMIDOL 75 ML: 755 INJECTION, SOLUTION INTRAVENOUS at 15:20

## 2020-03-17 ENCOUNTER — OFFICE VISIT (OUTPATIENT)
Dept: PSYCHIATRY | Facility: CLINIC | Age: 50
End: 2020-03-17

## 2020-03-17 DIAGNOSIS — G47.00 INSOMNIA, UNSPECIFIED TYPE: ICD-10-CM

## 2020-03-17 DIAGNOSIS — F39 MOOD DISORDER (HCC): Primary | ICD-10-CM

## 2020-03-17 DIAGNOSIS — F41.0 PANIC DISORDER: ICD-10-CM

## 2020-03-17 LAB
BUN SERPL-MCNC: 9 MG/DL (ref 6–20)
BUN/CREAT SERPL: 14.3 (ref 7–25)
CALCIUM SERPL-MCNC: 8.8 MG/DL (ref 8.6–10.5)
CHLORIDE SERPL-SCNC: 112 MMOL/L (ref 98–107)
CO2 SERPL-SCNC: 23.5 MMOL/L (ref 22–29)
CREAT SERPL-MCNC: 0.63 MG/DL (ref 0.57–1)
GLUCOSE SERPL-MCNC: 94 MG/DL (ref 65–99)
POTASSIUM SERPL-SCNC: 4.3 MMOL/L (ref 3.5–5.2)
SODIUM SERPL-SCNC: 144 MMOL/L (ref 136–145)

## 2020-03-17 PROCEDURE — 90837 PSYTX W PT 60 MINUTES: CPT | Performed by: SOCIAL WORKER

## 2020-03-17 NOTE — PROGRESS NOTES
Please let Ms. Fink know CTA of her head and neck showed no evidence of aneurysm or blockage  There was evidence of sinus thickening and mucous retention cyst, if having sinus issues follow-up with primary care, send copy of report to primary care please

## 2020-03-17 NOTE — PROGRESS NOTES
Date of Service: March 17, 2020  Time In: 1130  Time Out: 1227    PROGRESS NOTE  Data:  Marguerite Fink is a 50 y.o. female who met with the undersigned for a regularly scheduled individual outpatient therapy session at [facility].  Patient was referred by Ana Rosa nurse practitioner in this office.  Patient discussed taking the Abilify and feeling highs and lows but feels that she is more stable than before.  Patient discussed her healthcare appointments and having 6 appointments this month.  Patient discussed neurology.  Patient discussed seeing her ophthalmologist.  Patient discussed the possibility of having a stroke and having right eye weakness/drooping.  Patient discussed that she is sleeping more than she was before.  Patient discussed family dynamics, life stressors, mood disturbances and anxiety    HPI: Depression, anxiety, panic attacks, chronic pain and seizure disorder      Clinical Maneuvering/Intervention:  Assisted patient in processing above session content; acknowledged and normalized patient’s thoughts, feelings, and concerns.  Assisted patient processing her thoughts and feelings related to mood, anxiety, panic attacks, chronic pain and seizure disorder.  Patient sees psychiatric nurse practitioner in this office.  Patient is to take her medications as prescribed and contact the nurse practitioner in this office for questions and concerns.  Patient denies SI, HI and self-harm.  Educated on panic attacks and anxiety.  Educated on conflict resolution and the importance of creating a schedule.  Validated patient's thoughts and feelings of needing to complete paperwork for disability application that becomes overwhelmed.  Educated patient on healthy boundaries with her mother as her mother is a trigger for increased stress.  Educated on bipolar disorder/mood disorder.  Encourage patient to rate her mood and to put that in her journal for this therapist and  psychiatric nurse practitioner to be aware of the future sessions.  Educated on boundaries, and the mind-body connection and when to notice physically and mentally when she needs to have a healthy boundary with the person and gave the example of when to get off the phone.  Patient is to work on this between now and next session    Allowed patient to freely discuss issues without interruption or judgment. Provided safe, confidential environment to facilitate the development of positive therapeutic relationship and encourage open, honest communication. Assisted patient in identifying risk factors which would indicate the need for higher level of care including thoughts to harm self or others and/or self-harming behavior and encouraged patient to contact this office, call 911, or present to the nearest emergency room should any of these events occur. Discussed crisis intervention services and means to access.  Patient adamantly and convincingly denies current suicidal or homicidal ideation or perceptual disturbance.    Assessment     Diagnoses and all orders for this visit:    Mood disorder (CMS/HCC)    Panic disorder    Insomnia, unspecified type               REVIEW OF SYSTEMS:  Review of Systems       Mental Status Exam  Hygiene:   fair  Cooperation:  Cooperative  Eye Contact:  Good  Psychomotor Behavior:  Appropriate  Affect:  Appropriate  Hopelessness: 4  Speech:  Normal  Thought Process:  Goal directed  Thought Content:  Normal  Suicidal:  None  Homicidal:  None  Hallucinations:  None  Delusion:  None  Memory:  Deficits  Orientation:  Person, Place, Time and Situation  Reliability:  fair  Insight:  Good  Judgement:  Good  Impulse Control:  Good  Physical/Medical Issues:  Yes Chronic pain and seizure disorder    Patient's Support Network Includes:  , son, mother and extended family    Progress toward goal: Not at goal    Functional Status: Moderate impairment     Prognosis: Fair with Ongoing Treatment        Plan         Patient will adhere to medication regimen as prescribed and report any side effects. Patient will contact this office, call 911 or present to the nearest emergency room should suicidal or homicidal ideations occur. Provide Cognitive Behavioral Therapy and Integrative Therapy to improve functioning, maintain stability, and avoid decompensation and the need for higher level of care.          Return in about 2 weeks (around 3/31/2020) for 2-4 weeks and as needed.      This document is signed me Angela Gonzalez LCSW,   March 17, 2020 13:28

## 2020-03-19 ENCOUNTER — OFFICE VISIT (OUTPATIENT)
Dept: PSYCHIATRY | Facility: CLINIC | Age: 50
End: 2020-03-19

## 2020-03-19 VITALS
DIASTOLIC BLOOD PRESSURE: 64 MMHG | WEIGHT: 122 LBS | BODY MASS INDEX: 20.83 KG/M2 | HEIGHT: 64 IN | HEART RATE: 66 BPM | SYSTOLIC BLOOD PRESSURE: 122 MMHG

## 2020-03-19 DIAGNOSIS — F41.0 PANIC DISORDER: ICD-10-CM

## 2020-03-19 DIAGNOSIS — G47.00 INSOMNIA, UNSPECIFIED TYPE: ICD-10-CM

## 2020-03-19 DIAGNOSIS — F39 MODERATE MOOD DISORDER (HCC): Primary | ICD-10-CM

## 2020-03-19 DIAGNOSIS — F33.1 MODERATE EPISODE OF RECURRENT MAJOR DEPRESSIVE DISORDER (HCC): ICD-10-CM

## 2020-03-19 LAB
VIT B1 BLD-SCNC: 121.2 NMOL/L (ref 66.5–200)
VIT B12 SERPL-MCNC: 746 PG/ML (ref 211–946)
VIT B6 SERPL-MCNC: 5.3 UG/L (ref 2–32.8)

## 2020-03-19 PROCEDURE — 99213 OFFICE O/P EST LOW 20 MIN: CPT | Performed by: NURSE PRACTITIONER

## 2020-03-19 RX ORDER — HYDROXYZINE HYDROCHLORIDE 10 MG/1
TABLET, FILM COATED ORAL
Qty: 60 TABLET | Refills: 1 | Status: SHIPPED | OUTPATIENT
Start: 2020-03-19 | End: 2020-03-30 | Stop reason: SDUPTHER

## 2020-03-19 RX ORDER — TRAZODONE HYDROCHLORIDE 50 MG/1
TABLET ORAL
Qty: 30 TABLET | Refills: 1 | Status: SHIPPED | OUTPATIENT
Start: 2020-03-19 | End: 2020-05-12

## 2020-03-19 RX ORDER — ARIPIPRAZOLE 10 MG/1
10 TABLET ORAL DAILY
Qty: 30 TABLET | Refills: 1 | Status: SHIPPED | OUTPATIENT
Start: 2020-03-19 | End: 2020-05-12

## 2020-03-19 NOTE — PROGRESS NOTES
Please let them know the b 6 is low normal get b6 otc and take daily for one week the decrease to qod

## 2020-03-19 NOTE — PROGRESS NOTES
"Marguerite Fink is a 50 y.o. female is here today for medication management follow-up.    Chief Complaint:      ICD-10-CM ICD-9-CM   1. Moderate mood disorder (CMS/[facility]) F39 296.90   2. Moderate episode of recurrent major depressive disorder (CMS/HCC) F33.1 296.32   3. Insomnia, unspecified type G47.00 780.52   4. Panic disorder F41.0 300.01       History of Present Illness:  Pt presents for follow up visit and medication management of depressive symptoms. Pt is currently taking Abilify 10 mg daily.Pt states her mood has been \"stable\" and she feels better. Reports that she has been sleeping more; up to 10 hours. Mges she is learning in therapy how to set boundaries with other to reduce her stress. Pt reports that she could no longer take Vistaril because medication was too sedating.     Pt reports the presence/absence of the following depressive symptoms: (-) depressed mood, (-) reduced appetite, (-) increased appetite, (-) poor concentration, (-) hopelessness, (-) worthlessness, (-) insomnia, (-) hypersomnia, (-) psychomotor agitation, (-) irritability, (-) anhedonia, (-) amotivation, (+) fatigue, (-) suicidal ideation, (-) suicidal plan, (-) suicidal intent, (-) recurrent thoughts about death, and (-) homicidal ideation.        The following portions of the patient's history were reviewed and updated as appropriate: allergies, current medications, past family history, past medical history, past social history, past surgical history and problem list.    Review of Systems;;  Review of Systems   Constitutional: Negative.  Negative for activity change, appetite change, unexpected weight gain and unexpected weight loss.   Respiratory: Negative.    Cardiovascular: Negative.  Negative for chest pain.   Gastrointestinal: Negative.  Negative for diarrhea, nausea and vomiting.   Genitourinary: Negative.    Musculoskeletal: Negative.    Skin: Negative for rash and bruise.   Neurological: Negative.  Negative for dizziness, " "seizures and speech difficulty.   Psychiatric/Behavioral: Negative.  Negative for agitation, behavioral problems, decreased concentration, dysphoric mood, hallucinations, self-injury, sleep disturbance, suicidal ideas, negative for hyperactivity, depressed mood and stress. The patient is not nervous/anxious.        Physical Exam;;  Physical Exam  Blood pressure 122/64, pulse 66, height 162.6 cm (64\"), weight 55.3 kg (122 lb), not currently breastfeeding.    Current Medications;;    Current Outpatient Medications:   •  ARIPiprazole (Abilify) 10 MG tablet, Take 1 tablet by mouth Daily., Disp: 30 tablet, Rfl: 1  •  B Complex Vitamins (VITAMIN B COMPLEX) capsule capsule, Take  by mouth., Disp: , Rfl:   •  diclofenac (VOLTAREN) 1 % gel gel, Apply 4 g topically to the appropriate area as directed 4 (Four) Times a Day As Needed (pain)., Disp: 100 g, Rfl: 11  •  estradiol (VIVELLE-DOT) 0.1 MG/24HR patch, Place 1 patch on the skin as directed by provider 2 (Two) Times a Week., Disp: 8 patch, Rfl: 12  •  gabapentin (NEURONTIN) 100 MG capsule, Take 100 mg by mouth every night at bedtime. Up to two pills at night, Disp: , Rfl: 0  •  hydrOXYzine (ATARAX) 10 MG tablet, Take 1 to 2 tablets PO BID prn, Disp: 60 tablet, Rfl: 1  •  levETIRAcetam (KEPPRA) 500 MG tablet, Take 1.5 PO BID, Disp: 75 tablet, Rfl: 11  •  loratadine-pseudoephedrine (CLARITIN-D 12 HOUR) 5-120 MG per 12 hr tablet, Take 1 tablet by mouth 2 (Two) Times a Day., Disp: 180 tablet, Rfl: 3  •  Methylnaltrexone Bromide (RELISTOR) 150 MG tablet, Take 150 mg by mouth Daily., Disp: 90 tablet, Rfl: 3  •  montelukast (SINGULAIR) 10 MG tablet, Take 1 tablet by mouth Every Night., Disp: 30 tablet, Rfl: 5  •  OnabotulinumtoxinA 200 units reconstituted solution, Inject 200 units IM into head neck shoulders every 12 weeks per FDA protocol. Dx G43.709, Disp: 1 each, Rfl: 3  •  oxyCODONE-acetaminophen (ENDOCET)  MG per tablet, Every 6 (Six) Hours., Disp: , Rfl:   •  " rOPINIRole (REQUIP) 0.5 MG tablet, Take 1 tablet by mouth Every Night. Take 1 hour before bedtime., Disp: 30 tablet, Rfl: 11  •  SUMAtriptan (IMITREX) 20 MG/ACT nasal spray, INSTILL 1 SPRAY IN 1 NOSTRIL IF NEEDED OMIG, Disp: 18 each, Rfl: 0  •  topiramate (TOPAMAX) 100 MG tablet, Take 1 tablet by mouth 2 (Two) Times a Day., Disp: 60 tablet, Rfl: 11  •  traZODone (DESYREL) 50 MG tablet, Take 1/2 to 1 tablet PO qhs, Disp: 30 tablet, Rfl: 1  •  WIXELA INHUB 250-50 MCG/DOSE DISKUS, , Disp: , Rfl:     Lab Results:       Mental Status Exam:   Hygiene:   good  Cooperation:  Cooperative  Eye Contact:  Good  Psychomotor Behavior:  Appropriate  Mood: Dysthymic  Affect:  Appropriate  Hopelessness: Denies  Speech:  Normal  Thought Process:  Goal directed  Thought Content:  Normal  Suicidal:  None  Homicidal:  None  Hallucinations:  None  Delusion:  None  Memory:  Intact  Orientation:  Person, Place, Time and Situation  Reliability:  good  Insight:  Fair  Judgement:  Fair  Impulse Control:  Fair  Physical/Medical Issues:  Chronic Pain        Assessment Plan;;  Diagnoses and all orders for this visit:    Moderate mood disorder (CMS/HCC)  -     ARIPiprazole (Abilify) 10 MG tablet; Take 1 tablet by mouth Daily.    Moderate episode of recurrent major depressive disorder (CMS/HCC)  -     ARIPiprazole (Abilify) 10 MG tablet; Take 1 tablet by mouth Daily.    Insomnia, unspecified type  -     traZODone (DESYREL) 50 MG tablet; Take 1/2 to 1 tablet PO qhs    Panic disorder  -     hydrOXYzine (ATARAX) 10 MG tablet; Take 1 to 2 tablets PO BID prn    Treatment Plan        Problem: Mood symptoms/ Insomnia      Intervention: The prescription and adjustment of medications and monitoring of side effects. Decrease dose of Hydroxyzine. Continue current dose of Abilify andTrazodone. Continue therapy with Angela Gonzalez LCSW    Long term Goal: Overall improvement in mood and functioning per patient self -report      Short term Goal: Medication  adherence. Improvement in symptoms per patient self-report.       A psychological evaluation was conducted in order to assess past and current level of functioning. Areas assessed included, but were not limited to: perception of social support, perception of ability to face and deal with challenges in life (positive functioning), anxiety symptoms, depressive symptoms, perspective on beliefs/belief system, coping skills for stress, intelligence level,  Therapeutic rapport was established. Interventions conducted today were geared towards incorporating medication management along with support for continued therapy. Education was also provided as to the med management with this provider and what to expect in subsequent sessions.    We discussed risks, benefits,goals and side effects of the above medication and the patient was agreeable with the plan.Patient was educated on the importance of compliance with treatment and follow-up appointments. Patient is aware to contact the [facility] with any worsening of symptoms. To call for questions or concerns and return early if necessary. Patent is agreeable to go to the Emergency Department or call 911 should they begin SI/HI.         Return in about 2 months (around 5/19/2020) for Follow Up.    Ana Rosa Osorio, DNP, APRN, PMHNP-BC, FNP-C

## 2020-03-25 ENCOUNTER — TELEPHONE (OUTPATIENT)
Dept: NEUROLOGY | Facility: CLINIC | Age: 50
End: 2020-03-25

## 2020-03-25 NOTE — TELEPHONE ENCOUNTER
Marguerite called and had [employer] on the line regarding shipping her BOTOX. The medication is approved but the Procedure is not approved. I informed her /[employer] not to ship the medication without the procedure being authorized. She said she didn't know if Lex thought she should even still do the BOTOX. I will forward this to Lex to see her advise of getting the BOTOX.

## 2020-03-30 DIAGNOSIS — F41.0 PANIC DISORDER: ICD-10-CM

## 2020-03-30 NOTE — TELEPHONE ENCOUNTER
PATIENT CALLED AND STATED SHE IS HAVING ANOTHER CASE OF THE SHINGLES. SHE WOULD LIKE TO HAVE hydrOXYzine (ATARAX) 10 MG tablet CALLED IN FOR HER. PATIENT STATED THE gabapentin (NEURONTIN) 100 MG capsule IS THE ONLY THING HELPING WITH THE PAIN.    PATIENT STATED SHE IS IN A LOT OF PAIN AT THE MOMENT IT IS ON HER BUTTOCKS.    PATIENT CALL BACK [phone]      [facility]    PLEASE ADVISE

## 2020-03-31 RX ORDER — VALACYCLOVIR HYDROCHLORIDE 1 G/1
1000 TABLET, FILM COATED ORAL 3 TIMES DAILY
Qty: 21 TABLET | Refills: 1 | Status: SHIPPED | OUTPATIENT
Start: 2020-03-31 | End: 2020-06-03

## 2020-03-31 RX ORDER — HYDROXYZINE HYDROCHLORIDE 10 MG/1
TABLET, FILM COATED ORAL
Qty: 60 TABLET | Refills: 1 | Status: SHIPPED | OUTPATIENT
Start: 2020-03-31 | End: 2020-05-12

## 2020-04-15 ENCOUNTER — TELEPHONE (OUTPATIENT)
Dept: NEUROLOGY | Facility: CLINIC | Age: 50
End: 2020-04-15

## 2020-04-15 NOTE — TELEPHONE ENCOUNTER
[facility] CALLED AND WANTED TO SET UP BOTOX DELIVERY FOR PT    CALL BACK- [phone]

## 2020-04-15 NOTE — TELEPHONE ENCOUNTER
I CALLED AND SCHEDULED DELIVERY OF PT BOTOX FOR 4- AND IT WILL BE UNDER THE NAME MERISSA LOW PER INSURANCE

## 2020-04-20 ENCOUNTER — TELEMEDICINE (OUTPATIENT)
Dept: PSYCHIATRY | Facility: CLINIC | Age: 50
End: 2020-04-20

## 2020-04-20 DIAGNOSIS — F41.0 PANIC DISORDER: ICD-10-CM

## 2020-04-20 DIAGNOSIS — F39 MODERATE MOOD DISORDER (HCC): Primary | ICD-10-CM

## 2020-04-20 DIAGNOSIS — G47.00 INSOMNIA, UNSPECIFIED TYPE: ICD-10-CM

## 2020-04-20 PROCEDURE — 90837 PSYTX W PT 60 MINUTES: CPT | Performed by: SOCIAL WORKER

## 2020-04-20 NOTE — PROGRESS NOTES
Date of Service: April 20, 2020  Time In: 935  Time Out: 1030    PROGRESS NOTE  Data:  Marguerite Fink is a 50 y.o. female ..This visit has been rescheduled as a phone visit to comply with patient safety concerns in accordance with CDC recommendations. Total time of discussion was 55 minutes. Patient starts therapy session by discussing not being able to get out of the house.  Patient discussed she does not like cold weather so this past week is been difficult more depressing.  Patient discussed depressive symptoms and her lack of self-love.  Patient discussed she has negative self talk and it is increasing during this pandemic.  Patient discussed she feels worthless and helpless at times.  Patient discussed she is needing to fill the paperwork for her disability and is unable to get started.  Patient discussed just thinking about it is overwhelming.  Patient discussed the questions are hard to answer and hard to except that she is filling out paperwork for disability.  Patient discussed increased anxiety and feeling overwhelmed by only getting on the house once a week.  Patient discussed she misses her son as they have been social distancing and not seeing each other during this pandemic.  Patient discussed her health and having 5 doctors appointments since our last session but does not feel that she has any answers from them.    HPI: Depression, anxiety, panic attacks, chronic pain and seizure disorder      Clinical Maneuvering/Intervention:  Assisted patient in processing above session content; acknowledged and normalized patient’s thoughts, feelings, and concerns.  Assisted patient processing her thoughts and feelings related to mood, anxiety, panic attacks, chronic pain and seizure disorder.  Patient sees psychiatric nurse practitioner in this office.  Patient is to take her medications as prescribed and contact the nurse practitioner in this office for questions and concerns.  Patient denies SI, HI and  self-harm.  Educated on panic attacks and anxiety.  Educated on conflict resolution and the importance of creating a schedule.  Validated patient's thoughts and feelings of needing to complete paperwork for disability application that becomes overwhelmed.  Educated patient on healthy boundaries with her mother as her mother is a trigger for increased stress.  Educated on bipolar disorder/mood disorder.  Encourage patient to rate her mood and to put that in her journal for this therapist and psychiatric nurse practitioner to be aware of the future sessions. Continue to educate on the mind-body connection.  Educated on mindfulness and encouraged patient to research on the computer for tablet mindfulness meditations ways to practice mindfulness and self-awareness and to put energy into starting her day off in a self-aware/present/here and now state.  Patient is agreeable to practicing this.  Educated on cognitive behavior therapy and reframing negative thinking.  Patient to work on processing her thoughts and feelings and fears about visit disability paperwork, allowing her to self-love and making goals to completed just a few days a week that she does not feel that it is hanging overhead every day to decrease the feelings of being overwhelmed.    Allowed patient to freely discuss issues without interruption or judgment. Provided safe, confidential environment to facilitate the development of positive therapeutic relationship and encourage open, honest communication. Assisted patient in identifying risk factors which would indicate the need for higher level of care including thoughts to harm self or others and/or self-harming behavior and encouraged patient to contact this office, call 911, or present to the nearest emergency room should any of these events occur. Discussed crisis intervention services and means to access.  Patient adamantly and convincingly denies current suicidal or homicidal ideation or perceptual  disturbance.    Assessment     Diagnoses and all orders for this visit:    Moderate mood disorder (CMS/HCC)    Insomnia, unspecified type    Panic disorder               REVIEW OF SYSTEMS:  Review of Systems       Mental Status Exam  Hygiene:   fair  Cooperation:  Cooperative  Eye Contact:  Good  Psychomotor Behavior:  Appropriate  Affect:  Appropriate  Hopelessness: 4  Speech:  Normal  Thought Process:  Goal directed  Thought Content:  Normal  Suicidal:  None  Homicidal:  None  Hallucinations:  None  Delusion:  None  Memory:  Deficits  Orientation:  Person, Place, Time and Situation  Reliability:  fair  Insight:  Good  Judgement:  Good  Impulse Control:  Good  Physical/Medical Issues:  Yes Chronic pain and seizure disorder    Patient's Support Network Includes:  , son, mother and extended family    Progress toward goal: Not at goal    Functional Status: Moderate impairment     Prognosis: Fair with Ongoing Treatment       Plan         Patient will adhere to medication regimen as prescribed and report any side effects. Patient will contact this office, call 911 or present to the nearest emergency room should suicidal or homicidal ideations occur. Provide Cognitive Behavioral Therapy and Integrative Therapy to improve functioning, maintain stability, and avoid decompensation and the need for higher level of care.          Return in about 2 weeks (around 5/4/2020), or if symptoms worsen or fail to improve.      This document is signed me Angela Gonzalez LCSW,   April 20, 2020 11:19

## 2020-04-27 ENCOUNTER — APPOINTMENT (OUTPATIENT)
Dept: LAB | Facility: HOSPITAL | Age: 50
End: 2020-04-27

## 2020-04-27 ENCOUNTER — PROCEDURE VISIT (OUTPATIENT)
Dept: NEUROLOGY | Facility: CLINIC | Age: 50
End: 2020-04-27

## 2020-04-27 VITALS
OXYGEN SATURATION: 99 % | HEIGHT: 64 IN | SYSTOLIC BLOOD PRESSURE: 118 MMHG | HEART RATE: 63 BPM | BODY MASS INDEX: 21.17 KG/M2 | DIASTOLIC BLOOD PRESSURE: 58 MMHG | WEIGHT: 124 LBS

## 2020-04-27 DIAGNOSIS — G43.019 INTRACTABLE MIGRAINE WITHOUT AURA AND WITHOUT STATUS MIGRAINOSUS: ICD-10-CM

## 2020-04-27 DIAGNOSIS — Z51.81 MEDICATION MONITORING ENCOUNTER: Primary | ICD-10-CM

## 2020-04-27 PROCEDURE — 80307 DRUG TEST PRSMV CHEM ANLYZR: CPT | Performed by: NURSE PRACTITIONER

## 2020-04-27 PROCEDURE — 83519 RIA NONANTIBODY: CPT | Performed by: NURSE PRACTITIONER

## 2020-04-27 PROCEDURE — 36415 COLL VENOUS BLD VENIPUNCTURE: CPT | Performed by: NURSE PRACTITIONER

## 2020-04-27 PROCEDURE — G0480 DRUG TEST DEF 1-7 CLASSES: HCPCS | Performed by: NURSE PRACTITIONER

## 2020-04-27 PROCEDURE — 86255 FLUORESCENT ANTIBODY SCREEN: CPT | Performed by: NURSE PRACTITIONER

## 2020-04-27 PROCEDURE — 64615 CHEMODENERV MUSC MIGRAINE: CPT | Performed by: NURSE PRACTITIONER

## 2020-04-27 NOTE — PROGRESS NOTES
"CC: Botox Injections  Indication for Procedure: Chronic migraines          /58   Pulse 63   Ht 162.6 cm (64\")   Wt 56.2 kg (124 lb)   SpO2 99%   BMI 21.28 kg/m²     Date of last Injection: n/a first injection   Response: na  Onset of response: na  Wearing off: na  Side Effects: na  : Allergan  Lot #: [id number]  Expiration:09/2022  NDC:[id number]      With written consent obtained and risks and benefits explained to patient.     Botox injected using FDA approved protocol for chronic migraine prevention.   10 units, Procerus 5 units, Frontalis 20 units, Temporalis 40 units, Occipitalis 30 units, Cervical Paraspinals 20 units, Trapezius 30 units.     The total amount injected in units is 155.  The total amount wasted in units is 45.  The total amount submitted in units is 200.  Botox was supplied by specialty pharmacy    Patient tolerated procedure well with no immediate complications.     We have discussed risk and benefits of this Botox procedure and common side effects including headache, neck pain, neck stiffness or weakness, ptosis, flu-like symptoms as well as more serious possible adverse effects including possible dysphagia, respiratory distress or even death (death has only been reported once with adults for Botox for migraines in another state when mixed with lidocaine solution which we do not use lidocaine solution in our practice for mixing Botox). Verbalizes understanding, accepts risks and agrees with moving forward with Botox injections for chronic migraine prevention.      Pt has also asked I take over her gabapentin, she uses this for tremor and neuropathic pain.  She has a new pain doctor who only does injections, she is only taking 100 mg at night.  Drug screen pending, CS agreement signed    "

## 2020-05-01 ENCOUNTER — TELEPHONE (OUTPATIENT)
Dept: NEUROLOGY | Facility: CLINIC | Age: 50
End: 2020-05-01

## 2020-05-01 LAB
ACHR AB SER-SCNC: <0.03 NMOL/L (ref 0–0.24)
ACHR BLOCK AB/ACHR TOTAL SFR SER: 20 % (ref 0–25)
ACHR MOD AB/ACHR TOTAL SFR SER: <12 % (ref 0–20)
STRIA MUS AB TITR SER IF: NEGATIVE {TITER}

## 2020-05-01 NOTE — TELEPHONE ENCOUNTER
----- Message from CHRIS Motley sent at 5/1/2020  2:56 PM EDT -----  Please let her know her labs for myasthenia are negative

## 2020-05-03 LAB
11OH-THC SPEC-MCNC: NEGATIVE NG/ML
AMPHETAMINES SERPL QL SCN: NEGATIVE NG/ML
BARBITURATES SERPLBLD QL: NEGATIVE UG/ML
BENZODIAZ SERPL QL: NEGATIVE NG/ML
BZE BLD QL SCN: NEGATIVE NG/ML
CANNABIDIOL SERPLBLD CFM-MCNC: NEGATIVE NG/ML
CANNABINOIDS SPEC QL CFM: NORMAL
CANNABINOL: NEGATIVE NG/ML
CARBOXYTHC SPEC-MCNC: NORMAL NG/ML
ETHANOL BLD-MCNC: NEGATIVE GM/DL
METHADONE UR QL: NEGATIVE NG/ML
OPIATES SERPL QL SCN: NEGATIVE NG/ML
OXYCODONE SERPL-MCNC: NEGATIVE NG/ML
PCP SPEC-MCNC: NEGATIVE NG/ML
PROPOXYPH SPEC QL: NEGATIVE NG/ML
THC SERPLBLD CFM-MCNC: NEGATIVE NG/ML
THC SERPLBLD CFM-MCNC: NORMAL NG/ML

## 2020-05-04 ENCOUNTER — TELEMEDICINE (OUTPATIENT)
Dept: PSYCHIATRY | Facility: CLINIC | Age: 50
End: 2020-05-04

## 2020-05-04 DIAGNOSIS — F39 MODERATE MOOD DISORDER (HCC): Primary | ICD-10-CM

## 2020-05-04 DIAGNOSIS — F41.0 PANIC DISORDER: ICD-10-CM

## 2020-05-04 DIAGNOSIS — G47.00 INSOMNIA, UNSPECIFIED TYPE: ICD-10-CM

## 2020-05-04 PROCEDURE — 90837 PSYTX W PT 60 MINUTES: CPT | Performed by: SOCIAL WORKER

## 2020-05-04 NOTE — PROGRESS NOTES
Date of Service: May 4, 2020  Time In: 930  Time Out: 1030    PROGRESS NOTE  Data:  Marguerite Fink is a 50 y.o. female ..This was an audio and video enabled telemedicine encounter.  Patient started therapy session by discussing she has had a rough week last week.  Patient discussed increased pain due to her brain and some more migraines than normal.  Patient discussed she is missing seeing her son.  Patient says she only has 1 child and not being able to see him has been difficult on her mood.  Patient discusses increased her anxiety.  Patient discussed she has been working on the mindfulness and meditation that was discussed our previous session.  Patient discussed not being able to celebrate Mother's Day.  Patient discussed having a hard time not being able to do anything special for derby.     HPI: Depression, anxiety, panic attacks, chronic pain and seizure disorder      Clinical Maneuvering/Intervention:  Assisted patient in processing above session content; acknowledged and normalized patient’S thoughts, feelings, and concerns.  Assisted patient processing her thoughts and feelings related to mood, anxiety, panic attacks, chronic pain and seizure disorder.  Patient sees psychiatric nurse practitioner in this office.  Patient is to take her medications as prescribed and contact the nurse practitioner in this office for questions and concerns.  Patient denies SI, HI and self-harm.  Educated on panic attacks and anxiety.  Educated on conflict resolution and the importance of creating a schedule.  Validated patient's thoughts and feelings of needing to complete paperwork for disability application that becomes overwhelmed.  Educated patient on healthy boundaries with her mother as her mother is a trigger for increased stress.  Educated on bipolar disorder/mood disorder.  Encourage patient to rate her mood and to put that in her journal for this therapist and psychiatric nurse practitioner to be aware of the  future sessions. Continue to educate on the mind-body connection.  Continue to work on and educate on mindfulness and importance of grounding techniques, relaxation techniques and staying in the present/care now.  Educated on cognitive behavior therapy and putting her thoughts on trial.  Encourage patient to practice this between now and next session.  Encourage patient to get out or have a change of scenery when her mind is racing or having those obsessive ruminating thoughts.  Role played ways to do this such as get outside and do the 5 senses onto a healthy distraction in the house.    Allowed patient to freely discuss issues without interruption or judgment. Provided safe, confidential environment to facilitate the development of positive therapeutic relationship and encourage open, honest communication. Assisted patient in identifying risk factors which would indicate the need for higher level of care including thoughts to harm self or others and/or self-harming behavior and encouraged patient to contact this office, call 911, or present to the nearest emergency room should any of these events occur. Discussed crisis intervention services and means to access.  Patient adamantly and convincingly denies current suicidal or homicidal ideation or perceptual disturbance.    Assessment     Diagnoses and all orders for this visit:    Moderate mood disorder (CMS/HCC)    Insomnia, unspecified type    Panic disorder               REVIEW OF SYSTEMS:  Review of Systems       Mental Status Exam  Hygiene:   fair  Cooperation:  Cooperative  Eye Contact:  Good  Psychomotor Behavior:  Appropriate  Affect:  Appropriate  Hopelessness: 4  Speech:  Normal  Thought Process:  Goal directed  Thought Content:  Normal  Suicidal:  None  Homicidal:  None  Hallucinations:  None  Delusion:  None  Memory:  Deficits  Orientation:  Person, Place, Time and Situation  Reliability:  fair  Insight:  Good  Judgement:  Good  Impulse Control:   Good  Physical/Medical Issues:  Yes Chronic pain and seizure disorder    Patient's Support Network Includes:  , son, mother and extended family    Progress toward goal: Not at goal    Functional Status: Moderate impairment     Prognosis: Fair with Ongoing Treatment       Plan         Patient will adhere to medication regimen as prescribed and report any side effects. Patient will contact this office, call 911 or present to the nearest emergency room should suicidal or homicidal ideations occur. Provide Cognitive Behavioral Therapy and Integrative Therapy to improve functioning, maintain stability, and avoid decompensation and the need for higher level of care.          Return in about 2 weeks (around 5/18/2020).      This document is signed me Angela Gonzalez LCSW,   May 4, 2020 10:34

## 2020-05-05 ENCOUNTER — TELEPHONE (OUTPATIENT)
Dept: NEUROLOGY | Facility: CLINIC | Age: 50
End: 2020-05-05

## 2020-05-05 NOTE — TELEPHONE ENCOUNTER
----- Message from CHRIS Motley sent at 5/5/2020  8:06 AM EDT -----  Drug screen appropriate; please call in gabapentin 100 mg at night #30 with 5 RF

## 2020-05-08 DIAGNOSIS — F41.0 PANIC DISORDER: ICD-10-CM

## 2020-05-08 DIAGNOSIS — F39 MODERATE MOOD DISORDER (HCC): ICD-10-CM

## 2020-05-08 DIAGNOSIS — F33.1 MODERATE EPISODE OF RECURRENT MAJOR DEPRESSIVE DISORDER (HCC): ICD-10-CM

## 2020-05-08 DIAGNOSIS — G47.00 INSOMNIA, UNSPECIFIED TYPE: ICD-10-CM

## 2020-05-12 RX ORDER — ARIPIPRAZOLE 10 MG/1
TABLET ORAL
Qty: 30 TABLET | Refills: 1 | Status: SHIPPED | OUTPATIENT
Start: 2020-05-12 | End: 2020-05-19 | Stop reason: SDUPTHER

## 2020-05-12 RX ORDER — HYDROXYZINE HYDROCHLORIDE 10 MG/1
TABLET, FILM COATED ORAL
Qty: 60 TABLET | Refills: 1 | Status: SHIPPED | OUTPATIENT
Start: 2020-05-12 | End: 2020-05-19 | Stop reason: SDUPTHER

## 2020-05-12 RX ORDER — TRAZODONE HYDROCHLORIDE 50 MG/1
TABLET ORAL
Qty: 30 TABLET | Refills: 1 | Status: SHIPPED | OUTPATIENT
Start: 2020-05-12 | End: 2020-05-19 | Stop reason: SDUPTHER

## 2020-05-18 ENCOUNTER — TELEMEDICINE (OUTPATIENT)
Dept: PSYCHIATRY | Facility: CLINIC | Age: 50
End: 2020-05-18

## 2020-05-18 DIAGNOSIS — F41.0 PANIC DISORDER: ICD-10-CM

## 2020-05-18 DIAGNOSIS — G47.00 INSOMNIA, UNSPECIFIED TYPE: ICD-10-CM

## 2020-05-18 DIAGNOSIS — F39 MODERATE MOOD DISORDER (HCC): Primary | ICD-10-CM

## 2020-05-18 PROCEDURE — 90834 PSYTX W PT 45 MINUTES: CPT | Performed by: SOCIAL WORKER

## 2020-05-18 NOTE — PROGRESS NOTES
Date of Service: May 18, 2020  Time In: 930  Time Out: 1014    PROGRESS NOTE  Data:  Marguerite Fink is a 50 y.o. female ..This was an audio and video enabled telemedicine encounter.  Patient started therapy session by discussing she has continued with her breathing techniques/anxiety coping skills and meditation using mindfulness.  Patient discussed she actually called her current  Pranay for previous  that  8 years ago the wrong name.  Patient discussed she called Pranay Malcolm and wanted to process that.  Patient discussed that her  Pranay did not get upset.  Patient discussed her son does not refer to her current 's stepfather and it bothers her .  Patient discussed she made some big accomplishment since our last session and explained she finished her disability paperwork that we have been making goals to work towards completing.  Patient explained the anxiety that she had while doing the paperwork.  Patient discussed that she is idle her mind and mental health is compromised that she tries to schedule things throughout the day by increasing meditations and doing things around the house.  Patient discussed she has not started yoga but wants to make that our goal this time of incorporating something physical      HPI: Depression, anxiety, panic attacks, chronic pain and seizure disorder      Clinical Maneuvering/Intervention:  Assisted patient in processing above session content; acknowledged and normalized patient’S thoughts, feelings, and concerns.  Assisted patient processing her thoughts and feelings related to mood, anxiety, panic attacks, chronic pain and seizure disorder.  Patient sees psychiatric nurse practitioner in this office.  Patient is to take her medications as prescribed and contact the nurse practitioner in this office for questions and concerns.  Patient denies SI, HI and self-harm.  Educated on panic attacks and anxiety.  Educated on conflict resolution and  the importance of creating a schedule.  Validated patient's thoughts and feelings of needing to complete paperwork for disability application that becomes overwhelmed.  Educated patient on healthy boundaries with her mother as her mother is a trigger for increased stress.  Educated on bipolar disorder/mood disorder.  Encourage patient to rate her mood and to put that in her journal for this therapist and psychiatric nurse practitioner to be aware of the future sessions. Continue to educate on the mind-body connection.  Continue to work on and educate on mindfulness and importance of grounding techniques, relaxation techniques and staying in the present/care now.  Educated on cognitive behavior therapy and putting her thoughts on trial.  Encourage patient to practice this between now and next session.  Encourage patient to get out or have a change of scenery when her mind is racing or having those obsessive ruminating thoughts.  Role played ways to do this such as get outside and do the 5 senses onto a healthy distraction in the house.    Allowed patient to freely discuss issues without interruption or judgment. Provided safe, confidential environment to facilitate the development of positive therapeutic relationship and encourage open, honest communication. Assisted patient in identifying risk factors which would indicate the need for higher level of care including thoughts to harm self or others and/or self-harming behavior and encouraged patient to contact this office, call 911, or present to the nearest emergency room should any of these events occur. Discussed crisis intervention services and means to access.  Patient adamantly and convincingly denies current suicidal or homicidal ideation or perceptual disturbance.    Assessment     Diagnoses and all orders for this visit:    Moderate mood disorder (CMS/HCC)    Insomnia, unspecified type    Panic disorder               REVIEW OF SYSTEMS:  Review of Systems        Mental Status Exam  Hygiene:   fair  Cooperation:  Cooperative  Eye Contact:  Good  Psychomotor Behavior:  Appropriate  Affect:  Appropriate  Hopelessness: 4  Speech:  Normal  Thought Process:  Goal directed  Thought Content:  Normal  Suicidal:  None  Homicidal:  None  Hallucinations:  None  Delusion:  None  Memory:  Deficits  Orientation:  Person, Place, Time and Situation  Reliability:  fair  Insight:  Good  Judgement:  Good  Impulse Control:  Good  Physical/Medical Issues:  Yes Chronic pain and seizure disorder    Patient's Support Network Includes:  , son, mother and extended family    Progress toward goal: Not at goal    Functional Status: Moderate impairment     Prognosis: Fair with Ongoing Treatment       Plan         Patient will adhere to medication regimen as prescribed and report any side effects. Patient will contact this office, call 911 or present to the nearest emergency room should suicidal or homicidal ideations occur. Provide Cognitive Behavioral Therapy and Integrative Therapy to improve functioning, maintain stability, and avoid decompensation and the need for higher level of care.          Return in about 2 weeks (around 6/1/2020).      This document is signed me Angela Gonzalez LCSW,   May 18, 2020 10:32

## 2020-05-19 ENCOUNTER — TELEMEDICINE (OUTPATIENT)
Dept: PSYCHIATRY | Facility: CLINIC | Age: 50
End: 2020-05-19

## 2020-05-19 DIAGNOSIS — G47.00 INSOMNIA, UNSPECIFIED TYPE: ICD-10-CM

## 2020-05-19 DIAGNOSIS — F39 MODERATE MOOD DISORDER (HCC): Primary | ICD-10-CM

## 2020-05-19 DIAGNOSIS — F41.0 PANIC DISORDER: ICD-10-CM

## 2020-05-19 PROCEDURE — 99213 OFFICE O/P EST LOW 20 MIN: CPT | Performed by: NURSE PRACTITIONER

## 2020-05-19 RX ORDER — TRAZODONE HYDROCHLORIDE 50 MG/1
TABLET ORAL
Qty: 30 TABLET | Refills: 2 | Status: SHIPPED | OUTPATIENT
Start: 2020-05-19 | End: 2020-08-21 | Stop reason: SDUPTHER

## 2020-05-19 RX ORDER — HYDROXYZINE HYDROCHLORIDE 10 MG/1
TABLET, FILM COATED ORAL
Qty: 60 TABLET | Refills: 2 | Status: SHIPPED | OUTPATIENT
Start: 2020-05-19 | End: 2020-08-17 | Stop reason: SDUPTHER

## 2020-05-19 RX ORDER — ARIPIPRAZOLE 10 MG/1
10 TABLET ORAL DAILY
Qty: 30 TABLET | Refills: 2 | Status: SHIPPED | OUTPATIENT
Start: 2020-05-19 | End: 2020-08-21 | Stop reason: SDUPTHER

## 2020-05-19 NOTE — PROGRESS NOTES
"Marguerite Fink is a 50 y.o. female is here today for medication management follow-up.    Chief Complaint:      ICD-10-CM ICD-9-CM   1. Moderate mood disorder (CMS/Formerly [facility]) F39 296.90   2. Panic disorder F41.0 300.01   3. Insomnia, unspecified type G47.00 780.52       History of Present Illness:  Pt presents for follow up visit and medication management of depressive symptoms via [employer] Video. Pt continues to report stable mood symptoms. States she has not experienced any grand mal seizures since last visit. States she will experience \"absence\" seizures from time to time.     Pt reports the presence/absence of the following depressive symptoms: (-) depressed mood, (-) reduced appetite, (-) increased appetite, (-) poor concentration, (-) hopelessness, (-) worthlessness, (-) insomnia, (-) hypersomnia, (-) psychomotor agitation, (-) irritability, (-) anhedonia, (-) amotivation, (+) fatigue, (-) suicidal ideation, (-) suicidal plan, (-) suicidal intent, (-) recurrent thoughts about death, and (-) homicidal ideation.        The following portions of the patient's history were reviewed and updated as appropriate: allergies, current medications, past family history, past medical history, past social history, past surgical history and problem list.    Review of Systems;;  Review of Systems   Constitutional: Negative.  Negative for activity change, appetite change, unexpected weight gain and unexpected weight loss.   Respiratory: Negative.    Cardiovascular: Negative.  Negative for chest pain.   Gastrointestinal: Negative.  Negative for diarrhea, nausea and vomiting.   Genitourinary: Negative.    Musculoskeletal: Negative.    Skin: Negative for rash and bruise.   Neurological: Negative.  Negative for dizziness, seizures and speech difficulty.   Psychiatric/Behavioral: Negative.  Negative for agitation, behavioral problems, decreased concentration, dysphoric mood, hallucinations, self-injury, sleep disturbance, suicidal ideas, " negative for hyperactivity, depressed mood and stress. The patient is not nervous/anxious.        Physical Exam;;  Physical Exam    not currently breastfeeding.    Current Medications;;    Current Outpatient Medications:   •  ARIPiprazole (ABILIFY) 10 MG tablet, Take 1 tablet by mouth Daily., Disp: 30 tablet, Rfl: 2  •  B Complex Vitamins (VITAMIN B COMPLEX) capsule capsule, Take  by mouth., Disp: , Rfl:   •  diclofenac (VOLTAREN) 1 % gel gel, Apply 4 g topically to the appropriate area as directed 4 (Four) Times a Day As Needed (pain)., Disp: 100 g, Rfl: 11  •  estradiol (VIVELLE-DOT) 0.1 MG/24HR patch, Place 1 patch on the skin as directed by provider 2 (Two) Times a Week., Disp: 8 patch, Rfl: 12  •  gabapentin (NEURONTIN) 100 MG capsule, Take 100 mg by mouth every night at bedtime. Up to two pills at night, Disp: , Rfl: 0  •  hydrOXYzine (ATARAX) 10 MG tablet, TAKE 1 TO 2 TABLETS BY MOUTH DAILY IF NEEDED FOR PANIC, Disp: 60 tablet, Rfl: 2  •  levETIRAcetam (KEPPRA) 500 MG tablet, Take 1.5 PO BID, Disp: 75 tablet, Rfl: 11  •  lidocaine (XYLOCAINE) 2 % jelly, Apply  topically to the appropriate area as directed As Needed for Mild Pain ., Disp: 85 g, Rfl: 1  •  loratadine-pseudoephedrine (CLARITIN-D 12 HOUR) 5-120 MG per 12 hr tablet, Take 1 tablet by mouth 2 (Two) Times a Day., Disp: 180 tablet, Rfl: 3  •  Methylnaltrexone Bromide (RELISTOR) 150 MG tablet, Take 150 mg by mouth Daily., Disp: 90 tablet, Rfl: 3  •  montelukast (SINGULAIR) 10 MG tablet, Take 1 tablet by mouth Every Night., Disp: 30 tablet, Rfl: 5  •  OnabotulinumtoxinA 200 units reconstituted solution, Inject 200 units IM into head neck shoulders every 12 weeks per FDA protocol. Dx G43.709, Disp: 1 each, Rfl: 3  •  oxyCODONE-acetaminophen (ENDOCET)  MG per tablet, Every 6 (Six) Hours., Disp: , Rfl:   •  rOPINIRole (REQUIP) 0.5 MG tablet, Take 1 tablet by mouth Every Night. Take 1 hour before bedtime., Disp: 30 tablet, Rfl: 11  •  SUMAtriptan  (IMITREX) 20 MG/ACT nasal spray, INSTILL 1 SPRAY IN 1 NOSTRIL IF NEEDED OMIG, Disp: 18 each, Rfl: 0  •  topiramate (TOPAMAX) 100 MG tablet, Take 1 tablet by mouth 2 (Two) Times a Day., Disp: 60 tablet, Rfl: 11  •  traZODone (DESYREL) 50 MG tablet, TAKE 1/2 TO 1 TABLET BY MOUTH EVERY NIGHT AT BEDTIME, Disp: 30 tablet, Rfl: 2  •  valACYclovir (VALTREX) 1000 MG tablet, Take 1 tablet by mouth 3 (Three) Times a Day., Disp: 21 tablet, Rfl: 1  •  WIXELA INHUB 250-50 MCG/DOSE DISKUS, , Disp: , Rfl:     Lab Results:       Mental Status Exam:   Hygiene:   good  Cooperation:  Cooperative  Eye Contact:  Good  Psychomotor Behavior:  Appropriate  Mood: Dysthymic  Affect:  Appropriate  Hopelessness: Denies  Speech:  Normal  Thought Process:  Goal directed  Thought Content:  Normal  Suicidal:  None  Homicidal:  None  Hallucinations:  None  Delusion:  None  Memory:  Intact  Orientation:  Person, Place, Time and Situation  Reliability:  good  Insight:  Fair  Judgement:  Fair  Impulse Control:  Fair  Physical/Medical Issues:  Chronic Pain/Epilespy        Assessment Plan;;  Diagnoses and all orders for this visit:    Moderate mood disorder (CMS/HCC)  -     ARIPiprazole (ABILIFY) 10 MG tablet; Take 1 tablet by mouth Daily.    Panic disorder  -     hydrOXYzine (ATARAX) 10 MG tablet; TAKE 1 TO 2 TABLETS BY MOUTH DAILY IF NEEDED FOR PANIC    Insomnia, unspecified type  -     traZODone (DESYREL) 50 MG tablet; TAKE 1/2 TO 1 TABLET BY MOUTH EVERY NIGHT AT BEDTIME    Treatment Plan        Problem: Mood symptoms/ Insomnia      Intervention: The prescription and adjustment of medications and monitoring of side effects. Continue current doses of Abilify, Trazodone, and Hydroxyzines prn. Continue therapy with Angela Gonzalez LCSW    Long term Goal: Overall improvement in mood and functioning per patient self -report      Short term Goal: Medication adherence. Improvement in symptoms per patient self-report.       A psychological evaluation was  conducted in order to assess past and current level of functioning. Areas assessed included, but were not limited to: perception of social support, perception of ability to face and deal with challenges in life (positive functioning), anxiety symptoms, depressive symptoms, perspective on beliefs/belief system, coping skills for stress, intelligence level,  Therapeutic rapport was established. Interventions conducted today were geared towards incorporating medication management along with support for continued therapy. Education was also provided as to the med management with this provider and what to expect in subsequent sessions.    We discussed risks, benefits,goals and side effects of the above medication and the patient was agreeable with the plan.Patient was educated on the importance of compliance with treatment and follow-up appointments. Patient is aware to contact the [facility] with any worsening of symptoms. To call for questions or concerns and return early if necessary. Patent is agreeable to go to the Emergency Department or call 911 should they begin SI/HI.         Return in about 3 months (around 8/19/2020) for Follow Up.    Ana Rosa Osorio, DNP, APRN, PMHNP-BC, FNP-C

## 2020-06-03 ENCOUNTER — OFFICE VISIT (OUTPATIENT)
Dept: INTERNAL MEDICINE | Facility: CLINIC | Age: 50
End: 2020-06-03

## 2020-06-03 VITALS
BODY MASS INDEX: 20.96 KG/M2 | DIASTOLIC BLOOD PRESSURE: 60 MMHG | HEART RATE: 53 BPM | TEMPERATURE: 97.5 F | OXYGEN SATURATION: 100 % | SYSTOLIC BLOOD PRESSURE: 102 MMHG | RESPIRATION RATE: 16 BRPM | WEIGHT: 122.8 LBS | HEIGHT: 64 IN

## 2020-06-03 DIAGNOSIS — J44.9 CHRONIC OBSTRUCTIVE PULMONARY DISEASE, UNSPECIFIED COPD TYPE (HCC): ICD-10-CM

## 2020-06-03 DIAGNOSIS — R91.8 ABNORMAL LUNG FIELD: ICD-10-CM

## 2020-06-03 DIAGNOSIS — E53.1 VITAMIN B6 DEFICIENCY: ICD-10-CM

## 2020-06-03 DIAGNOSIS — R53.83 FATIGUE, UNSPECIFIED TYPE: Primary | ICD-10-CM

## 2020-06-03 PROCEDURE — 99214 OFFICE O/P EST MOD 30 MIN: CPT | Performed by: INTERNAL MEDICINE

## 2020-06-03 RX ORDER — LATANOPROST 50 UG/ML
SOLUTION/ DROPS OPHTHALMIC
COMMUNITY
Start: 2020-05-26 | End: 2021-03-30 | Stop reason: ALTCHOICE

## 2020-06-03 NOTE — PROGRESS NOTES
Subjective     Patient ID: Marguerite Fink is a 50 y.o. female. Patient is here for management of multiple medical problems.     Chief Complaint   Patient presents with   • Weight Loss     3 month follow-up     History of Present Illness     Wt loss.  And now stable.    BP down.       The following portions of the patient's history were reviewed and updated as appropriate: allergies, current medications, past family history, past medical history, past social history, past surgical history and problem list.    Review of Systems   Constitutional: Positive for fatigue. Negative for diaphoresis.   Respiratory: Positive for shortness of breath.    Psychiatric/Behavioral: Negative for sleep disturbance.   All other systems reviewed and are negative.      Current Outpatient Medications:   •  ARIPiprazole (ABILIFY) 10 MG tablet, Take 1 tablet by mouth Daily., Disp: 30 tablet, Rfl: 2  •  B Complex Vitamins (VITAMIN B COMPLEX) capsule capsule, Take  by mouth., Disp: , Rfl:   •  diclofenac (VOLTAREN) 1 % gel gel, Apply 4 g topically to the appropriate area as directed 4 (Four) Times a Day As Needed (pain)., Disp: 100 g, Rfl: 11  •  estradiol (VIVELLE-DOT) 0.1 MG/24HR patch, Place 1 patch on the skin as directed by provider 2 (Two) Times a Week., Disp: 8 patch, Rfl: 12  •  gabapentin (NEURONTIN) 100 MG capsule, Take 100 mg by mouth every night at bedtime. Up to two pills at night, Disp: , Rfl: 0  •  hydrOXYzine (ATARAX) 10 MG tablet, TAKE 1 TO 2 TABLETS BY MOUTH DAILY IF NEEDED FOR PANIC, Disp: 60 tablet, Rfl: 2  •  latanoprost (XALATAN) 0.005 % ophthalmic solution, INSTILL 1 DROP INTO BOTH EYES DAILY AT BEDTIME, Disp: , Rfl:   •  levETIRAcetam (KEPPRA) 500 MG tablet, Take 1.5 PO BID, Disp: 75 tablet, Rfl: 11  •  lidocaine (XYLOCAINE) 2 % jelly, Apply  topically to the appropriate area as directed As Needed for Mild Pain ., Disp: 85 g, Rfl: 1  •  Methylnaltrexone Bromide (RELISTOR) 150 MG tablet, Take 150 mg by mouth Daily.,  "Disp: 90 tablet, Rfl: 3  •  montelukast (SINGULAIR) 10 MG tablet, Take 1 tablet by mouth Every Night., Disp: 30 tablet, Rfl: 5  •  OnabotulinumtoxinA 200 units reconstituted solution, Inject 200 units IM into head neck shoulders every 12 weeks per FDA protocol. Dx G43.709, Disp: 1 each, Rfl: 3  •  oxyCODONE-acetaminophen (ENDOCET)  MG per tablet, 1 tablet Daily., Disp: , Rfl:   •  rOPINIRole (REQUIP) 0.5 MG tablet, Take 1 tablet by mouth Every Night. Take 1 hour before bedtime., Disp: 30 tablet, Rfl: 11  •  SUMAtriptan (IMITREX) 20 MG/ACT nasal spray, INSTILL 1 SPRAY IN 1 NOSTRIL IF NEEDED OMIG, Disp: 18 each, Rfl: 0  •  topiramate (TOPAMAX) 100 MG tablet, Take 1 tablet by mouth 2 (Two) Times a Day., Disp: 60 tablet, Rfl: 11  •  traZODone (DESYREL) 50 MG tablet, TAKE 1/2 TO 1 TABLET BY MOUTH EVERY NIGHT AT BEDTIME, Disp: 30 tablet, Rfl: 2  •  WIXELA INHUB 250-50 MCG/DOSE DISKUS, Inhale 1 puff Daily., Disp: 60 each, Rfl: 11  •  Umeclidinium Bromide (INCRUSE ELLIPTA) 62.5 MCG/INH aerosol powder , Inhale 1 puff Daily., Disp: 1 each, Rfl: 11    Objective      Blood pressure 102/60, pulse 53, temperature 97.5 °F (36.4 °C), temperature source Temporal, resp. rate 16, height 162.6 cm (64\"), weight 55.7 kg (122 lb 12.8 oz), SpO2 100 %, not currently breastfeeding.    Physical Exam     General Appearance:    Alert, cooperative, no distress, appears stated age   Head:    Normocephalic, without obvious abnormality, atraumatic   Eyes:    PERRL, conjunctiva/corneas clear, EOM's intact   Ears:    Normal TM's and external ear canals, both ears   Nose:   Nares normal, septum midline, mucosa normal, no drainage   or sinus tenderness   Throat:   Lips, mucosa, and tongue normal; teeth and gums normal   Neck:   Supple, symmetrical, trachea midline, no adenopathy;        thyroid:  No enlargement/tenderness/nodules; no carotid    bruit or JVD   Back:     Symmetric, no curvature, ROM normal, no CVA tenderness   Lungs:     Wheezing " in left lower lung     Chest wall:    No tenderness or deformity   Heart:    Regular rate and rhythm, S1 and S2 normal, no murmur,        rub or gallop   Abdomen:     Soft, non-tender, bowel sounds active all four quadrants,     no masses, no organomegaly   Extremities:   Extremities normal, atraumatic, no cyanosis or edema   Pulses:   2+ and symmetric all extremities   Skin:   Skin color, texture, turgor normal, no rashes or lesions   Lymph nodes:   Cervical, supraclavicular, and axillary nodes normal   Neurologic:   CNII-XII intact. Normal strength, sensation and reflexes       throughout      Results for orders placed or performed in visit on 04/27/20   Drug Screen 11 w/Conf, Serum   Result Value Ref Range    Ethyl Alcohol, Enz Negative Cutoff:0.020 gm/dL    Amphetamine Screen Negative Cutoff:50 ng/mL    Barbiturates Screen Negative Cutoff:0.1 ug/mL    Benzodiazepine Screen Negative Cutoff:20 ng/mL    Cocaine + Metabolite Screen Negative Cutoff:25 ng/mL    Phencyclidine Screen Negative Cutoff:8 ng/mL    THC, Screen Comment Cutoff:5 ng/mL    Opiates Negative Cutoff:5 ng/mL    Oxycodone Negative Cutoff:5 ng/mL    Methadone Screen Negative Cutoff:25 ng/mL    Propoxyphene Negative Cutoff:50 ng/mL   THC,MS,WB / SP RFX   Result Value Ref Range    Cannabinoid Confirmation Comment     Tetrahydrocannabinol(THC) Negative ng/mL    Carboxy THC Comment ng/mL    Hydroxy-THC Negative ng/mL    Cannabinol Negative ng/mL    Cannabidiol Negative ng/mL         Assessment/Plan       Marguerite was seen today for weight loss.    Diagnoses and all orders for this visit:    Fatigue, unspecified type  -     TSH  -     T4, Free  -     Comprehensive Metabolic Panel  -     CBC & Differential  -     Vitamin B6    Vitamin B6 deficiency  -     Vitamin B6    Abnormal lung field  -     CT Chest Without Contrast; Future    Chronic obstructive pulmonary disease, unspecified COPD type (CMS/HCC)  -     Vitamin B12  -     Umeclidinium Bromide (INCRUSE  ELLIPTA) 62.5 MCG/INH aerosol powder ; Inhale 1 puff Daily.    Other orders  -     WIXELA INHUB 250-50 MCG/DOSE DISKUS; Inhale 1 puff Daily.      Return in about 6 weeks (around 7/15/2020).          There are no Patient Instructions on file for this visit.     Juan Clemens MD    Assessment/Plan

## 2020-06-09 ENCOUNTER — LAB (OUTPATIENT)
Dept: LAB | Facility: HOSPITAL | Age: 50
End: 2020-06-09

## 2020-06-09 ENCOUNTER — HOSPITAL ENCOUNTER (OUTPATIENT)
Dept: CT IMAGING | Facility: HOSPITAL | Age: 50
Discharge: HOME | End: 2020-06-09
Admitting: INTERNAL MEDICINE

## 2020-06-09 DIAGNOSIS — R91.8 ABNORMAL LUNG FIELD: ICD-10-CM

## 2020-06-09 LAB
ALBUMIN SERPL-MCNC: 4.4 G/DL (ref 3.5–5.2)
ALBUMIN/GLOB SERPL: 2 G/DL
ALP SERPL-CCNC: 43 U/L (ref 39–117)
ALT SERPL W P-5'-P-CCNC: 9 U/L (ref 1–33)
ANION GAP SERPL CALCULATED.3IONS-SCNC: 8.8 MMOL/L (ref 5–15)
AST SERPL-CCNC: 17 U/L (ref 1–32)
BASOPHILS # BLD AUTO: 0.06 10*3/MM3 (ref 0–0.2)
BASOPHILS NFR BLD AUTO: 0.6 % (ref 0–1.5)
BILIRUB SERPL-MCNC: 0.3 MG/DL (ref 0.2–1.2)
BUN BLD-MCNC: 11 MG/DL (ref 6–20)
BUN/CREAT SERPL: 13.9 (ref 7–25)
CALCIUM SPEC-SCNC: 9 MG/DL (ref 8.6–10.5)
CHLORIDE SERPL-SCNC: 108 MMOL/L (ref 98–107)
CO2 SERPL-SCNC: 23.2 MMOL/L (ref 22–29)
CREAT BLD-MCNC: 0.79 MG/DL (ref 0.57–1)
DEPRECATED RDW RBC AUTO: 42.4 FL (ref 37–54)
EOSINOPHIL # BLD AUTO: 0.11 10*3/MM3 (ref 0–0.4)
EOSINOPHIL NFR BLD AUTO: 1 % (ref 0.3–6.2)
ERYTHROCYTE [DISTWIDTH] IN BLOOD BY AUTOMATED COUNT: 12.3 % (ref 12.3–15.4)
GFR SERPL CREATININE-BSD FRML MDRD: 77 ML/MIN/1.73
GLOBULIN UR ELPH-MCNC: 2.2 GM/DL
GLUCOSE BLD-MCNC: 98 MG/DL (ref 65–99)
HCT VFR BLD AUTO: 37.9 % (ref 34–46.6)
HGB BLD-MCNC: 12.7 G/DL (ref 12–15.9)
IMM GRANULOCYTES # BLD AUTO: 0.03 10*3/MM3 (ref 0–0.05)
IMM GRANULOCYTES NFR BLD AUTO: 0.3 % (ref 0–0.5)
LYMPHOCYTES # BLD AUTO: 2.94 10*3/MM3 (ref 0.7–3.1)
LYMPHOCYTES NFR BLD AUTO: 27.8 % (ref 19.6–45.3)
MCH RBC QN AUTO: 31.9 PG (ref 26.6–33)
MCHC RBC AUTO-ENTMCNC: 33.5 G/DL (ref 31.5–35.7)
MCV RBC AUTO: 95.2 FL (ref 79–97)
MONOCYTES # BLD AUTO: 0.59 10*3/MM3 (ref 0.1–0.9)
MONOCYTES NFR BLD AUTO: 5.6 % (ref 5–12)
NEUTROPHILS # BLD AUTO: 6.85 10*3/MM3 (ref 1.7–7)
NEUTROPHILS NFR BLD AUTO: 64.7 % (ref 42.7–76)
NRBC BLD AUTO-RTO: 0 /100 WBC (ref 0–0.2)
PLATELET # BLD AUTO: 243 10*3/MM3 (ref 140–450)
PMV BLD AUTO: 10 FL (ref 6–12)
POTASSIUM BLD-SCNC: 4.1 MMOL/L (ref 3.5–5.2)
PROT SERPL-MCNC: 6.6 G/DL (ref 6–8.5)
RBC # BLD AUTO: 3.98 10*6/MM3 (ref 3.77–5.28)
SODIUM BLD-SCNC: 140 MMOL/L (ref 136–145)
T4 FREE SERPL-MCNC: 1.09 NG/DL (ref 0.93–1.7)
TSH SERPL DL<=0.05 MIU/L-ACNC: 0.63 UIU/ML (ref 0.27–4.2)
VIT B12 BLD-MCNC: 357 PG/ML (ref 211–946)
WBC NRBC COR # BLD: 10.58 10*3/MM3 (ref 3.4–10.8)

## 2020-06-09 PROCEDURE — 71250 CT THORAX DX C-: CPT

## 2020-06-09 PROCEDURE — 82607 VITAMIN B-12: CPT | Performed by: INTERNAL MEDICINE

## 2020-06-09 PROCEDURE — 84207 ASSAY OF VITAMIN B-6: CPT | Performed by: INTERNAL MEDICINE

## 2020-06-09 PROCEDURE — 84439 ASSAY OF FREE THYROXINE: CPT | Performed by: INTERNAL MEDICINE

## 2020-06-09 PROCEDURE — 84443 ASSAY THYROID STIM HORMONE: CPT | Performed by: INTERNAL MEDICINE

## 2020-06-09 PROCEDURE — 85025 COMPLETE CBC W/AUTO DIFF WBC: CPT | Performed by: INTERNAL MEDICINE

## 2020-06-09 PROCEDURE — 80053 COMPREHEN METABOLIC PANEL: CPT | Performed by: INTERNAL MEDICINE

## 2020-06-09 PROCEDURE — 36415 COLL VENOUS BLD VENIPUNCTURE: CPT | Performed by: INTERNAL MEDICINE

## 2020-07-14 ENCOUNTER — TELEMEDICINE (OUTPATIENT)
Dept: PSYCHIATRY | Facility: CLINIC | Age: 50
End: 2020-07-14

## 2020-07-14 DIAGNOSIS — F39 MODERATE MOOD DISORDER (HCC): Primary | ICD-10-CM

## 2020-07-14 DIAGNOSIS — F41.0 PANIC DISORDER: ICD-10-CM

## 2020-07-14 DIAGNOSIS — G47.00 INSOMNIA, UNSPECIFIED TYPE: ICD-10-CM

## 2020-07-14 PROCEDURE — 90834 PSYTX W PT 45 MINUTES: CPT | Performed by: SOCIAL WORKER

## 2020-07-14 NOTE — PROGRESS NOTES
Date of Service: July 14, 2020  Time In: 1230  Time Out: 119    PROGRESS NOTE  Data:  Marguerite Fink is a 50 y.o. female ..This was an audio and video enabled telemedicine encounter.  Patient started therapy session by discussing she canceled her last appt and didn't realize it would be a while to get in with this therapist. patient discussed physically she is not feeling well. Patient discussed mentally she has been having increased anxiety and more irritability. patient dicussed being overwhelmed by the lack of control and all or nothing thinking. Patient dicussed she procrastinates unless she feels she will do something great or perfect. Patient discussed she has been staying in due to COVID.     HPI: Depression, anxiety, panic attacks, chronic pain and seizure disorder      Clinical Maneuvering/Intervention:  Assisted patient in processing above session content; acknowledged and normalized patient’S thoughts, feelings, and concerns.  Assisted patient processing her thoughts and feelings related to mood, anxiety, panic attacks, chronic pain and seizure disorder.  Patient sees psychiatric nurse practitioner in this office.  Patient is to take her medications as prescribed and contact the nurse practitioner in this office for questions and concerns.  Patient denies SI, HI and self-harm.  Educated on panic attacks and anxiety.  Educated on conflict resolution and the importance of creating a schedule.  Validated patient's thoughts and feelings of needing to complete paperwork for disability application that becomes overwhelmed.  Educated patient on healthy boundaries with her mother as her mother is a trigger for increased stress.  Educated on bipolar disorder/mood disorder.  Encourage patient to rate her mood and to put that in her journal for this therapist and psychiatric nurse practitioner to be aware of the future sessions. Continue to educate on the mind-body connection.  Educated on and reviewed cognitive  distortions and patient to work on self awareness. Encouraged patient to make a list of things to do that are healthy coping skills and distractions to create balance, moot imperilment and decrease anxiety. Patient agreeable to work on this and will see back in 2 weeks.     Allowed patient to freely discuss issues without interruption or judgment. Provided safe, confidential environment to facilitate the development of positive therapeutic relationship and encourage open, honest communication. Assisted patient in identifying risk factors which would indicate the need for higher level of care including thoughts to harm self or others and/or self-harming behavior and encouraged patient to contact this office, call 911, or present to the nearest emergency room should any of these events occur. Discussed crisis intervention services and means to access.  Patient adamantly and convincingly denies current suicidal or homicidal ideation or perceptual disturbance.    Assessment     Diagnoses and all orders for this visit:    Moderate mood disorder (CMS/HCC)    Panic disorder    Insomnia, unspecified type               REVIEW OF SYSTEMS:  Review of Systems       Mental Status Exam  Hygiene:   fair  Cooperation:  Cooperative  Eye Contact:  Good  Psychomotor Behavior:  Appropriate  Affect:  Appropriate  Hopelessness: 4  Speech:  Normal  Thought Process:  Goal directed  Thought Content:  Normal  Suicidal:  None  Homicidal:  None  Hallucinations:  None  Delusion:  None  Memory:  Deficits  Orientation:  Person, Place, Time and Situation  Reliability:  fair  Insight:  Good  Judgement:  Good  Impulse Control:  Good  Physical/Medical Issues:  Yes Chronic pain and seizure disorder    Patient's Support Network Includes:  , son, mother and extended family    Progress toward goal: Not at goal    Functional Status: Moderate impairment     Prognosis: Fair with Ongoing Treatment       Plan         Patient will adhere to medication  regimen as prescribed and report any side effects. Patient will contact this office, call 911 or present to the nearest emergency room should suicidal or homicidal ideations occur. Provide Cognitive Behavioral Therapy and Integrative Therapy to improve functioning, maintain stability, and avoid decompensation and the need for higher level of care.          Return in about 2 weeks (around 7/28/2020), or if symptoms worsen or fail to improve.      This document is signed me Angela Gonzalez LCSW,   July 14, 2020 13:37

## 2020-07-16 DIAGNOSIS — Z91.09 ENVIRONMENTAL ALLERGIES: ICD-10-CM

## 2020-07-16 RX ORDER — MONTELUKAST SODIUM 10 MG/1
TABLET ORAL
Qty: 30 TABLET | Refills: 5 | Status: SHIPPED | OUTPATIENT
Start: 2020-07-16 | End: 2021-01-05

## 2020-07-21 ENCOUNTER — PROCEDURE VISIT (OUTPATIENT)
Dept: NEUROLOGY | Facility: CLINIC | Age: 50
End: 2020-07-21

## 2020-07-21 VITALS
DIASTOLIC BLOOD PRESSURE: 60 MMHG | OXYGEN SATURATION: 98 % | WEIGHT: 125 LBS | HEART RATE: 58 BPM | BODY MASS INDEX: 21.34 KG/M2 | SYSTOLIC BLOOD PRESSURE: 100 MMHG | TEMPERATURE: 97.1 F | HEIGHT: 64 IN

## 2020-07-21 DIAGNOSIS — G43.019 INTRACTABLE MIGRAINE WITHOUT AURA AND WITHOUT STATUS MIGRAINOSUS: ICD-10-CM

## 2020-07-21 PROCEDURE — 64615 CHEMODENERV MUSC MIGRAINE: CPT | Performed by: NURSE PRACTITIONER

## 2020-07-21 RX ORDER — SUMATRIPTAN 20 MG/1
SPRAY NASAL
Qty: 18 EACH | Refills: 0 | Status: SHIPPED | OUTPATIENT
Start: 2020-07-21 | End: 2021-12-13 | Stop reason: SDUPTHER

## 2020-07-21 NOTE — PROGRESS NOTES
"CC: Botox Injections  Indication for Procedure: Chronic migraines          /60   Pulse 58   Temp 97.1 °F (36.2 °C)   Ht 162.6 cm (64\")   Wt 56.7 kg (125 lb)   SpO2 98%   BMI 21.46 kg/m²     Date of last Injection: 4/27/20   Response: 60% reduction  Onset of response: 1-2 weeks  Wearing off: 10 weeks  Side Effects: none  :Segterra ([employer])  Lot #:  [id number]  Expiration: 02/2023  NDC:[id number]      With written consent obtained and risks and benefits explained to patient.     Botox injected using FDA approved protocol for chronic migraine prevention.   10 units, Procerus 5 units, Frontalis 20 units, Temporalis 40 units, Occipitalis 30 units, Cervical Paraspinals 20 units, Trapezius 30 units.     The total amount injected in units is 155.  The total amount wasted in units is 45.  The total amount submitted in units is 200.  Botox was supplied by specialty pharmacy  Patient tolerated procedure well with no immediate complications.     We have discussed risk and benefits of this Botox procedure and common side effects including headache, neck pain, neck stiffness or weakness, ptosis, flu-like symptoms as well as more serious possible adverse effects including possible dysphagia, respiratory distress or even death (death has only been reported once with adults for Botox for migraines in another state when mixed with lidocaine solution which we do not use lidocaine solution in our practice for mixing Botox). Verbalizes understanding, accepts risks and agrees with moving forward with Botox injections for chronic migraine prevention.    "

## 2020-07-28 ENCOUNTER — TELEMEDICINE (OUTPATIENT)
Dept: PSYCHIATRY | Facility: CLINIC | Age: 50
End: 2020-07-28

## 2020-07-28 DIAGNOSIS — G47.00 INSOMNIA, UNSPECIFIED TYPE: ICD-10-CM

## 2020-07-28 DIAGNOSIS — F39 MODERATE MOOD DISORDER (HCC): Primary | ICD-10-CM

## 2020-07-28 DIAGNOSIS — F41.0 PANIC DISORDER: ICD-10-CM

## 2020-07-28 PROCEDURE — 90834 PSYTX W PT 45 MINUTES: CPT | Performed by: SOCIAL WORKER

## 2020-07-28 NOTE — PROGRESS NOTES
Date of Service: July 28, 2020  Time In: 1230  Time Out: 119    PROGRESS NOTE  Data:  Marguerite Fink is a 50 y.o. female ..This was an audio and video enabled telemedicine encounter. Patient started therapy session by discussing she has been doing better with depression. Patient discussed working on mindfulness and mindfulness meditation.  Patient discussed continuing to work on self-care.  Patient discussed spending a little bit of time with friends to get her out of the house along with her boyfriend.  Patient discussed her son is getting  in October and looking forward to him getting .  Patient discussed needing to find address.  Patient discussed continuing to stay in for the most part related to COVID.  Patient discussed she does things around the house to keep her busy and she enjoys her animals.  Patient discussed health conditions, applying for disability and loss of independence      HPI: Depression, anxiety, panic attacks, chronic pain and seizure disorder      Clinical Maneuvering/Intervention:  Assisted patient in processing above session content; acknowledged and normalized patient’S thoughts, feelings, and concerns.  Assisted patient processing her thoughts and feelings related to mood, anxiety, panic attacks, chronic pain and seizure disorder.  Patient sees psychiatric nurse practitioner in this office.  Patient is to take her medications as prescribed and contact the nurse practitioner in this office for questions and concerns.  Patient denies SI, HI and self-harm.  Educated on panic attacks and anxiety.  Educated on conflict resolution and the importance of creating a schedule.  Reviewed mindfulness and gave her a new goal to work on between now and next session related to focusing on the here and now/self-awareness.  Validated patient's thoughts and feelings of enjoying meditation would like to get more detailed with decreasing anxiety and living more of a peaceful lifestyle.   Patient is to work on this between now and next session we will see patient back in 2 to 4 weeks and as needed.    Allowed patient to freely discuss issues without interruption or judgment. Provided safe, confidential environment to facilitate the development of positive therapeutic relationship and encourage open, honest communication. Assisted patient in identifying risk factors which would indicate the need for higher level of care including thoughts to harm self or others and/or self-harming behavior and encouraged patient to contact this office, call 911, or present to the nearest emergency room should any of these events occur. Discussed crisis intervention services and means to access.  Patient adamantly and convincingly denies current suicidal or homicidal ideation or perceptual disturbance.    Assessment     Diagnoses and all orders for this visit:    Moderate mood disorder (CMS/HCC)    Panic disorder    Insomnia, unspecified type               REVIEW OF SYSTEMS:  Review of Systems       Mental Status Exam  Hygiene:   fair  Cooperation:  Cooperative  Eye Contact:  Good  Psychomotor Behavior:  Appropriate  Affect:  Appropriate  Hopelessness: 4  Speech:  Normal  Thought Process:  Goal directed  Thought Content:  Normal  Suicidal:  None  Homicidal:  None  Hallucinations:  None  Delusion:  None  Memory:  Deficits  Orientation:  Person, Place, Time and Situation  Reliability:  fair  Insight:  Good  Judgement:  Good  Impulse Control:  Good  Physical/Medical Issues:  Yes Chronic pain and seizure disorder    Patient's Support Network Includes:  , son, mother and extended family    Progress toward goal: Not at goal    Functional Status: Moderate impairment     Prognosis: Fair with Ongoing Treatment       Plan         Patient will adhere to medication regimen as prescribed and report any side effects. Patient will contact this office, call 911 or present to the nearest emergency room should suicidal or  homicidal ideations occur. Provide Cognitive Behavioral Therapy and Integrative Therapy to improve functioning, maintain stability, and avoid decompensation and the need for higher level of care.          Return in about 2 weeks (around 8/11/2020) for 2 to 4 weeks and as needed.      This document is signed me Angela Gonzalez LCSW,   July 29, 2020 09:40

## 2020-08-17 DIAGNOSIS — F41.0 PANIC DISORDER: ICD-10-CM

## 2020-08-17 RX ORDER — HYDROXYZINE HYDROCHLORIDE 10 MG/1
TABLET, FILM COATED ORAL
Qty: 60 TABLET | Refills: 2 | Status: SHIPPED | OUTPATIENT
Start: 2020-08-17 | End: 2020-08-21 | Stop reason: SDUPTHER

## 2020-08-21 ENCOUNTER — TELEMEDICINE (OUTPATIENT)
Dept: PSYCHIATRY | Facility: CLINIC | Age: 50
End: 2020-08-21

## 2020-08-21 DIAGNOSIS — F41.0 PANIC DISORDER: ICD-10-CM

## 2020-08-21 DIAGNOSIS — F39 MODERATE MOOD DISORDER (HCC): ICD-10-CM

## 2020-08-21 DIAGNOSIS — G47.00 INSOMNIA, UNSPECIFIED TYPE: ICD-10-CM

## 2020-08-21 PROCEDURE — 99214 OFFICE O/P EST MOD 30 MIN: CPT | Performed by: NURSE PRACTITIONER

## 2020-08-21 RX ORDER — SERTRALINE HYDROCHLORIDE 25 MG/1
TABLET, FILM COATED ORAL
Qty: 30 TABLET | Refills: 0 | Status: SHIPPED | OUTPATIENT
Start: 2020-08-21 | End: 2020-09-22

## 2020-08-21 RX ORDER — ARIPIPRAZOLE 10 MG/1
10 TABLET ORAL DAILY
Qty: 30 TABLET | Refills: 2 | Status: SHIPPED | OUTPATIENT
Start: 2020-08-21 | End: 2020-10-22 | Stop reason: SDUPTHER

## 2020-08-21 RX ORDER — HYDROXYZINE HYDROCHLORIDE 10 MG/1
TABLET, FILM COATED ORAL
Qty: 60 TABLET | Refills: 2 | Status: SHIPPED | OUTPATIENT
Start: 2020-08-21 | End: 2020-10-22 | Stop reason: SDUPTHER

## 2020-08-21 RX ORDER — TRAZODONE HYDROCHLORIDE 50 MG/1
TABLET ORAL
Qty: 30 TABLET | Refills: 2 | Status: SHIPPED | OUTPATIENT
Start: 2020-08-21 | End: 2020-10-22

## 2020-08-21 NOTE — PROGRESS NOTES
"Subjective   Marguerite Fink is a 50 y.o. female who presents today for follow up    Chief Complaint: Depression, anxiety, and panic    This provider is located at [facility] ,[address], [address] in [place], [place], using IVDes[employer].  The patient is seen remotely at their home via Vidyo, an encrypted service from a [facility] to home residence. The patient's condition being diagnosed/treated is appropriate for telemedecine.  The provider identified herself as well as her credentials.      The patient and/or patient's guardian consent to be seen remotely, and when consent is given they understand that the consent allows for patient identifiable information to be sent to a third party as needed.  They may refuse to be seen remotely at any time.  The electronic data is encrypted and password protected, and the patient has been advised of the potential risks to privacy notwithstanding such measures.      History of Present Illness: Marguerite is a 50-year-old,  female who presents via video visit for follow-up.  This is my initial encounter with this patient as she was previously seen in the office by another provider.  Marguerite endorses that she has been feeling no energy and low mood these past few months.  She states that she has been crying for no reason and continually staying in the house.  She tells me that her anniversary was last week and that she had to be, \"dragged out of the house.\"  Marguerite has been managed on Abilify, Atarax, and Trazodone.  Marguerite tells me she has tried Celexa and Lexapro in the past and while they helped some she prefers not to start those medications at this time.  She continues to see Angela Gonzalez LCSW for therapy.  She denies SI/HI/AVH.  The following portions of the patient's history were reviewed and updated as appropriate: allergies, current medications, past family history, past medical history, past social history, past " surgical history and problem list.      Past Medical History:  Past Medical History:   Diagnosis Date   • Abdominal pain, epigastric    • Acute sinusitis    • Acute UTI (urinary tract infection)    • Angina, intestinal (CMS/HCC)    • Anxiety    • Arthritis    • Back pain    • Breast mass, right    • Chronic hepatitis C virus infection (CMS/HCC)    • Depression    • Diarrhea    • Elevated LFTs    • Fatigue    • Glaucoma 05/05/2020   • History of endometriosis    • History of Papanicolaou smear of cervix 04/02/2014    NORMAL    • HTN (hypertension)    • IBS (irritable bowel syndrome)    • Insomnia    • Menopausal symptoms    • Migraine headache    • Nausea & vomiting    • Neck pain    • Ovarian cyst    • Pelvic adhesive disease    • Radicular pain of left lower extremity    • Restless leg syndrome    • Seizure disorder (CMS/HCC)    • Tobacco abuse    • Trochanteric bursitis    • Vitamin B 12 deficiency        Social History:  Social History     Socioeconomic History   • Marital status:      Spouse name: Not on file   • Number of children: 1   • Years of education: Not on file   • Highest education level: High school graduate   Tobacco Use   • Smoking status: Current Every Day Smoker     Packs/day: 0.50     Types: Cigarettes   • Smokeless tobacco: Never Used   • Tobacco comment:  1/2 PACK A DAY   Substance and Sexual Activity   • Alcohol use: Yes     Comment: ON OCCASION   • Drug use: No   • Sexual activity: Yes     Birth control/protection: Surgical       Family History:  Family History   Problem Relation Age of Onset   • Alzheimer's disease Other    • Cancer Other    • Dementia Other    • Hypertension Other    • Parkinsonism Other    • Osteoporosis Mother    • Diabetes Mother    • Hypertension Mother    • Obesity Mother    • Depression Mother    • Breast cancer Maternal Grandmother    • Osteoporosis Maternal Grandmother    • Diabetes Maternal Grandmother    • Dementia Maternal Grandmother    • ADD / ADHD  Sister    • Alcohol abuse Sister    • Anxiety disorder Sister    • Bipolar disorder Sister    • Depression Sister    • Drug abuse Sister    • OCD Neg Hx    • Paranoid behavior Neg Hx    • Schizophrenia Neg Hx    • Seizures Neg Hx    • Self-Injurious Behavior  Neg Hx    • Suicide Attempts Neg Hx        Past Surgical History:  Past Surgical History:   Procedure Laterality Date   • BILATERAL SALPINGO OOPHORECTOMY  03/10/2015    DR NATALY THOMPSON   • BREAST LUMPECTOMY     •  SECTION     • HYSTERECTOMY  03/10/2015    [facility] (DR NATALY THOMPSON)   • INGUINAL HERNIA REPAIR Right    • LYSIS OF ABDOMINAL ADHESIONS  03/10/2015    DR NATALY THOMPSON   • TUBAL ABDOMINAL LIGATION         Problem List:  Patient Active Problem List   Diagnosis   • Epigastric pain   • Abdominal pain   • Acute sinusitis   • Acute urinary tract infection   • Mesenteric vascular insufficiency (CMS/HCC)   • Anxiety   • Back pain   • History of trauma to spine   • Mass of breast   • Chronic hepatitis C virus infection (CMS/HCC)   • Depression   • Diarrhea   • Abnormal liver function tests   • Fatigue   • Chronic insomnia   • Menopausal symptom   • Migraine   • Nausea and vomiting   • Neck pain   • Radicular pain   • Restless legs syndrome   • Tobacco dependence syndrome   • Hip injury   • Trochanteric bursitis   • Cobalamin deficiency   • Short of breath on exertion   • Tobacco abuse   • Panlobular emphysema (CMS/HCC)   • Palpitations   • Essential hypertension   • Other hyperlipidemia   • Anginal equivalent (CMS/HCC)   • Seizure disorder (CMS/HCC)   • Periodic limb movement disorder (PLMD)   • Degenerative disc disease, cervical   • Degenerative disc disease, lumbar   • Endometriosis   • Pelvic mass in female   • Pelvic pain   • Chronic back pain   • Hepatitis C   • Seizures (CMS/HCC)       Allergy:   No Known Allergies     Current Medications:   Current Outpatient Medications   Medication Sig Dispense Refill   • ARIPiprazole (ABILIFY) 10 MG tablet  Take 1 tablet by mouth Daily. 30 tablet 2   • B Complex Vitamins (VITAMIN B COMPLEX) capsule capsule Take  by mouth.     • diclofenac (VOLTAREN) 1 % gel gel Apply 4 g topically to the appropriate area as directed 4 (Four) Times a Day As Needed (pain). 100 g 11   • estradiol (VIVELLE-DOT) 0.1 MG/24HR patch Place 1 patch on the skin as directed by provider 2 (Two) Times a Week. 8 patch 12   • gabapentin (NEURONTIN) 100 MG capsule Take 100 mg by mouth every night at bedtime. Up to two pills at night  0   • hydrOXYzine (ATARAX) 10 MG tablet TAKE 1 TO 2 TABLETS BY MOUTH DAILY IF NEEDED FOR PANIC 60 tablet 2   • latanoprost (XALATAN) 0.005 % ophthalmic solution INSTILL 1 DROP INTO BOTH EYES DAILY AT BEDTIME     • levETIRAcetam (KEPPRA) 500 MG tablet Take 1.5 PO BID 75 tablet 11   • lidocaine (XYLOCAINE) 2 % jelly Apply  topically to the appropriate area as directed As Needed for Mild Pain . 85 g 1   • Methylnaltrexone Bromide (RELISTOR) 150 MG tablet Take 150 mg by mouth Daily. 90 tablet 3   • montelukast (SINGULAIR) 10 MG tablet TAKE 1 TABLET BY MOUTH AT BEDTIME 30 tablet 5   • OnabotulinumtoxinA 200 units reconstituted solution Inject 200 units IM into head neck shoulders every 12 weeks per FDA protocol. Dx G43.709 1 each 3   • oxyCODONE-acetaminophen (ENDOCET)  MG per tablet 1 tablet Daily.     • rOPINIRole (REQUIP) 0.5 MG tablet Take 1 tablet by mouth Every Night. Take 1 hour before bedtime. 30 tablet 11   • sertraline (Zoloft) 25 MG tablet Take one tablet daily 30 tablet 0   • SUMAtriptan (IMITREX) 20 MG/ACT nasal spray USE 1 SPRAY IN 1 NOSTRIL IF NEEDED 18 each 0   • topiramate (TOPAMAX) 100 MG tablet Take 1 tablet by mouth 2 (Two) Times a Day. 60 tablet 11   • traZODone (DESYREL) 50 MG tablet TAKE 1/2 TO 1 TABLET BY MOUTH EVERY NIGHT AT BEDTIME 30 tablet 2   • Umeclidinium Bromide (INCRUSE ELLIPTA) 62.5 MCG/INH aerosol powder  Inhale 1 puff Daily. 1 each 11   • WIXELA INHUB 250-50 MCG/DOSE DISKUS Inhale 1  puff Daily. 60 each 11     No current facility-administered medications for this visit.        Review of Symptoms:    Review of Systems   Constitutional: Negative for chills, fever, unexpected weight gain and unexpected weight loss.   HENT: Negative.    Eyes: Negative.    Respiratory: Negative for cough and shortness of breath.    Cardiovascular: Negative for chest pain and palpitations.   Gastrointestinal: Negative for abdominal pain, constipation, diarrhea, vomiting and indigestion.   Musculoskeletal: Negative for arthralgias, gait problem and joint swelling.   Skin: Negative.    Allergic/Immunologic: Negative.    Neurological: Negative for dizziness, speech difficulty, weakness, memory problem and confusion.   Psychiatric/Behavioral: Positive for decreased concentration, sleep disturbance (improved with medication) and depressed mood. Negative for behavioral problems and suicidal ideas. The patient is nervous/anxious.          Physical Exam:   not currently breastfeeding. There is no height or weight on file to calculate BMI.     Physical Exam   Constitutional: She is oriented to person, place, and time. She appears well-developed and well-nourished.   Musculoskeletal: Normal range of motion.   Neurological: She is alert and oriented to person, place, and time.   Skin: Skin is warm and dry.   Psychiatric:   Depressed mood, crying, no energy   Nursing note and vitals reviewed.       Appearance: NAD patient appears stated age  Gait, Station, Strength: WNL      Patient's Support Network Includes:      Functional Status: Mild impairment     Progress toward goal: Not at goal    Prognosis: Guarded with Ongoing Treatment    Mental Status Exam:   Hygiene:   good  Cooperation:  Cooperative  Eye Contact:  Good  Psychomotor Behavior:  Appropriate  Affect:  Blunted  Mood: depressed  Hopelessness: 2  Speech:  Normal  Thought Process:  Goal directed and Linear  Thought Content:  Normal  Suicidal:  None  Homicidal:   None  Hallucinations:  None  Delusion:  None  Memory:  Intact  Orientation:  Person, Place, Time and Situation  Reliability:  good  Insight:Good  Judgement:  Good  Impulse Control:  Good  Physical/Medical Issues:  Chronic pain and neuropathy       Lab Results:   No visits with results within 1 Month(s) from this visit.   Latest known visit with results is:   Office Visit on 06/03/2020   Component Date Value Ref Range Status   • Vitamin B-12 06/09/2020 357  211 - 946 pg/mL Final   • TSH 06/09/2020 0.628  0.270 - 4.200 uIU/mL Final   • Free T4 06/09/2020 1.09  0.93 - 1.70 ng/dL Final   • Glucose 06/09/2020 98  65 - 99 mg/dL Final   • BUN 06/09/2020 11  6 - 20 mg/dL Final   • Creatinine 06/09/2020 0.79  0.57 - 1.00 mg/dL Final   • Sodium 06/09/2020 140  136 - 145 mmol/L Final   • Potassium 06/09/2020 4.1  3.5 - 5.2 mmol/L Final   • Chloride 06/09/2020 108* 98 - 107 mmol/L Final   • CO2 06/09/2020 23.2  22.0 - 29.0 mmol/L Final   • Calcium 06/09/2020 9.0  8.6 - 10.5 mg/dL Final   • Total Protein 06/09/2020 6.6  6.0 - 8.5 g/dL Final   • Albumin 06/09/2020 4.40  3.50 - 5.20 g/dL Final   • ALT (SGPT) 06/09/2020 9  1 - 33 U/L Final   • AST (SGOT) 06/09/2020 17  1 - 32 U/L Final   • Alkaline Phosphatase 06/09/2020 43  39 - 117 U/L Final   • Total Bilirubin 06/09/2020 0.3  0.2 - 1.2 mg/dL Final   • eGFR Non African Amer 06/09/2020 77  >60 mL/min/1.73 Final   • Globulin 06/09/2020 2.2  gm/dL Final   • A/G Ratio 06/09/2020 2.0  g/dL Final   • BUN/Creatinine Ratio 06/09/2020 13.9  7.0 - 25.0 Final   • Anion Gap 06/09/2020 8.8  5.0 - 15.0 mmol/L Final   • WBC 06/09/2020 10.58  3.40 - 10.80 10*3/mm3 Final   • RBC 06/09/2020 3.98  3.77 - 5.28 10*6/mm3 Final   • Hemoglobin 06/09/2020 12.7  12.0 - 15.9 g/dL Final   • Hematocrit 06/09/2020 37.9  34.0 - 46.6 % Final   • MCV 06/09/2020 95.2  79.0 - 97.0 fL Final   • MCH 06/09/2020 31.9  26.6 - 33.0 pg Final   • MCHC 06/09/2020 33.5  31.5 - 35.7 g/dL Final   • RDW 06/09/2020 12.3  12.3 -  15.4 % Final   • RDW-SD 06/09/2020 42.4  37.0 - 54.0 fl Final   • MPV 06/09/2020 10.0  6.0 - 12.0 fL Final   • Platelets 06/09/2020 243  140 - 450 10*3/mm3 Final   • Neutrophil % 06/09/2020 64.7  42.7 - 76.0 % Final   • Lymphocyte % 06/09/2020 27.8  19.6 - 45.3 % Final   • Monocyte % 06/09/2020 5.6  5.0 - 12.0 % Final   • Eosinophil % 06/09/2020 1.0  0.3 - 6.2 % Final   • Basophil % 06/09/2020 0.6  0.0 - 1.5 % Final   • Immature Grans % 06/09/2020 0.3  0.0 - 0.5 % Final   • Neutrophils, Absolute 06/09/2020 6.85  1.70 - 7.00 10*3/mm3 Final   • Lymphocytes, Absolute 06/09/2020 2.94  0.70 - 3.10 10*3/mm3 Final   • Monocytes, Absolute 06/09/2020 0.59  0.10 - 0.90 10*3/mm3 Final   • Eosinophils, Absolute 06/09/2020 0.11  0.00 - 0.40 10*3/mm3 Final   • Basophils, Absolute 06/09/2020 0.06  0.00 - 0.20 10*3/mm3 Final   • Immature Grans, Absolute 06/09/2020 0.03  0.00 - 0.05 10*3/mm3 Final   • nRBC 06/09/2020 0.0  0.0 - 0.2 /100 WBC Final       Assessment/Plan   Diagnoses and all orders for this visit:    Moderate mood disorder (CMS/HCC)  -     sertraline (Zoloft) 25 MG tablet; Take one tablet daily  -     ARIPiprazole (ABILIFY) 10 MG tablet; Take 1 tablet by mouth Daily.    Panic disorder  -     hydrOXYzine (ATARAX) 10 MG tablet; TAKE 1 TO 2 TABLETS BY MOUTH DAILY IF NEEDED FOR PANIC    Insomnia, unspecified type  -     traZODone (DESYREL) 50 MG tablet; TAKE 1/2 TO 1 TABLET BY MOUTH EVERY NIGHT AT BEDTIME        Visit Diagnoses:    ICD-10-CM ICD-9-CM   1. Moderate mood disorder (CMS/HCC) F39 296.90   2. Panic disorder F41.0 300.01   3. Insomnia, unspecified type G47.00 780.52     Impression:  -This is my initial encounter with this patient as she was treated in the clinic by previous provider.  The patient has had some worsening depressive symptoms she endorses feeling no energy, low mood, and crying for no reason.  Patient has been on Abilify 10 mg every day for treatment depression and she feels as if she has hit a  plateau with the Abilify.  -Initiate sertraline 25 mg daily for depression, with plans to gradually increase dose.  -Continue Abilify 10 mg daily for depression.  Will reevaluate next month for possible dose adjustment.  -Continue Atarax 10 mg, 1 to 2 tablets every night for anxiety.  -Continue trazodone 50 mg at night for insomnia.  -Visit began at 9:51am and ended at 10:10am for a total time of 19 minutes.  Time was also needed for chart review of other providers notes as well as clinical information.     The patient and/or patient's guardian consent to be seen remotely, and when consent is given they understand that the consent allows for patient identifiable information to be sent to a third party as needed.  They may refuse to be seen remotely at any time.  The electronic data is encrypted and password protected, and the patient has been advised of the potential risks to privacy notwithstanding such measures.    TREATMENT PLAN/GOALS: Continue supportive psychotherapy efforts and medications as indicated. Treatment and medication options discussed during today's visit. Patient ackowledged and verbally consented to continue with current treatment plan and was educated on the importance of compliance with treatment and follow-up appointments.    MEDICATION ISSUES:    We discussed risks, benefits, and side effects of the above medications and the patient was agreeable with the plan. Patient was educated on the importance of compliance with treatment and follow-up appointments.  Patient is agreeable to call the office with any worsening of symptoms or onset of side effects. Patient is agreeable to call 911 or go to the nearest ER should he/she begin having SI/HI.      Counseled patient regarding multimodal approach with healthy nutrition, healthy sleep, regular physical activity, social activities, counseling, and medications.      Coping skills reviewed and encouraged positive framing of thoughts     Assisted  patient in processing above session content; acknowledged and normalized patient’s thoughts, feelings, and concerns.  Applied  positive coping skills and behavior management in session.  Allowed patient to freely discuss issues without interruption or judgment. Provided safe, confidential environment to facilitate the development of positive therapeutic relationship and encourage open, honest communication. Assisted patient in identifying risk factors which would indicate the need for higher level of care including thoughts to harm self or others and/or self-harming behavior and encouraged patient to contact this office, call 911, or present to the nearest emergency room should any of these events occur. Discussed crisis intervention services and means to access.     MEDS ORDERED DURING VISIT:  New Medications Ordered This Visit   Medications   • sertraline (Zoloft) 25 MG tablet     Sig: Take one tablet daily     Dispense:  30 tablet     Refill:  0   • ARIPiprazole (ABILIFY) 10 MG tablet     Sig: Take 1 tablet by mouth Daily.     Dispense:  30 tablet     Refill:  2   • hydrOXYzine (ATARAX) 10 MG tablet     Sig: TAKE 1 TO 2 TABLETS BY MOUTH DAILY IF NEEDED FOR PANIC     Dispense:  60 tablet     Refill:  2   • traZODone (DESYREL) 50 MG tablet     Sig: TAKE 1/2 TO 1 TABLET BY MOUTH EVERY NIGHT AT BEDTIME     Dispense:  30 tablet     Refill:  2         RTO in 4 weeks.             This document has been electronically signed by CHRIS Mayfield  August 21, 2020 10:31    Please note that portions of this note were completed with a voice recognition program. Efforts were made to edit dictation, but occasionally words are mistranscribed.

## 2020-08-25 ENCOUNTER — TELEMEDICINE (OUTPATIENT)
Dept: PSYCHIATRY | Facility: CLINIC | Age: 50
End: 2020-08-25

## 2020-08-25 DIAGNOSIS — G47.00 INSOMNIA, UNSPECIFIED TYPE: ICD-10-CM

## 2020-08-25 DIAGNOSIS — F39 MODERATE MOOD DISORDER (HCC): Primary | ICD-10-CM

## 2020-08-25 DIAGNOSIS — F41.0 PANIC DISORDER: ICD-10-CM

## 2020-08-25 PROCEDURE — 90837 PSYTX W PT 60 MINUTES: CPT | Performed by: SOCIAL WORKER

## 2020-08-26 NOTE — PROGRESS NOTES
Date of Service:8/26 2020  Time In: 1222  Time Out: 118    PROGRESS NOTE  Data:  Marguerite Fink is a 50 y.o. female ..This was an audio and video enabled telemedicine encounter. Patient started therapy session seeing the new nurse practitioner in this office [facility] recently for medication management and was put on a new antidepressant.  Patient discussed it was  a few days ago she had been started working but is hopeful that she will see some benefits related to mood and anxiety.  Patient discussed feeling overwhelmed with depression and anxiety and feels that in the past year her anxiety has become increasingly worse.  Patient discussed health conditions, feels and worries related to her physical and mental health.  Patient discussed her son's upcoming wedding and lacking motivation to prepare for it.  Patient discussed everything seems hard to do.    HPI: Depression, anxiety, panic attacks, chronic pain and seizure disorder      Clinical Maneuvering/Intervention:  Assisted patient in processing above session content; acknowledged and normalized patient’S thoughts, feelings, and concerns.  Assisted patient processing her thoughts and feelings related to mood, anxiety, panic attacks, chronic pain and seizure disorder.  Patient sees psychiatric nurse practitioner in this office.  Patient is to take her medications as prescribed and contact the nurse practitioner in this office for questions and concerns.  Patient denies SI, HI and self-harm.  Educated on the mind-body connection and doing something from the different areas of her self such as mental, physical, emotional and spiritual.  Gave healthy coping skills related to the mind-body connection to work towards between now and next session.  Encourage patient to take small steps to meeting goals and trying new things.  Encouraged self-care would be a good first goal to work on advocating herself and her health as a priority and building up on that.  Patient is  agreeable we will see back in 2 to 4 weeks and as needed.      Allowed patient to freely discuss issues without interruption or judgment. Provided safe, confidential environment to facilitate the development of positive therapeutic relationship and encourage open, honest communication. Assisted patient in identifying risk factors which would indicate the need for higher level of care including thoughts to harm self or others and/or self-harming behavior and encouraged patient to contact this office, call 911, or present to the nearest emergency room should any of these events occur. Discussed crisis intervention services and means to access.  Patient adamantly and convincingly denies current suicidal or homicidal ideation or perceptual disturbance.    Assessment     Diagnoses and all orders for this visit:    Moderate mood disorder (CMS/HCC)    Panic disorder    Insomnia, unspecified type               REVIEW OF SYSTEMS:  Review of Systems       Mental Status Exam  Hygiene:   fair  Cooperation:  Cooperative  Eye Contact:  Good  Psychomotor Behavior:  Appropriate  Affect:  Appropriate  Hopelessness: 4  Speech:  Normal  Thought Process:  Goal directed  Thought Content:  Normal  Suicidal:  None  Homicidal:  None  Hallucinations:  None  Delusion:  None  Memory:  Deficits  Orientation:  Person, Place, Time and Situation  Reliability:  fair  Insight:  Good  Judgement:  Good  Impulse Control:  Good  Physical/Medical Issues:  Yes Chronic pain and seizure disorder    Patient's Support Network Includes:  , son, mother and extended family    Progress toward goal: Not at goal    Functional Status: Moderate impairment     Prognosis: Fair with Ongoing Treatment       Plan         Patient will adhere to medication regimen as prescribed and report any side effects. Patient will contact this office, call 911 or present to the nearest emergency room should suicidal or homicidal ideations occur. Provide Cognitive Behavioral  Therapy and Integrative Therapy to improve functioning, maintain stability, and avoid decompensation and the need for higher level of care.          Return in about 2 weeks (around 9/8/2020), or if symptoms worsen or fail to improve, for 2 to 4 weeks and as needed.      This document is signed me Angela Gonzalez LCSW,   August 26, 2020 10:33

## 2020-09-15 DIAGNOSIS — F39 MODERATE MOOD DISORDER (HCC): ICD-10-CM

## 2020-09-15 RX ORDER — SERTRALINE HYDROCHLORIDE 25 MG/1
TABLET, FILM COATED ORAL
Qty: 30 TABLET | Refills: 0 | OUTPATIENT
Start: 2020-09-15

## 2020-09-22 ENCOUNTER — TELEMEDICINE (OUTPATIENT)
Dept: PSYCHIATRY | Facility: CLINIC | Age: 50
End: 2020-09-22

## 2020-09-22 DIAGNOSIS — F39 MODERATE MOOD DISORDER (HCC): Primary | ICD-10-CM

## 2020-09-22 DIAGNOSIS — G47.09 OTHER INSOMNIA: ICD-10-CM

## 2020-09-22 DIAGNOSIS — F41.0 PANIC DISORDER: ICD-10-CM

## 2020-09-22 PROCEDURE — 99213 OFFICE O/P EST LOW 20 MIN: CPT | Performed by: NURSE PRACTITIONER

## 2020-09-22 NOTE — PROGRESS NOTES
"Subjective   Marguerite Fink is a 50 y.o. female who presents today for follow up    Chief Complaint: Mood disorder, panic, and insomnia    This provider is located at [facility] ,[address] [facility] in [place], [place], using [employer].  The patient is seen remotely at their home via [employer], an encrypted service from a [facility] to home residence. The patient's condition being diagnosed/treated is appropriate for telemedecine.  The provider identified herself as well as her credentials.      The patient and/or patient's guardian consent to be seen remotely, and when consent is given they understand that the consent allows for patient identifiable information to be sent to a third party as needed.  They may refuse to be seen remotely at any time.  The electronic data is encrypted and password protected, and the patient has been advised of the potential risks to privacy notwithstanding such measures.    History of Present Illness: Marguerite is a 50-year-old  female who presents via video visit by herself for a follow-up and medication check.  Marguerite tells me that \"I have been a little better\" since her last appointment.  She states with the initiation of Zoloft she has improved focus and decreased anxiety.  She tells me that she has gotten out of the house more and feels like she can accomplish more.  For instance, she was able to go and get her hair done. She also states that her meal preparations have improved which she tells me is important to her.  However, she states that it did take \"a bit\" for her to feel the effects of the Zoloft.  She denies any episodes of panic. Her current medication regiment is Abilify, Atarax, and Zoloft.  She denies any side effects of her current medication regiment.  She continues to see Angela Gonzalez LCSW for therapy.  She denies any SI/HI/AVH.    The following portions of the patient's history were reviewed and updated as " appropriate: allergies, current medications, past family history, past medical history, past social history, past surgical history and problem list.    Past Medical History:  Past Medical History:   Diagnosis Date   • Abdominal pain, epigastric    • Acute sinusitis    • Acute UTI (urinary tract infection)    • Angina, intestinal (CMS/HCC)    • Anxiety    • Arthritis    • Back pain    • Breast mass, right    • Chronic hepatitis C virus infection (CMS/HCC)    • Depression    • Diarrhea    • Elevated LFTs    • Fatigue    • Glaucoma 05/05/2020   • History of endometriosis    • History of Papanicolaou smear of cervix 04/02/2014    NORMAL    • HTN (hypertension)    • IBS (irritable bowel syndrome)    • Insomnia    • Menopausal symptoms    • Migraine headache    • Nausea & vomiting    • Neck pain    • Ovarian cyst    • Pelvic adhesive disease    • Radicular pain of left lower extremity    • Restless leg syndrome    • Seizure disorder (CMS/HCC)    • Tobacco abuse    • Trochanteric bursitis    • Vitamin B 12 deficiency        Social History:  Social History     Socioeconomic History   • Marital status:      Spouse name: Not on file   • Number of children: 1   • Years of education: Not on file   • Highest education level: High school graduate   Tobacco Use   • Smoking status: Current Every Day Smoker     Packs/day: 0.50     Types: Cigarettes   • Smokeless tobacco: Never Used   • Tobacco comment:  1/2 PACK A DAY   Substance and Sexual Activity   • Alcohol use: Yes     Comment: ON OCCASION   • Drug use: No   • Sexual activity: Yes     Birth control/protection: Surgical       Family History:  Family History   Problem Relation Age of Onset   • Alzheimer's disease Other    • Cancer Other    • Dementia Other    • Hypertension Other    • Parkinsonism Other    • Osteoporosis Mother    • Diabetes Mother    • Hypertension Mother    • Obesity Mother    • Depression Mother    • Breast cancer Maternal Grandmother    • Osteoporosis  Maternal Grandmother    • Diabetes Maternal Grandmother    • Dementia Maternal Grandmother    • ADD / ADHD Sister    • Alcohol abuse Sister    • Anxiety disorder Sister    • Bipolar disorder Sister    • Depression Sister    • Drug abuse Sister    • OCD Neg Hx    • Paranoid behavior Neg Hx    • Schizophrenia Neg Hx    • Seizures Neg Hx    • Self-Injurious Behavior  Neg Hx    • Suicide Attempts Neg Hx        Past Surgical History:  Past Surgical History:   Procedure Laterality Date   • BILATERAL SALPINGO OOPHORECTOMY  03/10/2015    DR NATALY THOMPSON   • BREAST LUMPECTOMY     •  SECTION     • HYSTERECTOMY  03/10/2015    [facility] (DR NATALY THOMPSON)   • INGUINAL HERNIA REPAIR Right    • LYSIS OF ABDOMINAL ADHESIONS  03/10/2015    DR NATALY THOMPSON   • TUBAL ABDOMINAL LIGATION         Problem List:  Patient Active Problem List   Diagnosis   • Epigastric pain   • Abdominal pain   • Acute sinusitis   • Acute urinary tract infection   • Mesenteric vascular insufficiency (CMS/HCC)   • Anxiety   • Back pain   • History of trauma to spine   • Mass of breast   • Chronic hepatitis C virus infection (CMS/HCC)   • Depression   • Diarrhea   • Abnormal liver function tests   • Fatigue   • Chronic insomnia   • Menopausal symptom   • Migraine   • Nausea and vomiting   • Neck pain   • Radicular pain   • Restless legs syndrome   • Tobacco dependence syndrome   • Hip injury   • Trochanteric bursitis   • Cobalamin deficiency   • Short of breath on exertion   • Tobacco abuse   • Panlobular emphysema (CMS/HCC)   • Palpitations   • Essential hypertension   • Other hyperlipidemia   • Anginal equivalent (CMS/HCC)   • Seizure disorder (CMS/HCC)   • Periodic limb movement disorder (PLMD)   • Degenerative disc disease, cervical   • Degenerative disc disease, lumbar   • Endometriosis   • Pelvic mass in female   • Pelvic pain   • Chronic back pain   • Hepatitis C   • Seizures (CMS/HCC)       Allergy:   No Known Allergies     Current Medications:    Current Outpatient Medications   Medication Sig Dispense Refill   • ARIPiprazole (ABILIFY) 10 MG tablet Take 1 tablet by mouth Daily. 30 tablet 2   • B Complex Vitamins (VITAMIN B COMPLEX) capsule capsule Take  by mouth.     • diclofenac (VOLTAREN) 1 % gel gel Apply 4 g topically to the appropriate area as directed 4 (Four) Times a Day As Needed (pain). 100 g 11   • estradiol (VIVELLE-DOT) 0.1 MG/24HR patch Place 1 patch on the skin as directed by provider 2 (Two) Times a Week. 8 patch 12   • gabapentin (NEURONTIN) 100 MG capsule Take 100 mg by mouth every night at bedtime. Up to two pills at night  0   • hydrOXYzine (ATARAX) 10 MG tablet TAKE 1 TO 2 TABLETS BY MOUTH DAILY IF NEEDED FOR PANIC 60 tablet 2   • latanoprost (XALATAN) 0.005 % ophthalmic solution INSTILL 1 DROP INTO BOTH EYES DAILY AT BEDTIME     • levETIRAcetam (KEPPRA) 500 MG tablet Take 1.5 PO BID 75 tablet 11   • lidocaine (XYLOCAINE) 2 % jelly Apply  topically to the appropriate area as directed As Needed for Mild Pain . 85 g 1   • montelukast (SINGULAIR) 10 MG tablet TAKE 1 TABLET BY MOUTH AT BEDTIME 30 tablet 5   • OnabotulinumtoxinA 200 units reconstituted solution Inject 200 units IM into head neck shoulders every 12 weeks per FDA protocol. Dx G43.709 1 each 3   • oxyCODONE-acetaminophen (ENDOCET)  MG per tablet 1 tablet Daily.     • sertraline (ZOLOFT) 50 MG tablet Take one tablet daily 30 tablet 0   • SUMAtriptan (IMITREX) 20 MG/ACT nasal spray USE 1 SPRAY IN 1 NOSTRIL IF NEEDED 18 each 0   • topiramate (TOPAMAX) 100 MG tablet Take 1 tablet by mouth 2 (Two) Times a Day. 60 tablet 11   • traZODone (DESYREL) 50 MG tablet TAKE 1/2 TO 1 TABLET BY MOUTH EVERY NIGHT AT BEDTIME 30 tablet 2   • Umeclidinium Bromide (INCRUSE ELLIPTA) 62.5 MCG/INH aerosol powder  Inhale 1 puff Daily. 1 each 11   • WIXELA INHUB 250-50 MCG/DOSE DISKUS Inhale 1 puff Daily. 60 each 11   • Methylnaltrexone Bromide (RELISTOR) 150 MG tablet Take 150 mg by mouth Daily.  90 tablet 3   • rOPINIRole (REQUIP) 0.5 MG tablet Take 1 tablet by mouth Every Night. Take 1 hour before bedtime. 30 tablet 11     No current facility-administered medications for this visit.        Review of Symptoms:    Review of Systems   Constitutional: Negative for chills, fever, unexpected weight gain and unexpected weight loss.   HENT: Negative.    Eyes: Negative.    Respiratory: Negative for cough and shortness of breath.    Cardiovascular: Negative for chest pain and palpitations.   Gastrointestinal: Negative for abdominal pain, constipation, diarrhea, vomiting and indigestion.   Musculoskeletal: Negative for arthralgias, gait problem and joint swelling.   Skin: Negative.    Allergic/Immunologic: Negative.    Neurological: Negative for dizziness, speech difficulty, weakness, memory problem and confusion.   Psychiatric/Behavioral: Positive for sleep disturbance (improved) and depressed mood (improved). The patient is nervous/anxious (improved).         Panic improved       PHQ-9 Score:   PHQ-9 Total Score:       Physical Exam:   not currently breastfeeding. There is no height or weight on file to calculate BMI.     Physical Exam  Vitals signs and nursing note reviewed.   Constitutional:       Appearance: She is well-developed.   Musculoskeletal: Normal range of motion.   Skin:     General: Skin is warm and dry.   Neurological:      Mental Status: She is alert and oriented to person, place, and time.   Psychiatric:      Comments: Improved depression, panic, and insomnia         Appearance: Well-developed, well-nourished, appears stated age, and NAD  Gait, Station, Strength: Unable to assess    Patient's Support Network Includes:      Functional Status: Mild impairment     Progress toward goal: Not at goal    Prognosis: Fair with Ongoing Treatment     Mental Status Exam:   Hygiene:   good  Cooperation:  Cooperative  Eye Contact:  Good  Psychomotor Behavior:  Slow  Affect:  Full range  Mood:  normal  Hopelessness: Denies  Speech:  Normal  Thought Process:  Goal directed and Linear  Thought Content:  Normal  Suicidal:  None  Homicidal:  None  Hallucinations:  None  Delusion:  None  Memory:  Intact  Orientation:  Person, Place, Time and Situation  Reliability:  good  Insight:  Good  Judgement:  Good  Impulse Control:  Good  Physical/Medical Issues:  No      Lab Results:   No visits with results within 1 Month(s) from this visit.   Latest known visit with results is:   Office Visit on 06/03/2020   Component Date Value Ref Range Status   • Vitamin B-12 06/09/2020 357  211 - 946 pg/mL Final   • TSH 06/09/2020 0.628  0.270 - 4.200 uIU/mL Final   • Free T4 06/09/2020 1.09  0.93 - 1.70 ng/dL Final   • Glucose 06/09/2020 98  65 - 99 mg/dL Final   • BUN 06/09/2020 11  6 - 20 mg/dL Final   • Creatinine 06/09/2020 0.79  0.57 - 1.00 mg/dL Final   • Sodium 06/09/2020 140  136 - 145 mmol/L Final   • Potassium 06/09/2020 4.1  3.5 - 5.2 mmol/L Final   • Chloride 06/09/2020 108* 98 - 107 mmol/L Final   • CO2 06/09/2020 23.2  22.0 - 29.0 mmol/L Final   • Calcium 06/09/2020 9.0  8.6 - 10.5 mg/dL Final   • Total Protein 06/09/2020 6.6  6.0 - 8.5 g/dL Final   • Albumin 06/09/2020 4.40  3.50 - 5.20 g/dL Final   • ALT (SGPT) 06/09/2020 9  1 - 33 U/L Final   • AST (SGOT) 06/09/2020 17  1 - 32 U/L Final   • Alkaline Phosphatase 06/09/2020 43  39 - 117 U/L Final   • Total Bilirubin 06/09/2020 0.3  0.2 - 1.2 mg/dL Final   • eGFR Non African Amer 06/09/2020 77  >60 mL/min/1.73 Final   • Globulin 06/09/2020 2.2  gm/dL Final   • A/G Ratio 06/09/2020 2.0  g/dL Final   • BUN/Creatinine Ratio 06/09/2020 13.9  7.0 - 25.0 Final   • Anion Gap 06/09/2020 8.8  5.0 - 15.0 mmol/L Final   • WBC 06/09/2020 10.58  3.40 - 10.80 10*3/mm3 Final   • RBC 06/09/2020 3.98  3.77 - 5.28 10*6/mm3 Final   • Hemoglobin 06/09/2020 12.7  12.0 - 15.9 g/dL Final   • Hematocrit 06/09/2020 37.9  34.0 - 46.6 % Final   • MCV 06/09/2020 95.2  79.0 - 97.0 fL Final   •  MCH 06/09/2020 31.9  26.6 - 33.0 pg Final   • MCHC 06/09/2020 33.5  31.5 - 35.7 g/dL Final   • RDW 06/09/2020 12.3  12.3 - 15.4 % Final   • RDW-SD 06/09/2020 42.4  37.0 - 54.0 fl Final   • MPV 06/09/2020 10.0  6.0 - 12.0 fL Final   • Platelets 06/09/2020 243  140 - 450 10*3/mm3 Final   • Neutrophil % 06/09/2020 64.7  42.7 - 76.0 % Final   • Lymphocyte % 06/09/2020 27.8  19.6 - 45.3 % Final   • Monocyte % 06/09/2020 5.6  5.0 - 12.0 % Final   • Eosinophil % 06/09/2020 1.0  0.3 - 6.2 % Final   • Basophil % 06/09/2020 0.6  0.0 - 1.5 % Final   • Immature Grans % 06/09/2020 0.3  0.0 - 0.5 % Final   • Neutrophils, Absolute 06/09/2020 6.85  1.70 - 7.00 10*3/mm3 Final   • Lymphocytes, Absolute 06/09/2020 2.94  0.70 - 3.10 10*3/mm3 Final   • Monocytes, Absolute 06/09/2020 0.59  0.10 - 0.90 10*3/mm3 Final   • Eosinophils, Absolute 06/09/2020 0.11  0.00 - 0.40 10*3/mm3 Final   • Basophils, Absolute 06/09/2020 0.06  0.00 - 0.20 10*3/mm3 Final   • Immature Grans, Absolute 06/09/2020 0.03  0.00 - 0.05 10*3/mm3 Final   • nRBC 06/09/2020 0.0  0.0 - 0.2 /100 WBC Final       Assessment/Plan   Diagnoses and all orders for this visit:    Moderate mood disorder (CMS/HCC)  -     sertraline (ZOLOFT) 50 MG tablet; Take one tablet daily    Other insomnia    JAY (generalized anxiety disorder)        Visit Diagnoses:    ICD-10-CM ICD-9-CM   1. Moderate mood disorder (CMS/HCC)  F39 296.90   2. Other insomnia  G47.09 780.52   3. JAY (generalized anxiety disorder)  F41.1 300.02       Review:   I have reviewed the patient's previous medical records to include labs, radiology, notes and medications.     Impression:   -This is a follow-up evaluation.  Marguerite appears to have improved depressive and anxiety symptoms with initiation of Zoloft.  She denies any episodes of panic. She is getting out of the house and having more energy/motivation.  -Increase Zoloft to 50 mg daily for depression and anxiety.  -Continue Abilify 10 mg daily for mood  disorder.  -Continue Atarax 10 mg, patient may take 1 or 2 tablets by mouth daily as needed for panic.    Time:   Video began 11:22 AM and ended at 11:32 AM.  I spent a total of 10 minutes face to face via a Telehealth visit with the patient discussing coordination care while counseling the patient regarding depression/grief and answering any questions the patient had about the medication and plan.    TREATMENT PLAN/GOALS: Continue supportive psychotherapy efforts and medications as indicated. Treatment and medication options discussed during today's visit. Patient ackowledged and verbally consented to continue with current treatment plan and was educated on the importance of compliance with treatment and follow-up appointments.    MEDICATION ISSUES:    We discussed risks, benefits, and side effects of the above medications and the patient was agreeable with the plan. Patient was educated on the importance of compliance with treatment and follow-up appointments.  Patient is agreeable to call the office with any worsening of symptoms or onset of side effects. Patient is agreeable to call 911 or go to the nearest ER should he/she begin having SI/HI.      Counseled patient regarding multimodal approach with healthy nutrition, healthy sleep, regular physical activity, social activities, counseling, and medications.      Coping skills reviewed and encouraged positive framing of thoughts     Assisted patient in processing above session content; acknowledged and normalized patient’s thoughts, feelings, and concerns.  Applied  positive coping skills and behavior management in session.  Allowed patient to freely discuss issues without interruption or judgment. Provided safe, confidential environment to facilitate the development of positive therapeutic relationship and encourage open, honest communication. Assisted patient in identifying risk factors which would indicate the need for higher level of care including thoughts  to harm self or others and/or self-harming behavior and encouraged patient to contact this office, call 911, or present to the nearest emergency room should any of these events occur. Discussed crisis intervention services and means to access.     MEDS ORDERED DURING VISIT:  New Medications Ordered This Visit   Medications   • sertraline (ZOLOFT) 50 MG tablet     Sig: Take one tablet daily     Dispense:  30 tablet     Refill:  0       No follow-ups on file.             This document has been electronically signed by CHRIS Witt, PMHNP-BC  September 22, 2020 12:09 EDT    Part of this note may be an electronic transcription/translation of spoken language to printed text using the Dragon Dictation System.

## 2020-10-06 ENCOUNTER — TELEMEDICINE (OUTPATIENT)
Dept: PSYCHIATRY | Facility: CLINIC | Age: 50
End: 2020-10-06

## 2020-10-06 DIAGNOSIS — G47.09 OTHER INSOMNIA: ICD-10-CM

## 2020-10-06 DIAGNOSIS — F39 MODERATE MOOD DISORDER (HCC): Primary | ICD-10-CM

## 2020-10-06 DIAGNOSIS — F41.0 PANIC DISORDER: ICD-10-CM

## 2020-10-06 PROCEDURE — 90837 PSYTX W PT 60 MINUTES: CPT | Performed by: SOCIAL WORKER

## 2020-10-06 NOTE — PROGRESS NOTES
Date of Service:10/6/2020  Time In: 1120  Time Out: 1215    PROGRESS NOTE  Data:  Marguerite Fink is a 50 y.o. female ..This was an audio and video enabled telemedicine encounter. Patient started therapy session by discussing her sons wedding is this weekend coming up patient discussed she has been having increased anxiety about the wedding. Patient has discussed her friend is with her today. Patient discussed the dress she bought and it makes her feel pretty. patient discussed she wants review coping skills for anxiety to help her at the wedding being in a social setting.     HPI: Depression, anxiety, panic attacks, chronic pain and seizure disorder      Clinical Maneuvering/Intervention:  Assisted patient in processing above session content; acknowledged and normalized patient’S thoughts, feelings, and concerns.  Assisted patient processing her thoughts and feelings related to mood, anxiety, panic attacks, chronic pain and seizure disorder.  Patient sees psychiatric nurse practitioner in this office.  Patient is to take her medications as prescribed and contact the nurse practitioner in this office for questions and concerns.  Patient denies SI, HI and self-harm.  Validated patient  Thoughts and feelings of her sons upcoming wedding, reviewed anxiety coping skills. Encouraged patient to have her top 3 coping skills of choice such as breathing techniques, time outs and distractions. Assisted patient tin making a plan using her coping skills while at the wedding. Will see patient in 2 to 4 weeks    Allowed patient to freely discuss issues without interruption or judgment. Provided safe, confidential environment to facilitate the development of positive therapeutic relationship and encourage open, honest communication. Assisted patient in identifying risk factors which would indicate the need for higher level of care including thoughts to harm self or others and/or self-harming behavior and encouraged patient to  contact this office, call 911, or present to the nearest emergency room should any of these events occur. Discussed crisis intervention services and means to access.  Patient adamantly and convincingly denies current suicidal or homicidal ideation or perceptual disturbance.    Assessment     Diagnoses and all orders for this visit:    Moderate mood disorder (CMS/HCC)    Other insomnia    Panic disorder               REVIEW OF SYSTEMS:  Review of Systems       Mental Status Exam  Hygiene:   fair  Cooperation:  Cooperative  Eye Contact:  Good  Psychomotor Behavior:  Appropriate  Affect:  Appropriate  Hopelessness: 4  Speech:  Normal  Thought Process:  Goal directed  Thought Content:  Normal  Suicidal:  None  Homicidal:  None  Hallucinations:  None  Delusion:  None  Memory:  Deficits  Orientation:  Person, Place, Time and Situation  Reliability:  fair  Insight:  Good  Judgement:  Good  Impulse Control:  Good  Physical/Medical Issues:  Yes Chronic pain and seizure disorder    Patient's Support Network Includes:  , son, mother and extended family    Progress toward goal: Not at goal    Functional Status: Moderate impairment     Prognosis: Fair with Ongoing Treatment       Plan         Patient will adhere to medication regimen as prescribed and report any side effects. Patient will contact this office, call 911 or present to the nearest emergency room should suicidal or homicidal ideations occur. Provide Cognitive Behavioral Therapy and Integrative Therapy to improve functioning, maintain stability, and avoid decompensation and the need for higher level of care.          Return in about 4 weeks (around 11/3/2020), or if symptoms worsen or fail to improve.      This document is signed me Angela Gonzalez LCSW,   October 12, 2020 09:14 EDT

## 2020-10-07 ENCOUNTER — OFFICE VISIT (OUTPATIENT)
Dept: INTERNAL MEDICINE | Facility: CLINIC | Age: 50
End: 2020-10-07

## 2020-10-07 VITALS — BODY MASS INDEX: 21.28 KG/M2 | WEIGHT: 124 LBS

## 2020-10-07 DIAGNOSIS — R53.83 FATIGUE, UNSPECIFIED TYPE: ICD-10-CM

## 2020-10-07 DIAGNOSIS — I10 ESSENTIAL HYPERTENSION: Primary | ICD-10-CM

## 2020-10-07 DIAGNOSIS — Z00.00 ROUTINE GENERAL MEDICAL EXAMINATION AT A HEALTH CARE FACILITY: ICD-10-CM

## 2020-10-07 PROCEDURE — 99396 PREV VISIT EST AGE 40-64: CPT | Performed by: INTERNAL MEDICINE

## 2020-10-07 RX ORDER — METHOCARBAMOL 750 MG/1
750 TABLET, FILM COATED ORAL 2 TIMES DAILY
COMMUNITY
Start: 2020-09-02 | End: 2021-03-30 | Stop reason: ALTCHOICE

## 2020-10-07 NOTE — PROGRESS NOTES
.You have chosen to receive care through a telephone visit. Do you consent to use a telephone visit for your medical care today? YES  Those participating in this Telephone visit are listed below:  Patient -  Marguerite Fink  Doctor - Juan Trejo  Answers for HPI/ROS submitted by the patient on 10/7/2020   What is the primary reason for your visit?: Other  Please describe your symptoms.: FOLLOW UP  Have you had these symptoms before?: No  How long have you been having these symptoms?: Greater than 2 weeks  Subjective     Patient ID: Marguerite Fink is a 50 y.o. female. Patient is here for management of multiple medical problems.     Chief Complaint   Patient presents with   • Fatigue     follow-up     History of Present Illness       Fatigue f/u.  rescheduled form June    Had 2nd shinges . Vac done yesterday.      Needs wellness exam.        The following portions of the patient's history were reviewed and updated as appropriate: allergies, current medications, past family history, past medical history, past social history, past surgical history and problem list.    Review of Systems   Constitutional: Negative for fatigue.   HENT: Negative for congestion, dental problem and ear discharge.    All other systems reviewed and are negative.      Current Outpatient Medications:   •  ARIPiprazole (ABILIFY) 10 MG tablet, Take 1 tablet by mouth Daily., Disp: 30 tablet, Rfl: 2  •  B Complex Vitamins (VITAMIN B COMPLEX) capsule capsule, Take  by mouth., Disp: , Rfl:   •  diclofenac (VOLTAREN) 1 % gel gel, Apply 4 g topically to the appropriate area as directed 4 (Four) Times a Day As Needed (pain)., Disp: 100 g, Rfl: 11  •  estradiol (VIVELLE-DOT) 0.1 MG/24HR patch, Place 1 patch on the skin as directed by provider 2 (Two) Times a Week., Disp: 8 patch, Rfl: 12  •  gabapentin (NEURONTIN) 100 MG capsule, Take 100 mg by mouth every night at bedtime. Up to two pills at night, Disp: , Rfl: 0  •  hydrOXYzine  (ATARAX) 10 MG tablet, TAKE 1 TO 2 TABLETS BY MOUTH DAILY IF NEEDED FOR PANIC, Disp: 60 tablet, Rfl: 2  •  latanoprost (XALATAN) 0.005 % ophthalmic solution, INSTILL 1 DROP INTO BOTH EYES DAILY AT BEDTIME, Disp: , Rfl:   •  levETIRAcetam (KEPPRA) 500 MG tablet, Take 1.5 PO BID, Disp: 75 tablet, Rfl: 11  •  lidocaine (XYLOCAINE) 2 % jelly, Apply  topically to the appropriate area as directed As Needed for Mild Pain ., Disp: 85 g, Rfl: 1  •  methocarbamol (ROBAXIN) 750 MG tablet, Take 750 mg by mouth 2 (Two) Times a Day., Disp: , Rfl:   •  Methylnaltrexone Bromide (RELISTOR) 150 MG tablet, Take 150 mg by mouth Daily., Disp: 90 tablet, Rfl: 3  •  montelukast (SINGULAIR) 10 MG tablet, TAKE 1 TABLET BY MOUTH AT BEDTIME, Disp: 30 tablet, Rfl: 5  •  OnabotulinumtoxinA 200 units reconstituted solution, Inject 200 units IM into head neck shoulders every 12 weeks per FDA protocol. Dx G43.709, Disp: 1 each, Rfl: 3  •  oxyCODONE-acetaminophen (ENDOCET)  MG per tablet, 1 tablet Daily., Disp: , Rfl:   •  rOPINIRole (REQUIP) 0.5 MG tablet, Take 1 tablet by mouth Every Night. Take 1 hour before bedtime., Disp: 30 tablet, Rfl: 11  •  sertraline (ZOLOFT) 50 MG tablet, Take one tablet daily, Disp: 30 tablet, Rfl: 0  •  SUMAtriptan (IMITREX) 20 MG/ACT nasal spray, USE 1 SPRAY IN 1 NOSTRIL IF NEEDED, Disp: 18 each, Rfl: 0  •  topiramate (TOPAMAX) 100 MG tablet, Take 1 tablet by mouth 2 (Two) Times a Day., Disp: 60 tablet, Rfl: 11  •  traZODone (DESYREL) 50 MG tablet, TAKE 1/2 TO 1 TABLET BY MOUTH EVERY NIGHT AT BEDTIME, Disp: 30 tablet, Rfl: 2  •  Umeclidinium Bromide (INCRUSE ELLIPTA) 62.5 MCG/INH aerosol powder , Inhale 1 puff Daily., Disp: 1 each, Rfl: 11  •  WIXELA INHUB 250-50 MCG/DOSE DISKUS, Inhale 1 puff Daily., Disp: 60 each, Rfl: 11    Objective      Weight 56.2 kg (124 lb), not currently breastfeeding.    Physical Exam     General Appearance:    Alert, cooperative, no distress, appears stated age   Head:     Eyes:      Ears:     Nose:    Throat:    Neck:    Back:      Lungs:      Chest wall:     Heart:     Abdomen:      Extremities:    Pulses:    Skin:    Lymph nodes:    Neurologic:       Results for orders placed or performed in visit on 06/03/20   Vitamin B12    Specimen: Blood   Result Value Ref Range    Vitamin B-12 357 211 - 946 pg/mL   TSH    Specimen: Blood   Result Value Ref Range    TSH 0.628 0.270 - 4.200 uIU/mL   T4, Free    Specimen: Blood   Result Value Ref Range    Free T4 1.09 0.93 - 1.70 ng/dL   Comprehensive Metabolic Panel    Specimen: Blood   Result Value Ref Range    Glucose 98 65 - 99 mg/dL    BUN 11 6 - 20 mg/dL    Creatinine 0.79 0.57 - 1.00 mg/dL    Sodium 140 136 - 145 mmol/L    Potassium 4.1 3.5 - 5.2 mmol/L    Chloride 108 (H) 98 - 107 mmol/L    CO2 23.2 22.0 - 29.0 mmol/L    Calcium 9.0 8.6 - 10.5 mg/dL    Total Protein 6.6 6.0 - 8.5 g/dL    Albumin 4.40 3.50 - 5.20 g/dL    ALT (SGPT) 9 1 - 33 U/L    AST (SGOT) 17 1 - 32 U/L    Alkaline Phosphatase 43 39 - 117 U/L    Total Bilirubin 0.3 0.2 - 1.2 mg/dL    eGFR Non African Amer 77 >60 mL/min/1.73    Globulin 2.2 gm/dL    A/G Ratio 2.0 g/dL    BUN/Creatinine Ratio 13.9 7.0 - 25.0    Anion Gap 8.8 5.0 - 15.0 mmol/L   CBC Auto Differential    Specimen: Blood   Result Value Ref Range    WBC 10.58 3.40 - 10.80 10*3/mm3    RBC 3.98 3.77 - 5.28 10*6/mm3    Hemoglobin 12.7 12.0 - 15.9 g/dL    Hematocrit 37.9 34.0 - 46.6 %    MCV 95.2 79.0 - 97.0 fL    MCH 31.9 26.6 - 33.0 pg    MCHC 33.5 31.5 - 35.7 g/dL    RDW 12.3 12.3 - 15.4 %    RDW-SD 42.4 37.0 - 54.0 fl    MPV 10.0 6.0 - 12.0 fL    Platelets 243 140 - 450 10*3/mm3    Neutrophil % 64.7 42.7 - 76.0 %    Lymphocyte % 27.8 19.6 - 45.3 %    Monocyte % 5.6 5.0 - 12.0 %    Eosinophil % 1.0 0.3 - 6.2 %    Basophil % 0.6 0.0 - 1.5 %    Immature Grans % 0.3 0.0 - 0.5 %    Neutrophils, Absolute 6.85 1.70 - 7.00 10*3/mm3    Lymphocytes, Absolute 2.94 0.70 - 3.10 10*3/mm3    Monocytes, Absolute 0.59 0.10 - 0.90  10*3/mm3    Eosinophils, Absolute 0.11 0.00 - 0.40 10*3/mm3    Basophils, Absolute 0.06 0.00 - 0.20 10*3/mm3    Immature Grans, Absolute 0.03 0.00 - 0.05 10*3/mm3    nRBC 0.0 0.0 - 0.2 /100 WBC         Assessment/Plan   colonoscopy 2020 with TA. Will need repeat in 5 years.  Get tdap from HD.          Diet and exercise going well    Wearing seat belts.    Telephone visit today 11lian Everett was seen today for fatigue.    Diagnoses and all orders for this visit:    Essential hypertension  -     Vitamin B12  -     CBC & Differential  -     Lipid Panel  -     Comprehensive Metabolic Panel  -     TSH  -     T4, Free    Fatigue, unspecified type  -     Vitamin B12  -     CBC & Differential  -     Lipid Panel  -     Comprehensive Metabolic Panel  -     TSH  -     T4, Free    Routine general medical examination at a health care facility  -     Ambulatory Referral to Gynecology  -     Vitamin B12  -     CBC & Differential  -     Lipid Panel  -     Comprehensive Metabolic Panel  -     TSH  -     T4, Free      Return in about 6 months (around 4/7/2021).          There are no Patient Instructions on file for this visit.     Juan Clemens MD    Assessment/Plan

## 2020-10-13 ENCOUNTER — PROCEDURE VISIT (OUTPATIENT)
Dept: NEUROLOGY | Facility: CLINIC | Age: 50
End: 2020-10-13

## 2020-10-13 VITALS
TEMPERATURE: 96.9 F | HEIGHT: 64 IN | OXYGEN SATURATION: 98 % | DIASTOLIC BLOOD PRESSURE: 70 MMHG | WEIGHT: 132 LBS | HEART RATE: 68 BPM | SYSTOLIC BLOOD PRESSURE: 110 MMHG | BODY MASS INDEX: 22.53 KG/M2

## 2020-10-13 DIAGNOSIS — G43.019 INTRACTABLE MIGRAINE WITHOUT AURA AND WITHOUT STATUS MIGRAINOSUS: ICD-10-CM

## 2020-10-13 PROCEDURE — 64615 CHEMODENERV MUSC MIGRAINE: CPT | Performed by: NURSE PRACTITIONER

## 2020-10-13 NOTE — PROGRESS NOTES
"CC: Botox Injections  Indication for Procedure: Chronic migraines          /70   Pulse 68   Temp 96.9 °F (36.1 °C)   Ht 162.6 cm (64\")   Wt 59.9 kg (132 lb)   SpO2 98%   BMI 22.66 kg/m²     Date of last Injection: 7/21/2020   Response: 80% reduction reported, patient is feeling much better since starting Botox  Onset of response: 1 week  Wearing off: 10 to 11 weeks  Side Effects: None  : [employer]  Lot #:  [id number]  Expiration: May 2023  NDC: [id number]      With written consent obtained and risks and benefits explained to patient.     Botox injected using FDA approved protocol for chronic migraine prevention.   10 units, Procerus 5 units, Frontalis 20 units, Temporalis 40 units, Occipitalis 30 units, Cervical Paraspinals 20 units, Trapezius 30 units.     The total amount injected in units is 155.  The total amount wasted in units is 45.  The total amount submitted in units is 200.  Botox was supplied by specialty pharmacy  Patient tolerated procedure well with no immediate complications.     We have discussed risk and benefits of this Botox procedure and common side effects including headache, neck pain, neck stiffness or weakness, ptosis, flu-like symptoms as well as more serious possible adverse effects including possible dysphagia, respiratory distress or even death (death has only been reported once with adults for Botox for migraines in another state when mixed with lidocaine solution which we do not use lidocaine solution in our practice for mixing Botox). Verbalizes understanding, accepts risks and agrees with moving forward with Botox injections for chronic migraine prevention.    "

## 2020-10-20 ENCOUNTER — TELEMEDICINE (OUTPATIENT)
Dept: PSYCHIATRY | Facility: CLINIC | Age: 50
End: 2020-10-20

## 2020-10-20 DIAGNOSIS — F39 MODERATE MOOD DISORDER (HCC): Primary | ICD-10-CM

## 2020-10-20 DIAGNOSIS — G47.00 INSOMNIA, UNSPECIFIED TYPE: ICD-10-CM

## 2020-10-20 DIAGNOSIS — G47.09 OTHER INSOMNIA: ICD-10-CM

## 2020-10-20 DIAGNOSIS — F41.0 PANIC DISORDER: ICD-10-CM

## 2020-10-20 PROCEDURE — 90837 PSYTX W PT 60 MINUTES: CPT | Performed by: SOCIAL WORKER
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
## 2020-10-22 ENCOUNTER — TELEMEDICINE (OUTPATIENT)
Dept: PSYCHIATRY | Facility: CLINIC | Age: 50
End: 2020-10-22

## 2020-10-22 DIAGNOSIS — G47.09 OTHER INSOMNIA: ICD-10-CM

## 2020-10-22 DIAGNOSIS — F39 MODERATE MOOD DISORDER (HCC): Primary | ICD-10-CM

## 2020-10-22 DIAGNOSIS — F41.0 PANIC DISORDER: ICD-10-CM

## 2020-10-22 PROCEDURE — 99214 OFFICE O/P EST MOD 30 MIN: CPT | Performed by: NURSE PRACTITIONER

## 2020-10-22 RX ORDER — ARIPIPRAZOLE 10 MG/1
10 TABLET ORAL DAILY
Qty: 30 TABLET | Refills: 2 | Status: SHIPPED | OUTPATIENT
Start: 2020-10-22 | End: 2020-11-30

## 2020-10-22 RX ORDER — HYDROXYZINE HYDROCHLORIDE 10 MG/1
TABLET, FILM COATED ORAL
Qty: 60 TABLET | Refills: 2 | Status: SHIPPED | OUTPATIENT
Start: 2020-10-22 | End: 2020-11-30

## 2020-10-22 RX ORDER — MIRTAZAPINE 7.5 MG/1
7.5 TABLET, FILM COATED ORAL NIGHTLY
Qty: 30 TABLET | Refills: 0 | Status: SHIPPED | OUTPATIENT
Start: 2020-10-22 | End: 2020-11-20 | Stop reason: SDUPTHER

## 2020-10-22 NOTE — PROGRESS NOTES
"Subjective   Marguerite Fink is a 50 y.o. female who presents today for follow up    Chief Complaint:  Mood disorder, panic, and insomnia    This provider is located at [facility] ,[address], [address] in [place], [place], using [employer].  The patient is seen remotely at their home via [employer], an encrypted service from a [facility] to home residence. The patient's condition being diagnosed/treated is appropriate for telemedecine.  The provider identified herself as well as her credentials.      The patient and/or patient's guardian consent to be seen remotely, and when consent is given they understand that the consent allows for patient identifiable information to be sent to a third party as needed.  They may refuse to be seen remotely at any time.  The electronic data is encrypted and password protected, and the patient has been advised of the potential risks to privacy notwithstanding such measures.    History of Present Illness: Marguerite is a 50-year-old  female who presents by herself via video visit for follow-up and medication check.  Marguerite tells me \"I feel like I am in a little lull.  The 25 mg of Zoloft really helped but I cannot tell as much with the 50.\"  At last visit, we increased her Zoloft to 50 mg daily.  Marguerite states her depression has improved but she continues to endorse symptoms of anxiety.  She states that she will just sit and wring her hands.  She states that most days she struggles to find the motivation to get out of bed and do her daily task.  She begins to worry and then feel overwhelmed by most the day.  For instance, she tells me that today she needs to go to the grocery store but she has already begun to worry about going there and her  will not be home for three hours.  She states that, due to her seizure disorder, she fears going into public places, as well as the risk of the COVID-19 virus.  She prefers to just stay at " "home and isolate herself.  Marguerite tells me that she is no longer getting good sleep with trazodone.  She states that she will take the hydroxyzine in addition to this medication to allow her to get to sleep; however, she does not stay asleep most nights.  She states that she will stay in bed until 10 or 11 AM most days as she did not get a good nights rest.  She states she stays awake thinking \"crazy thoughts.\"  Upon further investigation, these nightly thoughts are about things from her past and things that are no longer going on in her life.  Her current medication regiment is Abilify, Atarax, Zoloft, and trazodone.  She denies any side effects of her current medication regiment.  She denies any problems with appetite.  She denies any SI/HI/AVH.    The following portions of the patient's history were reviewed and updated as appropriate: allergies, current medications, past family history, past medical history, past social history, past surgical history and problem list.    Past Medical History:  Past Medical History:   Diagnosis Date   • Abdominal pain, epigastric    • Acute sinusitis    • Acute UTI (urinary tract infection)    • Angina, intestinal (CMS/HCC)    • Anxiety    • Arthritis    • Back pain    • Breast mass, right    • Chronic hepatitis C virus infection (CMS/HCC)    • Depression    • Diarrhea    • Elevated LFTs    • Fatigue    • Glaucoma 05/05/2020   • History of endometriosis    • History of Papanicolaou smear of cervix 04/02/2014    NORMAL    • HTN (hypertension)    • IBS (irritable bowel syndrome)    • Insomnia    • Menopausal symptoms    • Migraine headache    • Nausea & vomiting    • Neck pain    • Ovarian cyst    • Pelvic adhesive disease    • Radicular pain of left lower extremity    • Restless leg syndrome    • Seizure disorder (CMS/HCC)    • Tobacco abuse    • Trochanteric bursitis    • Vitamin B 12 deficiency        Social History:  Social History     Socioeconomic History   • Marital status: "      Spouse name: Not on file   • Number of children: 1   • Years of education: Not on file   • Highest education level: High school graduate   Tobacco Use   • Smoking status: Current Every Day Smoker     Packs/day: 0.50     Types: Cigarettes   • Smokeless tobacco: Never Used   • Tobacco comment:  1/2 PACK A DAY   Substance and Sexual Activity   • Alcohol use: Yes     Comment: ON OCCASION   • Drug use: No   • Sexual activity: Yes     Birth control/protection: Surgical       Family History:  Family History   Problem Relation Age of Onset   • Alzheimer's disease Other    • Cancer Other    • Dementia Other    • Hypertension Other    • Parkinsonism Other    • Osteoporosis Mother    • Diabetes Mother    • Hypertension Mother    • Obesity Mother    • Depression Mother    • Breast cancer Maternal Grandmother    • Osteoporosis Maternal Grandmother    • Diabetes Maternal Grandmother    • Dementia Maternal Grandmother    • ADD / ADHD Sister    • Alcohol abuse Sister    • Anxiety disorder Sister    • Bipolar disorder Sister    • Depression Sister    • Drug abuse Sister    • OCD Neg Hx    • Paranoid behavior Neg Hx    • Schizophrenia Neg Hx    • Seizures Neg Hx    • Self-Injurious Behavior  Neg Hx    • Suicide Attempts Neg Hx        Past Surgical History:  Past Surgical History:   Procedure Laterality Date   • BILATERAL SALPINGO OOPHORECTOMY  03/10/2015    DR NATALY THOMPSON   • BREAST LUMPECTOMY     •  SECTION     • HYSTERECTOMY  03/10/2015    [facility] (DR NATALY THOMPSON)   • INGUINAL HERNIA REPAIR Right    • LYSIS OF ABDOMINAL ADHESIONS  03/10/2015    DR NATALY THOMPSON   • TUBAL ABDOMINAL LIGATION         Problem List:  Patient Active Problem List   Diagnosis   • Epigastric pain   • Abdominal pain   • Acute sinusitis   • Acute urinary tract infection   • Mesenteric vascular insufficiency (CMS/HCC)   • Anxiety   • Back pain   • History of trauma to spine   • Mass of breast   • Chronic hepatitis C virus infection  (CMS/HCC)   • Depression   • Diarrhea   • Abnormal liver function tests   • Fatigue   • Chronic insomnia   • Menopausal symptom   • Migraine   • Nausea and vomiting   • Neck pain   • Radicular pain   • Restless legs syndrome   • Tobacco dependence syndrome   • Hip injury   • Trochanteric bursitis   • Cobalamin deficiency   • Short of breath on exertion   • Tobacco abuse   • Panlobular emphysema (CMS/HCC)   • Palpitations   • Essential hypertension   • Other hyperlipidemia   • Anginal equivalent (CMS/HCC)   • Seizure disorder (CMS/HCC)   • Periodic limb movement disorder (PLMD)   • Degenerative disc disease, cervical   • Degenerative disc disease, lumbar   • Endometriosis   • Pelvic mass in female   • Pelvic pain   • Chronic back pain   • Hepatitis C   • Seizures (CMS/HCC)       Allergy:   No Known Allergies     Current Medications:   Current Outpatient Medications   Medication Sig Dispense Refill   • ARIPiprazole (ABILIFY) 10 MG tablet Take 1 tablet by mouth Daily. 30 tablet 2   • B Complex Vitamins (VITAMIN B COMPLEX) capsule capsule Take  by mouth.     • diclofenac (VOLTAREN) 1 % gel gel Apply 4 g topically to the appropriate area as directed 4 (Four) Times a Day As Needed (pain). 100 g 11   • estradiol (VIVELLE-DOT) 0.1 MG/24HR patch Place 1 patch on the skin as directed by provider 2 (Two) Times a Week. 8 patch 12   • gabapentin (NEURONTIN) 100 MG capsule Take 100 mg by mouth every night at bedtime. Up to two pills at night  0   • hydrOXYzine (ATARAX) 10 MG tablet TAKE 1 TO 2 TABLETS BY MOUTH DAILY IF NEEDED FOR PANIC 60 tablet 2   • latanoprost (XALATAN) 0.005 % ophthalmic solution INSTILL 1 DROP INTO BOTH EYES DAILY AT BEDTIME     • levETIRAcetam (KEPPRA) 500 MG tablet Take 1.5 PO BID 75 tablet 11   • lidocaine (XYLOCAINE) 2 % jelly Apply  topically to the appropriate area as directed As Needed for Mild Pain . 85 g 1   • methocarbamol (ROBAXIN) 750 MG tablet Take 750 mg by mouth 2 (Two) Times a Day.     •  montelukast (SINGULAIR) 10 MG tablet TAKE 1 TABLET BY MOUTH AT BEDTIME 30 tablet 5   • OnabotulinumtoxinA 200 units reconstituted solution Inject 200 units IM into head neck shoulders every 12 weeks per FDA protocol. Dx G43.709 1 each 3   • oxyCODONE-acetaminophen (ENDOCET)  MG per tablet 1 tablet Daily.     • sertraline (ZOLOFT) 50 MG tablet Take one tablet daily 30 tablet 1   • SUMAtriptan (IMITREX) 20 MG/ACT nasal spray USE 1 SPRAY IN 1 NOSTRIL IF NEEDED 18 each 0   • topiramate (TOPAMAX) 100 MG tablet Take 1 tablet by mouth 2 (Two) Times a Day. 60 tablet 11   • Umeclidinium Bromide (INCRUSE ELLIPTA) 62.5 MCG/INH aerosol powder  Inhale 1 puff Daily. 1 each 11   • WIXELA INHUB 250-50 MCG/DOSE DISKUS Inhale 1 puff Daily. 60 each 11   • mirtazapine (REMERON) 7.5 MG tablet Take 1 tablet by mouth Every Night. 30 tablet 0     No current facility-administered medications for this visit.        Review of Symptoms:    Review of Systems   Constitutional: Positive for fever. Negative for chills, unexpected weight gain and unexpected weight loss.   HENT: Negative.    Eyes: Negative.    Respiratory: Negative for cough and shortness of breath.    Cardiovascular: Negative for chest pain and palpitations.   Gastrointestinal: Negative for abdominal pain, constipation, diarrhea, vomiting and indigestion.   Musculoskeletal: Negative for arthralgias, gait problem and joint swelling.   Skin: Negative.    Allergic/Immunologic: Negative.    Neurological: Negative for dizziness, speech difficulty, weakness, memory problem and confusion.   Psychiatric/Behavioral: Positive for decreased concentration, sleep disturbance and depressed mood (improved). Negative for behavioral problems and suicidal ideas. The patient is nervous/anxious.        PHQ-9 Score:   PHQ-9 Total Score:       Physical Exam:   not currently breastfeeding. There is no height or weight on file to calculate BMI.     Physical Exam  Vitals signs and nursing note  reviewed.   Constitutional:       Appearance: She is well-developed.   Neurological:      Mental Status: She is alert and oriented to person, place, and time.   Psychiatric:         Attention and Perception: She does not perceive auditory or visual hallucinations.         Mood and Affect: Mood is anxious.         Speech: Speech normal.         Behavior: Behavior normal.         Thought Content: Thought content is not paranoid. Thought content does not include suicidal ideation.         Cognition and Memory: Cognition is not impaired. Memory is not impaired.      Comments: Improving depression but worsening anxiety     Appearance: Well-developed, well-nourished, appears older than stated age, and NAD  Gait, Station, Strength: Unable to assess    Patient's Support Network Includes:      Functional Status: Mild impairment     Progress toward goal: Not at goal    Prognosis: Fair with Ongoing Treatment     Mental Status Exam:   Hygiene:   fair  Cooperation:  Cooperative  Eye Contact:  Fair  Psychomotor Behavior:  Restless  Affect:  Blunted  Mood: anxious  Hopelessness: 3  Speech:  Normal  Thought Process:  Goal directed and Linear  Thought Content:  Normal  Suicidal:  None  Homicidal:  None  Hallucinations:  None  Delusion:  None  Memory:  Intact  Orientation:  Person, Place, Time and Situation  Reliability:  good  Insight:  Good  Judgement:  Good  Impulse Control:  Good  Physical/Medical Issues:  No      Lab Results:   No visits with results within 1 Month(s) from this visit.   Latest known visit with results is:   Office Visit on 06/03/2020   Component Date Value Ref Range Status   • Vitamin B-12 06/09/2020 357  211 - 946 pg/mL Final   • TSH 06/09/2020 0.628  0.270 - 4.200 uIU/mL Final   • Free T4 06/09/2020 1.09  0.93 - 1.70 ng/dL Final   • Glucose 06/09/2020 98  65 - 99 mg/dL Final   • BUN 06/09/2020 11  6 - 20 mg/dL Final   • Creatinine 06/09/2020 0.79  0.57 - 1.00 mg/dL Final   • Sodium 06/09/2020 140  136 -  145 mmol/L Final   • Potassium 06/09/2020 4.1  3.5 - 5.2 mmol/L Final   • Chloride 06/09/2020 108* 98 - 107 mmol/L Final   • CO2 06/09/2020 23.2  22.0 - 29.0 mmol/L Final   • Calcium 06/09/2020 9.0  8.6 - 10.5 mg/dL Final   • Total Protein 06/09/2020 6.6  6.0 - 8.5 g/dL Final   • Albumin 06/09/2020 4.40  3.50 - 5.20 g/dL Final   • ALT (SGPT) 06/09/2020 9  1 - 33 U/L Final   • AST (SGOT) 06/09/2020 17  1 - 32 U/L Final   • Alkaline Phosphatase 06/09/2020 43  39 - 117 U/L Final   • Total Bilirubin 06/09/2020 0.3  0.2 - 1.2 mg/dL Final   • eGFR Non African Amer 06/09/2020 77  >60 mL/min/1.73 Final   • Globulin 06/09/2020 2.2  gm/dL Final   • A/G Ratio 06/09/2020 2.0  g/dL Final   • BUN/Creatinine Ratio 06/09/2020 13.9  7.0 - 25.0 Final   • Anion Gap 06/09/2020 8.8  5.0 - 15.0 mmol/L Final   • WBC 06/09/2020 10.58  3.40 - 10.80 10*3/mm3 Final   • RBC 06/09/2020 3.98  3.77 - 5.28 10*6/mm3 Final   • Hemoglobin 06/09/2020 12.7  12.0 - 15.9 g/dL Final   • Hematocrit 06/09/2020 37.9  34.0 - 46.6 % Final   • MCV 06/09/2020 95.2  79.0 - 97.0 fL Final   • MCH 06/09/2020 31.9  26.6 - 33.0 pg Final   • MCHC 06/09/2020 33.5  31.5 - 35.7 g/dL Final   • RDW 06/09/2020 12.3  12.3 - 15.4 % Final   • RDW-SD 06/09/2020 42.4  37.0 - 54.0 fl Final   • MPV 06/09/2020 10.0  6.0 - 12.0 fL Final   • Platelets 06/09/2020 243  140 - 450 10*3/mm3 Final   • Neutrophil % 06/09/2020 64.7  42.7 - 76.0 % Final   • Lymphocyte % 06/09/2020 27.8  19.6 - 45.3 % Final   • Monocyte % 06/09/2020 5.6  5.0 - 12.0 % Final   • Eosinophil % 06/09/2020 1.0  0.3 - 6.2 % Final   • Basophil % 06/09/2020 0.6  0.0 - 1.5 % Final   • Immature Grans % 06/09/2020 0.3  0.0 - 0.5 % Final   • Neutrophils, Absolute 06/09/2020 6.85  1.70 - 7.00 10*3/mm3 Final   • Lymphocytes, Absolute 06/09/2020 2.94  0.70 - 3.10 10*3/mm3 Final   • Monocytes, Absolute 06/09/2020 0.59  0.10 - 0.90 10*3/mm3 Final   • Eosinophils, Absolute 06/09/2020 0.11  0.00 - 0.40 10*3/mm3 Final   •  Basophils, Absolute 06/09/2020 0.06  0.00 - 0.20 10*3/mm3 Final   • Immature Grans, Absolute 06/09/2020 0.03  0.00 - 0.05 10*3/mm3 Final   • nRBC 06/09/2020 0.0  0.0 - 0.2 /100 WBC Final       Assessment/Plan   Diagnoses and all orders for this visit:    1. Moderate mood disorder (CMS/HCC) (Primary)  -     sertraline (ZOLOFT) 50 MG tablet; Take one tablet daily  Dispense: 30 tablet; Refill: 1  -     ARIPiprazole (ABILIFY) 10 MG tablet; Take 1 tablet by mouth Daily.  Dispense: 30 tablet; Refill: 2    2. Other insomnia  -     mirtazapine (REMERON) 7.5 MG tablet; Take 1 tablet by mouth Every Night.  Dispense: 30 tablet; Refill: 0    3. Panic disorder  -     hydrOXYzine (ATARAX) 10 MG tablet; TAKE 1 TO 2 TABLETS BY MOUTH DAILY IF NEEDED FOR PANIC  Dispense: 60 tablet; Refill: 2        Visit Diagnoses:    ICD-10-CM ICD-9-CM   1. Moderate mood disorder (CMS/HCC)  F39 296.90   2. Other insomnia  G47.09 780.52   3. Panic disorder  F41.0 300.01       Review:   I have reviewed the patient's previous medical records to include labs, radiology, notes and medications.      Impression:   -This is a follow-up and medication check.  Marguerite endorses an improvement in her depressive symptoms but state her anxiety has worsened.  She states that her trazodone is no longer helping her sleep.  She feels anxious and is fearful of going into public places.  -Stop trazodone due to lack of efficacy.  -Initiate Remeron 7.5 mg nightly for insomnia.  Marguerite and I had a lengthy discussion about choosing Remeron versus Seroquel for sleep.  I explained to her the purpose of this medication and how this is often a second line treatment for sleep versus Seroquel.  We had a lengthy discussion about risks versus benefits of adding this medication, as well as potential side effects.  She verbalized understanding.  I encouraged her to give this medication at least 2 weeks to assess efficacy.  -Continue Zoloft 50 mg daily for mood disorder.  -Continue  Atarax 10 mg, 1 to 2 tablets daily for panic.  -Continue Abilify 10 mg daily for mood disorder.  -Continue to see Angela Gonzalez LCSW for therapy.    Time:   Video began at 12:25 PM and ended at 12:45 PM.  I spent a total of 20 minutes face to face via a Telehealth visit with the patient discussing coordination care while counseling the patient regarding depression/grief and answering any questions the patient had about the medication and plan.    TREATMENT PLAN/GOALS: Continue supportive psychotherapy efforts and medications as indicated. Treatment and medication options discussed during today's visit. Patient ackowledged and verbally consented to continue with current treatment plan and was educated on the importance of compliance with treatment and follow-up appointments.    MEDICATION ISSUES:    We discussed risks, benefits, and side effects of the above medications and the patient was agreeable with the plan. Patient was educated on the importance of compliance with treatment and follow-up appointments.  Patient is agreeable to call the office with any worsening of symptoms or onset of side effects. Patient is agreeable to call 911 or go to the nearest ER should he/she begin having SI/HI.      Counseled patient regarding multimodal approach with healthy nutrition, healthy sleep, regular physical activity, social activities, counseling, and medications.      Coping skills reviewed and encouraged positive framing of thoughts     Assisted patient in processing above session content; acknowledged and normalized patient’s thoughts, feelings, and concerns.  Applied  positive coping skills and behavior management in session.  Allowed patient to freely discuss issues without interruption or judgment. Provided safe, confidential environment to facilitate the development of positive therapeutic relationship and encourage open, honest communication. Assisted patient in identifying risk factors which would indicate the  need for higher level of care including thoughts to harm self or others and/or self-harming behavior and encouraged patient to contact this office, call 911, or present to the nearest emergency room should any of these events occur. Discussed crisis intervention services and means to access.     MEDS ORDERED DURING VISIT:  New Medications Ordered This Visit   Medications   • sertraline (ZOLOFT) 50 MG tablet     Sig: Take one tablet daily     Dispense:  30 tablet     Refill:  1   • ARIPiprazole (ABILIFY) 10 MG tablet     Sig: Take 1 tablet by mouth Daily.     Dispense:  30 tablet     Refill:  2   • hydrOXYzine (ATARAX) 10 MG tablet     Sig: TAKE 1 TO 2 TABLETS BY MOUTH DAILY IF NEEDED FOR PANIC     Dispense:  60 tablet     Refill:  2   • mirtazapine (REMERON) 7.5 MG tablet     Sig: Take 1 tablet by mouth Every Night.     Dispense:  30 tablet     Refill:  0       Return in about 4 weeks (around 11/19/2020) for Medication Check.             This document has been electronically signed by CHRIS Witt, PMHNP-BC  October 22, 2020 12:59 EDT    Part of this note may be an electronic transcription/translation of spoken language to printed text using the Dragon Dictation System.

## 2020-11-13 DIAGNOSIS — F39 MODERATE MOOD DISORDER (HCC): ICD-10-CM

## 2020-11-13 RX ORDER — ARIPIPRAZOLE 10 MG/1
10 TABLET ORAL DAILY
Qty: 30 TABLET | Refills: 2 | OUTPATIENT
Start: 2020-11-13

## 2020-11-17 ENCOUNTER — TELEMEDICINE (OUTPATIENT)
Dept: PSYCHIATRY | Facility: CLINIC | Age: 50
End: 2020-11-17

## 2020-11-17 DIAGNOSIS — G47.00 INSOMNIA, UNSPECIFIED TYPE: ICD-10-CM

## 2020-11-17 DIAGNOSIS — G47.09 OTHER INSOMNIA: ICD-10-CM

## 2020-11-17 DIAGNOSIS — F39 MODERATE MOOD DISORDER (HCC): Primary | ICD-10-CM

## 2020-11-17 DIAGNOSIS — F41.0 PANIC DISORDER: ICD-10-CM

## 2020-11-17 PROCEDURE — 90837 PSYTX W PT 60 MINUTES: CPT | Performed by: SOCIAL WORKER

## 2020-11-20 DIAGNOSIS — G47.09 OTHER INSOMNIA: ICD-10-CM

## 2020-11-20 RX ORDER — MIRTAZAPINE 7.5 MG/1
7.5 TABLET, FILM COATED ORAL NIGHTLY
Qty: 30 TABLET | Refills: 2 | Status: SHIPPED | OUTPATIENT
Start: 2020-11-20 | End: 2021-01-11 | Stop reason: SDUPTHER

## 2020-11-20 NOTE — TELEPHONE ENCOUNTER
Refill request for mirtazapine. [facility]. Patient is aware it will be Monday before addressed.

## 2020-11-30 ENCOUNTER — TELEMEDICINE (OUTPATIENT)
Dept: PSYCHIATRY | Facility: CLINIC | Age: 50
End: 2020-11-30

## 2020-11-30 DIAGNOSIS — G47.09 OTHER INSOMNIA: ICD-10-CM

## 2020-11-30 DIAGNOSIS — F39 MODERATE MOOD DISORDER (HCC): Primary | ICD-10-CM

## 2020-11-30 DIAGNOSIS — F41.0 PANIC DISORDER: ICD-10-CM

## 2020-11-30 PROCEDURE — 99213 OFFICE O/P EST LOW 20 MIN: CPT | Performed by: NURSE PRACTITIONER

## 2020-11-30 RX ORDER — ARIPIPRAZOLE 15 MG/1
15 TABLET ORAL DAILY
Qty: 30 TABLET | Refills: 0 | Status: SHIPPED | OUTPATIENT
Start: 2020-11-30 | End: 2021-01-11 | Stop reason: SDUPTHER

## 2020-11-30 RX ORDER — PROPRANOLOL HYDROCHLORIDE 10 MG/1
10 TABLET ORAL DAILY PRN
Qty: 30 TABLET | Refills: 0 | Status: SHIPPED | OUTPATIENT
Start: 2020-11-30 | End: 2020-12-29

## 2020-11-30 RX ORDER — HYDROXYZINE HYDROCHLORIDE 10 MG/1
TABLET, FILM COATED ORAL
Qty: 60 TABLET | Refills: 2 | Status: SHIPPED | OUTPATIENT
Start: 2020-11-30 | End: 2021-01-11 | Stop reason: SDUPTHER

## 2020-11-30 NOTE — PROGRESS NOTES
"Subjective   Marguerite Fink is a 50 y.o. female who presents today for follow up    Chief Complaint:  Mood disorder, panic, and insomnia    This provider is located at [facility] ,[address], [address] in [place], [place], using [employer].  The patient is seen remotely at their home via [employer], an encrypted service from a [facility] to home residence. The patient's condition being diagnosed/treated is appropriate for telemedecine.  The provider identified herself as well as her credentials.      The patient and/or patient's guardian consent to be seen remotely, and when consent is given they understand that the consent allows for patient identifiable information to be sent to a third party as needed.  They may refuse to be seen remotely at any time.  The electronic data is encrypted and password protected, and the patient has been advised of the potential risks to privacy notwithstanding such measures.    History of Present Illness: Marguerite is a 50-year-old,  female who presents by herself via video visit for a follow up and medication check. Marguerite states \" I really want to cry but I cannot cry.  I feel emotionally numb.\"  Marguerite tells me that there is no major stressors or circumstances to cause her to be depressed but she has \"no desire to do anything.\"  Marguerite tells me that \"I could care less if my house burned down.\"  Marguerite endorses symptoms of depression such as depressed mood, anhedonia, no motivation, decreased concentration, psychomotor retardation, and feelings of hopelessness.  She rates her depression 9 out of 10 most days with 10 being the most severe over the last two weeks.  She rates her anxious symptoms 8 out of 10 most days with 10 being the most severe.  She tells me she feels very anxious when she has to go to the grocery store.  She also complains of physical symptoms of anxiety such as restlessness, increased heart rate, sweating, and " decreased concentration.  Marguerite does tell me that her sleep has improved with the addition of Remeron.  She tells me she is not napping during the day.  Marguerite continues to endorse symptoms of poor appetite and attributes some of this from her medication, Topamax, for migraines.  Marguerite's current medication regimen includes Abilify, Atarax, Remeron, and Zoloft.  She denies any side effects of her current medication regiment.  She denies any SI/HI/AVH.    The following portions of the patient's history were reviewed and updated as appropriate: allergies, current medications, past family history, past medical history, past social history, past surgical history and problem list.    Past Medical History:  Past Medical History:   Diagnosis Date   • Abdominal pain, epigastric    • Acute sinusitis    • Acute UTI (urinary tract infection)    • Angina, intestinal (CMS/HCC)    • Anxiety    • Arthritis    • Back pain    • Breast mass, right    • Chronic hepatitis C virus infection (CMS/HCC)    • Depression    • Diarrhea    • Elevated LFTs    • Fatigue    • Glaucoma 05/05/2020   • History of endometriosis    • History of Papanicolaou smear of cervix 04/02/2014    NORMAL    • HTN (hypertension)    • IBS (irritable bowel syndrome)    • Insomnia    • Menopausal symptoms    • Migraine headache    • Nausea & vomiting    • Neck pain    • Ovarian cyst    • Pelvic adhesive disease    • Radicular pain of left lower extremity    • Restless leg syndrome    • Seizure disorder (CMS/HCC)    • Tobacco abuse    • Trochanteric bursitis    • Vitamin B 12 deficiency        Social History:  Social History     Socioeconomic History   • Marital status:      Spouse name: Not on file   • Number of children: 1   • Years of education: Not on file   • Highest education level: High school graduate   Tobacco Use   • Smoking status: Current Every Day Smoker     Packs/day: 0.50     Types: Cigarettes   • Smokeless tobacco: Never Used   • Tobacco comment:   1/2 PACK A DAY   Substance and Sexual Activity   • Alcohol use: Yes     Comment: ON OCCASION   • Drug use: No   • Sexual activity: Yes     Birth control/protection: Surgical       Family History:  Family History   Problem Relation Age of Onset   • Alzheimer's disease Other    • Cancer Other    • Dementia Other    • Hypertension Other    • Parkinsonism Other    • Osteoporosis Mother    • Diabetes Mother    • Hypertension Mother    • Obesity Mother    • Depression Mother    • Breast cancer Maternal Grandmother    • Osteoporosis Maternal Grandmother    • Diabetes Maternal Grandmother    • Dementia Maternal Grandmother    • ADD / ADHD Sister    • Alcohol abuse Sister    • Anxiety disorder Sister    • Bipolar disorder Sister    • Depression Sister    • Drug abuse Sister    • OCD Neg Hx    • Paranoid behavior Neg Hx    • Schizophrenia Neg Hx    • Seizures Neg Hx    • Self-Injurious Behavior  Neg Hx    • Suicide Attempts Neg Hx        Past Surgical History:  Past Surgical History:   Procedure Laterality Date   • BILATERAL SALPINGO OOPHORECTOMY  03/10/2015    DR NATALY THOMPSON   • BREAST LUMPECTOMY     •  SECTION     • HYSTERECTOMY  03/10/2015    [facility] (DR NATALY THOMPSON)   • INGUINAL HERNIA REPAIR Right    • LYSIS OF ABDOMINAL ADHESIONS  03/10/2015    DR NATALY THOMPSON   • TUBAL ABDOMINAL LIGATION         Problem List:  Patient Active Problem List   Diagnosis   • Epigastric pain   • Abdominal pain   • Acute sinusitis   • Acute urinary tract infection   • Mesenteric vascular insufficiency (CMS/HCC)   • Anxiety   • Back pain   • History of trauma to spine   • Mass of breast   • Chronic hepatitis C virus infection (CMS/HCC)   • Depression   • Diarrhea   • Abnormal liver function tests   • Fatigue   • Chronic insomnia   • Menopausal symptom   • Migraine   • Nausea and vomiting   • Neck pain   • Radicular pain   • Restless legs syndrome   • Tobacco dependence syndrome   • Hip injury   • Trochanteric bursitis   • Cobalamin  deficiency   • Short of breath on exertion   • Tobacco abuse   • Panlobular emphysema (CMS/HCC)   • Palpitations   • Essential hypertension   • Other hyperlipidemia   • Anginal equivalent (CMS/HCC)   • Seizure disorder (CMS/HCC)   • Periodic limb movement disorder (PLMD)   • Degenerative disc disease, cervical   • Degenerative disc disease, lumbar   • Endometriosis   • Pelvic mass in female   • Pelvic pain   • Chronic back pain   • Hepatitis C   • Seizures (CMS/HCC)       Allergy:   No Known Allergies     Current Medications:   Current Outpatient Medications   Medication Sig Dispense Refill   • ARIPiprazole (ABILIFY) 15 MG tablet Take 1 tablet by mouth Daily. 30 tablet 0   • B Complex Vitamins (VITAMIN B COMPLEX) capsule capsule Take  by mouth.     • diclofenac (VOLTAREN) 1 % gel gel Apply 4 g topically to the appropriate area as directed 4 (Four) Times a Day As Needed (pain). 100 g 11   • estradiol (VIVELLE-DOT) 0.1 MG/24HR patch Place 1 patch on the skin as directed by provider 2 (Two) Times a Week. 8 patch 12   • gabapentin (NEURONTIN) 100 MG capsule Take 100 mg by mouth every night at bedtime. Up to two pills at night  0   • hydrOXYzine (ATARAX) 10 MG tablet TAKE 1 TO 2 TABLETS BY MOUTH DAILY IF NEEDED FOR PANIC 60 tablet 2   • latanoprost (XALATAN) 0.005 % ophthalmic solution INSTILL 1 DROP INTO BOTH EYES DAILY AT BEDTIME     • levETIRAcetam (KEPPRA) 500 MG tablet Take 1.5 PO BID 75 tablet 11   • methocarbamol (ROBAXIN) 750 MG tablet Take 750 mg by mouth 2 (Two) Times a Day.     • mirtazapine (REMERON) 7.5 MG tablet Take 1 tablet by mouth Every Night. 30 tablet 2   • montelukast (SINGULAIR) 10 MG tablet TAKE 1 TABLET BY MOUTH AT BEDTIME 30 tablet 5   • OnabotulinumtoxinA 200 units reconstituted solution Inject 200 units IM into head neck shoulders every 12 weeks per FDA protocol. Dx G43.709 1 each 3   • oxyCODONE-acetaminophen (ENDOCET)  MG per tablet 1 tablet Daily.     • sertraline (ZOLOFT) 50 MG tablet  Take one tablet daily 30 tablet 1   • SUMAtriptan (IMITREX) 20 MG/ACT nasal spray USE 1 SPRAY IN 1 NOSTRIL IF NEEDED 18 each 0   • topiramate (TOPAMAX) 100 MG tablet Take 1 tablet by mouth 2 (Two) Times a Day. 60 tablet 11   • Umeclidinium Bromide (INCRUSE ELLIPTA) 62.5 MCG/INH aerosol powder  Inhale 1 puff Daily. 1 each 11   • WIXELA INHUB 250-50 MCG/DOSE DISKUS Inhale 1 puff Daily. 60 each 11   • lidocaine (XYLOCAINE) 2 % jelly Apply  topically to the appropriate area as directed As Needed for Mild Pain . 85 g 1   • propranolol (INDERAL) 10 MG tablet Take 1 tablet by mouth Daily As Needed (anxiety). 30 tablet 0     No current facility-administered medications for this visit.        Review of Symptoms:    Review of Systems   Constitutional: Negative for chills, fever, unexpected weight gain and unexpected weight loss.   HENT: Negative.    Eyes: Negative.    Respiratory: Negative for cough and shortness of breath.    Cardiovascular: Negative for chest pain and palpitations.   Gastrointestinal: Negative for abdominal pain, constipation, diarrhea, vomiting and indigestion.   Musculoskeletal: Negative for arthralgias, gait problem and joint swelling.   Skin: Negative.    Allergic/Immunologic: Negative.    Neurological: Negative for dizziness, speech difficulty, weakness, memory problem and confusion.   Psychiatric/Behavioral: Positive for decreased concentration, sleep disturbance (improved) and depressed mood. Negative for behavioral problems and suicidal ideas. The patient is nervous/anxious.      Physical Exam:   not currently breastfeeding. There is no height or weight on file to calculate BMI.     Physical Exam  Vitals signs and nursing note reviewed.   Constitutional:       Appearance: She is well-developed.   Neurological:      Mental Status: She is alert and oriented to person, place, and time.   Psychiatric:         Attention and Perception: Attention normal.         Mood and Affect: Mood is depressed.          Speech: Speech normal.         Behavior: Behavior is slowed.         Thought Content: Thought content normal.         Cognition and Memory: Cognition normal.         Judgment: Judgment normal.      Comments: Depression and anxiety         Appearance: Well-developed, well-nourished, appears stated age, and NAD  Gait, Station, Strength: WNL    Patient's Support Network Includes:      Functional Status: Moderate impairment     Progress toward goal: Not at goal    Prognosis: Fair with Ongoing Treatment     Mental Status Exam:   Hygiene:   good  Cooperation:  Cooperative  Eye Contact:  Good  Psychomotor Behavior:  Slow  Affect:  Restricted  Mood: depressed  Hopelessness: 4  Speech:  Monotone  Thought Process:  Goal directed and Linear  Thought Content:  Normal  Suicidal:  None  Homicidal:  None  Hallucinations:  None  Delusion:  None  Memory:  Intact  Orientation:  Person, Place, Time and Situation  Reliability:  good  Insight:  Good  Judgement:  Good  Impulse Control:  Good  Physical/Medical Issues:  Yes Seizure disorder and migraines     Lab Results:   No visits with results within 1 Month(s) from this visit.   Latest known visit with results is:   Office Visit on 06/03/2020   Component Date Value Ref Range Status   • Vitamin B-12 06/09/2020 357  211 - 946 pg/mL Final   • TSH 06/09/2020 0.628  0.270 - 4.200 uIU/mL Final   • Free T4 06/09/2020 1.09  0.93 - 1.70 ng/dL Final   • Glucose 06/09/2020 98  65 - 99 mg/dL Final   • BUN 06/09/2020 11  6 - 20 mg/dL Final   • Creatinine 06/09/2020 0.79  0.57 - 1.00 mg/dL Final   • Sodium 06/09/2020 140  136 - 145 mmol/L Final   • Potassium 06/09/2020 4.1  3.5 - 5.2 mmol/L Final   • Chloride 06/09/2020 108* 98 - 107 mmol/L Final   • CO2 06/09/2020 23.2  22.0 - 29.0 mmol/L Final   • Calcium 06/09/2020 9.0  8.6 - 10.5 mg/dL Final   • Total Protein 06/09/2020 6.6  6.0 - 8.5 g/dL Final   • Albumin 06/09/2020 4.40  3.50 - 5.20 g/dL Final   • ALT (SGPT) 06/09/2020 9  1 - 33 U/L  Final   • AST (SGOT) 06/09/2020 17  1 - 32 U/L Final   • Alkaline Phosphatase 06/09/2020 43  39 - 117 U/L Final   • Total Bilirubin 06/09/2020 0.3  0.2 - 1.2 mg/dL Final   • eGFR Non African Amer 06/09/2020 77  >60 mL/min/1.73 Final   • Globulin 06/09/2020 2.2  gm/dL Final   • A/G Ratio 06/09/2020 2.0  g/dL Final   • BUN/Creatinine Ratio 06/09/2020 13.9  7.0 - 25.0 Final   • Anion Gap 06/09/2020 8.8  5.0 - 15.0 mmol/L Final   • WBC 06/09/2020 10.58  3.40 - 10.80 10*3/mm3 Final   • RBC 06/09/2020 3.98  3.77 - 5.28 10*6/mm3 Final   • Hemoglobin 06/09/2020 12.7  12.0 - 15.9 g/dL Final   • Hematocrit 06/09/2020 37.9  34.0 - 46.6 % Final   • MCV 06/09/2020 95.2  79.0 - 97.0 fL Final   • MCH 06/09/2020 31.9  26.6 - 33.0 pg Final   • MCHC 06/09/2020 33.5  31.5 - 35.7 g/dL Final   • RDW 06/09/2020 12.3  12.3 - 15.4 % Final   • RDW-SD 06/09/2020 42.4  37.0 - 54.0 fl Final   • MPV 06/09/2020 10.0  6.0 - 12.0 fL Final   • Platelets 06/09/2020 243  140 - 450 10*3/mm3 Final   • Neutrophil % 06/09/2020 64.7  42.7 - 76.0 % Final   • Lymphocyte % 06/09/2020 27.8  19.6 - 45.3 % Final   • Monocyte % 06/09/2020 5.6  5.0 - 12.0 % Final   • Eosinophil % 06/09/2020 1.0  0.3 - 6.2 % Final   • Basophil % 06/09/2020 0.6  0.0 - 1.5 % Final   • Immature Grans % 06/09/2020 0.3  0.0 - 0.5 % Final   • Neutrophils, Absolute 06/09/2020 6.85  1.70 - 7.00 10*3/mm3 Final   • Lymphocytes, Absolute 06/09/2020 2.94  0.70 - 3.10 10*3/mm3 Final   • Monocytes, Absolute 06/09/2020 0.59  0.10 - 0.90 10*3/mm3 Final   • Eosinophils, Absolute 06/09/2020 0.11  0.00 - 0.40 10*3/mm3 Final   • Basophils, Absolute 06/09/2020 0.06  0.00 - 0.20 10*3/mm3 Final   • Immature Grans, Absolute 06/09/2020 0.03  0.00 - 0.05 10*3/mm3 Final   • nRBC 06/09/2020 0.0  0.0 - 0.2 /100 WBC Final       Assessment/Plan   Diagnoses and all orders for this visit:    1. Moderate mood disorder (CMS/HCC) (Primary)  -     sertraline (ZOLOFT) 50 MG tablet; Take one tablet daily  Dispense: 30  "tablet; Refill: 1  -     ARIPiprazole (ABILIFY) 15 MG tablet; Take 1 tablet by mouth Daily.  Dispense: 30 tablet; Refill: 0    2. Panic disorder  -     hydrOXYzine (ATARAX) 10 MG tablet; TAKE 1 TO 2 TABLETS BY MOUTH DAILY IF NEEDED FOR PANIC  Dispense: 60 tablet; Refill: 2  -     propranolol (INDERAL) 10 MG tablet; Take 1 tablet by mouth Daily As Needed (anxiety).  Dispense: 30 tablet; Refill: 0    3. Other insomnia        Visit Diagnoses:    ICD-10-CM ICD-9-CM   1. Moderate mood disorder (CMS/[facility])  F39 296.90   2. Panic disorder  F41.0 300.01   3. Other insomnia  G47.09 780.52       Review:   I have reviewed the patient's previous medical records to include labs, radiology, notes and medications.     Impression:   -This is a follow up and medication check.  Marguerite continues to endorse symptoms of depression and anxiety.  She tells me she feels \"emotionally numb.\"  She has concerns about finding a medication to help her symptoms as she has tried multiple therapies.  She continues to be on Keppra and Topamax for her seizure disorder and migraines.  She is also on oxycodone for pain management.   -Increase Abilify to 15 mg daily for mood disorder. Marguerite and I had a lengthy discussion about increasing her Abilify to improve depressive symptoms.  I explained that higher doses are no longer beneficial for depressive symptoms and we may need to make other adjustments should this increase fail to improve symptoms.  Marguerite verbalized understanding.  -Initiate propranolol 10 mg as needed daily for anxiety.  I encouraged Marguerite to use this medication prior to going to the grocery store.  This is when Marguerite endorses the most symptoms of panic.  I explained the purpose of this medication.  We discussed the risk versus benefits of adding this medication, as well as potential side effects.  I encouraged her to monitor her blood pressure and watch for dizziness.  -Continue Atarax 10 mg twice daily for panic.  -Continue Zoloft 50 mg " daily for depression.  -Continue Remeron 7.5 mg nightly for insomnia.  -Continue therapy with Angela Gonzalez LCSW.    Time:   Video began at 2 PM and ended at 2:17 PM.  I spent a total of 17 minutes face to face via a Telehealth visit with the patient discussing coordination care while counseling the patient regarding depression/grief and answering any questions the patient had about the medication and plan.    TREATMENT PLAN/GOALS: Continue supportive psychotherapy efforts and medications as indicated. Treatment and medication options discussed during today's visit. Patient ackowledged and verbally consented to continue with current treatment plan and was educated on the importance of compliance with treatment and follow-up appointments.    MEDICATION ISSUES:    We discussed risks, benefits, and side effects of the above medications and the patient was agreeable with the plan. Patient was educated on the importance of compliance with treatment and follow-up appointments.  Patient is agreeable to call the office with any worsening of symptoms or onset of side effects. Patient is agreeable to call 911 or go to the nearest ER should he/she begin having SI/HI.      Counseled patient regarding multimodal approach with healthy nutrition, healthy sleep, regular physical activity, social activities, counseling, and medications.      Coping skills reviewed and encouraged positive framing of thoughts     Assisted patient in processing above session content; acknowledged and normalized patient’s thoughts, feelings, and concerns.  Applied  positive coping skills and behavior management in session.  Allowed patient to freely discuss issues without interruption or judgment. Provided safe, confidential environment to facilitate the development of positive therapeutic relationship and encourage open, honest communication. Assisted patient in identifying risk factors which would indicate the need for higher level of care including  thoughts to harm self or others and/or self-harming behavior and encouraged patient to contact this office, call 911, or present to the nearest emergency room should any of these events occur. Discussed crisis intervention services and means to access.     MEDS ORDERED DURING VISIT:  New Medications Ordered This Visit   Medications   • hydrOXYzine (ATARAX) 10 MG tablet     Sig: TAKE 1 TO 2 TABLETS BY MOUTH DAILY IF NEEDED FOR PANIC     Dispense:  60 tablet     Refill:  2   • sertraline (ZOLOFT) 50 MG tablet     Sig: Take one tablet daily     Dispense:  30 tablet     Refill:  1   • ARIPiprazole (ABILIFY) 15 MG tablet     Sig: Take 1 tablet by mouth Daily.     Dispense:  30 tablet     Refill:  0   • propranolol (INDERAL) 10 MG tablet     Sig: Take 1 tablet by mouth Daily As Needed (anxiety).     Dispense:  30 tablet     Refill:  0       Return in about 6 weeks (around 1/11/2021) for Medication Check.             This document has been electronically signed by CHRIS Witt, PMHNP-BC  November 30, 2020 14:37 EST    Part of this note may be an electronic transcription/translation of spoken language to printed text using the Dragon Dictation System.

## 2020-11-30 NOTE — PROGRESS NOTES
Date of Service:2020  Time In: 1230  Time Out: 130    PROGRESS NOTE  Data:  Marguerite Fink is a 50 y.o. female ...This visit has been rescheduled as a phone visit to comply with patient safety concerns in accordance with [facility] recommendations. Total time of discussion was 60 minutes.  Patient was having trouble on Wi-Fi and my chart self-love to the telephone call.  Patient started therapy session by discussing increased depression and feeling that she has nothing to look forward to.  Patient discussed she misses her previous  that .  Patient discussed the inability to work and giving herself in a negative light.  Patient discussed her quality of life is poor and explained she focuses on the negatives.  Patient discussed with him a few days of working on healthy coping skills and then goes back to what she knows what she feels comfortable with.  Patient discussed feeling down today, lacking energy and motivation and feels more depressed today rather than anxious and explained anxiety is mild to moderate today    HPI: Depression, anxiety, panic attacks, chronic pain and seizure disorder      Clinical Maneuvering/Intervention:  Assisted patient in processing above session content; acknowledged and normalized patient’S thoughts, feelings, and concerns.  Assisted patient processing her thoughts and feelings related to mood, anxiety, panic attacks, chronic pain and seizure disorder.  Patient sees psychiatric nurse practitioner in this office.  Patient is to take her medications as prescribed and contact the nurse practitioner in this office for questions and concerns.  Patient denies SI, HI and self-harm.  Validated patient thoughts and feelings of increased anxiety and lack of self-awareness and educated on anxiety and cognitive behavior therapy by sharing screen and reviewing handout on how to recognize triggers and what cognitive distortions are and assisted patient in processing her thoughts and  feelings related to trauma and educated patient on trauma response.  Patient is working on self-awareness and started processing some trauma during last session which could possibly cause increased depression while processing grief and trauma.  Encourage patient to contact the office for crisis visits as needed and patient has a safety plan and is agreeable to it.  Educated on importance of seeing therapist more frequently while working on trauma but wants to stay at her current schedule of appointments for now.  Patient is to continue working on trauma assignment by processing using trauma focused therapy.  Will see back in 2 to 4 weeks and as needed      Allowed patient to freely discuss issues without interruption or judgment. Provided safe, confidential environment to facilitate the development of positive therapeutic relationship and encourage open, honest communication. Assisted patient in identifying risk factors which would indicate the need for higher level of care including thoughts to harm self or others and/or self-harming behavior and encouraged patient to contact this office, call 911, or present to the nearest emergency room should any of these events occur. Discussed crisis intervention services and means to access.  Patient adamantly and convincingly denies current suicidal or homicidal ideation or perceptual disturbance.    Assessment     Diagnoses and all orders for this visit:    1. Moderate mood disorder (CMS/HCC) (Primary)    2. Other insomnia    3. Panic disorder    4. Insomnia, unspecified type               REVIEW OF SYSTEMS:  Review of Systems       Mental Status Exam  Hygiene:   fair  Cooperation:  Cooperative  Eye Contact:  Good  Psychomotor Behavior:  Appropriate  Affect:  Appropriate  Hopelessness: 4  Speech:  Normal  Thought Process:  Goal directed  Thought Content:  Normal  Suicidal:  None  Homicidal:  None  Hallucinations:  None  Delusion:  None  Memory:  Deficits  Orientation:   Person, Place, Time and Situation  Reliability:  fair  Insight:  Good  Judgement:  Good  Impulse Control:  Good  Physical/Medical Issues:  Yes Chronic pain and seizure disorder    Patient's Support Network Includes:  , son, mother and extended family    Progress toward goal: Not at goal    Functional Status: Moderate impairment     Prognosis: Fair with Ongoing Treatment       Plan         Patient will adhere to medication regimen as prescribed and report any side effects. Patient will contact this office, call 911 or present to the nearest emergency room should suicidal or homicidal ideations occur. Provide Cognitive Behavioral Therapy and Integrative Therapy to improve functioning, maintain stability, and avoid decompensation and the need for higher level of care.          Return in about 2 weeks (around 12/1/2020) for 2 to 4 weeks and as needed.      This document is signed me Angela Gonzalez LCSW,   November 30, 2020 10:02 EST

## 2020-12-01 ENCOUNTER — TELEMEDICINE (OUTPATIENT)
Dept: PSYCHIATRY | Facility: CLINIC | Age: 50
End: 2020-12-01

## 2020-12-01 DIAGNOSIS — G47.09 OTHER INSOMNIA: ICD-10-CM

## 2020-12-01 DIAGNOSIS — F41.0 PANIC DISORDER: Primary | ICD-10-CM

## 2020-12-01 DIAGNOSIS — F39 MODERATE MOOD DISORDER (HCC): ICD-10-CM

## 2020-12-01 PROCEDURE — 90834 PSYTX W PT 45 MINUTES: CPT | Performed by: SOCIAL WORKER

## 2020-12-02 NOTE — PROGRESS NOTES
Date of Service:12/1/2020  Time In: 1230  Time Out: 112    PROGRESS NOTE  Data:  Marguerite Fink is a 50 y.o. female..This was an audio and video enabled telemedicine encounter.  Patient starts therapy session by discussing she has been having good and bad days.  Patient discussed Thanksgiving.  Patient engaged in long discussion on [place] and not feeling in the [place] spirit.  Patient reports that she did be trauma assignment from last session and came to the conclusion that one of her biggest unresolved issues/trauma is her relationship with her father.  Patient discussed for years she did not think it affected her but once starting the assignment she realized that her father being in [facility] while she was a very small child and then when he got out he did not make her a priority which felt like she was not good enough for rejection/abandonment.  Patient engaged in a long discussion about her memories of her and her father in the room were only a few.  Patient discussed her father is in a nursing home now and has not seen him in over 20 years and patient explained she is not sure she wants to see him.  Patient discussed the man she has chosen the past have all been in [facility] and wanted to explore why she would be attracted to men that has been time in snf or [facility]..    HPI: Depression, anxiety, panic attacks, chronic pain and seizure disorder      Clinical Maneuvering/Intervention:  Assisted patient in processing above session content; acknowledged and normalized patient’S thoughts, feelings, and concerns.  Assisted patient processing her thoughts and feelings related to mood, anxiety, panic attacks, chronic pain and seizure disorder.  Patient sees psychiatric nurse practitioner in this office.  Patient is to take her medications as prescribed and contact the nurse practitioner in this office for questions and concerns.  Patient denies SI, HI and self-harm.  Continue to educate on trauma and trauma  responses.  Continue to educate on fighter flight.  Assisted patient in processing her thoughts and feelings related to her father being absent or inconsistent in her life and the pain that she feels such as sadness and anger of him not making her feel special or important.  Gave patient a new trauma assignment to work on between now and next session.  Patient is aware of crisis visits as needed.  Patient to continue working on self-care routine.  Will see back in 2 weeks and as needed        Allowed patient to freely discuss issues without interruption or judgment. Provided safe, confidential environment to facilitate the development of positive therapeutic relationship and encourage open, honest communication. Assisted patient in identifying risk factors which would indicate the need for higher level of care including thoughts to harm self or others and/or self-harming behavior and encouraged patient to contact this office, call 911, or present to the nearest emergency room should any of these events occur. Discussed crisis intervention services and means to access.  Patient adamantly and convincingly denies current suicidal or homicidal ideation or perceptual disturbance.    Assessment     Diagnoses and all orders for this visit:    1. Panic disorder (Primary)    2. Moderate mood disorder (CMS/HCC)    3. Other insomnia               REVIEW OF SYSTEMS:  Review of Systems       Mental Status Exam  Hygiene:   fair  Cooperation:  Cooperative  Eye Contact:  Good  Psychomotor Behavior:  Appropriate  Affect:  Appropriate  Hopelessness: 4  Speech:  Normal  Thought Process:  Goal directed  Thought Content:  Normal  Suicidal:  None  Homicidal:  None  Hallucinations:  None  Delusion:  None  Memory:  Deficits  Orientation:  Person, Place, Time and Situation  Reliability:  fair  Insight:  Good  Judgement:  Good  Impulse Control:  Good  Physical/Medical Issues:  Yes Chronic pain and seizure disorder    Patient's Support  Network Includes:  , son, mother and extended family    Progress toward goal: Not at goal    Functional Status: Moderate impairment     Prognosis: Fair with Ongoing Treatment       Plan         Patient will adhere to medication regimen as prescribed and report any side effects. Patient will contact this office, call 911 or present to the nearest emergency room should suicidal or homicidal ideations occur. Provide Cognitive Behavioral Therapy and Integrative Therapy to improve functioning, maintain stability, and avoid decompensation and the need for higher level of care.          Return in about 2 weeks (around 12/15/2020), or if symptoms worsen or fail to improve.      This document is signed me Angela Gonzalez LCSW,   December 2, 2020 13:46 EST

## 2020-12-15 ENCOUNTER — TELEMEDICINE (OUTPATIENT)
Dept: PSYCHIATRY | Facility: CLINIC | Age: 50
End: 2020-12-15

## 2020-12-15 DIAGNOSIS — F39 MODERATE MOOD DISORDER (HCC): Primary | ICD-10-CM

## 2020-12-15 DIAGNOSIS — G47.09 OTHER INSOMNIA: ICD-10-CM

## 2020-12-15 DIAGNOSIS — F41.0 PANIC DISORDER: ICD-10-CM

## 2020-12-15 PROCEDURE — 90834 PSYTX W PT 45 MINUTES: CPT | Performed by: SOCIAL WORKER

## 2020-12-16 NOTE — PROGRESS NOTES
Date of Service:12/15/2020  Time In: 1230  Time Out: 110    PROGRESS NOTE  Data:  Marguerite Fink is a 50 y.o. female..This was an audio and video enabled telemedicine encounter.  Patient starts therapy session by reporting she did a part or portion of her trauma timeline.  Patient was able to work through the trauma that happened to her younger self and work regarding herself needed at that time.  Patient was able to work through how her childhood needs not being met created a unhealthy relationship patterns in her adult life.  Patient discussed [place] traditions and planning to go out to eat with her friends this week.  Patient discussed having social anxiety about going out that she is going to try to not cancel and actually follow through with these plans as she is only sees them once a year.    HPI: Depression, anxiety, panic attacks, chronic pain and seizure disorder      Clinical Maneuvering/Intervention:  Assisted patient in processing above session content; acknowledged and normalized patient’S thoughts, feelings, and concerns.  Assisted patient processing her thoughts and feelings related to mood, anxiety, panic attacks, chronic pain and seizure disorder.  Patient sees psychiatric nurse practitioner in this office.  Patient is to take her medications as prescribed and contact the nurse practitioner in this office for questions and concerns.  Patient denies SI, HI and self-harm.  Continue to educate on trauma and trauma responses.  Assisted patient in processing her thoughts and feelings related to trauma timeline, younger self work and finding the correlation between her younger self and the trauma that she endured and lack of healthy attachment to her parents and how it has affected her today.  Patient is to continue to work on her trauma timeline between now and next session only if she feels able.  Encourage patient to take a break if she needs to during the holiday season as trauma work can  interfere with mood and anxiety.  Patient is agreeable to this but wants to continue working on it starting at her next session patient to continue working on self-care routine.  Will see back in 2 weeks and as needed        Allowed patient to freely discuss issues without interruption or judgment. Provided safe, confidential environment to facilitate the development of positive therapeutic relationship and encourage open, honest communication. Assisted patient in identifying risk factors which would indicate the need for higher level of care including thoughts to harm self or others and/or self-harming behavior and encouraged patient to contact this office, call 911, or present to the nearest emergency room should any of these events occur. Discussed crisis intervention services and means to access.  Patient adamantly and convincingly denies current suicidal or homicidal ideation or perceptual disturbance.    Assessment     Diagnoses and all orders for this visit:    1. Moderate mood disorder (CMS/HCC) (Primary)    2. Panic disorder    3. Other insomnia               REVIEW OF SYSTEMS:  Review of Systems       Mental Status Exam  Hygiene:   fair  Cooperation:  Cooperative  Eye Contact:  Good  Psychomotor Behavior:  Appropriate  Affect:  Appropriate  Hopelessness: 4  Speech:  Normal  Thought Process:  Goal directed  Thought Content:  Normal  Suicidal:  None  Homicidal:  None  Hallucinations:  None  Delusion:  None  Memory:  Deficits  Orientation:  Person, Place, Time and Situation  Reliability:  fair  Insight:  Good  Judgement:  Good  Impulse Control:  Good  Physical/Medical Issues:  Yes Chronic pain and seizure disorder    Patient's Support Network Includes:  , son, mother and extended family    Progress toward goal: Not at goal    Functional Status: Moderate impairment     Prognosis: Fair with Ongoing Treatment       Plan         Patient will adhere to medication regimen as prescribed and report any side  effects. Patient will contact this office, call 911 or present to the nearest emergency room should suicidal or homicidal ideations occur. Provide Cognitive Behavioral Therapy and Integrative Therapy to improve functioning, maintain stability, and avoid decompensation and the need for higher level of care.          Return in about 2 weeks (around 12/29/2020), or if symptoms worsen or fail to improve.      This document is signed me Angela Gonzalez LCSW,   December 16, 2020 15:22 EST

## 2020-12-23 ENCOUNTER — PROCEDURE VISIT (OUTPATIENT)
Dept: OBSTETRICS AND GYNECOLOGY | Facility: CLINIC | Age: 50
End: 2020-12-23

## 2020-12-23 VITALS
SYSTOLIC BLOOD PRESSURE: 120 MMHG | DIASTOLIC BLOOD PRESSURE: 70 MMHG | WEIGHT: 136 LBS | HEIGHT: 65 IN | BODY MASS INDEX: 22.66 KG/M2

## 2020-12-23 DIAGNOSIS — N95.1 MENOPAUSAL SYMPTOMS: ICD-10-CM

## 2020-12-23 DIAGNOSIS — Z12.31 ENCOUNTER FOR SCREENING MAMMOGRAM FOR BREAST CANCER: ICD-10-CM

## 2020-12-23 DIAGNOSIS — Z01.419 ENCOUNTER FOR GYNECOLOGICAL EXAMINATION (GENERAL) (ROUTINE) WITHOUT ABNORMAL FINDINGS: Primary | ICD-10-CM

## 2020-12-23 PROCEDURE — 99386 PREV VISIT NEW AGE 40-64: CPT | Performed by: OBSTETRICS & GYNECOLOGY

## 2020-12-23 RX ORDER — ESTRADIOL 0.1 MG/D
1 FILM, EXTENDED RELEASE TRANSDERMAL 2 TIMES WEEKLY
Qty: 24 PATCH | Refills: 4 | Status: SHIPPED | OUTPATIENT
Start: 2020-12-24 | End: 2021-03-22

## 2020-12-23 NOTE — PROGRESS NOTES
Subjective  Chief Complaint   Patient presents with   • Gynecologic Exam     Patient is 50 y.o.  here as a new patient for her annual examination.  Patient has not been seen since 2017.  Patient sees Dr. Tripathi for her primary care.  Patient has a known history of cervical dysplasia.  Patient had a hysterectomy in .  Patient has remained on hormone replacement therapy.  Patient is currently using estradiol patches.  She reports her symptoms are well controlled with the hormone patches.  She does report even on the patches she has occasional hot flashes and night sweats.  Patient does not want to try a lower dose at this time.  Patient had her last mammogram in November of last year.  Patient also reports having a colonoscopy this summer.  She reports it was normal.  She is to repeat in 5 years.    History  Past Medical History:   Diagnosis Date   • Abdominal pain, epigastric    • Acute sinusitis    • Acute UTI (urinary tract infection)    • Angina, intestinal (CMS/HCC)    • Anxiety    • Arthritis    • Back pain    • Breast mass, right    • Cancer (CMS/HCC) 2018    basal cell R ear   • Chronic hepatitis C virus infection (CMS/HCC)    • COPD (chronic obstructive pulmonary disease) (CMS/HCC)    • Depression    • Diarrhea    • Elevated LFTs    • Fatigue    • Glaucoma 2020   • History of endometriosis    • History of Papanicolaou smear of cervix 2014    NORMAL    • HTN (hypertension)    • IBS (irritable bowel syndrome)    • Insomnia    • Menopausal symptoms    • Migraine headache    • Nausea & vomiting    • Neck pain    • Ovarian cyst    • Pelvic adhesive disease    • Radicular pain of left lower extremity    • Restless leg syndrome    • Seizure disorder (CMS/HCC)    • Tobacco abuse    • Trochanteric bursitis    • Vitamin B 12 deficiency      Current Outpatient Medications on File Prior to Visit   Medication Sig Dispense Refill   • ARIPiprazole (ABILIFY) 15 MG tablet Take 1 tablet by  mouth Daily. 30 tablet 0   • B Complex Vitamins (VITAMIN B COMPLEX) capsule capsule Take  by mouth.     • diclofenac (VOLTAREN) 1 % gel gel Apply 4 g topically to the appropriate area as directed 4 (Four) Times a Day As Needed (pain). 100 g 11   • gabapentin (NEURONTIN) 100 MG capsule Take 100 mg by mouth every night at bedtime. Up to two pills at night  0   • hydrOXYzine (ATARAX) 10 MG tablet TAKE 1 TO 2 TABLETS BY MOUTH DAILY IF NEEDED FOR PANIC 60 tablet 2   • latanoprost (XALATAN) 0.005 % ophthalmic solution INSTILL 1 DROP INTO BOTH EYES DAILY AT BEDTIME     • levETIRAcetam (KEPPRA) 500 MG tablet Take 1.5 PO BID 75 tablet 11   • lidocaine (XYLOCAINE) 2 % jelly Apply  topically to the appropriate area as directed As Needed for Mild Pain . 85 g 1   • methocarbamol (ROBAXIN) 750 MG tablet Take 750 mg by mouth 2 (Two) Times a Day.     • mirtazapine (REMERON) 7.5 MG tablet Take 1 tablet by mouth Every Night. 30 tablet 2   • montelukast (SINGULAIR) 10 MG tablet TAKE 1 TABLET BY MOUTH AT BEDTIME 30 tablet 5   • OnabotulinumtoxinA 200 units reconstituted solution Inject 200 units IM into head neck shoulders every 12 weeks per FDA protocol. Dx G43.709 1 each 3   • oxyCODONE-acetaminophen (ENDOCET)  MG per tablet 1 tablet Daily.     • propranolol (INDERAL) 10 MG tablet Take 1 tablet by mouth Daily As Needed (anxiety). 30 tablet 0   • sertraline (ZOLOFT) 50 MG tablet Take one tablet daily 30 tablet 1   • SUMAtriptan (IMITREX) 20 MG/ACT nasal spray USE 1 SPRAY IN 1 NOSTRIL IF NEEDED 18 each 0   • topiramate (TOPAMAX) 100 MG tablet Take 1 tablet by mouth 2 (Two) Times a Day. 60 tablet 11   • Umeclidinium Bromide (INCRUSE ELLIPTA) 62.5 MCG/INH aerosol powder  Inhale 1 puff Daily. 1 each 11   • WIXELA INHUB 250-50 MCG/DOSE DISKUS Inhale 1 puff Daily. 60 each 11     No current facility-administered medications on file prior to visit.      No Known Allergies  Past Surgical History:   Procedure Laterality Date   • BILATERAL  SALPINGO OOPHORECTOMY  03/10/2015    DR NATALY THOMPSON   • BREAST LUMPECTOMY     •  SECTION     • HYSTERECTOMY  03/10/2015    [facility] (DR NATALY THOMPSON)   • INGUINAL HERNIA REPAIR Right    • LYSIS OF ABDOMINAL ADHESIONS  03/10/2015    DR NATALY THOMPSON   • TUBAL ABDOMINAL LIGATION       Family History   Problem Relation Age of Onset   • Alzheimer's disease Other    • Cancer Other    • Dementia Other    • Hypertension Other    • Parkinsonism Other    • Osteoporosis Mother    • Diabetes Mother    • Hypertension Mother    • Obesity Mother    • Depression Mother    • Arthritis Mother    • COPD Mother    • Hearing loss Mother    • Hyperlipidemia Mother    • Breast cancer Maternal Grandmother    • Osteoporosis Maternal Grandmother    • Diabetes Maternal Grandmother    • Dementia Maternal Grandmother    • Cancer Maternal Grandmother         Breast Cancer   • Hypertension Maternal Grandmother    • ADD / ADHD Sister    • Alcohol abuse Sister    • Anxiety disorder Sister    • Bipolar disorder Sister    • Depression Sister    • Drug abuse Sister    • Depression Father    • OCD Neg Hx    • Paranoid behavior Neg Hx    • Schizophrenia Neg Hx    • Seizures Neg Hx    • Self-Injurious Behavior  Neg Hx    • Suicide Attempts Neg Hx      Social History     Socioeconomic History   • Marital status:      Spouse name: Not on file   • Number of children: 1   • Years of education: Not on file   • Highest education level: High school graduate   Tobacco Use   • Smoking status: Light Tobacco Smoker     Packs/day: 0.50     Years: 18.00     Pack years: 9.00     Types: Cigarettes   • Smokeless tobacco: Never Used   • Tobacco comment:  1/2 PACK A DAY   Substance and Sexual Activity   • Alcohol use: Not Currently     Alcohol/week: 0.0 standard drinks     Comment: on occasion for an occasion or event   • Drug use: No   • Sexual activity: Yes     Partners: Male     Birth control/protection: Post-menopausal, Surgical       Review of  "Systems  The following systems were reviewed and negative:  constitution, eyes, ENT, respiratory, cardiovascular, gastrointestinal, genitourinary, integument, breast, hematologic / lymphatic, musculoskeletal, neurological, behavioral/psych, endocrine and allergies / immunologic  Objective  Vitals:    12/23/20 1510   BP: 120/70   Weight: 61.7 kg (136 lb)   Height: 165.1 cm (65\")     Physical Exam:  General Appearance: alert, appears stated age and cooperative  Head: normocephalic, without obvious abnormality and atraumatic  Eyes: lids and lashes normal, conjunctivae and sclerae normal, no icterus, no pallor, corneas clear and PERRLA  Ears: ears appear intact with no abnormalities noted  Nose: nares normal, septum midline, mucosa normal and no drainage  Neck: suppple, trachea midline and no thyromegaly  Lungs: clear to auscultation, respirations regular, respirations even and respirations unlabored  Heart: regular rhythm and normal rate, normal S1, S2, no murmur, gallop, or rubs and no click  Breasts: Examined in supine position  Symmetric without masses or skin dimpling  Nipples normal without inversion, lesions or discharge  There are no palpable axillary nodes  Abdomen: normal bowel sounds, no masses, no hepatomegaly, no splenomegaly, soft non-tender, no guarding and no rebound tenderness  Pelvic: Clinical staff was present for exam  External genitalia:  normal appearance of the external genitalia including Bartholin's and [place]'s glands.  :  urethral meatus normal;  Vaginal:  normal pink mucosa without prolapse or lesions.  Cervix:  absent.  Uterus:  absent.  Adnexa:  non palpable bilaterally.  Pap smear done and specimen sent using Thin-Prep technique  Extremities: moves extremities well, no edema, no cyanosis and no redness  Skin: no bleeding, bruising or rash and no lesions noted  Lymph Nodes: no palpable adenopathy  Neuro: CN II-X grossly intact; sensation intact  Psych: normal mood and affect, oriented to " person, time and place, thought content organized and appropriate judgment  Lab Review   No data reviewed    Imaging   Mammogram report    Decision to Obtain Medical Records  No    Summary of Medical Records  No    Assessment/Plan  1. Encounter for gynecological examination (general) (routine) without abnormal findings  Pap was done today.  If she does not receive the results of the Pap within 2 weeks  time, she was instructed to call to find out the results.  I explained to Marguerite that the recommendations for Pap smear interval in a low risk patient has lengthened to 3 years time if cytology alone normal or  5 years time if both cytology and HPV testing were normal.  I encouraged her to be seen yearly for a full physical exam including breast and pelvic exam even during the off years when PAP's will not be performed.  Patient with history of cervical dysplasia.  Pap smear is obtained today as noted.  - Pap IG, Rfx HPV ASCU; Future    2. Encounter for screening mammogram for breast cancer  It is recommended per ACOG, for women at average risk to start annual mammogram screening at the age of 40 until the age of 75 and an individualized decision be made for women after age 75.  She was encouraged to continue getting yearly mammograms.  She should report any palpable breast lump(s) or skin changes regardless of mammographic findings.  I explained to Marguerite that notification regarding her mammogram results will come from the center performing the study.  Our office will not be routinely calling with mammogram results.  It is her responsibility to make sure that the results from the mammogram are communicated to her by the breast center.  If she has any questions about the results, she is welcome to call our office anytime.  The patient reports she will schedule her mammogram.    Marguerite was counseled regarding having clinical breast exams and breast self-awareness.  Women aged 29-39 years of age should have clinical breast  exams every 1-3 years and yearly aged 40 and older.  The patient was counseled regarding breast self-awareness focusing on having a sense of what is normal for her breasts so that she can tell if there are changes.  Even small changes should be reported to provider.  - Mammo Screening Digital Tomosynthesis Bilateral With CAD; Future    3. Menopausal symptoms  I discussed with the patient the various options for management of her symptoms.  I discussed with the patient is recommended the lowest dose of estrogen to control her symptoms for the shortest period of time.  I recommended a trial with lower dose of hormone replacement therapy.  Patient desires to continue current dose at present given she still has occasional symptoms.  Instructions and precautions are given.  Patient is to follow-up as noted.  - estradiol (Vivelle-Dot) 0.1 MG/24HR patch; Place 1 patch on the skin as directed by provider 2 (Two) Times a Week.  Dispense: 24 patch; Refill: 4         Follow up as discussed/scheduled  Note: Speech recognition transcription software may have been used to dictate portions of this document.  An attempt at proofreading has been made though minor errors in transcription may still be present.  This note was electronically signed.  Sandra Rangel M.D.

## 2020-12-29 DIAGNOSIS — F41.0 PANIC DISORDER: ICD-10-CM

## 2020-12-29 RX ORDER — PROPRANOLOL HYDROCHLORIDE 10 MG/1
TABLET ORAL
Qty: 30 TABLET | Refills: 0 | Status: SHIPPED | OUTPATIENT
Start: 2020-12-29 | End: 2021-01-11

## 2021-01-05 ENCOUNTER — PROCEDURE VISIT (OUTPATIENT)
Dept: NEUROLOGY | Facility: CLINIC | Age: 51
End: 2021-01-05

## 2021-01-05 VITALS
HEIGHT: 65 IN | WEIGHT: 129 LBS | HEART RATE: 82 BPM | OXYGEN SATURATION: 97 % | DIASTOLIC BLOOD PRESSURE: 70 MMHG | SYSTOLIC BLOOD PRESSURE: 90 MMHG | BODY MASS INDEX: 21.49 KG/M2 | TEMPERATURE: 96.8 F

## 2021-01-05 DIAGNOSIS — Z91.09 ENVIRONMENTAL ALLERGIES: ICD-10-CM

## 2021-01-05 DIAGNOSIS — Z01.419 ENCOUNTER FOR GYNECOLOGICAL EXAMINATION (GENERAL) (ROUTINE) WITHOUT ABNORMAL FINDINGS: ICD-10-CM

## 2021-01-05 DIAGNOSIS — G43.019 INTRACTABLE MIGRAINE WITHOUT AURA AND WITHOUT STATUS MIGRAINOSUS: ICD-10-CM

## 2021-01-05 PROCEDURE — 64615 CHEMODENERV MUSC MIGRAINE: CPT | Performed by: NURSE PRACTITIONER

## 2021-01-05 RX ORDER — MONTELUKAST SODIUM 10 MG/1
TABLET ORAL
Qty: 90 TABLET | Refills: 3 | Status: SHIPPED | OUTPATIENT
Start: 2021-01-05 | End: 2022-03-08 | Stop reason: ALTCHOICE

## 2021-01-05 NOTE — PROGRESS NOTES
"CC: Botox Injections  Indication for Procedure: Chronic migraines          BP 90/70   Pulse 82   Temp 96.8 °F (36 °C)   Ht 165.1 cm (65\")   Wt 58.5 kg (129 lb)   SpO2 97%   BMI 21.47 kg/m²     Date of last Injection: 10/13/2020   Response: 80 to 90% reduction in migraines  Onset of response: 1 week  Wearing off: 11 weeks  Side Effects: None  : [employer]  Lot #:  [id number]  Expiration: August 2023  NDC: [id number]      With written consent obtained and risks and benefits explained to patient.     Botox injected using FDA approved protocol for chronic migraine prevention.   10 units, Procerus 5 units, Frontalis 20 units, Temporalis 40 units, Occipitalis 30 units, Cervical Paraspinals 20 units, Trapezius 30 units.     The total amount injected in units is 155.  The total amount wasted in units is 45.  The total amount submitted in units is 200.  Botox was supplied by specialty pharmacy.    Patient tolerated procedure well with no immediate complications.     We have discussed risk and benefits of this Botox procedure and common side effects including headache, neck pain, neck stiffness or weakness, ptosis, flu-like symptoms as well as more serious possible adverse effects including possible dysphagia, respiratory distress or even death (death has only been reported once with adults for Botox for migraines in another state when mixed with lidocaine solution which we do not use lidocaine solution in our practice for mixing Botox). Verbalizes understanding, accepts risks and agrees with moving forward with Botox injections for chronic migraine prevention.    "

## 2021-01-11 ENCOUNTER — TELEMEDICINE (OUTPATIENT)
Dept: PSYCHIATRY | Facility: CLINIC | Age: 51
End: 2021-01-11

## 2021-01-11 DIAGNOSIS — F41.0 PANIC DISORDER: Chronic | ICD-10-CM

## 2021-01-11 DIAGNOSIS — G47.09 OTHER INSOMNIA: Chronic | ICD-10-CM

## 2021-01-11 DIAGNOSIS — F39 MODERATE MOOD DISORDER (HCC): Primary | Chronic | ICD-10-CM

## 2021-01-11 PROCEDURE — 99214 OFFICE O/P EST MOD 30 MIN: CPT | Performed by: NURSE PRACTITIONER

## 2021-01-11 RX ORDER — PROPRANOLOL HYDROCHLORIDE 20 MG/1
20 TABLET ORAL DAILY PRN
Qty: 20 TABLET | Refills: 0 | Status: SHIPPED | OUTPATIENT
Start: 2021-01-11 | End: 2021-03-08 | Stop reason: SDUPTHER

## 2021-01-11 RX ORDER — ARIPIPRAZOLE 15 MG/1
15 TABLET ORAL DAILY
Qty: 30 TABLET | Refills: 2 | Status: SHIPPED | OUTPATIENT
Start: 2021-01-11 | End: 2021-04-15

## 2021-01-11 RX ORDER — HYDROXYZINE HYDROCHLORIDE 10 MG/1
TABLET, FILM COATED ORAL
Qty: 60 TABLET | Refills: 2 | Status: SHIPPED | OUTPATIENT
Start: 2021-01-11 | End: 2021-07-12 | Stop reason: SDUPTHER

## 2021-01-11 RX ORDER — MIRTAZAPINE 7.5 MG/1
7.5 TABLET, FILM COATED ORAL NIGHTLY
Qty: 30 TABLET | Refills: 2 | Status: SHIPPED | OUTPATIENT
Start: 2021-01-11 | End: 2021-06-01

## 2021-01-11 NOTE — PROGRESS NOTES
"This provider is located at [facility] ,[address], [address] in [place], [place],using telephone due to technical difficulties. Patient is being seen remotely via telehealth at their home address in [place], and stated they are in a secure environment for this session. The patient's condition being diagnosed/treated is appropriate for telemedicine. The provider identified herself as well as her credentials.   The patient, and/or patients guardian, consent to be seen remotely, and when consent is given they understand that the consent allows for patient identifiable information to be sent to a third party as needed.   They may refuse to be seen remotely at any time. The electronic data is encrypted and password protected, and the patient and/or guardian has been advised of the potential risks to privacy not withstanding such measures.    Chief Complaint  Mood disorder, panic, and insomnia  Subjective          Marguerite Fink presents today via [employer] through [employer] by herself for a follow up and medication check.     History of Present Illness: Marguerite states, \" I am doing pretty good.\"  Marguerite tells me that she had a quiet Rosa at home.  She was unable to celebrate in their traditional manner due to the ongoing COVID-19 virus but did get to see her mother on Rosa morning.  Marguerite continues to experience depressive symptoms but tells me that the increase in Abilify has helped.  She states \"I still feel blah but I have less sadness and and feeling more upbeat.\"  Marguerite endorses depressive symptoms such as depressed mood, anhedonia, difficulty falling asleep, fatigue, decreased concentration, feelings of hopelessness, and psychomotor retardation.  Marguerite continues to rate her depression a 7 out of 10 most days over the last two weeks with 10 being the most severe.  She tells me that she has been able to \"work through it\" a bit more.  Marguerite tells me that she " "has been practicing meditation to improve her depressive symptoms.  She states that finding motivation continues to be the biggest difficulty for her.  Marguerite also endorses some seasonal affective disorder such as feeling fatigued, less productive, and wanting to oversleep.  Marguerite's last seizure was \"a couple\" of days ago.  She states, \"I got stressed out and had a grand mal seizure.\"  Marguerite tells me that the first one she has had in a couple of months.  She tells me that a disagreement with her mother left her feeling overwhelmed and she had a seizure.  Marguerite continues to take Keppra and Topamax for her seizure disorder.  She is taking propranolol for anxiety and Remeron for sleep.  She tells me that she is having difficulty falling asleep after taking her Remeron.  She tells me that she goes to bed around 10:00 PM but cannot fall asleep until 11:30 PM and then will sleep until 11:00 AM the next morning.  I encouraged her to take her medication earlier and she verbalizes her understanding.  Marguerite and I also discussed possibly changing from Zoloft to Prozac in the future.  We are going to continue to monitor the effectiveness of the Abilify on her depressive symptoms prior to changing any other medication therapies.  Her current medications include Abilify, Atarax, Remeron, propranolol, and Zoloft.  She denies any side effects of her current medication regiment.  She denies any problems with appetite.  She denies any SI/HI/AVH.    Current Medications:   Current Outpatient Medications   Medication Sig Dispense Refill   • ARIPiprazole (ABILIFY) 15 MG tablet Take 1 tablet by mouth Daily. 30 tablet 2   • B Complex Vitamins (VITAMIN B COMPLEX) capsule capsule Take  by mouth.     • diclofenac (VOLTAREN) 1 % gel gel Apply 4 g topically to the appropriate area as directed 4 (Four) Times a Day As Needed (pain). 100 g 11   • estradiol (Vivelle-Dot) 0.1 MG/24HR patch Place 1 patch on the skin as directed by provider 2 (Two) " Times a Week. 24 patch 4   • gabapentin (NEURONTIN) 300 MG capsule Take 300 mg by mouth every night at bedtime. Up to two pills at night  0   • hydrOXYzine (ATARAX) 10 MG tablet TAKE 1 TO 2 TABLETS BY MOUTH DAILY IF NEEDED FOR PANIC 60 tablet 2   • latanoprost (XALATAN) 0.005 % ophthalmic solution INSTILL 1 DROP INTO BOTH EYES DAILY AT BEDTIME     • levETIRAcetam (KEPPRA) 500 MG tablet Take 1.5 PO BID 75 tablet 11   • lidocaine (XYLOCAINE) 2 % jelly Apply  topically to the appropriate area as directed As Needed for Mild Pain . 85 g 1   • methocarbamol (ROBAXIN) 750 MG tablet Take 750 mg by mouth 2 (Two) Times a Day.     • mirtazapine (REMERON) 7.5 MG tablet Take 1 tablet by mouth Every Night. 30 tablet 2   • montelukast (SINGULAIR) 10 MG tablet TAKE 1 TABLET BY MOUTH AT BEDTIME 90 tablet 3   • OnabotulinumtoxinA 200 units reconstituted solution Inject 200 units IM into head neck shoulders every 12 weeks per FDA protocol. Dx G43.709 1 each 3   • oxyCODONE-acetaminophen (ENDOCET)  MG per tablet 1 tablet Daily.     • propranolol (INDERAL) 20 MG tablet Take 1 tablet by mouth Daily As Needed (anxiety). for anxiety 20 tablet 0   • sertraline (ZOLOFT) 50 MG tablet Take one tablet daily 30 tablet 2   • SUMAtriptan (IMITREX) 20 MG/ACT nasal spray USE 1 SPRAY IN 1 NOSTRIL IF NEEDED 18 each 0   • topiramate (TOPAMAX) 100 MG tablet Take 1 tablet by mouth 2 (Two) Times a Day. 60 tablet 11   • Umeclidinium Bromide (INCRUSE ELLIPTA) 62.5 MCG/INH aerosol powder  Inhale 1 puff Daily. 1 each 11   • WIXELA INHUB 250-50 MCG/DOSE DISKUS Inhale 1 puff Daily. 60 each 11     No current facility-administered medications for this visit.        Objective   Vital Signs:   There were no vitals taken for this visit.    Physical Exam  Nursing note reviewed. Vitals reviewed: Vitals not obtained due to nature of telehealth visit. Exam conducted with a chaperone present.   Neurological:      General: No focal deficit present.      Mental  Status: She is alert and oriented to person, place, and time.   Psychiatric:         Attention and Perception: Attention normal.         Mood and Affect: Mood normal.         Speech: Speech normal.         Behavior: Behavior normal. Behavior is cooperative.         Thought Content: Thought content normal.         Cognition and Memory: Cognition normal.         Judgment: Judgment normal.      Comments: Improve depression        Result Review :                   Assessment and Plan    Problem List Items Addressed This Visit     None      Visit Diagnoses     Moderate mood disorder (CMS/HCC)    -  Primary    Relevant Medications    sertraline (ZOLOFT) 50 MG tablet    ARIPiprazole (ABILIFY) 15 MG tablet    hydrOXYzine (ATARAX) 10 MG tablet    mirtazapine (REMERON) 7.5 MG tablet    Panic disorder        Relevant Medications    propranolol (INDERAL) 20 MG tablet    sertraline (ZOLOFT) 50 MG tablet    ARIPiprazole (ABILIFY) 15 MG tablet    hydrOXYzine (ATARAX) 10 MG tablet    mirtazapine (REMERON) 7.5 MG tablet    Other insomnia        Relevant Medications    mirtazapine (REMERON) 7.5 MG tablet          Mental Status Exam:   Hygiene:   Unable to assess  Cooperation:  Cooperative  Eye Contact:  Unable to assess  Psychomotor Behavior:  Slow  Affect:  Restricted  Mood: depressed  Speech:  Normal  Thought Process:  Goal directed and Linear  Thought Content:  Normal  Suicidal:  None  Homicidal:  None  Hallucinations:  None  Delusion:  None  Memory:  Intact  Orientation:  Person, Place, Time and Situation  Reliability:  good  Insight:  Good  Judgement:  Good  Impulse Control:  Good  Physical/Medical Issues:  Yes Seizure disorder     PHQ-9 Score:   PHQ-9 Total Score: 12    Impression/Plan:  -This is a follow up and medication check.  Marguerite is endorsing improved symptoms of depression.  She continues to rate her depression at high levels but has noticed improvement in her sadness and mood.  She is hoping to find some more  motivation.  We had lengthy discussions about her medication management and I encouraged her to continue to evaluate the effectiveness of her Abilify prior to making any medication changes.  Marguerite sounded more upbeat on the phone and with an improved mood.  She continues to experience anxious symptoms.  She is sleeping with the Remeron but we did make some adjustments to the timing.  -Patient screened positive for depression based on a PHQ-9 score of 12 on 1/11/2021. Follow-up recommendations include: Prescribed antidepressant medication treatment and Suicide Risk Assessment performed.  -Increase propranolol to 20 mg as needed for panic.   -Continue Abilify 15 mg daily for mood disorder.  -Continue Atarax 10 mg twice daily for panic.  -Continue Zoloft 50 mg daily for depression.  -Continue Remeron 7.5 mg nightly for insomnia.  -Continue therapy with Angela Gonzalez LCSW.  -Continue Keppra and Topamax for seizure disorder and migraines.  Continue oxycodone for pain.  These medications are prescribed by another provider.    MEDS ORDERED DURING VISIT:  New Medications Ordered This Visit   Medications   • propranolol (INDERAL) 20 MG tablet     Sig: Take 1 tablet by mouth Daily As Needed (anxiety). for anxiety     Dispense:  20 tablet     Refill:  0   • sertraline (ZOLOFT) 50 MG tablet     Sig: Take one tablet daily     Dispense:  30 tablet     Refill:  2   • ARIPiprazole (ABILIFY) 15 MG tablet     Sig: Take 1 tablet by mouth Daily.     Dispense:  30 tablet     Refill:  2   • hydrOXYzine (ATARAX) 10 MG tablet     Sig: TAKE 1 TO 2 TABLETS BY MOUTH DAILY IF NEEDED FOR PANIC     Dispense:  60 tablet     Refill:  2   • mirtazapine (REMERON) 7.5 MG tablet     Sig: Take 1 tablet by mouth Every Night.     Dispense:  30 tablet     Refill:  2         Follow Up { Follow-up  Notes :23}  Return in about 2 months (around 3/11/2021) for Medication Check.  Patient was given instructions and counseling regarding her condition or for  health maintenance advice. Please see specific information pulled into the AVS if appropriate.       TREATMENT PLAN/GOALS: Continue supportive psychotherapy efforts and medications as indicated. Treatment and medication options discussed during today's visit. Patient acknowledged and verbally consented to continue with current treatment plan and was educated on the importance of compliance with treatment and follow-up appointments.    MEDICATION ISSUES:  Discussed medication options and treatment plan of prescribed medication as well as the risks, benefits, and side effects including potential falls, possible impaired driving and metabolic adversities among others. Patient is agreeable to call the office with any worsening of symptoms or onset of side effects. Patient is agreeable to call 911 or go to the nearest ER should he/she begin having SI/HI.      This document has been electronically signed by CHRIS Witt, PMHNP-BC  January 11, 2021 16:28 EST    Part of this note may be an electronic transcription/translation of spoken language to printed text using the Dragon Dictation System.

## 2021-01-12 ENCOUNTER — TELEMEDICINE (OUTPATIENT)
Dept: PSYCHIATRY | Facility: CLINIC | Age: 51
End: 2021-01-12

## 2021-01-12 DIAGNOSIS — F39 MODERATE MOOD DISORDER (HCC): ICD-10-CM

## 2021-01-12 DIAGNOSIS — G47.09 OTHER INSOMNIA: ICD-10-CM

## 2021-01-12 DIAGNOSIS — F33.1 MODERATE EPISODE OF RECURRENT MAJOR DEPRESSIVE DISORDER (HCC): ICD-10-CM

## 2021-01-12 DIAGNOSIS — F41.0 PANIC DISORDER: Primary | ICD-10-CM

## 2021-01-12 PROCEDURE — 90834 PSYTX W PT 45 MINUTES: CPT | Performed by: SOCIAL WORKER

## 2021-01-19 NOTE — PROGRESS NOTES
Date of Service:1/12/2021  Time In: 1130  Time Out: 1215    PROGRESS NOTE  Data:  Marguerite Fink is a 50 y.o. female..This was an audio and video enabled telemedicine encounter.  Patient had difficulty getting on teletherapy today so she had to log off and then back on it. Patient discussed the medication she is on has improved her mood and decreased anxiety some and is aware of it.  Patient engaged in long discussion her holidays, her time out with friends at [employer] in [place] and the anxiety she found getting outside of the home.  Patient discussed she is not agoraphobia but does have excessive fears and worries being outside of the home and relates it to her seizure disorder.  Patient discussed she continues to process her trauma history but realizing how uncomfortable it is for her to process and to feel the feelings and emotions that come with learned behaviors and trauma.      HPI: Depression, anxiety, panic attacks, chronic pain and seizure disorder      Clinical Maneuvering/Intervention:  Assisted patient in processing above session content; acknowledged and normalized patient’S thoughts, feelings, and concerns.  Assisted patient processing her thoughts and feelings related to mood, anxiety, panic attacks, chronic pain and seizure disorder.  Patient sees psychiatric nurse practitioner in this office.  Patient is to take her medications as prescribed and contact the nurse practitioner in this office for questions and concerns.  Patient denies SI, HI and self-harm.  Continue to educate on trauma and trauma responses.  Assisted patient in processing her thoughts and feelings related to trauma timeline, younger self work and finding the correlation between her younger self and the trauma that she endured and lack of healthy attachment to her parents and how it has affected her today.  Patient is to continue to work on her trauma timeline between now and next session only if she feels able.  Encourage  patient to start back when she feels she is ready to work on her trauma narrative.  Patient is to take medication as prescribed and contact this office with questions and concerns for psychiatric nurse practitioner.  Patient to see this therapist back in 2 weeks and as needed      Allowed patient to freely discuss issues without interruption or judgment. Provided safe, confidential environment to facilitate the development of positive therapeutic relationship and encourage open, honest communication. Assisted patient in identifying risk factors which would indicate the need for higher level of care including thoughts to harm self or others and/or self-harming behavior and encouraged patient to contact this office, call 911, or present to the nearest emergency room should any of these events occur. Discussed crisis intervention services and means to access.  Patient adamantly and convincingly denies current suicidal or homicidal ideation or perceptual disturbance.    Assessment     Diagnoses and all orders for this visit:    1. Panic disorder (Primary)    2. Moderate mood disorder (CMS/HCC)    3. Other insomnia    4. Moderate episode of recurrent major depressive disorder (CMS/HCC)               REVIEW OF SYSTEMS:  Review of Systems       Mental Status Exam  Hygiene:   fair  Cooperation:  Cooperative  Eye Contact:  Good  Psychomotor Behavior:  Appropriate  Affect:  Appropriate  Hopelessness: 4  Speech:  Normal  Thought Process:  Goal directed  Thought Content:  Normal  Suicidal:  None  Homicidal:  None  Hallucinations:  None  Delusion:  None  Memory:  Deficits  Orientation:  Person, Place, Time and Situation  Reliability:  fair  Insight:  Good  Judgement:  Good  Impulse Control:  Good  Physical/Medical Issues:  Yes Chronic pain and seizure disorder    Patient's Support Network Includes:  , son, mother and extended family    Progress toward goal: Not at goal    Functional Status: Moderate impairment      Prognosis: Fair with Ongoing Treatment       Plan         Patient will adhere to medication regimen as prescribed and report any side effects. Patient will contact this office, call 911 or present to the nearest emergency room should suicidal or homicidal ideations occur. Provide Cognitive Behavioral Therapy and Integrative Therapy to improve functioning, maintain stability, and avoid decompensation and the need for higher level of care.          Return in about 2 weeks (around 1/26/2021), or if symptoms worsen or fail to improve.      This document is signed me Angela Gonzalez LCSW,   January 19, 2021 13:16 EST

## 2021-01-22 RX ORDER — LEVETIRACETAM 500 MG/1
TABLET ORAL
Qty: 75 TABLET | Refills: 11 | Status: SHIPPED | OUTPATIENT
Start: 2021-01-22 | End: 2021-03-30 | Stop reason: ALTCHOICE

## 2021-01-22 NOTE — TELEPHONE ENCOUNTER
Caller:   MERISSA ALANIS   Relationship: SELF    Best call back number: [phone]    Medication needed:   Requested Prescriptions     Pending Prescriptions Disp Refills   • levETIRAcetam (KEPPRA) 500 MG tablet 75 tablet 11     Sig: Take 1.5 PO BID       When do you need the refill by: ASAP    What details did the patient provide when requesting the medication: PT CALLED IN FOR A REFILL WAS TOLD BY INSURANCE A PRE AUTH IS REQUIRED  PLEASE ADVISE AS PT IS OUT OF THIS MEDICATION     Does the patient have less than a 3 day supply:  [x] Yes  [] No    What is the patient's preferred pharmacy: [id number]  LISTED           PLEASE ADVISE

## 2021-01-23 DIAGNOSIS — F39 MODERATE MOOD DISORDER (HCC): Chronic | ICD-10-CM

## 2021-01-25 RX ORDER — ARIPIPRAZOLE 15 MG/1
15 TABLET ORAL DAILY
Qty: 30 TABLET | Refills: 2 | OUTPATIENT
Start: 2021-01-25

## 2021-02-02 ENCOUNTER — TELEMEDICINE (OUTPATIENT)
Dept: PSYCHIATRY | Facility: CLINIC | Age: 51
End: 2021-02-02

## 2021-02-02 DIAGNOSIS — F39 MODERATE MOOD DISORDER (HCC): Primary | ICD-10-CM

## 2021-02-02 DIAGNOSIS — G47.09 OTHER INSOMNIA: ICD-10-CM

## 2021-02-02 DIAGNOSIS — F41.0 PANIC DISORDER: ICD-10-CM

## 2021-02-02 DIAGNOSIS — G47.00 INSOMNIA, UNSPECIFIED TYPE: ICD-10-CM

## 2021-02-02 PROCEDURE — 90837 PSYTX W PT 60 MINUTES: CPT | Performed by: SOCIAL WORKER

## 2021-02-02 NOTE — PROGRESS NOTES
Date of Service:2/2/2021  Time In: 1230  Time Out: 127    PROGRESS NOTE  Data:  Marguerite Fink is a 51 y.o. female. ..This visit has been rescheduled as a phone visit to comply with patient safety concerns in accordance with [facility] recommendations. Total time of discussion was 57 minutes. Patient starts therapy session by discussing she is having a court date related to her disability this Friday morning. Patient explained she is having increased anxiety about this but trying to use her healthy coping skills she has learned. Patient discussed mood improvement and feels the medication regimen has been helping. Patient reports having more pain and rates pain a 7 on a 1-10 scale with 10 being the worst. Patient discussed working on making small goals to getting organized and declutter.       HPI: Depression, anxiety, panic attacks, chronic pain and seizure disorder      Clinical Maneuvering/Intervention:  Assisted patient in processing above session content; acknowledged and normalized patient’S thoughts, feelings, and concerns.  Assisted patient processing her thoughts and feelings related to mood, anxiety, panic attacks, chronic pain and seizure disorder.  Patient sees psychiatric nurse practitioner in this office.  Patient is to take her medications as prescribed and contact the nurse practitioner in this office for questions and concerns.  Patient denies SI, HI and self-harm.  Patient to see this therapist back in 2 weeks and as needed Reviewed healthy coping skills and mechanisms, ways to decrease anxiety and grounding techniques and encourage patient to be genuine and authentic as this will decrease her anxiety.      Allowed patient to freely discuss issues without interruption or judgment. Provided safe, confidential environment to facilitate the development of positive therapeutic relationship and encourage open, honest communication. Assisted patient in identifying risk factors which would indicate the need for  higher level of care including thoughts to harm self or others and/or self-harming behavior and encouraged patient to contact this office, call 911, or present to the nearest emergency room should any of these events occur. Discussed crisis intervention services and means to access.  Patient adamantly and convincingly denies current suicidal or homicidal ideation or perceptual disturbance.    Assessment     Diagnoses and all orders for this visit:    1. Moderate mood disorder (CMS/HCC) (Primary)    2. Panic disorder    3. Other insomnia    4. Insomnia, unspecified type               REVIEW OF SYSTEMS:  Review of Systems       Mental Status Exam  Hygiene:   fair  Cooperation:  Cooperative  Eye Contact:  Good  Psychomotor Behavior:  Appropriate  Affect:  Appropriate  Hopelessness: 4  Speech:  Normal  Thought Process:  Goal directed  Thought Content:  Normal  Suicidal:  None  Homicidal:  None  Hallucinations:  None  Delusion:  None  Memory:  Deficits  Orientation:  Person, Place, Time and Situation  Reliability:  fair  Insight:  Good  Judgement:  Good  Impulse Control:  Good  Physical/Medical Issues:  Yes Chronic pain and seizure disorder    Patient's Support Network Includes:  , son, mother and extended family    Progress toward goal: Not at goal    Functional Status: Moderate impairment     Prognosis: Fair with Ongoing Treatment       Plan         Patient will adhere to medication regimen as prescribed and report any side effects. Patient will contact this office, call 911 or present to the nearest emergency room should suicidal or homicidal ideations occur. Provide Cognitive Behavioral Therapy and Integrative Therapy to improve functioning, maintain stability, and avoid decompensation and the need for higher level of care.          Return in about 1 month (around 3/2/2021), or if symptoms worsen or fail to improve.      This document is signed me Angela Gonzalez LCSW,   February 2, 2021 15:02 EST

## 2021-02-28 ENCOUNTER — TELEPHONE (OUTPATIENT)
Dept: NEUROLOGY | Facility: CLINIC | Age: 51
End: 2021-02-28

## 2021-02-28 DIAGNOSIS — G43.709 CHRONIC MIGRAINE WITHOUT AURA, NOT INTRACTABLE, WITHOUT STATUS MIGRAINOSUS: ICD-10-CM

## 2021-03-01 ENCOUNTER — TELEMEDICINE (OUTPATIENT)
Dept: PSYCHIATRY | Facility: CLINIC | Age: 51
End: 2021-03-01

## 2021-03-01 DIAGNOSIS — G47.00 INSOMNIA, UNSPECIFIED TYPE: ICD-10-CM

## 2021-03-01 DIAGNOSIS — F39 MODERATE MOOD DISORDER (HCC): Primary | ICD-10-CM

## 2021-03-01 DIAGNOSIS — F41.0 PANIC DISORDER: ICD-10-CM

## 2021-03-01 PROCEDURE — 90834 PSYTX W PT 45 MINUTES: CPT | Performed by: SOCIAL WORKER

## 2021-03-01 RX ORDER — ONABOTULINUMTOXINA 200 [USP'U]/1
INJECTION, POWDER, LYOPHILIZED, FOR SOLUTION INTRADERMAL; INTRAMUSCULAR
Qty: 1 EACH | Refills: 3 | Status: SHIPPED | OUTPATIENT
Start: 2021-03-01 | End: 2021-03-05 | Stop reason: SDUPTHER
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
## 2021-03-05 DIAGNOSIS — G43.709 CHRONIC MIGRAINE WITHOUT AURA, NOT INTRACTABLE, WITHOUT STATUS MIGRAINOSUS: ICD-10-CM

## 2021-03-05 RX ORDER — ONABOTULINUMTOXINA 200 [USP'U]/1
INJECTION, POWDER, LYOPHILIZED, FOR SOLUTION INTRADERMAL; INTRAMUSCULAR
Qty: 1 EACH | Refills: 3 | Status: SHIPPED | OUTPATIENT
Start: 2021-03-05 | End: 2021-03-05 | Stop reason: SDUPTHER

## 2021-03-08 ENCOUNTER — TELEMEDICINE (OUTPATIENT)
Dept: PSYCHIATRY | Facility: CLINIC | Age: 51
End: 2021-03-08

## 2021-03-08 DIAGNOSIS — F39 MODERATE MOOD DISORDER (HCC): Primary | Chronic | ICD-10-CM

## 2021-03-08 DIAGNOSIS — G47.00 INSOMNIA, UNSPECIFIED TYPE: Chronic | ICD-10-CM

## 2021-03-08 DIAGNOSIS — F41.0 PANIC DISORDER: Chronic | ICD-10-CM

## 2021-03-08 PROCEDURE — 99214 OFFICE O/P EST MOD 30 MIN: CPT | Performed by: NURSE PRACTITIONER

## 2021-03-08 RX ORDER — PROPRANOLOL HYDROCHLORIDE 20 MG/1
20 TABLET ORAL DAILY PRN
Qty: 20 TABLET | Refills: 2 | Status: SHIPPED | OUTPATIENT
Start: 2021-03-08 | End: 2021-07-12 | Stop reason: SDUPTHER
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
## 2021-03-12 DIAGNOSIS — J44.9 CHRONIC OBSTRUCTIVE PULMONARY DISEASE, UNSPECIFIED COPD TYPE (HCC): ICD-10-CM

## 2021-03-12 RX ORDER — ONABOTULINUMTOXINA 200 [USP'U]/1
INJECTION, POWDER, LYOPHILIZED, FOR SOLUTION INTRADERMAL; INTRAMUSCULAR
Qty: 1 EACH | Refills: 3 | Status: SHIPPED | OUTPATIENT
Start: 2021-03-12 | End: 2022-02-14

## 2021-03-15 ENCOUNTER — TELEMEDICINE (OUTPATIENT)
Dept: PSYCHIATRY | Facility: CLINIC | Age: 51
End: 2021-03-15

## 2021-03-15 DIAGNOSIS — G47.00 INSOMNIA, UNSPECIFIED TYPE: ICD-10-CM

## 2021-03-15 DIAGNOSIS — F41.0 PANIC DISORDER: ICD-10-CM

## 2021-03-15 DIAGNOSIS — F39 MODERATE MOOD DISORDER (HCC): Primary | ICD-10-CM

## 2021-03-15 PROCEDURE — 90832 PSYTX W PT 30 MINUTES: CPT | Performed by: SOCIAL WORKER

## 2021-03-17 ENCOUNTER — PRIOR AUTHORIZATION (OUTPATIENT)
Dept: INTERNAL MEDICINE | Facility: CLINIC | Age: 51
End: 2021-03-17

## 2021-03-18 RX ORDER — ACLIDINIUM BROMIDE 400 UG/1
1 POWDER, METERED RESPIRATORY (INHALATION)
Qty: 1 EACH | Refills: 11 | Status: SHIPPED | OUTPATIENT
Start: 2021-03-18 | End: 2022-10-31 | Stop reason: ALTCHOICE

## 2021-03-21 DIAGNOSIS — N95.1 MENOPAUSAL SYMPTOMS: ICD-10-CM

## 2021-03-22 RX ORDER — ESTRADIOL 0.1 MG/D
FILM, EXTENDED RELEASE TRANSDERMAL
Qty: 8 PATCH | Refills: 6 | Status: SHIPPED | OUTPATIENT
Start: 2021-03-22 | End: 2021-10-20

## 2021-03-29 ENCOUNTER — TELEMEDICINE (OUTPATIENT)
Dept: PSYCHIATRY | Facility: CLINIC | Age: 51
End: 2021-03-29

## 2021-03-29 DIAGNOSIS — F39 MODERATE MOOD DISORDER (HCC): Primary | ICD-10-CM

## 2021-03-29 DIAGNOSIS — F41.0 PANIC DISORDER: ICD-10-CM

## 2021-03-29 DIAGNOSIS — G47.00 INSOMNIA, UNSPECIFIED TYPE: ICD-10-CM

## 2021-03-29 PROCEDURE — 90834 PSYTX W PT 45 MINUTES: CPT | Performed by: SOCIAL WORKER

## 2021-03-29 NOTE — PROGRESS NOTES
Date of Service:3/15/2021  Time In: 1220  Time Out: 1244    PROGRESS NOTE  Data:  Marguerite Anderson is a 51 y.o. female. ..This was an audio and video enabled telemedicine encounter.  Patient discussed she just woke up. Patient discussed she is really tired this morning.  Patient discussed sleep disturbances and has not been doing her healthy coping skills or daily routines.  Patient discussed that she is unsure why she and lacking motivation.  Patient discussed depression and anxiety.  Patient reports moderate depression and moderate anxiety.  Patient discussed irritability and frustrated.  Patient explained she is more frustrated with herself for not continuing her on healthy coping skills and daily routine/schedule.    HPI: Depression, anxiety, panic attacks, chronic pain and seizure disorder      Clinical Maneuvering/Intervention:  Assisted patient in processing above session content; acknowledged and normalized patient’S thoughts, feelings, and concerns.  Assisted patient processing her thoughts and feelings related to mood, anxiety, panic attacks, chronic pain and seizure disorder.  Patient sees psychiatric nurse practitioner in this office.  Patient is to take her medications as prescribed and contact the nurse practitioner in this office for questions and concerns.  Patient denies SI, HI and self-harm.  Patient to see this therapist back in 2 weeks and as needed Reviewed healthy coping skills and mechanisms, ways to decrease anxiety and grounding techniques and encourage patient to be genuine and authentic as this will decrease her anxiety.  Educated patient on a daily schedule,/routine and the importance of starting marijuana along with her self-care routine.  Patient wants to start on her self-care routine and working on her depression coping skills to improve mood.  Patient makes a goal during the session and will follow-up with this therapist in 2 weeks.  Patient For therapy visit short due to being  tired and having a houseguest        Allowed patient to freely discuss issues without interruption or judgment. Provided safe, confidential environment to facilitate the development of positive therapeutic relationship and encourage open, honest communication. Assisted patient in identifying risk factors which would indicate the need for higher level of care including thoughts to harm self or others and/or self-harming behavior and encouraged patient to contact this office, call 911, or present to the nearest emergency room should any of these events occur. Discussed crisis intervention services and means to access.  Patient adamantly and convincingly denies current suicidal or homicidal ideation or perceptual disturbance.    Assessment     Diagnoses and all orders for this visit:    1. Moderate mood disorder (CMS/HCC) (Primary)    2. Panic disorder    3. Insomnia, unspecified type               REVIEW OF SYSTEMS:  Review of Systems       Mental Status Exam  Hygiene:   fair  Cooperation:  Cooperative  Eye Contact:  Good  Psychomotor Behavior:  Appropriate  Affect:  Appropriate  Hopelessness: 4  Speech:  Normal  Thought Process:  Goal directed  Thought Content:  Normal  Suicidal:  None  Homicidal:  None  Hallucinations:  None  Delusion:  None  Memory:  Deficits  Orientation:  Person, Place, Time and Situation  Reliability:  fair  Insight:  Good  Judgement:  Good  Impulse Control:  Good  Physical/Medical Issues:  Yes Chronic pain and seizure disorder    Patient's Support Network Includes:  , son, mother and extended family    Progress toward goal: Not at goal    Functional Status: Moderate impairment     Prognosis: Fair with Ongoing Treatment       Plan         Patient will adhere to medication regimen as prescribed and report any side effects. Patient will contact this office, call 911 or present to the nearest emergency room should suicidal or homicidal ideations occur. Provide Cognitive Behavioral Therapy  and Integrative Therapy to improve functioning, maintain stability, and avoid decompensation and the need for higher level of care.          Return in about 2 days (around 3/17/2021), or if symptoms worsen or fail to improve.      This document is signed me Angela Gonzalez LCSW,   March 29, 2021 09:44 EDT
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
## 2021-03-30 ENCOUNTER — PROCEDURE VISIT (OUTPATIENT)
Dept: NEUROLOGY | Facility: CLINIC | Age: 51
End: 2021-03-30

## 2021-03-30 VITALS
DIASTOLIC BLOOD PRESSURE: 70 MMHG | BODY MASS INDEX: 21.49 KG/M2 | SYSTOLIC BLOOD PRESSURE: 100 MMHG | HEART RATE: 91 BPM | WEIGHT: 129 LBS | TEMPERATURE: 96.6 F | HEIGHT: 65 IN | OXYGEN SATURATION: 96 %

## 2021-03-30 DIAGNOSIS — G43.019 INTRACTABLE MIGRAINE WITHOUT AURA AND WITHOUT STATUS MIGRAINOSUS: ICD-10-CM

## 2021-03-30 PROCEDURE — 64615 CHEMODENERV MUSC MIGRAINE: CPT | Performed by: NURSE PRACTITIONER

## 2021-03-30 RX ORDER — TIZANIDINE 2 MG/1
2 TABLET ORAL 2 TIMES DAILY PRN
COMMUNITY
Start: 2021-02-24 | End: 2022-03-08 | Stop reason: ALTCHOICE

## 2021-03-30 RX ORDER — LEVETIRACETAM 750 MG/1
750 TABLET ORAL 2 TIMES DAILY
COMMUNITY
Start: 2021-03-25 | End: 2021-04-27 | Stop reason: SDUPTHER

## 2021-03-30 NOTE — PROGRESS NOTES
"CC: Botox Injections  Indication for Procedure: Chronic migraines          /70   Pulse 91   Temp 96.6 °F (35.9 °C)   Ht 165.1 cm (65\")   Wt 58.5 kg (129 lb)   SpO2 96%   BMI 21.47 kg/m²     Date of last Injection: 1/5/2021  Response: 80 to 90% reduction, she is having rare migraines at this point  Onset of response: 1 week  Wearing off: 11 weeks  Side Effects: None  : Sweeperyan  Lot #:  [id number]  Expiration: October 2023  NDC: [id number]      With written consent obtained and risks and benefits explained to patient.     Botox injected using FDA approved protocol for chronic migraine prevention.   10 units, Procerus 5 units, Frontalis 20 units, Temporalis 40 units, Occipitalis 30 units, Cervical Paraspinals 20 units, Trapezius 30 units.     The total amount injected in units is 155.  The total amount wasted in units is 45.  The total amount submitted in units is 200.  Botox was supplied by specialty pharmacy.    Patient tolerated procedure well with no immediate complications.     We have discussed risk and benefits of this Botox procedure and common side effects including headache, neck pain, neck stiffness or weakness, ptosis, flu-like symptoms as well as more serious possible adverse effects including possible dysphagia, respiratory distress or even death (death has only been reported once with adults for Botox for migraines in another state when mixed with lidocaine solution which we do not use lidocaine solution in our practice for mixing Botox). Verbalizes understanding, accepts risks and agrees with moving forward with Botox injections for chronic migraine prevention.    "

## 2021-04-15 DIAGNOSIS — F39 MODERATE MOOD DISORDER (HCC): Chronic | ICD-10-CM

## 2021-04-15 RX ORDER — ARIPIPRAZOLE 15 MG/1
15 TABLET ORAL DAILY
Qty: 30 TABLET | Refills: 2 | Status: SHIPPED | OUTPATIENT
Start: 2021-04-15 | End: 2021-06-08 | Stop reason: SDUPTHER

## 2021-04-26 ENCOUNTER — TELEMEDICINE (OUTPATIENT)
Dept: PSYCHIATRY | Facility: CLINIC | Age: 51
End: 2021-04-26

## 2021-04-26 DIAGNOSIS — F39 MODERATE MOOD DISORDER (HCC): Primary | ICD-10-CM

## 2021-04-26 DIAGNOSIS — F41.0 PANIC DISORDER: ICD-10-CM

## 2021-04-26 DIAGNOSIS — G47.00 INSOMNIA, UNSPECIFIED TYPE: ICD-10-CM

## 2021-04-26 PROCEDURE — 90834 PSYTX W PT 45 MINUTES: CPT | Performed by: SOCIAL WORKER
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
## 2021-04-27 ENCOUNTER — OFFICE VISIT (OUTPATIENT)
Dept: NEUROLOGY | Facility: CLINIC | Age: 51
End: 2021-04-27

## 2021-04-27 ENCOUNTER — LAB (OUTPATIENT)
Dept: LAB | Facility: HOSPITAL | Age: 51
End: 2021-04-27

## 2021-04-27 VITALS
HEIGHT: 65 IN | TEMPERATURE: 96.8 F | DIASTOLIC BLOOD PRESSURE: 70 MMHG | WEIGHT: 130 LBS | HEART RATE: 63 BPM | OXYGEN SATURATION: 96 % | SYSTOLIC BLOOD PRESSURE: 110 MMHG | BODY MASS INDEX: 21.66 KG/M2

## 2021-04-27 DIAGNOSIS — G43.009 MIGRAINE WITHOUT AURA AND WITHOUT STATUS MIGRAINOSUS, NOT INTRACTABLE: ICD-10-CM

## 2021-04-27 DIAGNOSIS — G40.909 SEIZURE DISORDER (HCC): ICD-10-CM

## 2021-04-27 PROCEDURE — 99213 OFFICE O/P EST LOW 20 MIN: CPT | Performed by: NURSE PRACTITIONER

## 2021-04-27 RX ORDER — LEVETIRACETAM 750 MG/1
750 TABLET ORAL 2 TIMES DAILY
Qty: 60 TABLET | Refills: 5 | Status: SHIPPED | OUTPATIENT
Start: 2021-04-27 | End: 2021-12-13 | Stop reason: SDUPTHER

## 2021-04-27 RX ORDER — TOPIRAMATE 100 MG/1
100 TABLET, FILM COATED ORAL 2 TIMES DAILY
Qty: 60 TABLET | Refills: 5 | Status: SHIPPED | OUTPATIENT
Start: 2021-04-27 | End: 2021-11-04

## 2021-04-27 NOTE — PROGRESS NOTES
"Subjective:     Patient ID: Marguerite Anderson is a 51 y.o. female.    CC:   Chief Complaint   Patient presents with   • Migraine       HPI:   History of Present Illness     Ms. Anderson is here today for follow-up on seizure disorder.  She reports to me a long history of seizures, previously diagnosed with partial complex seizures and absence seizures.  She has been a long-term patient of Dr. Ho for many years.  She is been on Keppra 750 mg twice a day as well as Topamax 100 mg twice a day for quite some time.  She is here asking that we fill out some papers for disability.  She reports that she has about 1 convulsive seizure every couple months and a couple \"absence seizures\" every couple months.  She saw Dr. Cross back in 2019, she was admitted for inpatient monitoring, her witnessed episodes were nonepileptic in nature, at that time Dr. Cross recommended that she continue her Keppra and Topamax as she was having rare convulsive seizures and he also sent her to psychiatry.  Her last mild seizure was about a week ago, occurred after her dog was killed, short duration with postictal time by report.    The following portions of the patient's history were reviewed and updated as appropriate: allergies, current medications, past family history, past medical history, past social history, past surgical history and problem list.    Past Medical History:   Diagnosis Date   • Abdominal pain, epigastric    • Acute sinusitis    • Acute UTI (urinary tract infection)    • Angina, intestinal (CMS/HCC)    • Anxiety    • Arthritis    • Back pain    • Breast mass, right    • Cancer (CMS/HCC) 12/20/2018    basal cell R ear   • Chronic hepatitis C virus infection (CMS/HCC)    • COPD (chronic obstructive pulmonary disease) (CMS/HCC)    • Depression    • Diarrhea    • Elevated LFTs    • Fatigue    • Glaucoma 05/05/2020   • History of endometriosis    • History of Papanicolaou smear of cervix 04/02/2014    NORMAL    • HTN " (hypertension)    • IBS (irritable bowel syndrome)    • Insomnia    • Menopausal symptoms    • Migraine headache    • Nausea & vomiting    • Neck pain    • Other hyperlipidemia    • Ovarian cyst    • Pelvic adhesive disease    • Radicular pain of left lower extremity    • Restless leg syndrome    • Seizure disorder (CMS/HCC)    • Tobacco abuse    • Tobacco abuse    • Trochanteric bursitis    • Vitamin B 12 deficiency        Past Surgical History:   Procedure Laterality Date   • BILATERAL SALPINGO OOPHORECTOMY  03/10/2015    DR NATALY THOMPSON   • BREAST LUMPECTOMY     •  SECTION     • HYSTERECTOMY  03/10/2015    [facility] (DR NATALY THOMPSON)   • INGUINAL HERNIA REPAIR Right    • LYSIS OF ABDOMINAL ADHESIONS  03/10/2015    DR NATALY THOMPSON   • TUBAL ABDOMINAL LIGATION         Social History     Socioeconomic History   • Marital status:      Spouse name: Not on file   • Number of children: 1   • Years of education: Not on file   • Highest education level: High school graduate   Tobacco Use   • Smoking status: Light Tobacco Smoker     Packs/day: 0.50     Years: 18.00     Pack years: 9.00     Types: Cigarettes   • Smokeless tobacco: Never Used   • Tobacco comment:  1/2 PACK A DAY   Substance and Sexual Activity   • Alcohol use: Not Currently     Alcohol/week: 0.0 standard drinks     Comment: on occasion for an occasion or event   • Drug use: No   • Sexual activity: Yes     Partners: Male     Birth control/protection: Post-menopausal, Surgical       Family History   Problem Relation Age of Onset   • Alzheimer's disease Other    • Cancer Other    • Dementia Other    • Hypertension Other    • Parkinsonism Other    • Osteoporosis Mother    • Diabetes Mother    • Hypertension Mother    • Obesity Mother    • Depression Mother    • Arthritis Mother    • COPD Mother    • Hearing loss Mother    • Hyperlipidemia Mother    • Breast cancer Maternal Grandmother    • Osteoporosis Maternal Grandmother    • Diabetes Maternal  "Grandmother    • Dementia Maternal Grandmother    • Cancer Maternal Grandmother         Breast Cancer   • Hypertension Maternal Grandmother    • ADD / ADHD Sister    • Alcohol abuse Sister    • Anxiety disorder Sister    • Bipolar disorder Sister    • Depression Sister    • Drug abuse Sister    • Depression Father    • OCD Neg Hx    • Paranoid behavior Neg Hx    • Schizophrenia Neg Hx    • Seizures Neg Hx    • Self-Injurious Behavior  Neg Hx    • Suicide Attempts Neg Hx         Review of Systems   Constitutional: Negative.    Eyes: Negative.    Respiratory: Negative.    Cardiovascular: Negative.    Gastrointestinal: Negative.    Endocrine: Negative.    Genitourinary: Negative.    Musculoskeletal: Negative.    Skin: Negative.    Allergic/Immunologic: Negative.    Neurological: Positive for seizures and headaches ( Headaches improved with Botox). Negative for dizziness, tremors, syncope, facial asymmetry, speech difficulty, weakness, light-headedness and numbness.   Hematological: Negative.    Psychiatric/Behavioral: Negative.         Objective:  /70   Pulse 63   Temp 96.8 °F (36 °C)   Ht 165.1 cm (65\")   Wt 59 kg (130 lb)   SpO2 96%   BMI 21.63 kg/m²     Neurologic Exam     Mental Status   Oriented to person, place, and time.   Patient is alert and oriented, speech clear and articulate     Cranial Nerves     CN III, IV, VI   Pupils are equal, round, and reactive to light.  Cranial nerves intact     Motor Exam   Muscle bulk: normal  Overall muscle tone: normal  Right arm pronator drift: absent  Left arm pronator drift: absent    Strength   Strength 5/5 throughout.     Gait, Coordination, and Reflexes     Gait  Gait: normal    Coordination   Romberg: negative  Finger to nose coordination: normal    Tremor   Resting tremor: absent  Intention tremor: absent  Action tremor: absent    Reflexes   Reflexes 2+ except as noted.       Physical Exam  Vitals reviewed.   Constitutional:       General: She is not in " acute distress.     Appearance: She is well-developed. She is not ill-appearing or toxic-appearing.   HENT:      Head: Normocephalic and atraumatic.      Mouth/Throat:      Mouth: Mucous membranes are moist.   Eyes:      General: No scleral icterus.     Extraocular Movements: Extraocular movements intact.      Conjunctiva/sclera: Conjunctivae normal.      Pupils: Pupils are equal, round, and reactive to light.   Pulmonary:      Effort: Pulmonary effort is normal. No respiratory distress.   Musculoskeletal:      Cervical back: Normal range of motion and neck supple.   Skin:     General: Skin is warm.      Capillary Refill: Capillary refill takes less than 2 seconds.   Neurological:      Mental Status: She is alert and oriented to person, place, and time.      Coordination: Finger-Nose-Finger Test and Romberg Test normal.      Gait: Gait is intact.      Deep Tendon Reflexes: Strength normal.   Psychiatric:         Mood and Affect: Mood normal.         Behavior: Behavior normal.         Thought Content: Thought content normal.         Judgment: Judgment normal.         Assessment/Plan:       Diagnoses and all orders for this visit:    1. Seizure disorder (CMS/HCC)  -     topiramate (TOPAMAX) 100 MG tablet; Take 1 tablet by mouth 2 (Two) Times a Day.  Dispense: 60 tablet; Refill: 5  -     Levetiracetam Level (Keppra); Future  -     Topiramate Level; Future  -     CBC (No Diff); Future  -     Comprehensive Metabolic Panel; Future    2. Migraine without aura and without status migrainosus, not intractable  -     topiramate (TOPAMAX) 100 MG tablet; Take 1 tablet by mouth 2 (Two) Times a Day.  Dispense: 60 tablet; Refill: 5    Other orders  -     levETIRAcetam (KEPPRA) 750 MG tablet; Take 1 tablet by mouth 2 (Two) Times a Day.  Dispense: 60 tablet; Refill: 5    Will check levels of topiramate and Keppra today, recommend no driving for 90 days after any seizure-like episode  Labs pending, we will contact her with results,  refills given today  Her form has to be completed by an MD, will leave for Dr. Ho and contact her when it is complete  Return to clinic for already scheduled Botox appointment         Reviewed medications, potential side effects and signs and symptoms to report. Discussed risk versus benefits of treatment plan with patient and/or family-including medications, labs and radiology that may be ordered. Addressed questions and concerns during visit. Patient and/or family verbalized understanding and agree with plan.      Ginny Pace, APRN  4/27/2021

## 2021-05-02 LAB
ALBUMIN SERPL-MCNC: 4.7 G/DL (ref 3.8–4.9)
ALBUMIN/GLOB SERPL: 2 {RATIO} (ref 1.2–2.2)
ALP SERPL-CCNC: 65 IU/L (ref 39–117)
ALT SERPL-CCNC: 8 IU/L (ref 0–32)
AST SERPL-CCNC: 16 IU/L (ref 0–40)
BILIRUB SERPL-MCNC: <0.2 MG/DL (ref 0–1.2)
BUN SERPL-MCNC: 13 MG/DL (ref 6–24)
BUN/CREAT SERPL: 15 (ref 9–23)
CALCIUM SERPL-MCNC: 9.6 MG/DL (ref 8.7–10.2)
CHLORIDE SERPL-SCNC: 107 MMOL/L (ref 96–106)
CO2 SERPL-SCNC: 23 MMOL/L (ref 20–29)
CREAT SERPL-MCNC: 0.86 MG/DL (ref 0.57–1)
ERYTHROCYTE [DISTWIDTH] IN BLOOD BY AUTOMATED COUNT: 12.9 % (ref 11.7–15.4)
GLOBULIN SER CALC-MCNC: 2.4 G/DL (ref 1.5–4.5)
GLUCOSE SERPL-MCNC: 71 MG/DL (ref 65–99)
HCT VFR BLD AUTO: 43.8 % (ref 34–46.6)
HGB BLD-MCNC: 14.6 G/DL (ref 11.1–15.9)
LEVETIRACETAM SERPL-MCNC: 8.6 UG/ML (ref 10–40)
MCH RBC QN AUTO: 31.2 PG (ref 26.6–33)
MCHC RBC AUTO-ENTMCNC: 33.3 G/DL (ref 31.5–35.7)
MCV RBC AUTO: 94 FL (ref 79–97)
PLATELET # BLD AUTO: 277 X10E3/UL (ref 150–450)
POTASSIUM SERPL-SCNC: 4.4 MMOL/L (ref 3.5–5.2)
PROT SERPL-MCNC: 7.1 G/DL (ref 6–8.5)
RBC # BLD AUTO: 4.68 X10E6/UL (ref 3.77–5.28)
SODIUM SERPL-SCNC: 143 MMOL/L (ref 134–144)
TOPIRAMATE SERPL-MCNC: 3.8 UG/ML (ref 2–25)
WBC # BLD AUTO: 10.1 X10E3/UL (ref 3.4–10.8)

## 2021-05-04 ENCOUNTER — TELEPHONE (OUTPATIENT)
Dept: NEUROLOGY | Facility: CLINIC | Age: 51
End: 2021-05-04

## 2021-05-04 NOTE — TELEPHONE ENCOUNTER
----- Message from CHRIS Motley sent at 5/3/2021  5:01 PM EDT -----  Please let patient know her metabolic panel is stable, no signs of anemia on her blood counts  Topamax level normal range, Keppra level is just slightly low, can you verify her medication dosing and ensure that she is taking this without missing scheduled doses

## 2021-05-05 ENCOUNTER — TELEPHONE (OUTPATIENT)
Dept: NEUROLOGY | Facility: CLINIC | Age: 51
End: 2021-05-05

## 2021-05-05 NOTE — TELEPHONE ENCOUNTER
Marguerite notified of lab results. She says she has not missed any doses of her Keppra. She takes 750mg twice daily.

## 2021-05-05 NOTE — TELEPHONE ENCOUNTER
Caller: Marguerite Anderson    Relationship: Self    Best call back number: [phone]    What is the best time to reach you: ANY    Who are you requesting to speak with (clinical staff, provider,  specific staff member): JOSELINE     Do you know the name of the person who called: JOSELINE    What was the call regarding: LABS     Do you require a callback: YES

## 2021-05-31 DIAGNOSIS — G47.09 OTHER INSOMNIA: Chronic | ICD-10-CM

## 2021-06-01 RX ORDER — MIRTAZAPINE 7.5 MG/1
7.5 TABLET, FILM COATED ORAL NIGHTLY
Qty: 30 TABLET | Refills: 2 | Status: SHIPPED | OUTPATIENT
Start: 2021-06-01 | End: 2021-09-14 | Stop reason: SDUPTHER

## 2021-06-08 ENCOUNTER — TELEMEDICINE (OUTPATIENT)
Dept: PSYCHIATRY | Facility: CLINIC | Age: 51
End: 2021-06-08

## 2021-06-08 DIAGNOSIS — F39 MODERATE MOOD DISORDER (HCC): Chronic | ICD-10-CM

## 2021-06-08 DIAGNOSIS — G47.09 OTHER INSOMNIA: Chronic | ICD-10-CM

## 2021-06-08 DIAGNOSIS — F41.0 PANIC DISORDER: Primary | Chronic | ICD-10-CM

## 2021-06-08 PROCEDURE — 99214 OFFICE O/P EST MOD 30 MIN: CPT | Performed by: NURSE PRACTITIONER

## 2021-06-08 RX ORDER — ARIPIPRAZOLE 15 MG/1
15 TABLET ORAL DAILY
Qty: 30 TABLET | Refills: 2 | Status: SHIPPED | OUTPATIENT
Start: 2021-06-08 | End: 2021-09-17 | Stop reason: SDUPTHER

## 2021-06-08 RX ORDER — DULOXETIN HYDROCHLORIDE 30 MG/1
CAPSULE, DELAYED RELEASE ORAL
Qty: 42 CAPSULE | Refills: 0 | Status: SHIPPED | OUTPATIENT
Start: 2021-06-08 | End: 2021-07-12

## 2021-06-08 NOTE — PROGRESS NOTES
"This provider is located at [facility] ,[address], [address] in [place], [place],using a secure MyChart Video Visit through [employer]. Patient is being seen remotely via telehealth at their home address in [place], and stated they are in a secure environment for this session. The patient's condition being diagnosed/treated is appropriate for telemedicine. The provider identified herself as well as her credentials.   The patient, and/or patients guardian, consent to be seen remotely, and when consent is given they understand that the consent allows for patient identifiable information to be sent to a third party as needed.   They may refuse to be seen remotely at any time. The electronic data is encrypted and password protected, and the patient and/or guardian has been advised of the potential risks to privacy not withstanding such measures.    Chief Complaint  Mood disorder, panic, and insomnia    Subjective          Marguerite Anderson presents today via MyChart Video through [employer] by herself for a follow up and medication check.     History of Present Illness: Marguerite states, \"it has been hit or miss.  The summertime does help\"  Marguerite reports symptoms of moderate depression such as depressed mood, anhedonia, no motivation, difficulty with going in public, fatigue, decreased appetite, feelings of hopelessness, decreased concentration, and psychomotor retardation.  Marguerite tells me that she has trouble going \"anywhere.\"  She does continue to report symptoms of anxiety.  She feels her sleep has been \"good\" overall but does tell me that she stays up late reading and then sleeps in in the morning.  Marguerite reports having no appetite.  She states, \"I have no desire to eat.  I could care less if I eat or not.\"  Marguerite feels the symptoms have only been difficult for her most recently.  She denies any seizure activity.  She has recently seen her neurologist, Shy Barrow in April.  " She tells me her Keppra levels were low and I reviewed her most recent labs.  Marguerite continues to take Abilify, Zoloft, propranolol, hydroxyzine, and mirtazapine.  She is only using hydroxyzine and propranolol as needed.  She continues to take gabapentin, Keppra, Topamax, Imitrex, and Botox for her seizure disorder and migraines.  These medications are prescribed by neurology.  She denies any side effects of her current medication regiment.  She denies any problems with SI/HI/AVH.    Current Medications:   Current Outpatient Medications   Medication Sig Dispense Refill   • aclidinium bromide (Tudorza Pressair) 400 MCG/ACT aerosol powder  powder for inhalation Inhale 1 puff 2 (Two) Times a Day. 1 each 11   • ARIPiprazole (ABILIFY) 15 MG tablet Take 1 tablet by mouth Daily. 30 tablet 2   • B Complex Vitamins (VITAMIN B COMPLEX) capsule capsule Take  by mouth.     • Diclofenac Sodium (VOLTAREN) 1 % gel gel APPLY 4 GRAMS TOPICALLY TO AFFECTED AREA 4 TIMES A DAY AS NEEDED FOR PAIN 100 g 3   • estradiol (VIVELLE-DOT) 0.1 MG/24HR patch UNWRAP AND APPLY 1 PATCH TO SKIN TWO TIMES A WEEK AS DIRECTED BY PROVIDER 8 patch 6   • gabapentin (NEURONTIN) 300 MG capsule Take 300 mg by mouth every night at bedtime.  0   • hydrOXYzine (ATARAX) 10 MG tablet TAKE 1 TO 2 TABLETS BY MOUTH DAILY IF NEEDED FOR PANIC 60 tablet 2   • levETIRAcetam (KEPPRA) 750 MG tablet Take 1 tablet by mouth 2 (Two) Times a Day. 60 tablet 5   • mirtazapine (REMERON) 7.5 MG tablet TAKE 1 TABLET BY MOUTH EVERY NIGHT 30 tablet 2   • montelukast (SINGULAIR) 10 MG tablet TAKE 1 TABLET BY MOUTH AT BEDTIME 90 tablet 3   • OnabotulinumtoxinA (Botox) 200 units reconstituted solution INJECT 200 UNITS INTRAMUSCULARLY INTO HEAD, NECK AND SHOULDERS EVERY 12 WEEKS PER FDA PROTOCOL 1 each 3   • oxyCODONE-acetaminophen (ENDOCET)  MG per tablet 1 tablet Daily.     • propranolol (INDERAL) 20 MG tablet Take 1 tablet by mouth Daily As Needed (anxiety). for anxiety 20 tablet  2   • SUMAtriptan (IMITREX) 20 MG/ACT nasal spray USE 1 SPRAY IN 1 NOSTRIL IF NEEDED 18 each 0   • tiZANidine (ZANAFLEX) 2 MG tablet Take 2 mg by mouth 2 (Two) Times a Day As Needed.     • topiramate (TOPAMAX) 100 MG tablet Take 1 tablet by mouth 2 (Two) Times a Day. 60 tablet 5   • Umeclidinium Bromide (INCRUSE ELLIPTA) 62.5 MCG/INH aerosol powder  Inhale 1 puff Daily. 1 each 11   • WIXELA INHUB 250-50 MCG/DOSE DISKUS Inhale 1 puff Daily. 60 each 11   • DULoxetine (Cymbalta) 30 MG capsule Take 1 capsule by mouth Daily for 14 days, THEN 1 capsule 2 (Two) Times a Day for 14 days. 42 capsule 0     No current facility-administered medications for this visit.       Objective   Vital Signs:   There were no vitals taken for this visit.    Physical Exam  Nursing note reviewed. Vitals reviewed: Vitals not obtained due to nature of telehealth visit.   Constitutional:       Appearance: Normal appearance.   Neurological:      General: No focal deficit present.      Mental Status: She is alert and oriented to person, place, and time.   Psychiatric:         Attention and Perception: Attention normal.         Mood and Affect: Mood is depressed. Affect is blunt.         Speech: Speech normal.         Behavior: Behavior is slowed. Behavior is cooperative.         Thought Content: Thought content normal.         Cognition and Memory: Cognition normal.         Judgment: Judgment normal.        Result Review :     The following data was reviewed by: CHRIS Mayfield on 06/08/2021:  Comprehensive Metabolic Panel (04/27/2021 14:43)  CBC (No Diff) (04/27/2021 14:43)  Topiramate Level (04/27/2021 14:43)  Levetiracetam Level (Keppra) (04/27/2021 14:43)    Office Visit with Ginny Pace APRN (04/27/2021)  Telemedicine with Angela Gonzalez LCSW (04/26/2021)           Assessment and Plan    Problem List Items Addressed This Visit     None      Visit Diagnoses     Panic disorder  (Chronic)   -  Primary    Relevant  Medications    ARIPiprazole (ABILIFY) 15 MG tablet    DULoxetine (Cymbalta) 30 MG capsule    Moderate mood disorder (CMS/HCC)  (Chronic)       Relevant Medications    ARIPiprazole (ABILIFY) 15 MG tablet    DULoxetine (Cymbalta) 30 MG capsule    Other insomnia  (Chronic)             Mental Status Exam:   Hygiene:   good  Cooperation:  Cooperative  Eye Contact:  Good  Psychomotor Behavior:  Slow  Affect:  Blunted  Mood: depressed  Speech:  Normal  Thought Process:  Goal directed and Linear  Thought Content:  Normal  Suicidal:  None  Homicidal:  None  Hallucinations:  None  Delusion:  None  Memory:  Intact  Orientation:  Person, Place, Time and Situation  Reliability:  good  Insight:  Good  Judgement:  Good  Impulse Control:  Good  Physical/Medical Issues:  Yes Pain, seizure disorder, and migraines      PHQ-9 Score:   PHQ-9 Total Score: 15    Impression/Plan:  -This is a follow up and medication check.  Marguerite reports moderate symptoms of depression and ongoing anxiety.  She feels the warmer weather does help her mood but the ongoing pain makes it difficult. She continues in therapy with Angela Gonzalez and is followed by neurology for seizures and migraines. She denies any seizure activity but reports her Keppra level was low in April. I reviewed her most recent labs. Today, Marguerite and I discussed changing her antidepressant medication from an SSRI to an SNRI such as Cymbalta for the benefits of improving mood and pain. She is in agreement to this.   -Taper Zoloft. Patient will take 25 mg nightly for one week and then stop.  -Initiate Cymbalta.  Patient will take 30 mg daily for 2 weeks then increase to twice daily for depression and anxiety.  I explained the purpose of this medication to Marguerite.  We discussed the risk versus benefits of adding this medication to her regiment, as well as potential side effects.  She verbalized understanding.  -Continue propranolol to 20 mg as needed for panic.  Patient has  refills.  -Continue Abilify 15 mg daily for mood disorder.    -Continue Atarax 10 mg twice daily for panic.  Patient has refills.  -Continue Remeron 7.5 mg nightly for insomnia.  Patient has refills.  -Continue therapy with Angela Gonzalez LCSW.  -Continue Keppra,Topamax, Imitrex, and Botox for seizure disorder and migraines.  Continue oxycodone and gabapentin for pain. These medications are prescribed by another provider.    MEDS ORDERED DURING VISIT:  New Medications Ordered This Visit   Medications   • ARIPiprazole (ABILIFY) 15 MG tablet     Sig: Take 1 tablet by mouth Daily.     Dispense:  30 tablet     Refill:  2   • DULoxetine (Cymbalta) 30 MG capsule     Sig: Take 1 capsule by mouth Daily for 14 days, THEN 1 capsule 2 (Two) Times a Day for 14 days.     Dispense:  42 capsule     Refill:  0         Follow Up   Return in about 4 weeks (around 7/6/2021) for Medication Check.  Patient was given instructions and counseling regarding her condition or for health maintenance advice. Please see specific information pulled into the AVS if appropriate.       TREATMENT PLAN/GOALS: Continue supportive psychotherapy efforts and medications as indicated. Treatment and medication options discussed during today's visit. Patient acknowledged and verbally consented to continue with current treatment plan and was educated on the importance of compliance with treatment and follow-up appointments.    MEDICATION ISSUES:  Discussed medication options and treatment plan of prescribed medication as well as the risks, benefits, and side effects including potential falls, possible impaired driving and metabolic adversities among others. Patient is agreeable to call the office with any worsening of symptoms or onset of side effects. Patient is agreeable to call 911 or go to the nearest ER should he/she begin having SI/HI.      This document has been electronically signed by CHRIS Witt, PMHNP-BC  June 8, 2021 11:58  EDT    Part of this note may be an electronic transcription/translation of spoken language to printed text using the Dragon Dictation System.

## 2021-06-10 ENCOUNTER — OFFICE VISIT (OUTPATIENT)
Dept: INTERNAL MEDICINE | Facility: CLINIC | Age: 51
End: 2021-06-10

## 2021-06-10 VITALS
WEIGHT: 123 LBS | OXYGEN SATURATION: 97 % | TEMPERATURE: 97.8 F | BODY MASS INDEX: 20.49 KG/M2 | HEIGHT: 65 IN | HEART RATE: 83 BPM | DIASTOLIC BLOOD PRESSURE: 78 MMHG | SYSTOLIC BLOOD PRESSURE: 114 MMHG | RESPIRATION RATE: 16 BRPM

## 2021-06-10 DIAGNOSIS — E53.1 VITAMIN B6 DEFICIENCY: ICD-10-CM

## 2021-06-10 DIAGNOSIS — Z72.0 TOBACCO ABUSE: ICD-10-CM

## 2021-06-10 DIAGNOSIS — R53.83 FATIGUE, UNSPECIFIED TYPE: ICD-10-CM

## 2021-06-10 DIAGNOSIS — R56.9 SEIZURE (HCC): Primary | ICD-10-CM

## 2021-06-10 DIAGNOSIS — R63.4 WEIGHT LOSS: ICD-10-CM

## 2021-06-10 PROCEDURE — 99214 OFFICE O/P EST MOD 30 MIN: CPT | Performed by: INTERNAL MEDICINE

## 2021-06-10 RX ORDER — ASPIRIN 81 MG/1
81 TABLET ORAL DAILY
Qty: 90 TABLET | Refills: 3 | Status: SHIPPED | OUTPATIENT
Start: 2021-06-10

## 2021-06-10 NOTE — PROGRESS NOTES
Subjective     Patient ID: Marguerite Anderson is a 51 y.o. female. Patient is here for management of multiple medical problems.     Chief Complaint   Patient presents with   • Hypertension   • Weight Loss     History of Present Illness       Htn    Weight loss.   On Topamax.     The following portions of the patient's history were reviewed and updated as appropriate: allergies, current medications, past family history, past medical history, past social history, past surgical history and problem list.    Review of Systems   Constitutional: Negative for fatigue.   Psychiatric/Behavioral: Negative for sleep disturbance.   All other systems reviewed and are negative.      Current Outpatient Medications:   •  aclidinium bromide (Tudorza Pressair) 400 MCG/ACT aerosol powder  powder for inhalation, Inhale 1 puff 2 (Two) Times a Day., Disp: 1 each, Rfl: 11  •  ARIPiprazole (ABILIFY) 15 MG tablet, Take 1 tablet by mouth Daily., Disp: 30 tablet, Rfl: 2  •  B Complex Vitamins (VITAMIN B COMPLEX) capsule capsule, Take  by mouth., Disp: , Rfl:   •  Diclofenac Sodium (VOLTAREN) 1 % gel gel, APPLY 4 GRAMS TOPICALLY TO AFFECTED AREA 4 TIMES A DAY AS NEEDED FOR PAIN, Disp: 100 g, Rfl: 3  •  DULoxetine (Cymbalta) 30 MG capsule, Take 1 capsule by mouth Daily for 14 days, THEN 1 capsule 2 (Two) Times a Day for 14 days., Disp: 42 capsule, Rfl: 0  •  estradiol (VIVELLE-DOT) 0.1 MG/24HR patch, UNWRAP AND APPLY 1 PATCH TO SKIN TWO TIMES A WEEK AS DIRECTED BY PROVIDER, Disp: 8 patch, Rfl: 6  •  gabapentin (NEURONTIN) 300 MG capsule, Take 300 mg by mouth every night at bedtime., Disp: , Rfl: 0  •  hydrOXYzine (ATARAX) 10 MG tablet, TAKE 1 TO 2 TABLETS BY MOUTH DAILY IF NEEDED FOR PANIC, Disp: 60 tablet, Rfl: 2  •  levETIRAcetam (KEPPRA) 750 MG tablet, Take 1 tablet by mouth 2 (Two) Times a Day., Disp: 60 tablet, Rfl: 5  •  mirtazapine (REMERON) 7.5 MG tablet, TAKE 1 TABLET BY MOUTH EVERY NIGHT, Disp: 30 tablet, Rfl: 2  •  montelukast  "(SINGULAIR) 10 MG tablet, TAKE 1 TABLET BY MOUTH AT BEDTIME, Disp: 90 tablet, Rfl: 3  •  OnabotulinumtoxinA (Botox) 200 units reconstituted solution, INJECT 200 UNITS INTRAMUSCULARLY INTO HEAD, NECK AND SHOULDERS EVERY 12 WEEKS PER FDA PROTOCOL, Disp: 1 each, Rfl: 3  •  oxyCODONE-acetaminophen (ENDOCET)  MG per tablet, 1 tablet Daily., Disp: , Rfl:   •  propranolol (INDERAL) 20 MG tablet, Take 1 tablet by mouth Daily As Needed (anxiety). for anxiety, Disp: 20 tablet, Rfl: 2  •  SUMAtriptan (IMITREX) 20 MG/ACT nasal spray, USE 1 SPRAY IN 1 NOSTRIL IF NEEDED, Disp: 18 each, Rfl: 0  •  tiZANidine (ZANAFLEX) 2 MG tablet, Take 2 mg by mouth 2 (Two) Times a Day As Needed., Disp: , Rfl:   •  topiramate (TOPAMAX) 100 MG tablet, Take 1 tablet by mouth 2 (Two) Times a Day., Disp: 60 tablet, Rfl: 5  •  Umeclidinium Bromide (INCRUSE ELLIPTA) 62.5 MCG/INH aerosol powder , Inhale 1 puff Daily., Disp: 1 each, Rfl: 11  •  WIXELA INHUB 250-50 MCG/DOSE DISKUS, Inhale 1 puff Daily., Disp: 60 each, Rfl: 11  •  aspirin (aspirin) 81 MG EC tablet, Take 1 tablet by mouth Daily., Disp: 90 tablet, Rfl: 3    Objective      Blood pressure 114/78, pulse 83, temperature 97.8 °F (36.6 °C), resp. rate 16, height 165.1 cm (65\"), weight 55.8 kg (123 lb), SpO2 97 %, not currently breastfeeding.    Physical Exam     General Appearance:    Alert, cooperative, no distress, appears stated age   Head:    Normocephalic, without obvious abnormality, atraumatic   Eyes:    PERRL, conjunctiva/corneas clear, EOM's intact   Ears:    Normal TM's and external ear canals, both ears   Nose:   Nares normal, septum midline, mucosa normal, no drainage   or sinus tenderness   Throat:   Lips, mucosa, and tongue normal; teeth and gums normal   Neck:   Supple, symmetrical, trachea midline, no adenopathy;        thyroid:  No enlargement/tenderness/nodules; no carotid    bruit or JVD   Back:     Symmetric, no curvature, ROM normal, no CVA tenderness   Lungs:     Clear " to auscultation bilaterally, respirations unlabored   Chest wall:    No tenderness or deformity   Heart:    Regular rate and rhythm, S1 and S2 normal, no murmur,        rub or gallop   Abdomen:     Soft, non-tender, bowel sounds active all four quadrants,     no masses, no organomegaly   Extremities:   Extremities normal, atraumatic, no cyanosis or edema   Pulses:   2+ and symmetric all extremities   Skin:   Skin color, texture, turgor normal, no rashes or lesions   Lymph nodes:   Cervical, supraclavicular, and axillary nodes normal   Neurologic:   CNII-XII intact. Normal strength, sensation and reflexes       throughout      Results for orders placed or performed in visit on 04/27/21   Comprehensive Metabolic Panel    Specimen: Blood    BLOOD  RELEASE TO [facility]   Result Value Ref Range    Glucose 71 65 - 99 mg/dL    BUN 13 6 - 24 mg/dL    Creatinine 0.86 0.57 - 1.00 mg/dL    eGFR Non African Am 78 >59 mL/min/1.73    eGFR African Am 90 >59 mL/min/1.73    BUN/Creatinine Ratio 15 9 - 23    Sodium 143 134 - 144 mmol/L    Potassium 4.4 3.5 - 5.2 mmol/L    Chloride 107 (H) 96 - 106 mmol/L    Total CO2 23 20 - 29 mmol/L    Calcium 9.6 8.7 - 10.2 mg/dL    Total Protein 7.1 6.0 - 8.5 g/dL    Albumin 4.7 3.8 - 4.9 g/dL    Globulin 2.4 1.5 - 4.5 g/dL    A/G Ratio 2.0 1.2 - 2.2    Total Bilirubin <0.2 0.0 - 1.2 mg/dL    Alkaline Phosphatase 65 39 - 117 IU/L    AST (SGOT) 16 0 - 40 IU/L    ALT (SGPT) 8 0 - 32 IU/L   CBC (No Diff)    Specimen: Blood    BLOOD  RELEASE TO [facility]   Result Value Ref Range    WBC 10.1 3.4 - 10.8 x10E3/uL    RBC 4.68 3.77 - 5.28 x10E6/uL    Hemoglobin 14.6 11.1 - 15.9 g/dL    Hematocrit 43.8 34.0 - 46.6 %    MCV 94 79 - 97 fL    MCH 31.2 26.6 - 33.0 pg    MCHC 33.3 31.5 - 35.7 g/dL    RDW 12.9 11.7 - 15.4 %    Platelets 277 150 - 450 x10E3/uL   Topiramate Level    Specimen: Blood    BLOOD  RELEASE TO [facility]   Result Value Ref Range    Topiramate 3.8 2.0 - 25.0 ug/mL   Levetiracetam Level (Keppra)     Specimen: Blood    BLOOD  RELEASE TO [facility]   Result Value Ref Range    Levetiracetam 8.6 (L) 10.0 - 40.0 ug/mL         Assessment/Plan   Pt still still smoking 1/2 ppd x 30years had been up to 1ppd x 10 years.     overall fatigue. Wt loss. Has depression and anxeity.     Hx of low vit b 6 on supplement    wt loss. tobacco abuse. fatigue. + soa will get ct chest.        Diagnoses and all orders for this visit:    1. Seizure (CMS/HCC) (Primary)  -     Vitamin B6  -     CBC & Differential  -     Vitamin B12  -     Comprehensive Metabolic Panel  -     TSH  -     T4, Free  -     Lipid Panel  -     CT Chest Without Contrast; Future    2. Vitamin B6 deficiency  -     Vitamin B6  -     CBC & Differential  -     Vitamin B12  -     Comprehensive Metabolic Panel  -     TSH  -     T4, Free  -     Lipid Panel  -     CT Chest Without Contrast; Future    3. Tobacco abuse  -     Vitamin B6  -     CBC & Differential  -     Vitamin B12  -     Comprehensive Metabolic Panel  -     TSH  -     T4, Free  -     Lipid Panel  -     CT Chest Without Contrast; Future    4. Weight loss  -     CT Chest Without Contrast; Future    5. Fatigue, unspecified type  -     CT Chest Without Contrast; Future    Other orders  -     aspirin (aspirin) 81 MG EC tablet; Take 1 tablet by mouth Daily.  Dispense: 90 tablet; Refill: 3      No follow-ups on file.          There are no Patient Instructions on file for this visit.     Juan Clemens MD    Assessment/Plan

## 2021-06-14 LAB
ALBUMIN SERPL-MCNC: 4.9 G/DL (ref 3.5–5.2)
ALBUMIN/GLOB SERPL: 2.5 G/DL
ALP SERPL-CCNC: 64 U/L (ref 39–117)
ALT SERPL-CCNC: 10 U/L (ref 1–33)
AST SERPL-CCNC: 12 U/L (ref 1–32)
BASOPHILS # BLD AUTO: 0.09 10*3/MM3 (ref 0–0.2)
BASOPHILS NFR BLD AUTO: 0.7 % (ref 0–1.5)
BILIRUB SERPL-MCNC: 0.2 MG/DL (ref 0–1.2)
BUN SERPL-MCNC: 13 MG/DL (ref 6–20)
BUN/CREAT SERPL: 17.3 (ref 7–25)
CALCIUM SERPL-MCNC: 9.6 MG/DL (ref 8.6–10.5)
CHLORIDE SERPL-SCNC: 107 MMOL/L (ref 98–107)
CHOLEST SERPL-MCNC: 228 MG/DL (ref 0–200)
CO2 SERPL-SCNC: 24.4 MMOL/L (ref 22–29)
CREAT SERPL-MCNC: 0.75 MG/DL (ref 0.57–1)
EOSINOPHIL # BLD AUTO: 0.06 10*3/MM3 (ref 0–0.4)
EOSINOPHIL NFR BLD AUTO: 0.4 % (ref 0.3–6.2)
ERYTHROCYTE [DISTWIDTH] IN BLOOD BY AUTOMATED COUNT: 12.5 % (ref 12.3–15.4)
GLOBULIN SER CALC-MCNC: 2 GM/DL
GLUCOSE SERPL-MCNC: 83 MG/DL (ref 65–99)
HCT VFR BLD AUTO: 42.9 % (ref 34–46.6)
HDLC SERPL-MCNC: 62 MG/DL (ref 40–60)
HGB BLD-MCNC: 14.5 G/DL (ref 12–15.9)
IMM GRANULOCYTES # BLD AUTO: 0.09 10*3/MM3 (ref 0–0.05)
IMM GRANULOCYTES NFR BLD AUTO: 0.7 % (ref 0–0.5)
LDLC SERPL CALC-MCNC: 146 MG/DL (ref 0–100)
LYMPHOCYTES # BLD AUTO: 1.76 10*3/MM3 (ref 0.7–3.1)
LYMPHOCYTES NFR BLD AUTO: 12.9 % (ref 19.6–45.3)
MCH RBC QN AUTO: 31.5 PG (ref 26.6–33)
MCHC RBC AUTO-ENTMCNC: 33.8 G/DL (ref 31.5–35.7)
MCV RBC AUTO: 93.1 FL (ref 79–97)
MONOCYTES # BLD AUTO: 0.76 10*3/MM3 (ref 0.1–0.9)
MONOCYTES NFR BLD AUTO: 5.6 % (ref 5–12)
NEUTROPHILS # BLD AUTO: 10.91 10*3/MM3 (ref 1.7–7)
NEUTROPHILS NFR BLD AUTO: 79.7 % (ref 42.7–76)
NRBC BLD AUTO-RTO: 0 /100 WBC (ref 0–0.2)
PLATELET # BLD AUTO: 303 10*3/MM3 (ref 140–450)
POTASSIUM SERPL-SCNC: 4.5 MMOL/L (ref 3.5–5.2)
PROT SERPL-MCNC: 6.9 G/DL (ref 6–8.5)
RBC # BLD AUTO: 4.61 10*6/MM3 (ref 3.77–5.28)
SODIUM SERPL-SCNC: 142 MMOL/L (ref 136–145)
T4 FREE SERPL-MCNC: 1.2 NG/DL (ref 0.93–1.7)
TRIGL SERPL-MCNC: 114 MG/DL (ref 0–150)
TSH SERPL DL<=0.005 MIU/L-ACNC: 2.44 UIU/ML (ref 0.27–4.2)
VIT B12 SERPL-MCNC: 306 PG/ML (ref 211–946)
VIT B6 SERPL-MCNC: 5.8 UG/L (ref 2–32.8)
VLDLC SERPL CALC-MCNC: 20 MG/DL (ref 5–40)
WBC # BLD AUTO: 13.67 10*3/MM3 (ref 3.4–10.8)

## 2021-06-15 ENCOUNTER — TELEMEDICINE (OUTPATIENT)
Dept: PSYCHIATRY | Facility: CLINIC | Age: 51
End: 2021-06-15

## 2021-06-15 DIAGNOSIS — F39 MODERATE MOOD DISORDER (HCC): Primary | ICD-10-CM

## 2021-06-15 DIAGNOSIS — F41.0 PANIC DISORDER: ICD-10-CM

## 2021-06-15 DIAGNOSIS — G47.00 INSOMNIA, UNSPECIFIED TYPE: ICD-10-CM

## 2021-06-15 DIAGNOSIS — G47.09 OTHER INSOMNIA: ICD-10-CM

## 2021-06-15 PROCEDURE — 90834 PSYTX W PT 45 MINUTES: CPT | Performed by: SOCIAL WORKER

## 2021-06-22 ENCOUNTER — PROCEDURE VISIT (OUTPATIENT)
Dept: NEUROLOGY | Facility: CLINIC | Age: 51
End: 2021-06-22

## 2021-06-22 VITALS
HEART RATE: 79 BPM | SYSTOLIC BLOOD PRESSURE: 110 MMHG | TEMPERATURE: 97.1 F | BODY MASS INDEX: 21.16 KG/M2 | DIASTOLIC BLOOD PRESSURE: 80 MMHG | HEIGHT: 65 IN | OXYGEN SATURATION: 98 % | WEIGHT: 127 LBS

## 2021-06-22 DIAGNOSIS — G43.019 INTRACTABLE MIGRAINE WITHOUT AURA AND WITHOUT STATUS MIGRAINOSUS: ICD-10-CM

## 2021-06-22 PROCEDURE — 64615 CHEMODENERV MUSC MIGRAINE: CPT | Performed by: NURSE PRACTITIONER

## 2021-06-22 NOTE — PROGRESS NOTES
"CC: Botox Injections  Indication for Procedure: Chronic migraines          /80   Pulse 79   Temp 97.1 °F (36.2 °C)   Ht 165.1 cm (65\")   Wt 57.6 kg (127 lb)   SpO2 98%   BMI 21.13 kg/m²     Date of last Injection: 32,021   Response: Overall at least 80% reduction in migraines since starting Botox  Onset of response: 1 week  Wearing off: 10 to 11 weeks  Side Effects: None  : [employer]  Lot #:  [id number]  Expiration: February 2024  NDC:[id number]      With written consent obtained and risks and benefits explained to patient.     Botox injected using FDA approved protocol for chronic migraine prevention.   10 units, Procerus 5 units, Frontalis 20 units, Temporalis 40 units, Occipitalis 30 units, Cervical Paraspinals 20 units, Trapezius 30 units.     The total amount injected in units is 155.  The total amount wasted in units is 45.  The total amount submitted in units is 200.  Botox was supplied by specialty pharmacy.    Patient tolerated procedure well with no immediate complications.     We have discussed risk and benefits of this Botox procedure and common side effects including headache, neck pain, neck stiffness or weakness, ptosis, flu-like symptoms as well as more serious possible adverse effects including possible dysphagia, respiratory distress or even death (death has only been reported once with adults for Botox for migraines in another state when mixed with lidocaine solution which we do not use lidocaine solution in our practice for mixing Botox). Verbalizes understanding, accepts risks and agrees with moving forward with Botox injections for chronic migraine prevention.    "

## 2021-06-23 ENCOUNTER — APPOINTMENT (OUTPATIENT)
Dept: CT IMAGING | Facility: HOSPITAL | Age: 51
End: 2021-06-23

## 2021-06-23 NOTE — PROGRESS NOTES
Date of Service:6/15/2021  Time In: 130  Time Out: 212    PROGRESS NOTE  Data:  Marguerite Anderson is a 51 y.o. female. ..This was an audio and video enabled telemedicine encounter.  Patient started therapy session by discussed that she lost another pet and that she has been having more depression and anxiety since this happened. Patient processed having a hard with the loss. Patient reports having a hard time getting close to the other dog that she has because she misses her small dog.       HPI: Depression, anxiety, panic attacks, chronic pain and seizure disorder      Clinical Maneuvering/Intervention:  Assisted patient in processing above session content; acknowledged and normalized patient’S thoughts, feelings, and concerns.  Assisted patient processing her thoughts and feelings related to mood, anxiety, panic attacks, chronic pain and seizure disorder.  Patient sees psychiatric nurse practitioner in this office.  Patient is to take her medications as prescribed and contact the nurse practitioner in this office for questions and concerns.  Patient denies SI, HI and self-harm.  Patient to see this therapist back in 4 weeks and as needed Continue to work on grief and gave assignment to work with the other dog on ways to bond and allow their relationship help her grieve as the dog is grieving as well.       Allowed patient to freely discuss issues without interruption or judgment. Provided safe, confidential environment to facilitate the development of positive therapeutic relationship and encourage open, honest communication. Assisted patient in identifying risk factors which would indicate the need for higher level of care including thoughts to harm self or others and/or self-harming behavior and encouraged patient to contact this office, call 911, or present to the nearest emergency room should any of these events occur. Discussed crisis intervention services and means to access.  Patient adamantly and  convincingly denies current suicidal or homicidal ideation or perceptual disturbance.    Assessment     Diagnoses and all orders for this visit:    1. Moderate mood disorder (CMS/HCC) (Primary)    2. Panic disorder    3. Other insomnia    4. Insomnia, unspecified type               REVIEW OF SYSTEMS:  Review of Systems       Mental Status Exam  Hygiene:   fair  Cooperation:  Cooperative  Eye Contact:  Good  Psychomotor Behavior:  Appropriate  Affect:  Appropriate  Hopelessness: 4  Speech:  Normal  Thought Process:  Goal directed  Thought Content:  Normal  Suicidal:  None  Homicidal:  None  Hallucinations:  None  Delusion:  None  Memory:  Deficits  Orientation:  Person, Place, Time and Situation  Reliability:  fair  Insight:  Good  Judgement:  Good  Impulse Control:  Good  Physical/Medical Issues:  Yes Chronic pain and seizure disorder    Patient's Support Network Includes:  , son, mother and extended family    Progress toward goal: Not at goal    Functional Status: Moderate impairment     Prognosis: Fair with Ongoing Treatment       Plan         Patient will adhere to medication regimen as prescribed and report any side effects. Patient will contact this office, call 911 or present to the nearest emergency room should suicidal or homicidal ideations occur. Provide Cognitive Behavioral Therapy and Integrative Therapy to improve functioning, maintain stability, and avoid decompensation and the need for higher level of care.          Return in about 2 weeks (around 6/29/2021).      This document is signed me Angela Gonzalez LCSW,   June 23, 2021 09:28 EDT

## 2021-07-12 ENCOUNTER — TELEMEDICINE (OUTPATIENT)
Dept: PSYCHIATRY | Facility: CLINIC | Age: 51
End: 2021-07-12

## 2021-07-12 DIAGNOSIS — F39 MODERATE MOOD DISORDER (HCC): Primary | Chronic | ICD-10-CM

## 2021-07-12 DIAGNOSIS — F41.0 PANIC DISORDER: Chronic | ICD-10-CM

## 2021-07-12 DIAGNOSIS — G47.09 OTHER INSOMNIA: ICD-10-CM

## 2021-07-12 PROCEDURE — 99214 OFFICE O/P EST MOD 30 MIN: CPT | Performed by: NURSE PRACTITIONER

## 2021-07-12 RX ORDER — HYDROXYZINE HYDROCHLORIDE 10 MG/1
TABLET, FILM COATED ORAL
Qty: 60 TABLET | Refills: 2 | Status: SHIPPED | OUTPATIENT
Start: 2021-07-12 | End: 2021-09-17

## 2021-07-12 RX ORDER — PROPRANOLOL HYDROCHLORIDE 20 MG/1
20 TABLET ORAL DAILY PRN
Qty: 20 TABLET | Refills: 2 | Status: SHIPPED | OUTPATIENT
Start: 2021-07-12 | End: 2021-09-17

## 2021-07-12 RX ORDER — DULOXETIN HYDROCHLORIDE 60 MG/1
60 CAPSULE, DELAYED RELEASE ORAL DAILY
Qty: 30 CAPSULE | Refills: 2 | Status: SHIPPED | OUTPATIENT
Start: 2021-07-12 | End: 2021-09-17

## 2021-07-12 NOTE — PROGRESS NOTES
"This provider is located at [facility] ,[address], [address] in [place], [place],using a secure MyChart Video Visit through [employer]. Patient is being seen remotely via telehealth at their home address in [place], and stated they are in a secure environment for this session. The patient's condition being diagnosed/treated is appropriate for telemedicine. The provider identified herself as well as her credentials.   The patient, and/or patients guardian, consent to be seen remotely, and when consent is given they understand that the consent allows for patient identifiable information to be sent to a third party as needed.   They may refuse to be seen remotely at any time. The electronic data is encrypted and password protected, and the patient and/or guardian has been advised of the potential risks to privacy not withstanding such measures.    Chief Complaint  Mood disorder, panic, and insomnia    Subjective          Marguerite Anderson presents today via MyChart Video through [employer] by herself for a medication check.    History of Present Illness: Marguerite states, \"I feel a little better.\" Marguerite tells me that she has \"more pep in her step\" after tapering Zoloft and starting Cymbalta. She tells me her depressive symptoms have improved and she is getting some benefits of improved pain levels. Marguerite had an appointment with Dr. Clemens on June 10th. She tells me he ordered labs and a CT of her lungs but she was unable to get the CT as insurance would not cover it. She denies any changes to her medication regimen after the appointment. A chart review reveals elevated cholesterol levels and white blood cell counts. Marguerite is currently taking Abilify, Cymbalta, Propranolol, Atarax, and Remeron. She denies any side effects of her current regimen. She reports some ongoing struggles with initiating sleep due to racing thoughts. She also continues to experience a poor appetite. She denies " SI/HI/AVH.    Current Medications:   Current Outpatient Medications   Medication Sig Dispense Refill   • aclidinium bromide (Tudorza Pressair) 400 MCG/ACT aerosol powder  powder for inhalation Inhale 1 puff 2 (Two) Times a Day. 1 each 11   • ARIPiprazole (ABILIFY) 15 MG tablet Take 1 tablet by mouth Daily. 30 tablet 2   • aspirin (aspirin) 81 MG EC tablet Take 1 tablet by mouth Daily. 90 tablet 3   • B Complex Vitamins (VITAMIN B COMPLEX) capsule capsule Take  by mouth.     • Diclofenac Sodium (VOLTAREN) 1 % gel gel APPLY 4 GRAMS TOPICALLY TO AFFECTED AREA 4 TIMES A DAY AS NEEDED FOR PAIN 100 g 3   • estradiol (VIVELLE-DOT) 0.1 MG/24HR patch UNWRAP AND APPLY 1 PATCH TO SKIN TWO TIMES A WEEK AS DIRECTED BY PROVIDER 8 patch 6   • gabapentin (NEURONTIN) 300 MG capsule Take 300 mg by mouth every night at bedtime.  0   • hydrOXYzine (ATARAX) 10 MG tablet TAKE 1 TO 2 TABLETS BY MOUTH DAILY IF NEEDED FOR PANIC 60 tablet 2   • levETIRAcetam (KEPPRA) 750 MG tablet Take 1 tablet by mouth 2 (Two) Times a Day. 60 tablet 5   • mirtazapine (REMERON) 7.5 MG tablet TAKE 1 TABLET BY MOUTH EVERY NIGHT 30 tablet 2   • montelukast (SINGULAIR) 10 MG tablet TAKE 1 TABLET BY MOUTH AT BEDTIME 90 tablet 3   • OnabotulinumtoxinA (Botox) 200 units reconstituted solution INJECT 200 UNITS INTRAMUSCULARLY INTO HEAD, NECK AND SHOULDERS EVERY 12 WEEKS PER FDA PROTOCOL 1 each 3   • oxyCODONE-acetaminophen (ENDOCET)  MG per tablet 1 tablet Daily.     • propranolol (INDERAL) 20 MG tablet Take 1 tablet by mouth Daily As Needed (anxiety). for anxiety 20 tablet 2   • SUMAtriptan (IMITREX) 20 MG/ACT nasal spray USE 1 SPRAY IN 1 NOSTRIL IF NEEDED 18 each 0   • tiZANidine (ZANAFLEX) 2 MG tablet Take 2 mg by mouth 2 (Two) Times a Day As Needed.     • topiramate (TOPAMAX) 100 MG tablet Take 1 tablet by mouth 2 (Two) Times a Day. 60 tablet 5   • Umeclidinium Bromide (INCRUSE ELLIPTA) 62.5 MCG/INH aerosol powder  Inhale 1 puff Daily. 1 each 11   • WIXELA  INHUB 250-50 MCG/DOSE DISKUS Inhale 1 puff Daily. 60 each 11   • DULoxetine (Cymbalta) 60 MG capsule Take 1 capsule by mouth Daily for 30 days. 30 capsule 2     No current facility-administered medications for this visit.       Objective   Vital Signs:   There were no vitals taken for this visit.    Physical Exam  Nursing note reviewed. Vitals reviewed: Vitals not obtained due to nature of telehealth visit.   Constitutional:       Appearance: Normal appearance.   Neurological:      General: No focal deficit present.      Mental Status: She is alert and oriented to person, place, and time.   Psychiatric:         Attention and Perception: Attention normal.         Mood and Affect: Mood normal.         Speech: Speech normal.         Behavior: Behavior normal. Behavior is cooperative.         Thought Content: Thought content normal.         Cognition and Memory: Cognition normal.         Judgment: Judgment normal.        Result Review :     The following data was reviewed by: CHRIS Mayfield on 07/12/2021:    Telemedicine with Angela Gonzalez LCSW (06/15/2021)           Assessment and Plan    Problem List Items Addressed This Visit     None      Visit Diagnoses     Moderate mood disorder (CMS/HCC)  (Chronic)   -  Primary    Relevant Medications    DULoxetine (Cymbalta) 60 MG capsule    hydrOXYzine (ATARAX) 10 MG tablet    Panic disorder  (Chronic)       Relevant Medications    DULoxetine (Cymbalta) 60 MG capsule    propranolol (INDERAL) 20 MG tablet    hydrOXYzine (ATARAX) 10 MG tablet    Other insomnia              Mental Status Exam:   Hygiene:   good  Cooperation:  Cooperative  Eye Contact:  Good  Psychomotor Behavior:  Appropriate  Affect:  Appropriate  Mood: normal  Speech:  Normal  Thought Process:  Goal directed and Linear  Thought Content:  Normal  Suicidal:  None  Homicidal:  None  Hallucinations:  None  Delusion:  None  Memory:  Intact  Orientation:  Person, Place, Time and  Situation  Reliability:  good  Insight:  Good  Judgement:  Good  Impulse Control:  Good  Physical/Medical Issues:  Yes Pain, seizure disorder, and migraines       PHQ-9 Score:   PHQ-9 Total Score:      Impression/Plan:  -This is a follow up and medication check.  Marguerite reports improved symptoms of depression. She feels she is also receiving benefits of improved pain levels with Cymbalta. Marguerite continues to experience poor appetite and difficulty with sleep. She reports having racing thoughts. We discussed using 20 mg hydroxyzine before bed to help initiate sleep. She is in agreement to trying this for several nights before increasing the Remeron dose. Should the hydroxyzine not help after several nights, she will try taking two Remeron and notify this provider of results.  -Continue Cymbalta 60 mg daily for depression and anxiety.   -Continue propranolol to 20 mg as needed for panic.    -Continue Abilify 15 mg daily for mood disorder.  Patient has refills.  -Continue Atarax 10 mg twice daily for panic.    -Continue Remeron 7.5 mg nightly for insomnia.  Patient has refills.  -Continue therapy with Angela Gonzalez LCSW.  -Continue Keppra,Topamax, Imitrex, and Botox for seizure disorder and migraines.  Continue oxycodone and gabapentin for pain. These medications are prescribed by another provider.    MEDS ORDERED DURING VISIT:  New Medications Ordered This Visit   Medications   • DULoxetine (Cymbalta) 60 MG capsule     Sig: Take 1 capsule by mouth Daily for 30 days.     Dispense:  30 capsule     Refill:  2   • propranolol (INDERAL) 20 MG tablet     Sig: Take 1 tablet by mouth Daily As Needed (anxiety). for anxiety     Dispense:  20 tablet     Refill:  2   • hydrOXYzine (ATARAX) 10 MG tablet     Sig: TAKE 1 TO 2 TABLETS BY MOUTH DAILY IF NEEDED FOR PANIC     Dispense:  60 tablet     Refill:  2         Follow Up   Return in about 2 months (around 9/12/2021) for Medication Check.  Patient was given instructions and  counseling regarding her condition or for health maintenance advice. Please see specific information pulled into the AVS if appropriate.       TREATMENT PLAN/GOALS: Continue supportive psychotherapy efforts and medications as indicated. Treatment and medication options discussed during today's visit. Patient acknowledged and verbally consented to continue with current treatment plan and was educated on the importance of compliance with treatment and follow-up appointments.    MEDICATION ISSUES:  Discussed medication options and treatment plan of prescribed medication as well as the risks, benefits, and side effects including potential falls, possible impaired driving and metabolic adversities among others. Patient is agreeable to call the office with any worsening of symptoms or onset of side effects. Patient is agreeable to call 911 or go to the nearest ER should he/she begin having SI/HI.      This document has been electronically signed by CHRIS Witt, PMHNP-BC  July 12, 2021 19:38 EDT    Part of this note may be an electronic transcription/translation of spoken language to printed text using the Dragon Dictation System.

## 2021-07-19 ENCOUNTER — TELEMEDICINE (OUTPATIENT)
Dept: PSYCHIATRY | Facility: CLINIC | Age: 51
End: 2021-07-19

## 2021-07-19 DIAGNOSIS — F39 MODERATE MOOD DISORDER (HCC): Primary | ICD-10-CM

## 2021-07-19 DIAGNOSIS — F41.0 PANIC DISORDER: ICD-10-CM

## 2021-07-19 DIAGNOSIS — G47.09 OTHER INSOMNIA: ICD-10-CM

## 2021-07-19 PROCEDURE — 90834 PSYTX W PT 45 MINUTES: CPT | Performed by: SOCIAL WORKER

## 2021-07-23 DIAGNOSIS — F39 MODERATE MOOD DISORDER (HCC): Chronic | ICD-10-CM

## 2021-07-28 NOTE — PROGRESS NOTES
Date of Service:7/19/2021  Time In: 1130  Time Out: 1213    PROGRESS NOTE  Data:  Marguerite Anderson is a 51 y.o. female. ..This was an audio and video enabled telemedicine encounter.  Patient started therapy session by discussing dealing with the death of her dog better than she was last time and not holding on to anger for the dog that survived. Patient discussed anxiety, depression and occasionally a trauma response.     HPI: Depression, anxiety, panic attacks, chronic pain and seizure disorder      Clinical Maneuvering/Intervention:  Assisted patient in processing above session content; acknowledged and normalized patient’S thoughts, feelings, and concerns.  Assisted patient processing her thoughts and feelings related to mood, anxiety, panic attacks, chronic pain and seizure disorder.  Patient sees psychiatric nurse practitioner in this office.  Patient is to take her medications as prescribed and contact the nurse practitioner in this office for questions and concerns.  Patient denies SI, HI and self-harm.  Patient to see this therapist back in 4 weeks and as needed Continue to work on grief, anxiety coping skills and mood improvement activities that improve quality of life.     Allowed patient to freely discuss issues without interruption or judgment. Provided safe, confidential environment to facilitate the development of positive therapeutic relationship and encourage open, honest communication. Assisted patient in identifying risk factors which would indicate the need for higher level of care including thoughts to harm self or others and/or self-harming behavior and encouraged patient to contact this office, call 911, or present to the nearest emergency room should any of these events occur. Discussed crisis intervention services and means to access.  Patient adamantly and convincingly denies current suicidal or homicidal ideation or perceptual disturbance.    Assessment     Diagnoses and all orders for  this visit:    1. Moderate mood disorder (CMS/HCC) (Primary)    2. Panic disorder    3. Other insomnia               REVIEW OF SYSTEMS:  Review of Systems       Mental Status Exam  Hygiene:   fair  Cooperation:  Cooperative  Eye Contact:  Good  Psychomotor Behavior:  Appropriate  Affect:  Appropriate  Hopelessness: 4  Speech:  Normal  Thought Process:  Goal directed  Thought Content:  Normal  Suicidal:  None  Homicidal:  None  Hallucinations:  None  Delusion:  None  Memory:  Deficits  Orientation:  Person, Place, Time and Situation  Reliability:  fair  Insight:  Good  Judgement:  Good  Impulse Control:  Good  Physical/Medical Issues:  Yes Chronic pain and seizure disorder    Patient's Support Network Includes:  , son, mother and extended family    Progress toward goal: Not at goal    Functional Status: Moderate impairment     Prognosis: Fair with Ongoing Treatment       Plan         Patient will adhere to medication regimen as prescribed and report any side effects. Patient will contact this office, call 911 or present to the nearest emergency room should suicidal or homicidal ideations occur. Provide Cognitive Behavioral Therapy and Integrative Therapy to improve functioning, maintain stability, and avoid decompensation and the need for higher level of care.          Return in about 1 month (around 8/19/2021).      This document is signed me Angela Gonzalez LCSW,   July 27, 2021 22:11 EDT

## 2021-08-23 RX ORDER — MIRTAZAPINE 15 MG/1
15 TABLET, FILM COATED ORAL NIGHTLY
Qty: 30 TABLET | Refills: 2 | Status: SHIPPED | OUTPATIENT
Start: 2021-08-23 | End: 2021-09-17

## 2021-08-23 NOTE — TELEPHONE ENCOUNTER
PT STATES SHE HAS BEEN TAKING 2-7.5 MG OF MIRTAZAPINE, SHE STATES THAT IT WORKED TO HELP HER SLEEP. CAN YOU SEND IN A SCRIPT FOR 15 MG.

## 2021-09-12 DIAGNOSIS — F39 MODERATE MOOD DISORDER (HCC): Chronic | ICD-10-CM

## 2021-09-13 RX ORDER — ARIPIPRAZOLE 15 MG/1
15 TABLET ORAL DAILY
Qty: 30 TABLET | Refills: 2 | OUTPATIENT
Start: 2021-09-13

## 2021-09-14 ENCOUNTER — LAB (OUTPATIENT)
Dept: LAB | Facility: HOSPITAL | Age: 51
End: 2021-09-14

## 2021-09-14 ENCOUNTER — PROCEDURE VISIT (OUTPATIENT)
Dept: NEUROLOGY | Facility: CLINIC | Age: 51
End: 2021-09-14

## 2021-09-14 VITALS
BODY MASS INDEX: 21.16 KG/M2 | HEIGHT: 65 IN | DIASTOLIC BLOOD PRESSURE: 70 MMHG | SYSTOLIC BLOOD PRESSURE: 100 MMHG | TEMPERATURE: 97.2 F | WEIGHT: 127 LBS | OXYGEN SATURATION: 98 % | HEART RATE: 86 BPM

## 2021-09-14 DIAGNOSIS — G40.909 SEIZURE DISORDER (HCC): Primary | ICD-10-CM

## 2021-09-14 DIAGNOSIS — G43.019 INTRACTABLE MIGRAINE WITHOUT AURA AND WITHOUT STATUS MIGRAINOSUS: ICD-10-CM

## 2021-09-14 PROCEDURE — 64615 CHEMODENERV MUSC MIGRAINE: CPT | Performed by: NURSE PRACTITIONER

## 2021-09-14 NOTE — PROGRESS NOTES
"CC: Botox Injections  Indication for Procedure: Chronic migraines          /70   Pulse 86   Temp 97.2 °F (36.2 °C)   Ht 165.1 cm (65\")   Wt 57.6 kg (127 lb)   SpO2 98%   BMI 21.13 kg/m²     Date of last Injection: 6/22/2021   Response: Overall about 80 to 90% reduction, she is only had 1 or 2 migraine since last injection  Onset of response: Within 1 week  Wearing off: 11 weeks  Side Effects: None  : AllergONtheAIR  Lot #:  [id number]  Expiration: April 2024  NDC:[id number]      With written consent obtained and risks and benefits explained to patient.     Botox injected using FDA approved protocol for chronic migraine prevention.   10 units, Procerus 5 units, Frontalis 20 units, Temporalis 40 units, Occipitalis 30 units, Cervical Paraspinals 20 units, Trapezius 30 units.     The total amount injected in units is 155.  The total amount wasted in units is 45.  The total amount submitted in units is 200.  Botox was supplied by specialty pharmacy  Patient tolerated procedure well with no immediate complications.     We have discussed risk and benefits of this Botox procedure and common side effects including headache, neck pain, neck stiffness or weakness, ptosis, flu-like symptoms as well as more serious possible adverse effects including possible dysphagia, respiratory distress or even death (death has only been reported once with adults for Botox for migraines in another state when mixed with lidocaine solution which we do not use lidocaine solution in our practice for mixing Botox). Verbalizes understanding, accepts risks and agrees with moving forward with Botox injections for chronic migraine prevention.      Patient has history of seizure disorder, she had a \"grand mall seizure\" about 1 to 2 weeks ago lasting about 3 minutes.  Reportedly has not missed doses, Keppra level has consistently been a bit low in the past.  We will recheck levels today, if Keppra level I will have to bump her " dose.  Patient does not drive  Patient instructions include: No driving or operating heavy machinery for 3 months from onset of most recent seizure. Minimize stress as much as possible. Recommended 7-8 hours of sleep each night. Abstain from alcohol intake. Educated on Antiepileptic medications with possible side effects and signs and symptoms to report if prescribed during visit. Instructed to take seizure medication daily if prescribed. Reviewed potential seizure risk factors. Instructed to call 911 or our office if another seizure does occur.

## 2021-09-15 ENCOUNTER — TELEMEDICINE (OUTPATIENT)
Dept: PSYCHIATRY | Facility: CLINIC | Age: 51
End: 2021-09-15

## 2021-09-15 DIAGNOSIS — F41.0 PANIC DISORDER: ICD-10-CM

## 2021-09-15 DIAGNOSIS — F39 MODERATE MOOD DISORDER (HCC): Primary | ICD-10-CM

## 2021-09-15 DIAGNOSIS — G47.09 OTHER INSOMNIA: ICD-10-CM

## 2021-09-15 PROCEDURE — 90837 PSYTX W PT 60 MINUTES: CPT | Performed by: SOCIAL WORKER

## 2021-09-17 ENCOUNTER — OFFICE VISIT (OUTPATIENT)
Dept: PSYCHIATRY | Facility: CLINIC | Age: 51
End: 2021-09-17

## 2021-09-17 ENCOUNTER — LAB (OUTPATIENT)
Dept: LAB | Facility: HOSPITAL | Age: 51
End: 2021-09-17

## 2021-09-17 VITALS
HEIGHT: 65 IN | BODY MASS INDEX: 21.99 KG/M2 | HEART RATE: 82 BPM | WEIGHT: 132 LBS | DIASTOLIC BLOOD PRESSURE: 68 MMHG | SYSTOLIC BLOOD PRESSURE: 114 MMHG

## 2021-09-17 DIAGNOSIS — G47.00 INSOMNIA, UNSPECIFIED TYPE: ICD-10-CM

## 2021-09-17 DIAGNOSIS — F39 MODERATE MOOD DISORDER (HCC): Primary | Chronic | ICD-10-CM

## 2021-09-17 DIAGNOSIS — Z79.899 MEDICATION MANAGEMENT: ICD-10-CM

## 2021-09-17 DIAGNOSIS — G47.09 OTHER INSOMNIA: ICD-10-CM

## 2021-09-17 DIAGNOSIS — F41.0 PANIC DISORDER: Chronic | ICD-10-CM

## 2021-09-17 LAB
ALBUMIN SERPL-MCNC: 4.7 G/DL (ref 3.8–4.9)
ALBUMIN/GLOB SERPL: 1.9 {RATIO} (ref 1.2–2.2)
ALP SERPL-CCNC: 70 IU/L (ref 44–121)
ALT SERPL-CCNC: 10 IU/L (ref 0–32)
AMPHET+METHAMPHET UR QL: NEGATIVE
AMPHETAMINES UR QL: NEGATIVE
AST SERPL-CCNC: 14 IU/L (ref 0–40)
BARBITURATES UR QL SCN: NEGATIVE
BENZODIAZ UR QL SCN: NEGATIVE
BILIRUB SERPL-MCNC: 0.2 MG/DL (ref 0–1.2)
BUN SERPL-MCNC: 12 MG/DL (ref 6–24)
BUN/CREAT SERPL: 16 (ref 9–23)
BUPRENORPHINE SERPL-MCNC: NEGATIVE NG/ML
CALCIUM SERPL-MCNC: 9.7 MG/DL (ref 8.7–10.2)
CANNABINOIDS SERPL QL: NEGATIVE
CHLORIDE SERPL-SCNC: 109 MMOL/L (ref 96–106)
CO2 SERPL-SCNC: 21 MMOL/L (ref 20–29)
COCAINE UR QL: NEGATIVE
CREAT SERPL-MCNC: 0.74 MG/DL (ref 0.57–1)
ERYTHROCYTE [DISTWIDTH] IN BLOOD BY AUTOMATED COUNT: 12.8 % (ref 11.7–15.4)
GLOBULIN SER CALC-MCNC: 2.5 G/DL (ref 1.5–4.5)
GLUCOSE SERPL-MCNC: 88 MG/DL (ref 65–99)
HCT VFR BLD AUTO: 45.6 % (ref 34–46.6)
HGB BLD-MCNC: 15.3 G/DL (ref 11.1–15.9)
LEVETIRACETAM SERPL-MCNC: 14.3 UG/ML (ref 10–40)
MCH RBC QN AUTO: 31.6 PG (ref 26.6–33)
MCHC RBC AUTO-ENTMCNC: 33.6 G/DL (ref 31.5–35.7)
MCV RBC AUTO: 94 FL (ref 79–97)
METHADONE UR QL SCN: NEGATIVE
OPIATES UR QL: NEGATIVE
OXYCODONE UR QL SCN: NEGATIVE
PCP UR QL SCN: NEGATIVE
PLATELET # BLD AUTO: 306 X10E3/UL (ref 150–450)
POTASSIUM SERPL-SCNC: 5.2 MMOL/L (ref 3.5–5.2)
PROPOXYPH UR QL: NEGATIVE
PROT SERPL-MCNC: 7.2 G/DL (ref 6–8.5)
RBC # BLD AUTO: 4.84 X10E6/UL (ref 3.77–5.28)
SODIUM SERPL-SCNC: 143 MMOL/L (ref 134–144)
TOPIRAMATE SERPL-MCNC: 7.1 UG/ML (ref 2–25)
TRICYCLICS UR QL SCN: NEGATIVE
WBC # BLD AUTO: 9 X10E3/UL (ref 3.4–10.8)

## 2021-09-17 PROCEDURE — 80306 DRUG TEST PRSMV INSTRMNT: CPT

## 2021-09-17 PROCEDURE — 36415 COLL VENOUS BLD VENIPUNCTURE: CPT | Performed by: NURSE PRACTITIONER

## 2021-09-17 PROCEDURE — 99214 OFFICE O/P EST MOD 30 MIN: CPT | Performed by: NURSE PRACTITIONER

## 2021-09-17 RX ORDER — PROPRANOLOL HYDROCHLORIDE 20 MG/1
20 TABLET ORAL DAILY
Qty: 30 TABLET | Refills: 2 | Status: SHIPPED | OUTPATIENT
Start: 2021-09-17 | End: 2021-12-16 | Stop reason: SDUPTHER

## 2021-09-17 RX ORDER — MIRTAZAPINE 7.5 MG/1
7.5 TABLET, FILM COATED ORAL NIGHTLY
Qty: 30 TABLET | Refills: 2 | Status: SHIPPED | OUTPATIENT
Start: 2021-09-17 | End: 2021-12-16

## 2021-09-17 RX ORDER — DULOXETIN HYDROCHLORIDE 30 MG/1
30 CAPSULE, DELAYED RELEASE ORAL 2 TIMES DAILY
Qty: 60 CAPSULE | Refills: 2 | Status: SHIPPED | OUTPATIENT
Start: 2021-09-17 | End: 2021-10-29 | Stop reason: SDUPTHER

## 2021-09-17 RX ORDER — CLONAZEPAM 0.5 MG/1
0.5 TABLET ORAL NIGHTLY PRN
Qty: 15 TABLET | Refills: 0 | Status: SHIPPED | OUTPATIENT
Start: 2021-09-17 | End: 2021-10-29

## 2021-09-17 RX ORDER — ARIPIPRAZOLE 15 MG/1
15 TABLET ORAL DAILY
Qty: 30 TABLET | Refills: 2 | Status: SHIPPED | OUTPATIENT
Start: 2021-09-17 | End: 2021-12-16 | Stop reason: SDUPTHER

## 2021-09-17 NOTE — PROGRESS NOTES
"Chief Complaint  Mood disorder, panic, and insomnia      Subjective          Marguerite Anderson presents to [facility] by herself for a follow up and medication check.    History of Present Illness: Marguerite states, \"I am sorry.  I am feeling really anxious.\"  Marguerite tells me that she almost missed her ride to this appointment so she is feeling overly anxious.  She appears restless and is shaking.  Marguerite tells me the Cymbalta is \"doing okay\" for her depression but has possibly worsened her anxiety.  She states, \"I did not know that the Zoloft was helping so much.\"  She reports symptoms of anxiety such as feeling \"fidgety, restless, and shaky hands.\"  Marguerite holds out her arms and displays a bilateral hand tremor with right being greater than left.  She tells me that the hydroxyzine helps her to get to sleep but she will often wake with her eyes \"wide open\" and have difficulty with resuming sleep.  She is using mirtazapine as well.  Marguerite reports having a seizure last week.  She continues to take Keppra and Topamax for seizures.  She is no longer taking gabapentin and oxycodone.  She is currently taking Abilify, Cymbalta, hydroxyzine, mirtazapine, and propranolol.  She denies any side effects of her current medication regiment.  She denies any problems with appetite.  She denies any SI/HI/AVH.    Current Medications:   Current Outpatient Medications   Medication Sig Dispense Refill   • aclidinium bromide (Tudorza Pressair) 400 MCG/ACT aerosol powder  powder for inhalation Inhale 1 puff 2 (Two) Times a Day. 1 each 11   • Advair Diskus 250-50 MCG/DOSE DISKUS INHALE 1 PUFF BY MOUTH DAILY 60 each 11   • ARIPiprazole (ABILIFY) 15 MG tablet Take 1 tablet by mouth Daily. 30 tablet 2   • aspirin (aspirin) 81 MG EC tablet Take 1 tablet by mouth Daily. 90 tablet 3   • B Complex Vitamins (VITAMIN B COMPLEX) capsule capsule Take  by mouth.     • Diclofenac Sodium (VOLTAREN) 1 % gel gel APPLY 4 GRAMS " "TOPICALLY TO AFFECTED AREA 4 TIMES A DAY AS NEEDED FOR PAIN 100 g 3   • DULoxetine (Cymbalta) 30 MG capsule Take 1 capsule by mouth 2 (Two) Times a Day for 30 days. 60 capsule 2   • estradiol (VIVELLE-DOT) 0.1 MG/24HR patch UNWRAP AND APPLY 1 PATCH TO SKIN TWO TIMES A WEEK AS DIRECTED BY PROVIDER 8 patch 6   • levETIRAcetam (KEPPRA) 750 MG tablet Take 1 tablet by mouth 2 (Two) Times a Day. 60 tablet 5   • mirtazapine (REMERON) 7.5 MG tablet Take 1 tablet by mouth Every Night. 30 tablet 2   • montelukast (SINGULAIR) 10 MG tablet TAKE 1 TABLET BY MOUTH AT BEDTIME 90 tablet 3   • OnabotulinumtoxinA (Botox) 200 units reconstituted solution INJECT 200 UNITS INTRAMUSCULARLY INTO HEAD, NECK AND SHOULDERS EVERY 12 WEEKS PER FDA PROTOCOL 1 each 3   • propranolol (INDERAL) 20 MG tablet Take 1 tablet by mouth Daily. for anxiety 30 tablet 2   • SUMAtriptan (IMITREX) 20 MG/ACT nasal spray USE 1 SPRAY IN 1 NOSTRIL IF NEEDED 18 each 0   • tiZANidine (ZANAFLEX) 2 MG tablet Take 2 mg by mouth 2 (Two) Times a Day As Needed.     • topiramate (TOPAMAX) 100 MG tablet Take 1 tablet by mouth 2 (Two) Times a Day. 60 tablet 5   • Umeclidinium Bromide (INCRUSE ELLIPTA) 62.5 MCG/INH aerosol powder  Inhale 1 puff Daily. 1 each 11   • clonazePAM (KlonoPIN) 0.5 MG tablet Take 1 tablet by mouth At Night As Needed for Anxiety. 15 tablet 0     No current facility-administered medications for this visit.         Objective   Vital Signs:   /68   Pulse 82   Ht 165.1 cm (65\")   Wt 59.9 kg (132 lb)   BMI 21.97 kg/m²     Physical Exam  Vitals and nursing note reviewed.   Constitutional:       Appearance: Normal appearance. She is well-developed.   Musculoskeletal:         General: Normal range of motion.      Comments: Bilateral hand tremor with R>L   Skin:     General: Skin is warm and dry.   Neurological:      Mental Status: She is alert and oriented to person, place, and time.   Psychiatric:         Attention and Perception: Attention " normal.         Mood and Affect: Mood is anxious.         Speech: Speech normal.         Behavior: Behavior normal. Hyperactive: restless. Behavior is cooperative.         Thought Content: Thought content normal.         Cognition and Memory: Cognition normal.         Judgment: Judgment normal.        Result Review :                   Assessment and Plan    Problem List Items Addressed This Visit     None      Visit Diagnoses     Insomnia, unspecified type    -  Primary    Other insomnia        Relevant Medications    mirtazapine (REMERON) 7.5 MG tablet    Panic disorder  (Chronic)       Relevant Medications    mirtazapine (REMERON) 7.5 MG tablet    propranolol (INDERAL) 20 MG tablet    DULoxetine (Cymbalta) 30 MG capsule    ARIPiprazole (ABILIFY) 15 MG tablet    clonazePAM (KlonoPIN) 0.5 MG tablet    Moderate mood disorder (CMS/HCC)  (Chronic)       Relevant Medications    mirtazapine (REMERON) 7.5 MG tablet    DULoxetine (Cymbalta) 30 MG capsule    ARIPiprazole (ABILIFY) 15 MG tablet    Medication management        Relevant Orders    Urine Drug Screen - Urine, Clean Catch (Completed)          Mental Status Exam:   Hygiene:   good  Cooperation:  Cooperative  Eye Contact:  Fair  Psychomotor Behavior:  Restless  Affect:  Restricted  Mood: anxious  Speech:  Normal  Thought Process:  Goal directed and Linear  Thought Content:  Normal  Suicidal:  None  Homicidal:  None  Hallucinations:  None  Delusion:  None  Memory:  Intact  Orientation:  Person, Place, Time and Situation  Reliability:  good  Insight:  Good  Judgement:  Good  Impulse Control:  Good  Physical/Medical Issues:  Yes Pain, seizure disorder, and migraines        PHQ-9 Score:   PHQ-9 Total Score: 20    Impression/Plan:  -This is a follow up and medication check.  Marguerite reports having high levels of anxiety today.  She almost missed her ride and is feeling extremely anxious.  She feels that Cymbalta has been beneficial to help her depression but feels her  anxiety has almost been worse since switching from Zoloft.  She reports having a bilateral hand tremor which is causing some difficulty with ADLs.  She does not notice any relationship between the timing of her medication and her hand tremor.  I conducted an AIMS assessment in the office today.  There are abnormal movements noted in her hands and fingers with right being greater than left.  Marguerite and I had a lengthy discussion about her current medication regiment.  It appears that she is using multiple psychiatric medications to treat mood, sleep, and anxiety.  She is no longer taking gabapentin and oxycodone for pain management and we discussed possibly adding a low-dose benzodiazepine to her regiment to help sleep, anxiety, and tremor.  She has used clonazepam in the past and found it beneficial.  Marguerite and I discussed the risk of adding a benzodiazepine to her regiment.  We discussed the potential for addiction and dependency, as well as cognitive decline, falls, and respiratory depression.  She is aware not to combine these medications with opioid pain medications or alcohol.  She is aware not to drive while taking this medication.  She is also in agreement to stopping hydroxyzine and decreasing mirtazapine to assess sleep.  She will change propranolol to an afternoon dose and we possibly may increase to twice daily.  She will continue Cymbalta but will switch to 30 mg twice daily.  She will change Abilify to a daily administration rather than nighttime administration.  -Stop Atarax as patient feels this medication is not effective.   -Initiate clonazepam 0.5 mg nightly as needed for anxiety and insomnia.  I explained the purpose of this medication to Marguerite.  We discussed the risk versus benefits of adding this medication to her regiment, as well as potential side effects.  She verbalizes understanding.  -Continue Cymbalta 60 mg daily for depression and anxiety.  Patient will take 30 mg twice  daily.  -Continue propranolol to 20 mg as needed for panic.  Take an afternoon.  -Continue Abilify 15 mg daily for mood disorder.  Patient will take in the a.m.  -Continue Remeron 7.5 mg nightly for insomnia.  Patient will only take on nights that she does not use clonazepam for sleep.  -Continue therapy with Angela Gonzalez LCSW.  -Continue Keppra,Topamax, Imitrex, and Botox for seizure disorder and migraines. These medications are prescribed by another provider.  -I will collect a urine drug screen in the office today.  I am expecting Marguerite to be negative for all substances.  I will review official results.  -The patient has read and signed the [facility] Controlled Substance Contract. We discussed the risks and benefits of the use of controlled substances, including the risk of tolerance and drug dependence. Anorectic medications can be prescribed by one provider at a time and dispensed from one facility at a time, they can only be taken as prescribed, and we are not obligated to refill them if lost or stolen. The refills are only during regular clinic hours. Samir report was pulled on patient, reviewed and found to be appropriate.       MEDS ORDERED DURING VISIT:  New Medications Ordered This Visit   Medications   • mirtazapine (REMERON) 7.5 MG tablet     Sig: Take 1 tablet by mouth Every Night.     Dispense:  30 tablet     Refill:  2   • propranolol (INDERAL) 20 MG tablet     Sig: Take 1 tablet by mouth Daily. for anxiety     Dispense:  30 tablet     Refill:  2   • DULoxetine (Cymbalta) 30 MG capsule     Sig: Take 1 capsule by mouth 2 (Two) Times a Day for 30 days.     Dispense:  60 capsule     Refill:  2   • ARIPiprazole (ABILIFY) 15 MG tablet     Sig: Take 1 tablet by mouth Daily.     Dispense:  30 tablet     Refill:  2   • clonazePAM (KlonoPIN) 0.5 MG tablet     Sig: Take 1 tablet by mouth At Night As Needed for Anxiety.     Dispense:  15 tablet     Refill:  0       I spent 38 minutes caring for Marguerite on  this date of service. This time includes time spent by me in the following activities:preparing for the visit, performing a medically appropriate examination and/or evaluation , counseling and educating the patient/family/caregiver, ordering medications, tests, or procedures, documenting information in the medical record and care coordination  Follow Up   Return in about 6 weeks (around 10/29/2021) for Medication Check.  Patient was given instructions and counseling regarding her condition or for health maintenance advice. Please see specific information pulled into the AVS if appropriate.       TREATMENT PLAN/GOALS: Continue supportive psychotherapy efforts and medications as indicated. Treatment and medication options discussed during today's visit. Patient acknowledged and verbally consented to continue with current treatment plan and was educated on the importance of compliance with treatment and follow-up appointments.    MEDICATION ISSUES:  Discussed medication options and treatment plan of prescribed medication as well as the risks, benefits, and side effects including potential falls, possible impaired driving and metabolic adversities among others. Patient is agreeable to call the office with any worsening of symptoms or onset of side effects. Patient is agreeable to call 911 or go to the nearest ER should he/she begin having SI/HI.        This document has been electronically signed by CHRIS Witt, PMHNP-BC  September 17, 2021 14:26 EDT    Part of this note may be an electronic transcription/translation of spoken language to printed text using the Dragon Dictation System.

## 2021-09-24 ENCOUNTER — TELEPHONE (OUTPATIENT)
Dept: NEUROLOGY | Facility: CLINIC | Age: 51
End: 2021-09-24

## 2021-09-24 NOTE — PROGRESS NOTES
Please let patient know her metabolic panel stable, CBC stable  Topiramate and Keppra levels both in normal range  She reported a seizure prior to seeing me I recommend that she maybe take an extra half of her Keppra at night so 1 pill in the morning and one half at night

## 2021-09-24 NOTE — TELEPHONE ENCOUNTER
----- Message from CHRIS Motley sent at 9/24/2021  9:20 AM EDT -----  Please let patient know her metabolic panel stable, CBC stable  Topiramate and Keppra levels both in normal range  She reported a seizure prior to seeing me I recommend that she maybe take an extra half of her Keppra at night so 1 pill in the morning and one half at night

## 2021-09-24 NOTE — TELEPHONE ENCOUNTER
PATIENT INFORMED OF LABS AND SHE WILL TAKE INCREASED DOSE OF KEPPRA THAT YOU RECOMMEND. I TOLD HER TO CALL US WHEN SHE RUNS LOW SINCE SHE WILL BE TAKING AND EXTRA 1/2 AT NIGHT.

## 2021-10-04 NOTE — PROGRESS NOTES
Date of Service:/915/2021  Time In: 1125  Time Out: 1220    PROGRESS NOTE  Data:  Marguerite Anderson is a 51 y.o. female. ..This was an audio and video enabled telemedicine encounter.  Patient started therapy session by discussing she has been doing ok, working on some goals and combating depression and anxiety sx.     HPI: Depression, anxiety, panic attacks, chronic pain and seizure disorder      Clinical Maneuvering/Intervention:  Assisted patient in processing above session content; acknowledged and normalized patient’S thoughts, feelings, and concerns.  Assisted patient processing her thoughts and feelings related to mood, anxiety, panic attacks, chronic pain and seizure disorder.  Patient sees psychiatric nurse practitioner in this office.  Patient is to take her medications as prescribed and contact the nurse practitioner in this office for questions and concerns.  Patient denies SI, HI and self-harm.  Patient to see this therapist back in 4 weeks and as needed Continue to work on grief, anxiety coping skills and mood improvement  using the workbook provided by this therapist. Will continue to work on it during session and make goals.     Allowed patient to freely discuss issues without interruption or judgment. Provided safe, confidential environment to facilitate the development of positive therapeutic relationship and encourage open, honest communication. Assisted patient in identifying risk factors which would indicate the need for higher level of care including thoughts to harm self or others and/or self-harming behavior and encouraged patient to contact this office, call 911, or present to the nearest emergency room should any of these events occur. Discussed crisis intervention services and means to access.  Patient adamantly and convincingly denies current suicidal or homicidal ideation or perceptual disturbance.    Assessment     Diagnoses and all orders for this visit:    1. Moderate mood disorder  ([facility]) (Primary)    2. Panic disorder    3. Other insomnia               REVIEW OF SYSTEMS:  Review of Systems       Mental Status Exam  Hygiene:   fair  Cooperation:  Cooperative  Eye Contact:  Good  Psychomotor Behavior:  Appropriate  Affect:  Appropriate  Hopelessness: 4  Speech:  Normal  Thought Process:  Goal directed  Thought Content:  Normal  Suicidal:  None  Homicidal:  None  Hallucinations:  None  Delusion:  None  Memory:  Deficits  Orientation:  Person, Place, Time and Situation  Reliability:  fair  Insight:  Good  Judgement:  Good  Impulse Control:  Good  Physical/Medical Issues:  Yes Chronic pain and seizure disorder    Patient's Support Network Includes:  , son, mother and extended family    Progress toward goal: Not at goal    Functional Status: Moderate impairment     Prognosis: Fair with Ongoing Treatment       Plan         Patient will adhere to medication regimen as prescribed and report any side effects. Patient will contact this office, call 911 or present to the nearest emergency room should suicidal or homicidal ideations occur. Provide Cognitive Behavioral Therapy and Integrative Therapy to improve functioning, maintain stability, and avoid decompensation and the need for higher level of care.          Return in about 2 weeks (around 9/29/2021).      This document is signed me Angela Gonzalez LCSW,   October 3, 2021 21:35 EDT

## 2021-10-20 DIAGNOSIS — N95.1 MENOPAUSAL SYMPTOMS: ICD-10-CM

## 2021-10-20 RX ORDER — ESTRADIOL 0.1 MG/D
FILM, EXTENDED RELEASE TRANSDERMAL
Qty: 8 PATCH | Refills: 1 | Status: SHIPPED | OUTPATIENT
Start: 2021-10-20 | End: 2022-03-28

## 2021-10-29 ENCOUNTER — TELEMEDICINE (OUTPATIENT)
Dept: PSYCHIATRY | Facility: CLINIC | Age: 51
End: 2021-10-29

## 2021-10-29 DIAGNOSIS — F39 MODERATE MOOD DISORDER (HCC): Primary | Chronic | ICD-10-CM

## 2021-10-29 DIAGNOSIS — F41.0 PANIC DISORDER: Chronic | ICD-10-CM

## 2021-10-29 DIAGNOSIS — G47.00 INSOMNIA, UNSPECIFIED TYPE: Chronic | ICD-10-CM

## 2021-10-29 PROCEDURE — 99214 OFFICE O/P EST MOD 30 MIN: CPT | Performed by: NURSE PRACTITIONER

## 2021-10-29 RX ORDER — DULOXETIN HYDROCHLORIDE 30 MG/1
30 CAPSULE, DELAYED RELEASE ORAL 2 TIMES DAILY
Qty: 60 CAPSULE | Refills: 2 | Status: SHIPPED | OUTPATIENT
Start: 2021-10-29 | End: 2021-12-16 | Stop reason: SDUPTHER

## 2021-10-29 RX ORDER — CLONAZEPAM 0.5 MG/1
0.5 TABLET ORAL NIGHTLY PRN
Qty: 15 TABLET | Refills: 0 | Status: SHIPPED | OUTPATIENT
Start: 2021-10-29 | End: 2021-12-16 | Stop reason: SDUPTHER

## 2021-10-29 NOTE — PROGRESS NOTES
"This provider is located at [facility] ,[address], [address] [facility] in [place], [place],using a secure MyChart Video Visit through [employer]. Patient is being seen remotely via telehealth at their home address in [place], and stated they are in a secure environment for this session. The patient's condition being diagnosed/treated is appropriate for telemedicine. The provider identified herself as well as her credentials.   The patient, and/or patients guardian, consent to be seen remotely, and when consent is given they understand that the consent allows for patient identifiable information to be sent to a third party as needed.   They may refuse to be seen remotely at any time. The electronic data is encrypted and password protected, and the patient and/or guardian has been advised of the potential risks to privacy not withstanding such measures.    Chief Complaint  Mood disorder, panic, and insomnia    Subjective          Marguerite Anderson presents today via MyChart Video through [email or web address] by herself for a follow up and medication check.    History of Present Illness: Marguerite states, \"I am okay.\" Marguerite tells me she was able to sleep and had improved anxiety with the addition of Klonopin. She did not take Mirtazapine while taking Klonopin. She reports anxiety being \"high\" throughout the day and she tells me she feels restless. She reports the constant need to move, especially her hands and feet. She continues to receive Botox injections and denies migraines. She take Keppra for seizures and has not experienced seizure activity recently. She is currently taking Abilify, Cymbalta, Remeron, Propranolol, and Klonopin. She denies any side effects of her current medication regimen. She denies any problems with SI/HI/AVH.    Current Medications:   Current Outpatient Medications   Medication Sig Dispense Refill   • aclidinium bromide (Tudorza Pressair) 400 MCG/ACT aerosol powder  powder for " inhalation Inhale 1 puff 2 (Two) Times a Day. 1 each 11   • Advair Diskus 250-50 MCG/DOSE DISKUS INHALE 1 PUFF BY MOUTH DAILY 60 each 11   • ARIPiprazole (ABILIFY) 15 MG tablet Take 1 tablet by mouth Daily. 30 tablet 2   • aspirin (aspirin) 81 MG EC tablet Take 1 tablet by mouth Daily. 90 tablet 3   • B Complex Vitamins (VITAMIN B COMPLEX) capsule capsule Take  by mouth.     • clonazePAM (KlonoPIN) 0.5 MG tablet Take 1 tablet by mouth At Night As Needed for Anxiety. 15 tablet 0   • Diclofenac Sodium (VOLTAREN) 1 % gel gel APPLY 4 GRAMS TOPICALLY TO AFFECTED AREA 4 TIMES A DAY AS NEEDED FOR PAIN 100 g 3   • estradiol (VIVELLE-DOT) 0.1 MG/24HR patch UNWRAP AND APPLY 1 PATCH TO SKIN TWO TIMES WEEKLY AS DIRECTED BY PROVIDER 8 patch 1   • levETIRAcetam (KEPPRA) 750 MG tablet Take 1 tablet by mouth 2 (Two) Times a Day. 60 tablet 5   • mirtazapine (REMERON) 7.5 MG tablet Take 1 tablet by mouth Every Night. 30 tablet 2   • montelukast (SINGULAIR) 10 MG tablet TAKE 1 TABLET BY MOUTH AT BEDTIME 90 tablet 3   • OnabotulinumtoxinA (Botox) 200 units reconstituted solution INJECT 200 UNITS INTRAMUSCULARLY INTO HEAD, NECK AND SHOULDERS EVERY 12 WEEKS PER FDA PROTOCOL 1 each 3   • propranolol (INDERAL) 20 MG tablet Take 1 tablet by mouth Daily. for anxiety 30 tablet 2   • SUMAtriptan (IMITREX) 20 MG/ACT nasal spray USE 1 SPRAY IN 1 NOSTRIL IF NEEDED 18 each 0   • tiZANidine (ZANAFLEX) 2 MG tablet Take 2 mg by mouth 2 (Two) Times a Day As Needed.     • topiramate (TOPAMAX) 100 MG tablet Take 1 tablet by mouth 2 (Two) Times a Day. 60 tablet 5   • Umeclidinium Bromide (INCRUSE ELLIPTA) 62.5 MCG/INH aerosol powder  Inhale 1 puff Daily. 1 each 11   • DULoxetine (Cymbalta) 30 MG capsule Take 1 capsule by mouth 2 (Two) Times a Day for 30 days. 60 capsule 2     No current facility-administered medications for this visit.       Objective   Vital Signs:   There were no vitals taken for this visit.    Physical Exam   Result Review :                    Assessment and Plan    Problem List Items Addressed This Visit     None      Visit Diagnoses     Moderate mood disorder (HCC)  (Chronic)   -  Primary    Relevant Medications    DULoxetine (Cymbalta) 30 MG capsule    Insomnia, unspecified type  (Chronic)       Panic disorder  (Chronic)       Relevant Medications    clonazePAM (KlonoPIN) 0.5 MG tablet    DULoxetine (Cymbalta) 30 MG capsule          Mental Status Exam:   Hygiene:   good  Cooperation:  Cooperative  Eye Contact:  Good  Psychomotor Behavior:  Restless  Affect:  Restricted  Mood: anxious  Speech:  Normal  Thought Process:  Linear  Thought Content:  Normal  Suicidal:  None  Homicidal:  None  Hallucinations:  None  Delusion:  None  Memory:  Intact  Orientation:  Person, Place, Time and Situation  Reliability:  good  Insight:  Good  Judgement:  Good  Impulse Control:  Good  Physical/Medical Issues:  Yes Pain, seizure disorder, and migraines         PHQ-9 Score:   PHQ-9 Total Score:      Impression/Plan:  -This is a follow up and medication check.  Marguerite reports improved symptoms with Klonopin. She was able to sleep better and felt improved anxiety during the day. She has not had the medication in several days and is experiencing higher levels of anxiety. She stopped taking Remeron with the Klonopin as directed and I encouraged her to resume taking this medication nightly to assess benefits on sleep. I reminded her of the potential harm for using Klonopin daily and stressed the importance of trying to use as cautiously as possible.  She verbalized understanding.   -Continue clonazepam 0.5 mg nightly as needed for anxiety and insomnia.   -Continue Cymbalta 60 mg daily for depression and anxiety.  Patient will take 30 mg twice daily.  -Continue propranolol to 20 mg as needed for panic.  Take an afternoon. Patient has refills.  -Continue Abilify 15 mg daily for mood disorder.  Patient will take in the a.m. Patient has refills.   -Continue Remeron 7.5 mg  nightly for insomnia.  Patient has refills.   -Continue therapy with Angela Gonzalez LCSW.  -Continue Keppra,Topamax, Imitrex, and Botox for seizure disorder and migraines. These medications are prescribed by another provider.  -The [facility]YA report, request number [id number], of the past 12 months were reviewed and is appropriate.  The patient/guardian reports taking the medication only as prescribed.  The patient/guardian denies any abuse or misuse of the medication.  The patient/guardian denies any other substance use or issues.  There are no apparent substance related issues.  The patient reports no side effects of the current medication usage.  The patient/guardian has reported significant improvement with medication usage and wishes to continue medication as prescribed.  The patient/guardian is appropriate to continue with current medication usage at this time.  Reinforced risks and side effects of medication usage, patient and/or guardian verbalize understanding in their own words and are in agreement with current plan.    MEDS ORDERED DURING VISIT:  New Medications Ordered This Visit   Medications   • clonazePAM (KlonoPIN) 0.5 MG tablet     Sig: Take 1 tablet by mouth At Night As Needed for Anxiety.     Dispense:  15 tablet     Refill:  0   • DULoxetine (Cymbalta) 30 MG capsule     Sig: Take 1 capsule by mouth 2 (Two) Times a Day for 30 days.     Dispense:  60 capsule     Refill:  2         Follow Up   Return in about 2 months (around 12/29/2021) for Medication Check.  Patient was given instructions and counseling regarding her condition or for health maintenance advice. Please see specific information pulled into the AVS if appropriate.       TREATMENT PLAN/GOALS: Continue supportive psychotherapy efforts and medications as indicated. Treatment and medication options discussed during today's visit. Patient acknowledged and verbally consented to continue with current treatment plan and was educated on the  importance of compliance with treatment and follow-up appointments.    MEDICATION ISSUES:  Discussed medication options and treatment plan of prescribed medication as well as the risks, benefits, and side effects including potential falls, possible impaired driving and metabolic adversities among others. Patient is agreeable to call the office with any worsening of symptoms or onset of side effects. Patient is agreeable to call 911 or go to the nearest ER should he/she begin having SI/HI.      This document has been electronically signed by CHRIS Witt, PMHNP-BC  October 29, 2021 12:33 EDT    Part of this note may be an electronic transcription/translation of spoken language to printed text using the Dragon Dictation System.

## 2021-11-04 DIAGNOSIS — G43.009 MIGRAINE WITHOUT AURA AND WITHOUT STATUS MIGRAINOSUS, NOT INTRACTABLE: ICD-10-CM

## 2021-11-04 DIAGNOSIS — G40.909 SEIZURE DISORDER (HCC): ICD-10-CM

## 2021-11-04 RX ORDER — TOPIRAMATE 100 MG/1
TABLET, FILM COATED ORAL
Qty: 60 TABLET | Refills: 5 | Status: SHIPPED | OUTPATIENT
Start: 2021-11-04 | End: 2022-06-16

## 2021-11-04 NOTE — TELEPHONE ENCOUNTER
Rx Refill Note  Requested Prescriptions     Pending Prescriptions Disp Refills   • topiramate (TOPAMAX) 100 MG tablet [Pharmacy Med Name: TOPIRAMATE 100MG TABLETS] 60 tablet 5     Sig: TAKE 1 TABLET BY MOUTH TWICE DAILY      Last office visit with prescribing clinician: 4/27/2021      Next office visit with prescribing clinician: 12/13/2021            Halie Larios MA  11/04/21, 08:32 EDT

## 2021-11-10 ENCOUNTER — TELEMEDICINE (OUTPATIENT)
Dept: PSYCHIATRY | Facility: CLINIC | Age: 51
End: 2021-11-10

## 2021-11-10 DIAGNOSIS — F39 MODERATE MOOD DISORDER (HCC): ICD-10-CM

## 2021-11-10 DIAGNOSIS — F33.1 MODERATE EPISODE OF RECURRENT MAJOR DEPRESSIVE DISORDER (HCC): ICD-10-CM

## 2021-11-10 DIAGNOSIS — F41.0 PANIC DISORDER: Primary | ICD-10-CM

## 2021-11-10 PROCEDURE — 90834 PSYTX W PT 45 MINUTES: CPT | Performed by: SOCIAL WORKER

## 2021-11-30 NOTE — PROGRESS NOTES
Date of Service:11/10/2021  Time In: 1130  Time Out: 1209    PROGRESS NOTE  Data:  Marguerite Anderson is a 51 y.o. female. ..This was an audio and video enabled telemedicine encounter.  Patient started therapy session by discussing her health, sleep patterns and stress levels. Patient discussed needing to work on getting out more but hesitant. Patient discussed she is having increased anxiety and stress of going out of the house with friends so she is not sure about it.     HPI: Depression, anxiety, panic attacks, chronic pain and seizure disorder      Clinical Maneuvering/Intervention:  Assisted patient in processing above session content; acknowledged and normalized patient’S thoughts, feelings, and concerns.  Assisted patient processing her thoughts and feelings related to mood, anxiety, panic attacks, chronic pain and seizure disorder.  Patient sees psychiatric nurse practitioner in this office.  Patient is to take her medications as prescribed and contact the nurse practitioner in this office for questions and concerns.  Patient denies SI, HI and self-harm.  Patient to see this therapist back in 4 weeks and as needed Continue to work on grief, anxiety coping skills and mood improvement/ Educated on exposure therapy and assessed level of change.     Allowed patient to freely discuss issues without interruption or judgment. Provided safe, confidential environment to facilitate the development of positive therapeutic relationship and encourage open, honest communication. Assisted patient in identifying risk factors which would indicate the need for higher level of care including thoughts to harm self or others and/or self-harming behavior and encouraged patient to contact this office, call 911, or present to the nearest emergency room should any of these events occur. Discussed crisis intervention services and means to access.  Patient adamantly and convincingly denies current suicidal or homicidal ideation or  perceptual disturbance.    Assessment     Diagnoses and all orders for this visit:    1. Panic disorder (Primary)    2. Moderate mood disorder (HCC)    3. Moderate episode of recurrent major depressive disorder (HCC)               REVIEW OF SYSTEMS:  Review of Systems       Mental Status Exam  Hygiene:   fair  Cooperation:  Cooperative  Eye Contact:  Good  Psychomotor Behavior:  Appropriate  Affect:  Appropriate  Hopelessness: 4  Speech:  Normal  Thought Process:  Goal directed  Thought Content:  Normal  Suicidal:  None  Homicidal:  None  Hallucinations:  None  Delusion:  None  Memory:  Deficits  Orientation:  Person, Place, Time and Situation  Reliability:  fair  Insight:  Good  Judgement:  Good  Impulse Control:  Good  Physical/Medical Issues:  Yes Chronic pain and seizure disorder    Patient's Support Network Includes:  , son, mother and extended family    Progress toward goal: Not at goal    Functional Status: Moderate impairment     Prognosis: Fair with Ongoing Treatment       Plan         Patient will adhere to medication regimen as prescribed and report any side effects. Patient will contact this office, call 911 or present to the nearest emergency room should suicidal or homicidal ideations occur. Provide Cognitive Behavioral Therapy and Integrative Therapy to improve functioning, maintain stability, and avoid decompensation and the need for higher level of care.          Return in about 1 month (around 12/10/2021).      This document is signed me Angela Gonzalez LCSW,   November 30, 2021 00:06 EST

## 2021-12-05 RX ORDER — MIRTAZAPINE 15 MG/1
15 TABLET, FILM COATED ORAL NIGHTLY
Qty: 30 TABLET | Refills: 2 | OUTPATIENT
Start: 2021-12-05 | End: 2022-12-05

## 2021-12-13 ENCOUNTER — PROCEDURE VISIT (OUTPATIENT)
Dept: NEUROLOGY | Facility: CLINIC | Age: 51
End: 2021-12-13

## 2021-12-13 VITALS
OXYGEN SATURATION: 98 % | SYSTOLIC BLOOD PRESSURE: 110 MMHG | HEIGHT: 65 IN | WEIGHT: 141 LBS | DIASTOLIC BLOOD PRESSURE: 80 MMHG | HEART RATE: 71 BPM | TEMPERATURE: 97.3 F | BODY MASS INDEX: 23.49 KG/M2

## 2021-12-13 DIAGNOSIS — G43.019 INTRACTABLE MIGRAINE WITHOUT AURA AND WITHOUT STATUS MIGRAINOSUS: ICD-10-CM

## 2021-12-13 PROCEDURE — 64615 CHEMODENERV MUSC MIGRAINE: CPT | Performed by: NURSE PRACTITIONER

## 2021-12-13 RX ORDER — LEVETIRACETAM 750 MG/1
750 TABLET ORAL 2 TIMES DAILY
Qty: 60 TABLET | Refills: 5 | Status: SHIPPED | OUTPATIENT
Start: 2021-12-13 | End: 2022-09-23

## 2021-12-13 RX ORDER — SUMATRIPTAN 20 MG/1
SPRAY NASAL
Qty: 18 EACH | Refills: 0 | Status: SHIPPED | OUTPATIENT
Start: 2021-12-13 | End: 2023-03-27

## 2021-12-13 NOTE — PROGRESS NOTES
"CC: Botox Injections  Indication for Procedure: Chronic migraines    /80   Pulse 71   Temp 97.3 °F (36.3 °C)   Ht 165.1 cm (65\")   Wt 64 kg (141 lb)   SpO2 98%   BMI 23.46 kg/m²     Date of last Injection: 9/14/2021  Response: 75 to 85% reduction overall  Onset of response: 1 week  Wearing off: 10 weeks  Side Effects: None  : OxThera  Lot #:  [id number]  Expiration: June 2024  NDC: 5574-4585-38    With written consent obtained and risks and benefits explained to patient.     Botox injected using FDA approved protocol for chronic migraine prevention.   10 units, Procerus 5 units, Frontalis 20 units, Temporalis 40 units, Occipitalis 30 units, Cervical Paraspinals 20 units, Trapezius 30 units.     The total amount injected in units is 155.  The total amount wasted in units is 45.  The total amount submitted in units is 200.  Botox was supplied by patient supplied  Patient tolerated procedure well with no immediate complications.     We have discussed risk and benefits of this Botox procedure and common side effects including headache, neck pain, neck stiffness or weakness, ptosis, flu-like symptoms as well as more serious possible adverse effects including possible dysphagia, respiratory distress or even death (extremely rare in Botox for Chronic Migraine Prevention) Also recommended no massages, no heavy lifting, no chiropractic adjustments or other injections of any kind in the areas where Botox was administered for 72 hours following injections. The patient and/or family verbalizes understanding, accepts risks and agrees with moving forward with Botox injections for chronic migraine prevention.      Ginny Pace, APRN  12/13/2021    "

## 2021-12-15 ENCOUNTER — TELEMEDICINE (OUTPATIENT)
Dept: PSYCHIATRY | Facility: CLINIC | Age: 51
End: 2021-12-15

## 2021-12-15 DIAGNOSIS — G47.09 OTHER INSOMNIA: ICD-10-CM

## 2021-12-15 DIAGNOSIS — G47.00 INSOMNIA, UNSPECIFIED TYPE: ICD-10-CM

## 2021-12-15 DIAGNOSIS — F39 MODERATE MOOD DISORDER: Primary | ICD-10-CM

## 2021-12-15 DIAGNOSIS — F41.0 PANIC DISORDER: ICD-10-CM

## 2021-12-15 PROCEDURE — 90834 PSYTX W PT 45 MINUTES: CPT | Performed by: SOCIAL WORKER

## 2021-12-16 ENCOUNTER — TELEMEDICINE (OUTPATIENT)
Dept: PSYCHIATRY | Facility: CLINIC | Age: 51
End: 2021-12-16

## 2021-12-16 DIAGNOSIS — F39 MODERATE MOOD DISORDER (HCC): Chronic | ICD-10-CM

## 2021-12-16 DIAGNOSIS — F41.0 PANIC DISORDER: Primary | Chronic | ICD-10-CM

## 2021-12-16 DIAGNOSIS — G47.09 OTHER INSOMNIA: Chronic | ICD-10-CM

## 2021-12-16 DIAGNOSIS — F41.1 GAD (GENERALIZED ANXIETY DISORDER): ICD-10-CM

## 2021-12-16 PROCEDURE — 99214 OFFICE O/P EST MOD 30 MIN: CPT | Performed by: NURSE PRACTITIONER

## 2021-12-16 RX ORDER — PROPRANOLOL HYDROCHLORIDE 20 MG/1
20 TABLET ORAL DAILY
Qty: 30 TABLET | Refills: 2 | Status: SHIPPED | OUTPATIENT
Start: 2021-12-16 | End: 2022-03-09

## 2021-12-16 RX ORDER — DULOXETIN HYDROCHLORIDE 30 MG/1
30 CAPSULE, DELAYED RELEASE ORAL 2 TIMES DAILY
Qty: 60 CAPSULE | Refills: 2 | Status: SHIPPED | OUTPATIENT
Start: 2021-12-16 | End: 2022-03-17 | Stop reason: SDUPTHER

## 2021-12-16 RX ORDER — CLONAZEPAM 0.5 MG/1
0.5 TABLET ORAL NIGHTLY PRN
Qty: 30 TABLET | Refills: 0 | Status: SHIPPED | OUTPATIENT
Start: 2021-12-16 | End: 2022-01-17 | Stop reason: SDUPTHER

## 2021-12-16 RX ORDER — ARIPIPRAZOLE 15 MG/1
15 TABLET ORAL DAILY
Qty: 30 TABLET | Refills: 2 | Status: SHIPPED | OUTPATIENT
Start: 2021-12-16 | End: 2022-03-09

## 2021-12-16 RX ORDER — QUETIAPINE FUMARATE 25 MG/1
TABLET, FILM COATED ORAL
Qty: 30 TABLET | Refills: 1 | Status: SHIPPED | OUTPATIENT
Start: 2021-12-16 | End: 2022-01-17 | Stop reason: SDUPTHER

## 2021-12-16 NOTE — PROGRESS NOTES
"This provider is located at [facility] ,[address], [address] in [place], [place],using a secure MyChart Video Visit through [email or web address]. Patient is being seen remotely via telehealth at their home address in [place], and stated they are in a secure environment for this session. The patient's condition being diagnosed/treated is appropriate for telemedicine. The provider identified herself as well as her credentials.   The patient, and/or patients guardian, consent to be seen remotely, and when consent is given they understand that the consent allows for patient identifiable information to be sent to a third party as needed.   They may refuse to be seen remotely at any time. The electronic data is encrypted and password protected, and the patient and/or guardian has been advised of the potential risks to privacy not withstanding such measures.    Chief Complaint  Mood disorder, anxiety, panic, and insomnia    Subjective          Marguerite Anderson presents today via MyChart Video through [employer] by herself for a follow up and medication check.    History of Present Illness: Marguerite states, \"my anxiety has been through the roof.\" Marguerite tells me that she feels \"pressure\" during the holidays. She tells me that she had a past suicide attempt two years ago around [place] and it can be very upsetting for her. She continues to meet with Angela to talk about her feelings and learn ways to decrease her anxiety. She tells me she tries to stay busy, takes a shower/bath, and spend time with friends/family. She states, \"my stomach is just in knots.\" She tells me the worsening anxiety is leading to depression. She tells me she is waking a lot throughout the night. She tells me she \"tosses and turns\" and \"watches the clock.\" She tells me she would really like to go with friends to dinner in [place] this week but feels afraid to try and go. She states, \"I keep telling myself no, no, no.\" She " reports compliance with her medications. She is taking Abilify, Klonopin, Cymbalta, Remeron, and Propranolol. She denies any side effects of her regiment. She denies any SI/HI/AVH.    Current Medications:   Current Outpatient Medications   Medication Sig Dispense Refill   • aclidinium bromide (Tudorza Pressair) 400 MCG/ACT aerosol powder  powder for inhalation Inhale 1 puff 2 (Two) Times a Day. 1 each 11   • Advair Diskus 250-50 MCG/DOSE DISKUS INHALE 1 PUFF BY MOUTH DAILY 60 each 11   • ARIPiprazole (ABILIFY) 15 MG tablet Take 1 tablet by mouth Daily. 30 tablet 2   • aspirin (aspirin) 81 MG EC tablet Take 1 tablet by mouth Daily. 90 tablet 3   • B Complex Vitamins (VITAMIN B COMPLEX) capsule capsule Take  by mouth.     • clonazePAM (KlonoPIN) 0.5 MG tablet Take 1 tablet by mouth At Night As Needed for Anxiety. 30 tablet 0   • Diclofenac Sodium (VOLTAREN) 1 % gel gel APPLY 4 GRAMS TOPICALLY TO AFFECTED AREA 4 TIMES A DAY AS NEEDED FOR PAIN 100 g 3   • estradiol (VIVELLE-DOT) 0.1 MG/24HR patch UNWRAP AND APPLY 1 PATCH TO SKIN TWO TIMES WEEKLY AS DIRECTED BY PROVIDER 8 patch 1   • levETIRAcetam (KEPPRA) 750 MG tablet Take 1 tablet by mouth 2 (Two) Times a Day. 60 tablet 5   • montelukast (SINGULAIR) 10 MG tablet TAKE 1 TABLET BY MOUTH AT BEDTIME 90 tablet 3   • OnabotulinumtoxinA (Botox) 200 units reconstituted solution INJECT 200 UNITS INTRAMUSCULARLY INTO HEAD, NECK AND SHOULDERS EVERY 12 WEEKS PER FDA PROTOCOL 1 each 3   • propranolol (INDERAL) 20 MG tablet Take 1 tablet by mouth Daily. for anxiety 30 tablet 2   • SUMAtriptan (IMITREX) 20 MG/ACT nasal spray USE 1 SPRAY IN 1 NOSTRIL IF NEEDED 18 each 0   • tiZANidine (ZANAFLEX) 2 MG tablet Take 2 mg by mouth 2 (Two) Times a Day As Needed.     • topiramate (TOPAMAX) 100 MG tablet TAKE 1 TABLET BY MOUTH TWICE DAILY 60 tablet 5   • Umeclidinium Bromide (INCRUSE ELLIPTA) 62.5 MCG/INH aerosol powder  Inhale 1 puff Daily. 1 each 11   • DULoxetine (Cymbalta) 30 MG capsule  Take 1 capsule by mouth 2 (Two) Times a Day for 30 days. 60 capsule 2   • QUEtiapine (SEROquel) 25 MG tablet Take 1/2 to 1 tablet nightly for sleep 30 tablet 1     No current facility-administered medications for this visit.       Objective   Vital Signs:   There were no vitals taken for this visit.    Physical Exam  Nursing note reviewed. Vitals reviewed: Vitals not obtained due to nature of telehealth visit.   Constitutional:       Appearance: Normal appearance.   Neurological:      General: No focal deficit present.      Mental Status: She is alert and oriented to person, place, and time.   Psychiatric:         Attention and Perception: Attention normal.         Mood and Affect: Mood is anxious and depressed. Affect is blunt.         Speech: Speech normal.         Behavior: Behavior normal. Hyperactive: restless. Behavior is cooperative.         Thought Content: Thought content normal.         Cognition and Memory: Cognition normal.         Judgment: Judgment normal.        Result Review :     The following data was reviewed by: CHRIS Mayfield on 12/16/2021:    Telemedicine with Angela Gonzalez LCSW (11/10/2021)           Assessment and Plan    Problem List Items Addressed This Visit     None      Visit Diagnoses     Panic disorder  (Chronic)   -  Primary    Relevant Medications    ARIPiprazole (ABILIFY) 15 MG tablet    clonazePAM (KlonoPIN) 0.5 MG tablet    DULoxetine (Cymbalta) 30 MG capsule    propranolol (INDERAL) 20 MG tablet    QUEtiapine (SEROquel) 25 MG tablet    Moderate mood disorder (HCC)  (Chronic)       Relevant Medications    ARIPiprazole (ABILIFY) 15 MG tablet    DULoxetine (Cymbalta) 30 MG capsule    QUEtiapine (SEROquel) 25 MG tablet    Other insomnia  (Chronic)       Relevant Medications    QUEtiapine (SEROquel) 25 MG tablet    JAY (generalized anxiety disorder)        Relevant Medications    ARIPiprazole (ABILIFY) 15 MG tablet    DULoxetine (Cymbalta) 30 MG capsule     QUEtiapine (SEROquel) 25 MG tablet          Mental Status Exam:   Hygiene:   fair  Cooperation:  Cooperative  Eye Contact:  Good  Psychomotor Behavior:  Restless  Affect:  Blunted  Mood: depressed and anxious  Speech:  Normal  Thought Process:  Goal directed and Linear  Thought Content:  Normal  Suicidal:  None  Homicidal:  None  Hallucinations:  None  Delusion:  None  Memory:  Intact  Orientation:  Person, Place, Time and Situation  Reliability:  good  Insight:  Good  Judgement:  Good  Impulse Control:  Good  Physical/Medical Issues:  Yes Chronic pain, seizures, and migraines     PHQ-9 Score:   PHQ-9 Total Score:      Impression/Plan:  -This is a follow up and medication check. Marguerite reports an exacerbation of chronic anxiety and panic. The anxiety is affecting her mood which has been more depressed. She is wanting to spend time with others but isolates herself with anxiety. She had a past SI two years ago at [place] and tells me that creates pressure for her. She is motivated to make medication adjustments and we discussed adding Seroquel to her treatment plan for help with sleep. I explained the purpose of this medication to Marguerite and she feels it could be beneficial. We also discussed using Klonopin during the day instead of at night for sleep. I informed her I will increase the number of tablets for this month but would like to decrease her use back down to PRN  rather than daily. She agrees.  -Initiate Seroquel 12.5 to 25 mg nightly for anxiety and insomnia. I explained the purpose of this medication to Marguerite. We discussed the risks versus benefits of adding this medication to her regiment, as well as potential side effects.  -Stop Remeron as we are changing therapies.   -Continue clonazepam 0.5 mg nightly as needed for anxiety and insomnia. # 30 tablets given.  -Continue Cymbalta 60 mg daily for depression and anxiety.  Patient will take 30 mg twice daily.  -Continue propranolol to 20 mg as needed  for panic.  Take an afternoon. Patient has refills.  -Continue Abilify 15 mg daily for mood disorder.  Patient will take in the a.m. Patient has refills.   -Continue therapy with Angela Gonzalez LCSW.  -Continue Keppra,Topamax, Imitrex, and Botox for seizure disorder and migraines. These medications are prescribed by another provider.  -The [facility] report, request number [id number], of the past 12 months were reviewed and is appropriate.  The patient/guardian reports taking the medication only as prescribed.  The patient/guardian denies any abuse or misuse of the medication.  The patient/guardian denies any other substance use or issues.  There are no apparent substance related issues.  The patient reports no side effects of the current medication usage.  The patient/guardian has reported significant improvement with medication usage and wishes to continue medication as prescribed.  The patient/guardian is appropriate to continue with current medication usage at this time.  Reinforced risks and side effects of medication usage, patient and/or guardian verbalize understanding in their own words and are in agreement with current plan.    MEDS ORDERED DURING VISIT:  New Medications Ordered This Visit   Medications   • ARIPiprazole (ABILIFY) 15 MG tablet     Sig: Take 1 tablet by mouth Daily.     Dispense:  30 tablet     Refill:  2   • clonazePAM (KlonoPIN) 0.5 MG tablet     Sig: Take 1 tablet by mouth At Night As Needed for Anxiety.     Dispense:  30 tablet     Refill:  0   • DULoxetine (Cymbalta) 30 MG capsule     Sig: Take 1 capsule by mouth 2 (Two) Times a Day for 30 days.     Dispense:  60 capsule     Refill:  2   • propranolol (INDERAL) 20 MG tablet     Sig: Take 1 tablet by mouth Daily. for anxiety     Dispense:  30 tablet     Refill:  2   • QUEtiapine (SEROquel) 25 MG tablet     Sig: Take 1/2 to 1 tablet nightly for sleep     Dispense:  30 tablet     Refill:  1         Follow Up   Return in about 2 months (around  2/16/2022) for Medication Check.  Patient was given instructions and counseling regarding her condition or for health maintenance advice. Please see specific information pulled into the AVS if appropriate.       TREATMENT PLAN/GOALS: Continue supportive psychotherapy efforts and medications as indicated. Treatment and medication options discussed during today's visit. Patient acknowledged and verbally consented to continue with current treatment plan and was educated on the importance of compliance with treatment and follow-up appointments.    MEDICATION ISSUES:  Discussed medication options and treatment plan of prescribed medication as well as the risks, benefits, and side effects including potential falls, possible impaired driving and metabolic adversities among others. Patient is agreeable to call the office with any worsening of symptoms or onset of side effects. Patient is agreeable to call 911 or go to the nearest ER should he/she begin having SI/HI.      This document has been electronically signed by CHRIS Witt, PMHNP-BC  December 16, 2021 20:58 EST    Part of this note may be an electronic transcription/translation of spoken language to printed text using the Dragon Dictation System.

## 2021-12-30 DIAGNOSIS — G47.09 OTHER INSOMNIA: ICD-10-CM

## 2021-12-30 RX ORDER — MIRTAZAPINE 7.5 MG/1
7.5 TABLET, FILM COATED ORAL NIGHTLY
Qty: 30 TABLET | Refills: 2 | OUTPATIENT
Start: 2021-12-30 | End: 2022-12-30

## 2022-01-02 NOTE — PROGRESS NOTES
Date of Service:12/15/2021  Time In: 1230  Time Out: 109    PROGRESS NOTE  Data:  Marguerite Anderson is a 51 y.o. female. ..This was an audio and video enabled telemedicine encounter.  Patient started therapy session by discussing increased anxiety and depression. Having a difficult time with the thought of leaving the house. Patient discussed since her health conditions started she feels more comfortable staying at home and now getting out is frightening.     HPI: Depression, anxiety, panic attacks, chronic pain and seizure disorder      Clinical Maneuvering/Intervention:  Assisted patient in processing above session content; acknowledged and normalized patient’S thoughts, feelings, and concerns.  Assisted patient processing her thoughts and feelings related to mood, anxiety, panic attacks, chronic pain and seizure disorder.  Patient sees psychiatric nurse practitioner in this office.  Patient is to take her medications as prescribed and contact the nurse practitioner in this office for questions and concerns.  Patient denies SI, HI and self-harm.  Patient to see this therapist back in 4 weeks and as needed Educated on exposure therapy, anxiety coping skills and encouraged patient to start back on the workbook exercises we did in the past and assessing her distortions.     Allowed patient to freely discuss issues without interruption or judgment. Provided safe, confidential environment to facilitate the development of positive therapeutic relationship and encourage open, honest communication. Assisted patient in identifying risk factors which would indicate the need for higher level of care including thoughts to harm self or others and/or self-harming behavior and encouraged patient to contact this office, call 911, or present to the nearest emergency room should any of these events occur. Discussed crisis intervention services and means to access.  Patient adamantly and convincingly denies current suicidal or  homicidal ideation or perceptual disturbance.    Assessment     Diagnoses and all orders for this visit:    1. Moderate mood disorder (HCC) (Primary)    2. Panic disorder    3. Insomnia, unspecified type    4. Other insomnia               REVIEW OF SYSTEMS:  Review of Systems       Mental Status Exam  Hygiene:   fair  Cooperation:  Cooperative  Eye Contact:  Good  Psychomotor Behavior:  Appropriate  Affect:  Appropriate  Hopelessness: 4  Speech:  Normal  Thought Process:  Goal directed  Thought Content:  Normal  Suicidal:  None  Homicidal:  None  Hallucinations:  None  Delusion:  None  Memory:  Deficits  Orientation:  Person, Place, Time and Situation  Reliability:  fair  Insight:  Good  Judgement:  Good  Impulse Control:  Good  Physical/Medical Issues:  Yes Chronic pain and seizure disorder    Patient's Support Network Includes:  , son, mother and extended family    Progress toward goal: Not at goal    Functional Status: Moderate impairment     Prognosis: Fair with Ongoing Treatment       Plan         Patient will adhere to medication regimen as prescribed and report any side effects. Patient will contact this office, call 911 or present to the nearest emergency room should suicidal or homicidal ideations occur. Provide Cognitive Behavioral Therapy and Integrative Therapy to improve functioning, maintain stability, and avoid decompensation and the need for higher level of care.          Return in about 2 weeks (around 12/29/2021) for 2-4 weeks and prn. .      This document is signed me Angela Gonzalez LCSW,   January 1, 2022 20:03 EST

## 2022-01-17 ENCOUNTER — TELEMEDICINE (OUTPATIENT)
Dept: PSYCHIATRY | Facility: CLINIC | Age: 52
End: 2022-01-17

## 2022-01-17 DIAGNOSIS — F41.0 PANIC DISORDER: Chronic | ICD-10-CM

## 2022-01-17 DIAGNOSIS — F39 MODERATE MOOD DISORDER: Chronic | ICD-10-CM

## 2022-01-17 DIAGNOSIS — G47.09 OTHER INSOMNIA: Chronic | ICD-10-CM

## 2022-01-17 DIAGNOSIS — F41.1 GAD (GENERALIZED ANXIETY DISORDER): Primary | ICD-10-CM

## 2022-01-17 PROCEDURE — 99214 OFFICE O/P EST MOD 30 MIN: CPT | Performed by: NURSE PRACTITIONER

## 2022-01-17 RX ORDER — CLONAZEPAM 0.5 MG/1
0.5 TABLET ORAL NIGHTLY PRN
Qty: 30 TABLET | Refills: 0 | Status: SHIPPED | OUTPATIENT
Start: 2022-01-17 | End: 2022-03-17 | Stop reason: SDUPTHER

## 2022-01-17 RX ORDER — QUETIAPINE FUMARATE 25 MG/1
TABLET, FILM COATED ORAL
Qty: 45 TABLET | Refills: 1 | Status: SHIPPED | OUTPATIENT
Start: 2022-01-17 | End: 2022-03-17

## 2022-01-17 RX ORDER — CYCLOBENZAPRINE HCL 10 MG
10 TABLET ORAL 2 TIMES DAILY PRN
COMMUNITY
Start: 2022-01-07

## 2022-01-17 NOTE — PROGRESS NOTES
"This provider is located at [facility], Behavioral Health ,[address], [address] [facility] [address] in [place], [place],using a secure [employer] Video Visit through [employer]. Patient is being seen remotely via telehealth at their home address in [place], and stated they are in a secure environment for this session. The patient's condition being diagnosed/treated is appropriate for telemedicine. The provider identified herself as well as her credentials.   The patient, and/or patients guardian, consent to be seen remotely, and when consent is given they understand that the consent allows for patient identifiable information to be sent to a third party as needed.   They may refuse to be seen remotely at any time. The electronic data is encrypted and password protected, and the patient and/or guardian has been advised of the potential risks to privacy not withstanding such measures.    Chief Complaint  Mood disorder, anxiety, panic, and insomnia    Subjective          Marguerite Anderson presents today via MyChart Video through [employer] by herself for a follow up and medication check.     History of Present Illness: Marguerite states, \"the Seroquel helped a lot.\"  Marguerite tells me that she is taking Seroquel at night and clonazepam to the day.  She is taking the whole tablet of Seroquel at night.  She tells me that this has helped her anxiety significantly.  She states, \"I am doing more during the day and not as scared.\"  She also describes not feeling as restless or fidgety.  She tells me she was able to go with her friends at [place] to eat in [place].  She tells me that she struggled during the holidays but was able to get by.  She continues to take her medications for seizures and migraines.  She reports having mild depressive symptoms.  She is compliant with her medications.  She is taking Abilify, Cymbalta, propranolol, Seroquel, and clonazepam.  She denies any side effects of her current medication regiment.  She " denies any problems with sleep or appetite.  She denies any SI/HI/AVH.    Current Medications:   Current Outpatient Medications   Medication Sig Dispense Refill   • Advair Diskus 250-50 MCG/DOSE DISKUS INHALE 1 PUFF BY MOUTH DAILY 60 each 11   • ARIPiprazole (ABILIFY) 15 MG tablet Take 1 tablet by mouth Daily. 30 tablet 2   • aspirin (aspirin) 81 MG EC tablet Take 1 tablet by mouth Daily. 90 tablet 3   • B Complex Vitamins (VITAMIN B COMPLEX) capsule capsule Take  by mouth.     • clonazePAM (KlonoPIN) 0.5 MG tablet Take 1 tablet by mouth At Night As Needed for Anxiety. 30 tablet 0   • cyclobenzaprine (FLEXERIL) 10 MG tablet Take 10 mg by mouth 2 (Two) Times a Day As Needed.     • Diclofenac Sodium (VOLTAREN) 1 % gel gel APPLY 4 GRAMS TOPICALLY TO AFFECTED AREA 4 TIMES A DAY AS NEEDED FOR PAIN 100 g 3   • estradiol (VIVELLE-DOT) 0.1 MG/24HR patch UNWRAP AND APPLY 1 PATCH TO SKIN TWO TIMES WEEKLY AS DIRECTED BY PROVIDER 8 patch 1   • levETIRAcetam (KEPPRA) 750 MG tablet Take 1 tablet by mouth 2 (Two) Times a Day. 60 tablet 5   • montelukast (SINGULAIR) 10 MG tablet TAKE 1 TABLET BY MOUTH AT BEDTIME 90 tablet 3   • OnabotulinumtoxinA (Botox) 200 units reconstituted solution INJECT 200 UNITS INTRAMUSCULARLY INTO HEAD, NECK AND SHOULDERS EVERY 12 WEEKS PER FDA PROTOCOL 1 each 3   • propranolol (INDERAL) 20 MG tablet Take 1 tablet by mouth Daily. for anxiety 30 tablet 2   • QUEtiapine (SEROquel) 25 MG tablet Take 1/2 tablet in the afternoon and one tablet at night 45 tablet 1   • SUMAtriptan (IMITREX) 20 MG/ACT nasal spray USE 1 SPRAY IN 1 NOSTRIL IF NEEDED 18 each 0   • topiramate (TOPAMAX) 100 MG tablet TAKE 1 TABLET BY MOUTH TWICE DAILY 60 tablet 5   • aclidinium bromide (Tudorza Pressair) 400 MCG/ACT aerosol powder  powder for inhalation Inhale 1 puff 2 (Two) Times a Day. 1 each 11   • DULoxetine (Cymbalta) 30 MG capsule Take 1 capsule by mouth 2 (Two) Times a Day for 30 days. 60 capsule 2   • tiZANidine (ZANAFLEX) 2  MG tablet Take 2 mg by mouth 2 (Two) Times a Day As Needed.     • Umeclidinium Bromide (INCRUSE ELLIPTA) 62.5 MCG/INH aerosol powder  Inhale 1 puff Daily. 1 each 11     No current facility-administered medications for this visit.       Objective   Vital Signs:   There were no vitals taken for this visit.    Physical Exam  Nursing note reviewed. Vitals reviewed: Vitals not obtained due to nature of telehealth visit.   Constitutional:       Appearance: Normal appearance.   Neurological:      General: No focal deficit present.      Mental Status: She is alert and oriented to person, place, and time.   Psychiatric:         Attention and Perception: Attention normal.         Mood and Affect: Mood is anxious. Affect is blunt.         Speech: Speech normal.         Behavior: Behavior normal. Behavior is cooperative.         Thought Content: Thought content normal.         Cognition and Memory: Cognition normal.         Judgment: Judgment normal.        Result Review :                   Assessment and Plan    Problem List Items Addressed This Visit     None      Visit Diagnoses     JAY (generalized anxiety disorder)    -  Primary    Relevant Medications    QUEtiapine (SEROquel) 25 MG tablet    Panic disorder  (Chronic)       Relevant Medications    clonazePAM (KlonoPIN) 0.5 MG tablet    QUEtiapine (SEROquel) 25 MG tablet    Moderate mood disorder (HCC)  (Chronic)       Relevant Medications    QUEtiapine (SEROquel) 25 MG tablet    Other insomnia  (Chronic)       Relevant Medications    QUEtiapine (SEROquel) 25 MG tablet          Mental Status Exam:   Hygiene:   good  Cooperation:  Cooperative  Eye Contact:  Good  Psychomotor Behavior:  Appropriate  Affect:  Blunted  Mood: anxious  Speech:  Normal  Thought Process:  Goal directed and Linear  Thought Content:  Normal  Suicidal:  None  Homicidal:  None  Hallucinations:  None  Delusion:  None  Memory:  Intact  Orientation:  Person, Place, Time and Situation  Reliability:   good  Insight:  Good  Judgement:  Good  Impulse Control:  Good  Physical/Medical Issues:  Yes Chronic pain, seizures, and migraines      PHQ-9 Score:   PHQ-9 Total Score:      Impression/Plan:  -This is a follow up and medication check. Marguerite reports improved symptoms of anxiety. She is sleeping better with Seroquel. She has concerns about medication as she has been using Clonazepam daily. She feels her anxiety is improved. Marguerite and I discussed the importance of managing anxiety with other medications such as Seroquel so benzodiazepines can be used as needed. She verbalized understanding. I encouraged Marguerite to use 1/2 tablet of Seroquel at 11:00 AM when she takes the Clonazepam and to make attempts not to use the benzodiazepine. If she needs it, I encouraged her to use half tablet.   -Increase Seroquel 25 mg nightly and 12.5 mg daily for anxiety and insomnia.   -Continue clonazepam 0.5 mg nightly as needed for anxiety and insomnia. # 30 tablets given.  -Continue Cymbalta 60 mg daily for depression and anxiety.  Patient will take 30 mg twice daily. Patient has refills.   -Continue propranolol to 20 mg as needed for panic.  Take an afternoon. Patient has refills.  -Continue Abilify 15 mg daily for mood disorder.  Patient will take in the a.m. Patient has refills.   -Continue therapy with Angela Gonzalez LCSW.  -Continue Keppra,Topamax, Imitrex, and Botox for seizure disorder and migraines. These medications are prescribed by another provider.  -The ASYA report, request number [id number] , of the past 12 months were reviewed and is appropriate.  The patient/guardian reports taking the medication only as prescribed.  The patient/guardian denies any abuse or misuse of the medication.  The patient/guardian denies any other substance use or issues.  There are no apparent substance related issues.  The patient reports no side effects of the current medication usage.  The patient/guardian has reported significant  improvement with medication usage and wishes to continue medication as prescribed.  The patient/guardian is appropriate to continue with current medication usage at this time.  Reinforced risks and side effects of medication usage, patient and/or guardian verbalize understanding in their own words and are in agreement with current plan.      MEDS ORDERED DURING VISIT:  New Medications Ordered This Visit   Medications   • clonazePAM (KlonoPIN) 0.5 MG tablet     Sig: Take 1 tablet by mouth At Night As Needed for Anxiety.     Dispense:  30 tablet     Refill:  0   • QUEtiapine (SEROquel) 25 MG tablet     Sig: Take 1/2 tablet in the afternoon and one tablet at night     Dispense:  45 tablet     Refill:  1         Follow Up   Return in about 2 months (around 3/17/2022) for Medication Check.  Patient was given instructions and counseling regarding her condition or for health maintenance advice. Please see specific information pulled into the AVS if appropriate.       TREATMENT PLAN/GOALS: Continue supportive psychotherapy efforts and medications as indicated. Treatment and medication options discussed during today's visit. Patient acknowledged and verbally consented to continue with current treatment plan and was educated on the importance of compliance with treatment and follow-up appointments.    MEDICATION ISSUES:  Discussed medication options and treatment plan of prescribed medication as well as the risks, benefits, and side effects including potential falls, possible impaired driving and metabolic adversities among others. Patient is agreeable to call the office with any worsening of symptoms or onset of side effects. Patient is agreeable to call 911 or go to the nearest ER should he/she begin having SI/HI.      This document has been electronically signed by CHRIS Witt, PMHNP-BC  January 18, 2022 07:20 EST    Part of this note may be an electronic transcription/translation of spoken language to  printed text using the Dragon Dictation System.

## 2022-01-26 DIAGNOSIS — N95.1 MENOPAUSAL SYMPTOMS: ICD-10-CM

## 2022-01-26 RX ORDER — ESTRADIOL 0.1 MG/D
FILM, EXTENDED RELEASE TRANSDERMAL
Qty: 24 PATCH | OUTPATIENT
Start: 2022-01-26

## 2022-02-13 DIAGNOSIS — G43.709 CHRONIC MIGRAINE WITHOUT AURA, NOT INTRACTABLE, WITHOUT STATUS MIGRAINOSUS: ICD-10-CM

## 2022-02-14 RX ORDER — ONABOTULINUMTOXINA 200 [USP'U]/1
INJECTION, POWDER, LYOPHILIZED, FOR SOLUTION INTRADERMAL; INTRAMUSCULAR
Qty: 1 EACH | Refills: 3 | Status: SHIPPED | OUTPATIENT
Start: 2022-02-14

## 2022-02-14 NOTE — TELEPHONE ENCOUNTER
Rx Refill Note  Requested Prescriptions     Pending Prescriptions Disp Refills   • OnabotulinumtoxinA (Botox) 200 units reconstituted solution [Pharmacy Med Name: BOTOX  200UNIT  For Solution] 1 each 3     Sig: INJECT 200 UNITS INTRAMUSCULARLY INTO HEAD, NECK & SHOULDERS EVERY 12 WEEKS PER FDA PROTOCOL      Last office visit with prescribing clinician: 4/27/2021      Next office visit with prescribing clinician: 3/8/2022            Halie Larios MA  02/14/22, 07:49 EST

## 2022-03-08 ENCOUNTER — TRANSCRIBE ORDERS (OUTPATIENT)
Dept: GENERAL RADIOLOGY | Facility: HOSPITAL | Age: 52
End: 2022-03-08

## 2022-03-08 ENCOUNTER — PROCEDURE VISIT (OUTPATIENT)
Dept: NEUROLOGY | Facility: CLINIC | Age: 52
End: 2022-03-08

## 2022-03-08 ENCOUNTER — HOSPITAL ENCOUNTER (OUTPATIENT)
Dept: GENERAL RADIOLOGY | Facility: HOSPITAL | Age: 52
Discharge: HOME | End: 2022-03-08
Admitting: PHYSICIAN ASSISTANT

## 2022-03-08 VITALS
OXYGEN SATURATION: 99 % | DIASTOLIC BLOOD PRESSURE: 80 MMHG | TEMPERATURE: 96.6 F | WEIGHT: 142 LBS | HEART RATE: 79 BPM | BODY MASS INDEX: 23.66 KG/M2 | SYSTOLIC BLOOD PRESSURE: 110 MMHG | HEIGHT: 65 IN

## 2022-03-08 DIAGNOSIS — G47.09 OTHER INSOMNIA: Chronic | ICD-10-CM

## 2022-03-08 DIAGNOSIS — G43.019 INTRACTABLE MIGRAINE WITHOUT AURA AND WITHOUT STATUS MIGRAINOSUS: ICD-10-CM

## 2022-03-08 DIAGNOSIS — M47.812 OSTEOARTHRITIS OF SPINE WITHOUT MYELOPATHY OR RADICULOPATHY, CERVICAL REGION: ICD-10-CM

## 2022-03-08 DIAGNOSIS — M47.812 OSTEOARTHRITIS OF SPINE WITHOUT MYELOPATHY OR RADICULOPATHY, CERVICAL REGION: Primary | ICD-10-CM

## 2022-03-08 PROCEDURE — 72040 X-RAY EXAM NECK SPINE 2-3 VW: CPT

## 2022-03-08 PROCEDURE — 64615 CHEMODENERV MUSC MIGRAINE: CPT | Performed by: NURSE PRACTITIONER

## 2022-03-08 NOTE — PROGRESS NOTES
"CC: Botox Injections  Indication for Procedure: Chronic migraines    /80   Pulse 79   Temp 96.6 °F (35.9 °C)   Ht 165.1 cm (65\")   Wt 64.4 kg (142 lb)   SpO2 99%   BMI 23.63 kg/m²     Date of last Injection: 12/13/2021  Response: Overall at least a 70% reduction in migraines and to wear off around week 10  Onset of response: 1 week  Wearing off: 10 weeks  Side Effects: None  : Allergan  Lot #:  [id number]  Expiration: 10/2024  NDC: 4004-7827-57    With written consent obtained and risks and benefits explained to patient.     Botox injected using FDA approved protocol for chronic migraine prevention.   10 units, Procerus 5 units, Frontalis 20 units, Temporalis 40 units, Occipitalis 30 units, Cervical Paraspinals 20 units, Trapezius 30 units.     The total amount injected in units is 155.  The total amount wasted in units is 45.  The total amount submitted in units is 200.  Botox was supplied by specialty pharmacy.    Patient tolerated procedure well with no immediate complications.     We have discussed risk and benefits of this Botox procedure and common side effects including headache, neck pain, neck stiffness or weakness, ptosis, flu-like symptoms as well as more serious possible adverse effects including possible dysphagia, respiratory distress or even death (extremely rare in Botox for Chronic Migraine Prevention) Also recommended no massages, no heavy lifting, no chiropractic adjustments or other injections of any kind in the areas where Botox was administered for 72 hours following injections. The patient and/or family verbalizes understanding, accepts risks and agrees with moving forward with Botox injections for chronic migraine prevention.      Ginny Pace, APRN  3/8/2022    "

## 2022-03-09 DIAGNOSIS — F41.0 PANIC DISORDER: Chronic | ICD-10-CM

## 2022-03-09 DIAGNOSIS — F39 MODERATE MOOD DISORDER: Chronic | ICD-10-CM

## 2022-03-09 RX ORDER — QUETIAPINE FUMARATE 25 MG/1
TABLET, FILM COATED ORAL
Qty: 30 TABLET | OUTPATIENT
Start: 2022-03-09

## 2022-03-09 RX ORDER — ARIPIPRAZOLE 15 MG/1
15 TABLET ORAL DAILY
Qty: 30 TABLET | Refills: 2 | Status: SHIPPED | OUTPATIENT
Start: 2022-03-09 | End: 2022-06-07

## 2022-03-09 RX ORDER — PROPRANOLOL HYDROCHLORIDE 20 MG/1
20 TABLET ORAL DAILY
Qty: 30 TABLET | Refills: 2 | Status: SHIPPED | OUTPATIENT
Start: 2022-03-09 | End: 2022-06-07

## 2022-03-09 NOTE — TELEPHONE ENCOUNTER
Rx Refill Note  Requested Prescriptions     Pending Prescriptions Disp Refills   • Diclofenac Sodium (VOLTAREN) 1 % gel gel [Pharmacy Med Name: DICLOFENAC 1% GEL 100GM] 100 g 3     Sig: APPLY 4 GRAMS TOPICALLY TO AFFECTED AREA 4 TIMES A DAY AS NEEDED FOR PAIN      Last office visit with prescribing clinician: 6/10/2021      Next office visit with prescribing clinician: 6/10/2022            Sandrine Bates LPN  03/09/22, 12:36 EST

## 2022-03-17 ENCOUNTER — TELEMEDICINE (OUTPATIENT)
Dept: PSYCHIATRY | Facility: CLINIC | Age: 52
End: 2022-03-17

## 2022-03-17 DIAGNOSIS — F41.0 PANIC DISORDER: Chronic | ICD-10-CM

## 2022-03-17 DIAGNOSIS — F41.1 GAD (GENERALIZED ANXIETY DISORDER): ICD-10-CM

## 2022-03-17 DIAGNOSIS — G47.09 OTHER INSOMNIA: Chronic | ICD-10-CM

## 2022-03-17 DIAGNOSIS — F39 MODERATE MOOD DISORDER: Primary | Chronic | ICD-10-CM

## 2022-03-17 PROCEDURE — 99214 OFFICE O/P EST MOD 30 MIN: CPT | Performed by: NURSE PRACTITIONER

## 2022-03-17 RX ORDER — CLONAZEPAM 0.5 MG/1
0.5 TABLET ORAL NIGHTLY PRN
Qty: 30 TABLET | Refills: 0 | Status: SHIPPED | OUTPATIENT
Start: 2022-03-17 | End: 2022-04-18 | Stop reason: SDUPTHER

## 2022-03-17 RX ORDER — DULOXETIN HYDROCHLORIDE 30 MG/1
30 CAPSULE, DELAYED RELEASE ORAL NIGHTLY
Qty: 30 CAPSULE | Refills: 2 | Status: SHIPPED | OUTPATIENT
Start: 2022-03-17 | End: 2022-06-20 | Stop reason: SDUPTHER

## 2022-03-17 RX ORDER — QUETIAPINE FUMARATE 50 MG/1
TABLET, FILM COATED ORAL
Qty: 60 TABLET | Refills: 2 | Status: SHIPPED | OUTPATIENT
Start: 2022-03-17 | End: 2022-06-06

## 2022-03-17 RX ORDER — DULOXETIN HYDROCHLORIDE 60 MG/1
60 CAPSULE, DELAYED RELEASE ORAL DAILY
Qty: 30 CAPSULE | Refills: 2 | Status: SHIPPED | OUTPATIENT
Start: 2022-03-17 | End: 2022-07-22

## 2022-03-17 NOTE — PROGRESS NOTES
"This provider is located at [facility], Behavioral Health ,[address], [address] [facility] [address] in [place], [place],using a secure [employer] Video Visit through [employer]. Patient is being seen remotely via telehealth at their home address in [place], and stated they are in a secure environment for this session. The patient's condition being diagnosed/treated is appropriate for telemedicine. The provider identified herself as well as her credentials.   The patient, and/or patients guardian, consent to be seen remotely, and when consent is given they understand that the consent allows for patient identifiable information to be sent to a third party as needed.   They may refuse to be seen remotely at any time. The electronic data is encrypted and password protected, and the patient and/or guardian has been advised of the potential risks to privacy not withstanding such measures.    Chief Complaint  Mood disorder, anxiety, panic, and insomnia    Subjective          Marguerite Anderson presents today via MyChart Video through [email or web address] by herself for a follow up and medication check.     History of Present Illness: Marguerite states, \"I am not good.\" Marguerite tells me her mother fell and fractured her left femur. She had surgery and is in a nursing facility for rehabilitation. Marguerite expresses anxiety and being overwhelmed as she is taking over her affairs and worries about her recovery. Her sister is not helping her. She is unable to visit since her mom has COVID and is in isolation. She feels her anxiety has impacted her sleep and mood. She is struggling to initiate and sustain sleep. She also wakes early. She reports no change to appetite. She is compliant with her medications.  She is taking Abilify, Cymbalta, propranolol, Seroquel, and clonazepam.  She denies any side effects of her current medication regiment.  She denies any problems with sleep or appetite.  She denies any SI/HI/AVH.    Current Medications:   Current " Outpatient Medications   Medication Sig Dispense Refill   • aclidinium bromide (Tudorza Pressair) 400 MCG/ACT aerosol powder  powder for inhalation Inhale 1 puff 2 (Two) Times a Day. 1 each 11   • Advair Diskus 250-50 MCG/DOSE DISKUS INHALE 1 PUFF BY MOUTH DAILY 60 each 11   • ARIPiprazole (ABILIFY) 15 MG tablet TAKE 1 TABLET BY MOUTH DAILY 30 tablet 2   • aspirin (aspirin) 81 MG EC tablet Take 1 tablet by mouth Daily. 90 tablet 3   • B Complex Vitamins (VITAMIN B COMPLEX) capsule capsule Take  by mouth.     • clonazePAM (KlonoPIN) 0.5 MG tablet Take 1 tablet by mouth At Night As Needed for Anxiety. 30 tablet 0   • cyclobenzaprine (FLEXERIL) 10 MG tablet Take 10 mg by mouth 2 (Two) Times a Day As Needed.     • Diclofenac Sodium (VOLTAREN) 1 % gel gel APPLY 4 GRAMS TOPICALLY TO AFFECTED AREA 4 TIMES A DAY AS NEEDED FOR PAIN 100 g 3   • DULoxetine (Cymbalta) 30 MG capsule Take 1 capsule by mouth Every Night for 30 days. Take in addition to the 60 mg 30 capsule 2   • estradiol (VIVELLE-DOT) 0.1 MG/24HR patch UNWRAP AND APPLY 1 PATCH TO SKIN TWO TIMES WEEKLY AS DIRECTED BY PROVIDER 8 patch 1   • levETIRAcetam (KEPPRA) 750 MG tablet Take 1 tablet by mouth 2 (Two) Times a Day. 60 tablet 5   • OnabotulinumtoxinA (Botox) 200 units reconstituted solution INJECT 200 UNITS INTRAMUSCULARLY INTO HEAD, NECK & SHOULDERS EVERY 12 WEEKS PER FDA PROTOCOL 1 each 3   • propranolol (INDERAL) 20 MG tablet TAKE 1 TABLET BY MOUTH DAILY FOR ANXIETY 30 tablet 2   • QUEtiapine (SEROquel) 50 MG tablet Take one to two tablet at night 60 tablet 2   • SUMAtriptan (IMITREX) 20 MG/ACT nasal spray USE 1 SPRAY IN 1 NOSTRIL IF NEEDED 18 each 0   • topiramate (TOPAMAX) 100 MG tablet TAKE 1 TABLET BY MOUTH TWICE DAILY 60 tablet 5   • DULoxetine (Cymbalta) 60 MG capsule Take 1 capsule by mouth Daily. Take in addition to the 30 mg 30 capsule 2     No current facility-administered medications for this visit.       Objective   Vital Signs:   There were no  vitals taken for this visit.    Physical Exam  Nursing note reviewed. Vitals reviewed: Vitals not obtained due to nature of telehealth visit.   Constitutional:       Appearance: Normal appearance. She is ill-appearing.   Neurological:      General: No focal deficit present.      Mental Status: She is alert and oriented to person, place, and time.   Psychiatric:         Attention and Perception: Attention normal.         Mood and Affect: Mood is anxious. Affect is blunt.         Speech: Speech normal.         Behavior: Behavior normal. Behavior is cooperative.         Thought Content: Thought content normal.         Cognition and Memory: Cognition normal.         Judgment: Judgment normal.        Result Review :                   Assessment and Plan    Problem List Items Addressed This Visit    None     Visit Diagnoses     Moderate mood disorder (HCC)  (Chronic)   -  Primary    Relevant Medications    DULoxetine (Cymbalta) 30 MG capsule    QUEtiapine (SEROquel) 50 MG tablet    DULoxetine (Cymbalta) 60 MG capsule    Panic disorder  (Chronic)       Relevant Medications    clonazePAM (KlonoPIN) 0.5 MG tablet    DULoxetine (Cymbalta) 30 MG capsule    QUEtiapine (SEROquel) 50 MG tablet    DULoxetine (Cymbalta) 60 MG capsule    Other insomnia  (Chronic)       Relevant Medications    QUEtiapine (SEROquel) 50 MG tablet    JAY (generalized anxiety disorder)        Relevant Medications    DULoxetine (Cymbalta) 30 MG capsule    QUEtiapine (SEROquel) 50 MG tablet    DULoxetine (Cymbalta) 60 MG capsule          Mental Status Exam:   Hygiene:   good  Cooperation:  Cooperative  Eye Contact:  Good  Psychomotor Behavior:  Appropriate  Affect:  Blunted  Mood: anxious  Speech:  Normal  Thought Process:  Goal directed and Linear  Thought Content:  Normal  Suicidal:  None  Homicidal:  None  Hallucinations:  None  Delusion:  None  Memory:  Intact  Orientation:  Person, Place, Time and Situation  Reliability:  good  Insight:   Good  Judgement:  Good  Impulse Control:  Good  Physical/Medical Issues:  Yes Chronic pain, seizures, and migraines      PHQ-9 Score:   PHQ-9 Total Score:      Impression/Plan:  -This is a follow up and medication check. Marguerite reports high stress levels and anxiety related to her mother's recent injury. She is taking over her affairs and has little to no support. She is not sleeping well and feels her mood has been impacted. She denies changes to appetite. She is motivated to make medication adjustments to improve anxiety, mood, and sleep. I suggested increasing Cymbalta and Seroquel. She agrees.  -Increase Seroquel 50 to 100 mg nightly for anxiety and insomnia.   -Increase Cymbalta to 90 mg daily for depression and anxiety.  Patient will take 60 mg in am and 30 mg nightly.  -Continue clonazepam 0.5 mg nightly as needed for anxiety and insomnia. # 30 tablets given.  -Continue propranolol to 20 mg as needed for panic.  Take an afternoon. Patient has refills.  -Continue Abilify 15 mg daily for mood disorder.  Patient will take in the a.m. Patient has refills.  -Continue therapy with Angela Gonzalez LCSW.  -Continue Keppra,Topamax, Imitrex, and Botox for seizure disorder and migraines. These medications are prescribed by another provider.  -The ASYA report, reviewed through PDMP , of the past 12 months were reviewed and is appropriate.  The patient/guardian reports taking the medication only as prescribed.  The patient/guardian denies any abuse or misuse of the medication.  The patient/guardian denies any other substance use or issues.  There are no apparent substance related issues.  The patient reports no side effects of the current medication usage.  The patient/guardian has reported significant improvement with medication usage and wishes to continue medication as prescribed.  The patient/guardian is appropriate to continue with current medication usage at this time.  Reinforced risks and side effects of  medication usage, patient and/or guardian verbalize understanding in their own words and are in agreement with current plan.      MEDS ORDERED DURING VISIT:  New Medications Ordered This Visit   Medications   • clonazePAM (KlonoPIN) 0.5 MG tablet     Sig: Take 1 tablet by mouth At Night As Needed for Anxiety.     Dispense:  30 tablet     Refill:  0   • DULoxetine (Cymbalta) 30 MG capsule     Sig: Take 1 capsule by mouth Every Night for 30 days. Take in addition to the 60 mg     Dispense:  30 capsule     Refill:  2   • QUEtiapine (SEROquel) 50 MG tablet     Sig: Take one to two tablet at night     Dispense:  60 tablet     Refill:  2   • DULoxetine (Cymbalta) 60 MG capsule     Sig: Take 1 capsule by mouth Daily. Take in addition to the 30 mg     Dispense:  30 capsule     Refill:  2         Follow Up   Return in about 3 months (around 6/17/2022) for Medication Check.  Patient was given instructions and counseling regarding her condition or for health maintenance advice. Please see specific information pulled into the AVS if appropriate.       TREATMENT PLAN/GOALS: Continue supportive psychotherapy efforts and medications as indicated. Treatment and medication options discussed during today's visit. Patient acknowledged and verbally consented to continue with current treatment plan and was educated on the importance of compliance with treatment and follow-up appointments.    MEDICATION ISSUES:  Discussed medication options and treatment plan of prescribed medication as well as the risks, benefits, and side effects including potential falls, possible impaired driving and metabolic adversities among others. Patient is agreeable to call the office with any worsening of symptoms or onset of side effects. Patient is agreeable to call 911 or go to the nearest ER should he/she begin having SI/HI.      This document has been electronically signed by Marisol Bowden, CHRIS, PMHNP-BC  March 17, 2022 18:28 EDT    Part of  this note may be an electronic transcription/translation of spoken language to printed text using the Dragon Dictation System.

## 2022-03-21 ENCOUNTER — TELEMEDICINE (OUTPATIENT)
Dept: PSYCHIATRY | Facility: CLINIC | Age: 52
End: 2022-03-21

## 2022-03-21 DIAGNOSIS — F41.0 PANIC DISORDER: ICD-10-CM

## 2022-03-21 DIAGNOSIS — F41.1 GAD (GENERALIZED ANXIETY DISORDER): ICD-10-CM

## 2022-03-21 DIAGNOSIS — F39 MODERATE MOOD DISORDER: Primary | ICD-10-CM

## 2022-03-21 DIAGNOSIS — G47.09 OTHER INSOMNIA: ICD-10-CM

## 2022-03-21 PROCEDURE — 90834 PSYTX W PT 45 MINUTES: CPT | Performed by: SOCIAL WORKER

## 2022-03-21 NOTE — PROGRESS NOTES
Date of Service:3/21/2022  Time In: 1121  Time Out: 1158    PROGRESS NOTE  Data:  Marguerite Anderson is a 52 y.o. female. ..This was an audio and video enabled telemedicine encounter.  Patient started therapy session by reporting that her mother fell and broke her femur. Patient discussed she is in a nursing home and she is POA. Patient discussed the stress that has came from this and getting her out of her comfort zone.     HPI: Depression, anxiety, panic attacks, chronic pain and seizure disorder      Clinical Maneuvering/Intervention:  Assisted patient in processing above session content; acknowledged and normalized patient’S thoughts, feelings, and concerns.  Assisted patient processing her thoughts and feelings related to mood, anxiety, panic attacks, chronic pain and seizure disorder.  Patient sees psychiatric nurse practitioner in this office.  Patient is to take her medications as prescribed and contact the nurse practitioner in this office for questions and concerns.  Patient denies SI, HI and self-harm.  Patient to see this therapist back in 4 weeks and as needed Educated on exposure therapy, anxiety coping skills and stress management during this stressful time.     Allowed patient to freely discuss issues without interruption or judgment. Provided safe, confidential environment to facilitate the development of positive therapeutic relationship and encourage open, honest communication. Assisted patient in identifying risk factors which would indicate the need for higher level of care including thoughts to harm self or others and/or self-harming behavior and encouraged patient to contact this office, call 911, or present to the nearest emergency room should any of these events occur. Discussed crisis intervention services and means to access.  Patient adamantly and convincingly denies current suicidal or homicidal ideation or perceptual disturbance.    Assessment     Diagnoses and all orders for this  visit:    1. Moderate mood disorder (HCC) (Primary)    2. JAY (generalized anxiety disorder)    3. Panic disorder    4. Other insomnia               REVIEW OF SYSTEMS:  Review of Systems       Mental Status Exam  Hygiene:   fair  Cooperation:  Cooperative  Eye Contact:  Good  Psychomotor Behavior:  Appropriate  Affect:  Appropriate  Hopelessness: 4  Speech:  Normal  Thought Process:  Goal directed  Thought Content:  Normal  Suicidal:  None  Homicidal:  None  Hallucinations:  None  Delusion:  None  Memory:  Deficits  Orientation:  Person, Place, Time and Situation  Reliability:  fair  Insight:  Good  Judgement:  Good  Impulse Control:  Good  Physical/Medical Issues:  Yes Chronic pain and seizure disorder    Patient's Support Network Includes:  , son, mother and extended family    Progress toward goal: Not at goal    Functional Status: Moderate impairment     Prognosis: Fair with Ongoing Treatment       Plan         Patient will adhere to medication regimen as prescribed and report any side effects. Patient will contact this office, call 911 or present to the nearest emergency room should suicidal or homicidal ideations occur. Provide Cognitive Behavioral Therapy and Integrative Therapy to improve functioning, maintain stability, and avoid decompensation and the need for higher level of care.          Return in about 4 weeks (around 4/18/2022).      This document is signed me Angela Gonzalez LCSW,   March 21, 2022 14:22 EDT

## 2022-03-28 DIAGNOSIS — N95.1 MENOPAUSAL SYMPTOMS: ICD-10-CM

## 2022-03-28 RX ORDER — ESTRADIOL 0.1 MG/D
FILM, EXTENDED RELEASE TRANSDERMAL
Qty: 8 PATCH | Refills: 0 | Status: SHIPPED | OUTPATIENT
Start: 2022-03-28 | End: 2022-04-25

## 2022-04-18 ENCOUNTER — TELEMEDICINE (OUTPATIENT)
Dept: PSYCHIATRY | Facility: CLINIC | Age: 52
End: 2022-04-18

## 2022-04-18 DIAGNOSIS — F41.1 GAD (GENERALIZED ANXIETY DISORDER): ICD-10-CM

## 2022-04-18 DIAGNOSIS — F39 MODERATE MOOD DISORDER: Primary | ICD-10-CM

## 2022-04-18 DIAGNOSIS — F41.0 PANIC DISORDER: Chronic | ICD-10-CM

## 2022-04-18 DIAGNOSIS — F41.0 PANIC DISORDER: ICD-10-CM

## 2022-04-18 DIAGNOSIS — G47.09 OTHER INSOMNIA: ICD-10-CM

## 2022-04-18 PROCEDURE — 90832 PSYTX W PT 30 MINUTES: CPT | Performed by: SOCIAL WORKER

## 2022-04-18 RX ORDER — CLONAZEPAM 0.5 MG/1
0.5 TABLET ORAL NIGHTLY PRN
Qty: 30 TABLET | Refills: 0 | Status: SHIPPED | OUTPATIENT
Start: 2022-04-18 | End: 2022-05-23 | Stop reason: SDUPTHER

## 2022-04-18 NOTE — TELEPHONE ENCOUNTER
Rx Refill Note  Requested Prescriptions     Pending Prescriptions Disp Refills   • clonazePAM (KlonoPIN) 0.5 MG tablet 30 tablet 0     Sig: Take 1 tablet by mouth At Night As Needed for Anxiety.      Last office visit with prescribing clinician: 9/17/2021      Next office visit with prescribing clinician: 6/20/2022            Patience Nowak Rep  04/18/22, 12:08 EDT

## 2022-04-23 DIAGNOSIS — N95.1 MENOPAUSAL SYMPTOMS: ICD-10-CM

## 2022-04-25 RX ORDER — ESTRADIOL 0.1 MG/D
FILM, EXTENDED RELEASE TRANSDERMAL
Qty: 8 PATCH | Refills: 0 | Status: SHIPPED | OUTPATIENT
Start: 2022-04-25 | End: 2022-05-25

## 2022-05-01 NOTE — PROGRESS NOTES
Date of Service:4/18/2022  Time In: 1130  Time Out: 1202    PROGRESS NOTE  Data:  Marguerite Anderson is a 52 y.o. female. ..This was an audio and video enabled telemedicine encounter.  Patient started therapy session by reporting that she has been taking care  Of her mother. Patient discussed that her anxiety has been elevated but has been pushing through to help her mother and feels that in that area it has been good but making it harder at home     HPI: Depression, anxiety, panic attacks, chronic pain and seizure disorder      Clinical Maneuvering/Intervention:  Assisted patient in processing above session content; acknowledged and normalized patient’S thoughts, feelings, and concerns.  Assisted patient processing her thoughts and feelings related to mood, anxiety, panic attacks, chronic pain and seizure disorder.  Patient sees psychiatric nurse practitioner in this office.  Patient is to take her medications as prescribed and contact the nurse practitioner in this office for questions and concerns.  Patient denies SI, HI and self-harm.  Patient to see this therapist back in 4 weeks and as needed Educated on exposure therapy, anxiety coping skills and stress management during this stressful time. Continue to work on anxiety coping skills, finding some balance and mood stability.     Allowed patient to freely discuss issues without interruption or judgment. Provided safe, confidential environment to facilitate the development of positive therapeutic relationship and encourage open, honest communication. Assisted patient in identifying risk factors which would indicate the need for higher level of care including thoughts to harm self or others and/or self-harming behavior and encouraged patient to contact this office, call 911, or present to the nearest emergency room should any of these events occur. Discussed crisis intervention services and means to access.  Patient adamantly and convincingly denies current  suicidal or homicidal ideation or perceptual disturbance.    Assessment     Diagnoses and all orders for this visit:    1. Moderate mood disorder (HCC) (Primary)    2. JAY (generalized anxiety disorder)    3. Panic disorder    4. Other insomnia               REVIEW OF SYSTEMS:  Review of Systems       Mental Status Exam  Hygiene:   fair  Cooperation:  Cooperative  Eye Contact:  Good  Psychomotor Behavior:  Appropriate  Affect:  Appropriate  Hopelessness: 4  Speech:  Normal  Thought Process:  Goal directed  Thought Content:  Normal  Suicidal:  None  Homicidal:  None  Hallucinations:  None  Delusion:  None  Memory:  Deficits  Orientation:  Person, Place, Time and Situation  Reliability:  fair  Insight:  Good  Judgement:  Good  Impulse Control:  Good  Physical/Medical Issues:  Yes Chronic pain and seizure disorder    Patient's Support Network Includes:  , son, mother and extended family    Progress toward goal: Not at goal    Functional Status: Moderate impairment     Prognosis: Fair with Ongoing Treatment       Plan         Patient will adhere to medication regimen as prescribed and report any side effects. Patient will contact this office, call 911 or present to the nearest emergency room should suicidal or homicidal ideations occur. Provide Cognitive Behavioral Therapy and Integrative Therapy to improve functioning, maintain stability, and avoid decompensation and the need for higher level of care.          Return in about 1 month (around 5/18/2022).      This document is signed me Angela Gonzalez LCSW,   May 1, 2022 13:39 EDT

## 2022-05-08 DIAGNOSIS — G47.09 OTHER INSOMNIA: Chronic | ICD-10-CM

## 2022-05-09 ENCOUNTER — PRIOR AUTHORIZATION (OUTPATIENT)
Dept: NEUROLOGY | Facility: CLINIC | Age: 52
End: 2022-05-09

## 2022-05-09 RX ORDER — QUETIAPINE FUMARATE 25 MG/1
TABLET, FILM COATED ORAL
Qty: 45 TABLET | Refills: 1 | OUTPATIENT
Start: 2022-05-09

## 2022-05-16 ENCOUNTER — TELEMEDICINE (OUTPATIENT)
Dept: PSYCHIATRY | Facility: CLINIC | Age: 52
End: 2022-05-16

## 2022-05-16 DIAGNOSIS — F41.1 GAD (GENERALIZED ANXIETY DISORDER): ICD-10-CM

## 2022-05-16 DIAGNOSIS — F41.0 PANIC DISORDER: Primary | ICD-10-CM

## 2022-05-16 DIAGNOSIS — F39 MODERATE MOOD DISORDER: ICD-10-CM

## 2022-05-16 PROCEDURE — 90834 PSYTX W PT 45 MINUTES: CPT | Performed by: SOCIAL WORKER

## 2022-05-16 NOTE — PROGRESS NOTES
Date of Service:5/16/2022  Time In: 1130  Time Out: 1210    PROGRESS NOTE  Data:  Marguerite Anderson is a 52 y.o. female. ..This was an audio and video enabled telemedicine encounter.  Patient started therapy session  By discussing that she is caring for the mother, family not helping and her an her spouse is caring for her mom. Patient discussed sx of depression and anxiety are worsening.  Patient discussed anxiety is more sever but has been working on coping skills.     HPI: Depression, anxiety, panic attacks, chronic pain and seizure disorder      Clinical Maneuvering/Intervention:  Assisted patient in processing above session content; acknowledged and normalized patient’S thoughts, feelings, and concerns.  Assisted patient processing her thoughts and feelings related to mood, anxiety, panic attacks, chronic pain and seizure disorder.  Patient sees psychiatric nurse practitioner in this office.  Patient is to take her medications as prescribed and contact the nurse practitioner in this office for questions and concerns.  Patient denies SI, HI and self-harm.  Patient to see this therapist back in 4 weeks and as needed Educated on exposure therapy, anxiety coping skills and stress management during this stressful time. Continue to work on anxiety coping skills, finding some balance and mood stability. Educated on noticing triggers and tracking them for next appt.      Allowed patient to freely discuss issues without interruption or judgment. Provided safe, confidential environment to facilitate the development of positive therapeutic relationship and encourage open, honest communication. Assisted patient in identifying risk factors which would indicate the need for higher level of care including thoughts to harm self or others and/or self-harming behavior and encouraged patient to contact this office, call 911, or present to the nearest emergency room should any of these events occur. Discussed crisis intervention  services and means to access.  Patient adamantly and convincingly denies current suicidal or homicidal ideation or perceptual disturbance.    Assessment     Diagnoses and all orders for this visit:    1. Panic disorder (Primary)    2. Moderate mood disorder (HCC)    3. JAY (generalized anxiety disorder)               REVIEW OF SYSTEMS:  Review of Systems       Mental Status Exam  Hygiene:   fair  Cooperation:  Cooperative  Eye Contact:  Good  Psychomotor Behavior:  Appropriate  Affect:  Appropriate  Hopelessness: 4  Speech:  Normal  Thought Process:  Goal directed  Thought Content:  Normal  Suicidal:  None  Homicidal:  None  Hallucinations:  None  Delusion:  None  Memory:  Deficits  Orientation:  Person, Place, Time and Situation  Reliability:  fair  Insight:  Good  Judgement:  Good  Impulse Control:  Good  Physical/Medical Issues:  Yes Chronic pain and seizure disorder    Patient's Support Network Includes:  , son, mother and extended family    Progress toward goal: Not at goal    Functional Status: Moderate impairment     Prognosis: Fair with Ongoing Treatment       Plan         Patient will adhere to medication regimen as prescribed and report any side effects. Patient will contact this office, call 911 or present to the nearest emergency room should suicidal or homicidal ideations occur. Provide Cognitive Behavioral Therapy and Integrative Therapy to improve functioning, maintain stability, and avoid decompensation and the need for higher level of care.          Return in about 1 month (around 6/16/2022).      This document is signed me Angela Gonzalez LCSW,   May 16, 2022 12:17 EDT

## 2022-05-23 DIAGNOSIS — F41.0 PANIC DISORDER: Chronic | ICD-10-CM

## 2022-05-23 RX ORDER — CLONAZEPAM 0.5 MG/1
0.5 TABLET ORAL NIGHTLY PRN
Qty: 30 TABLET | Refills: 0 | Status: SHIPPED | OUTPATIENT
Start: 2022-05-23 | End: 2022-06-20 | Stop reason: SDUPTHER

## 2022-05-23 NOTE — TELEPHONE ENCOUNTER
Rx Refill Note  Requested Prescriptions     Pending Prescriptions Disp Refills   • clonazePAM (KlonoPIN) 0.5 MG tablet 30 tablet 0     Sig: Take 1 tablet by mouth At Night As Needed for Anxiety.      Last office visit with prescribing clinician: 9/17/2021      Next office visit with prescribing clinician: 6/20/2022            Promise Castellano CMA  05/23/22, 13:33 EDT

## 2022-05-25 DIAGNOSIS — N95.1 MENOPAUSAL SYMPTOMS: ICD-10-CM

## 2022-05-25 RX ORDER — ESTRADIOL 0.1 MG/D
FILM, EXTENDED RELEASE TRANSDERMAL
Qty: 8 PATCH | Refills: 2 | Status: SHIPPED | OUTPATIENT
Start: 2022-05-25 | End: 2022-08-17

## 2022-06-06 DIAGNOSIS — G47.09 OTHER INSOMNIA: Chronic | ICD-10-CM

## 2022-06-06 RX ORDER — QUETIAPINE FUMARATE 50 MG/1
TABLET, FILM COATED ORAL
Qty: 60 TABLET | Refills: 2 | Status: SHIPPED | OUTPATIENT
Start: 2022-06-06 | End: 2022-08-19

## 2022-06-07 DIAGNOSIS — F39 MODERATE MOOD DISORDER: Chronic | ICD-10-CM

## 2022-06-07 DIAGNOSIS — F41.0 PANIC DISORDER: Chronic | ICD-10-CM

## 2022-06-07 RX ORDER — ARIPIPRAZOLE 15 MG/1
15 TABLET ORAL DAILY
Qty: 30 TABLET | Refills: 2 | Status: SHIPPED | OUTPATIENT
Start: 2022-06-07 | End: 2022-08-19

## 2022-06-07 RX ORDER — PROPRANOLOL HYDROCHLORIDE 20 MG/1
20 TABLET ORAL DAILY
Qty: 30 TABLET | Refills: 2 | Status: SHIPPED | OUTPATIENT
Start: 2022-06-07 | End: 2022-08-19

## 2022-06-16 DIAGNOSIS — G40.909 SEIZURE DISORDER: ICD-10-CM

## 2022-06-16 DIAGNOSIS — G43.009 MIGRAINE WITHOUT AURA AND WITHOUT STATUS MIGRAINOSUS, NOT INTRACTABLE: ICD-10-CM

## 2022-06-16 RX ORDER — TOPIRAMATE 100 MG/1
TABLET, FILM COATED ORAL
Qty: 60 TABLET | Refills: 5 | Status: SHIPPED | OUTPATIENT
Start: 2022-06-16 | End: 2022-10-19 | Stop reason: SDUPTHER

## 2022-06-16 NOTE — TELEPHONE ENCOUNTER
Rx Refill Note  Requested Prescriptions     Pending Prescriptions Disp Refills   • topiramate (TOPAMAX) 100 MG tablet [Pharmacy Med Name: TOPIRAMATE 100MG TABLETS] 60 tablet 5     Sig: TAKE 1 TABLET BY MOUTH TWICE DAILY      Last office visit with prescribing clinician: 4/27/2021      Next office visit with prescribing clinician: 6/23/2022            Halie Larios MA  06/16/22, 15:07 EDT

## 2022-06-17 NOTE — PROGRESS NOTES
"This provider is located at [facility] ,[address], [address] in [place], [place],using a secure [employer] Video Visit through [employer]. Patient is being seen remotely via telehealth at their home address in [place], and stated they are in a secure environment for this session. The patient's condition being diagnosed/treated is appropriate for telemedicine. The provider identified herself as well as her credentials.   The patient, and/or patients guardian, consent to be seen remotely, and when consent is given they understand that the consent allows for patient identifiable information to be sent to a third party as needed.   They may refuse to be seen remotely at any time. The electronic data is encrypted and password protected, and the patient and/or guardian has been advised of the potential risks to privacy not withstanding such measures.    Chief Complaint  Mood disorder, anxiety, panic, and insomnia    Subjective          Marguerite Anderson presents today via MyChart Video through [employer] by herself for a follow up and medication check.     History of Present Illness: Marguerite states, \"I am fair to [place].\" Marguerite tells me she is dealing with higher levels to anxiety which is influenced by multiple factors. She is dealing with her mother and all her issues. She is caring for her financials, grocery shopping, and caring for her mother. She tells me she has a difficult time caring for her own self and financial affairs but is managing as best she can. She reports that her sister was supposed to help but she and her mother \"fell out\" this weekend so her sister moved out and now Marguerite is the sole care-taker. Her mother has been released to walk with a walker and has been getting OT/PT. Marguerite will go over to 3-4 times per week. She continues in therapy with Angela who has praised her for getting out and managing her mother's care. She is taking medications but notes adverse " effects such as vivid dreams with Seroquel. She tells me they have scared her at times. She does not feel Propranolol has helped her. Klonopin has been effective.  She is compliant with her medications.  She is taking Abilify, Cymbalta, propranolol, Seroquel, and clonazepam.  She denies any side effects of her current medication regiment.  She denies any problems with sleep or appetite.  She continues in therapy with Angela. She denies any SI/HI/AVH.    Current Medications:   Current Outpatient Medications   Medication Sig Dispense Refill   • aclidinium bromide (Tudorza Pressair) 400 MCG/ACT aerosol powder  powder for inhalation Inhale 1 puff 2 (Two) Times a Day. 1 each 11   • Advair Diskus 250-50 MCG/DOSE DISKUS INHALE 1 PUFF BY MOUTH DAILY 60 each 11   • ARIPiprazole (ABILIFY) 15 MG tablet TAKE 1 TABLET BY MOUTH DAILY 30 tablet 2   • B Complex Vitamins (VITAMIN B COMPLEX) capsule capsule Take  by mouth.     • clonazePAM (KlonoPIN) 0.5 MG tablet Take 1 tablet by mouth At Night As Needed for Anxiety. 30 tablet 0   • cyclobenzaprine (FLEXERIL) 10 MG tablet Take 10 mg by mouth 2 (Two) Times a Day As Needed.     • Diclofenac Sodium (VOLTAREN) 1 % gel gel APPLY 4 GRAMS TOPICALLY TO AFFECTED AREA 4 TIMES A DAY AS NEEDED FOR PAIN 100 g 3   • DULoxetine (Cymbalta) 30 MG capsule Take 1 capsule by mouth Every Night. Take in addition to the 60 mg 30 capsule 2   • DULoxetine (Cymbalta) 60 MG capsule Take 1 capsule by mouth Daily. Take in addition to the 30 mg 30 capsule 2   • estradiol (VIVELLE-DOT) 0.1 MG/24HR patch APPLY 1 PATCH TOPICALLY TO THE SKIN 2 TIMES A WEEK AS DIRECTED 8 patch 2   • levETIRAcetam (KEPPRA) 750 MG tablet Take 1 tablet by mouth 2 (Two) Times a Day. 60 tablet 5   • OnabotulinumtoxinA (Botox) 200 units reconstituted solution INJECT 200 UNITS INTRAMUSCULARLY INTO HEAD, NECK & SHOULDERS EVERY 12 WEEKS PER FDA PROTOCOL 1 each 3   • propranolol (INDERAL) 20 MG tablet TAKE 1 TABLET BY MOUTH DAILY FOR ANXIETY 30  tablet 2   • QUEtiapine (SEROquel) 50 MG tablet TAKE 1 TO 2 TABLETS BY MOUTH AT NIGHT 60 tablet 2   • SUMAtriptan (IMITREX) 20 MG/ACT nasal spray USE 1 SPRAY IN 1 NOSTRIL IF NEEDED 18 each 0   • topiramate (TOPAMAX) 100 MG tablet TAKE 1 TABLET BY MOUTH TWICE DAILY 60 tablet 5   • aspirin (aspirin) 81 MG EC tablet Take 1 tablet by mouth Daily. 90 tablet 3     No current facility-administered medications for this visit.       Objective   Vital Signs:   There were no vitals taken for this visit.    Physical Exam  Nursing note reviewed. Vitals reviewed: Vitals not obtained due to nature of telehealth visit.   Constitutional:       Appearance: Normal appearance. She is ill-appearing.   Neurological:      General: No focal deficit present.      Mental Status: She is alert and oriented to person, place, and time.   Psychiatric:         Attention and Perception: Attention normal.         Mood and Affect: Mood is anxious. Affect is labile.         Speech: Speech normal.         Behavior: Behavior normal. Behavior is cooperative.         Thought Content: Thought content normal.         Cognition and Memory: Cognition normal.         Judgment: Judgment normal.        Result Review :       The following data was reviewed by CHRIS Nick on 06/20/2022:    Telemedicine with Angela Gonzalez LCSW (05/16/2022)              Assessment and Plan    Problem List Items Addressed This Visit    None     Visit Diagnoses     Moderate mood disorder (HCC)  (Chronic)   -  Primary    Relevant Medications    DULoxetine (Cymbalta) 30 MG capsule    Panic disorder  (Chronic)       Relevant Medications    DULoxetine (Cymbalta) 30 MG capsule    clonazePAM (KlonoPIN) 0.5 MG tablet    Other insomnia  (Chronic)       JAY (generalized anxiety disorder)  (Chronic)       Relevant Medications    DULoxetine (Cymbalta) 30 MG capsule          Mental Status Exam:   Hygiene:   good  Cooperation:  Cooperative  Eye Contact:  Good  Psychomotor  Behavior:  Appropriate  Affect:  labile  Mood: anxious  Speech:  Normal  Thought Process:  Goal directed and Linear  Thought Content:  Normal  Suicidal:  None  Homicidal:  None  Hallucinations:  None  Delusion:  None  Memory:  Intact  Orientation:  Person, Place, Time and Situation  Reliability:  good  Insight:  Good  Judgement:  Good  Impulse Control:  Good  Physical/Medical Issues:  Yes Chronic pain, seizures, and migraines      PHQ-9 Score:   PHQ-9 Total Score:      Impression/Plan:  -This is a follow up and medication check. Marguerite reports increased anxiety while caring for her mother. She is doing things for her that have been difficult for her to do for herself in the past. She is concerned for vivid dreams with Seroquel and feels the Propranolol is not helping. I offered to change treatment for insomnia and she would like to continue as it is beneficial for sleep.  I reviewed her medication regiment and suggested increasing Cymbalta back to previous dose of 90 mg. She will take Abilify, Cymbalta 60 mg, Topamax, and Keppra in the morning. I suggested taking a dose of Propranolol first thing in the AM as well and using Klonopin at 11 AM and Seroquel 25 mg at 1 PM. She can use an extra dose of Propranolol in the late afternoon as needed and will finish her day with Cymbalta 30 mg, Seroquel 100 mg, Flexeril, Topamax, and Keppra at night.   -Increase Seroquel to 100 mg nightly and 25 mg during the day for anxiety and insomnia. Patient has refills.  -Increase Cymbalta to 90 mg daily for depression and anxiety.  Patient will take 60 mg in am and 30 mg nightly.  -Continue clonazepam 0.5 mg nightly as needed for anxiety and insomnia. # 30 tablets given.  -Continue propranolol to 20 mg twice daily as needed for panic.  Take in morning and afternoon. Patient has refills.  -Continue Abilify 15 mg daily for mood disorder.  Patient will take in the a.m. Patient has refills.  -Continue therapy with Angela Gonzalez,  LCSW.  -Continue Keppra,Topamax, Imitrex, and Botox for seizure disorder and migraines. These medications are prescribed by another provider.  -The ASYA report, reviewed through PDMP , of the past 12 months were reviewed and is appropriate.  The patient/guardian reports taking the medication only as prescribed.  The patient/guardian denies any abuse or misuse of the medication.  The patient/guardian denies any other substance use or issues.  There are no apparent substance related issues.  The patient reports no side effects of the current medication usage.  The patient/guardian has reported significant improvement with medication usage and wishes to continue medication as prescribed.  The patient/guardian is appropriate to continue with current medication usage at this time.  Reinforced risks and side effects of medication usage, patient and/or guardian verbalize understanding in their own words and are in agreement with current plan.  -Last UDS on 09/17/2021. Appropriate. I will place order for UDS to collect before next refill of Clonazepam.     MEDS ORDERED DURING VISIT:  New Medications Ordered This Visit   Medications   • DULoxetine (Cymbalta) 30 MG capsule     Sig: Take 1 capsule by mouth Every Night. Take in addition to the 60 mg     Dispense:  30 capsule     Refill:  2   • clonazePAM (KlonoPIN) 0.5 MG tablet     Sig: Take 1 tablet by mouth At Night As Needed for Anxiety.     Dispense:  30 tablet     Refill:  0         Follow Up   Return in about 3 months (around 9/20/2022) for Medication Check.  Patient was given instructions and counseling regarding her condition or for health maintenance advice. Please see specific information pulled into the AVS if appropriate.       TREATMENT PLAN/GOALS: Continue supportive psychotherapy efforts and medications as indicated. Treatment and medication options discussed during today's visit. Patient acknowledged and verbally consented to continue with current treatment  plan and was educated on the importance of compliance with treatment and follow-up appointments.    MEDICATION ISSUES:  Discussed medication options and treatment plan of prescribed medication as well as the risks, benefits, and side effects including potential falls, possible impaired driving and metabolic adversities among others. Patient is agreeable to call the office with any worsening of symptoms or onset of side effects. Patient is agreeable to call 911 or go to the nearest ER should he/she begin having SI/HI.      This document has been electronically signed by CHRIS Witt, PMHNP-BC  June 20, 2022 20:22 EDT    Part of this note may be an electronic transcription/translation of spoken language to printed text using the Dragon Dictation System.

## 2022-06-20 ENCOUNTER — TELEMEDICINE (OUTPATIENT)
Dept: PSYCHIATRY | Facility: CLINIC | Age: 52
End: 2022-06-20

## 2022-06-20 DIAGNOSIS — F41.0 PANIC DISORDER: Chronic | ICD-10-CM

## 2022-06-20 DIAGNOSIS — F41.1 GAD (GENERALIZED ANXIETY DISORDER): Chronic | ICD-10-CM

## 2022-06-20 DIAGNOSIS — G47.09 OTHER INSOMNIA: Chronic | ICD-10-CM

## 2022-06-20 DIAGNOSIS — F39 MODERATE MOOD DISORDER: Primary | Chronic | ICD-10-CM

## 2022-06-20 PROCEDURE — 99214 OFFICE O/P EST MOD 30 MIN: CPT | Performed by: NURSE PRACTITIONER

## 2022-06-20 RX ORDER — DULOXETIN HYDROCHLORIDE 30 MG/1
30 CAPSULE, DELAYED RELEASE ORAL NIGHTLY
Qty: 30 CAPSULE | Refills: 2 | Status: SHIPPED | OUTPATIENT
Start: 2022-06-20 | End: 2022-09-21

## 2022-06-20 RX ORDER — CLONAZEPAM 0.5 MG/1
0.5 TABLET ORAL NIGHTLY PRN
Qty: 30 TABLET | Refills: 0 | Status: SHIPPED | OUTPATIENT
Start: 2022-06-20 | End: 2022-07-20 | Stop reason: SDUPTHER

## 2022-06-23 ENCOUNTER — LAB (OUTPATIENT)
Dept: LAB | Facility: HOSPITAL | Age: 52
End: 2022-06-23

## 2022-06-23 ENCOUNTER — PROCEDURE VISIT (OUTPATIENT)
Dept: NEUROLOGY | Facility: CLINIC | Age: 52
End: 2022-06-23

## 2022-06-23 VITALS
OXYGEN SATURATION: 100 % | HEART RATE: 79 BPM | BODY MASS INDEX: 23.66 KG/M2 | HEIGHT: 65 IN | WEIGHT: 142 LBS | TEMPERATURE: 96.9 F

## 2022-06-23 DIAGNOSIS — G40.909 SEIZURE DISORDER: ICD-10-CM

## 2022-06-23 DIAGNOSIS — G43.019 INTRACTABLE MIGRAINE WITHOUT AURA AND WITHOUT STATUS MIGRAINOSUS: ICD-10-CM

## 2022-06-23 DIAGNOSIS — G40.909 SEIZURE DISORDER: Primary | ICD-10-CM

## 2022-06-23 LAB
ALBUMIN SERPL-MCNC: 4.4 G/DL (ref 3.5–5.2)
ALBUMIN/GLOB SERPL: 1.7 G/DL
ALP SERPL-CCNC: 79 U/L (ref 39–117)
ALT SERPL W P-5'-P-CCNC: 11 U/L (ref 1–33)
ANION GAP SERPL CALCULATED.3IONS-SCNC: 8.9 MMOL/L (ref 5–15)
AST SERPL-CCNC: 17 U/L (ref 1–32)
BILIRUB SERPL-MCNC: <0.2 MG/DL (ref 0–1.2)
BUN SERPL-MCNC: 13 MG/DL (ref 6–20)
BUN/CREAT SERPL: 16.5 (ref 7–25)
CALCIUM SPEC-SCNC: 9.2 MG/DL (ref 8.6–10.5)
CHLORIDE SERPL-SCNC: 110 MMOL/L (ref 98–107)
CO2 SERPL-SCNC: 21.1 MMOL/L (ref 22–29)
CREAT SERPL-MCNC: 0.79 MG/DL (ref 0.57–1)
DEPRECATED RDW RBC AUTO: 40.2 FL (ref 37–54)
EGFRCR SERPLBLD CKD-EPI 2021: 90.1 ML/MIN/1.73
ERYTHROCYTE [DISTWIDTH] IN BLOOD BY AUTOMATED COUNT: 11.9 % (ref 12.3–15.4)
GLOBULIN UR ELPH-MCNC: 2.6 GM/DL
GLUCOSE SERPL-MCNC: 84 MG/DL (ref 65–99)
HCT VFR BLD AUTO: 43.4 % (ref 34–46.6)
HGB BLD-MCNC: 14.6 G/DL (ref 12–15.9)
MCH RBC QN AUTO: 31.2 PG (ref 26.6–33)
MCHC RBC AUTO-ENTMCNC: 33.6 G/DL (ref 31.5–35.7)
MCV RBC AUTO: 92.7 FL (ref 79–97)
PLATELET # BLD AUTO: 320 10*3/MM3 (ref 140–450)
PMV BLD AUTO: 9.5 FL (ref 6–12)
POTASSIUM SERPL-SCNC: 5.3 MMOL/L (ref 3.5–5.2)
PROT SERPL-MCNC: 7 G/DL (ref 6–8.5)
RBC # BLD AUTO: 4.68 10*6/MM3 (ref 3.77–5.28)
SODIUM SERPL-SCNC: 140 MMOL/L (ref 136–145)
WBC NRBC COR # BLD: 10.11 10*3/MM3 (ref 3.4–10.8)

## 2022-06-23 PROCEDURE — 36415 COLL VENOUS BLD VENIPUNCTURE: CPT

## 2022-06-23 PROCEDURE — 80053 COMPREHEN METABOLIC PANEL: CPT

## 2022-06-23 PROCEDURE — 64615 CHEMODENERV MUSC MIGRAINE: CPT | Performed by: NURSE PRACTITIONER

## 2022-06-23 PROCEDURE — 85027 COMPLETE CBC AUTOMATED: CPT

## 2022-06-23 PROCEDURE — 80177 DRUG SCRN QUAN LEVETIRACETAM: CPT

## 2022-06-23 PROCEDURE — 80201 ASSAY OF TOPIRAMATE: CPT

## 2022-06-23 NOTE — PROGRESS NOTES
"CC: Botox Injections  Indication for Procedure: Chronic migraines    Pulse 79   Temp 96.9 °F (36.1 °C)   Ht 165.1 cm (65\")   Wt 64.4 kg (142 lb)   SpO2 100%   BMI 23.63 kg/m²     Date of last Injection: 3/8/2022  Response: Overall at least 80% reduction in migraines until wear off  Onset of response: 1 week  Wearing off: Around 11 weeks  Side Effects: None  : Allergan  Lot #:  [id number]  Expiration: December 2024  NDC: 4933-4889-44    With written consent obtained and risks and benefits explained to patient.     Botox injected using FDA approved protocol for chronic migraine prevention.   10 units, Procerus 5 units, Frontalis 20 units, Temporalis 40 units, Occipitalis 30 units, Cervical Paraspinals 20 units, Trapezius 30 units.     The total amount injected in units is 155.  The total amount wasted in units is 45.  The total amount submitted in units is 200.  Botox was supplied by specialty pharmacy  Patient tolerated procedure well with no immediate complications.     We have discussed risk and benefits of this Botox procedure and common side effects including headache, neck pain, neck stiffness or weakness, ptosis, flu-like symptoms as well as more serious possible adverse effects including possible dysphagia, respiratory distress or even death (extremely rare in Botox for Chronic Migraine Prevention) Also recommended no massages, no heavy lifting, no chiropractic adjustments or other injections of any kind in the areas where Botox was administered for 72 hours following injections. The patient and/or family verbalizes understanding, accepts risks and agrees with moving forward with Botox injections for chronic migraine prevention.      Ginny Pace, APRN  6/23/2022  Patient reports that she has had a few \"focal\" seizures but no grand mal seizures.  This is not unusual for her but I would like to check levels, possibly will bump Keppra pending lab levels  "

## 2022-06-25 NOTE — PROGRESS NOTES
Please notify Marguerite that her blood counts look good overall. Her liver and kidney function look good. Her potassium is minimally above normal with a level of 5.3 & normal is 5.2. I will forward to PCP as an FYI. Awaiting seizure medication levels to result and we will keep her posted. Thanks, CHRIS Plascencia covering provider for CHRIS Escudero

## 2022-06-27 ENCOUNTER — TELEPHONE (OUTPATIENT)
Dept: NEUROLOGY | Facility: CLINIC | Age: 52
End: 2022-06-27

## 2022-06-27 LAB — TOPIRAMATE SERPL-MCNC: 9.3 UG/ML (ref 2–25)

## 2022-06-27 NOTE — TELEPHONE ENCOUNTER
----- Message from CHRIS Adrian sent at 6/25/2022 12:21 AM EDT -----  Please notify Marguerite that her blood counts look good overall. Her liver and kidney function look good. Her potassium is minimally above normal with a level of 5.3 & normal is 5.2. I will forward to PCP as an FYI. Awaiting seizure medication levels to result and we will keep her posted. Thanks, CHRIS Plascencia covering provider for CHRIS Escudero

## 2022-06-28 LAB — LEVETIRACETAM SERPL-MCNC: 22.9 UG/ML (ref 10–40)

## 2022-06-29 ENCOUNTER — TELEPHONE (OUTPATIENT)
Dept: NEUROLOGY | Facility: CLINIC | Age: 52
End: 2022-06-29

## 2022-06-29 NOTE — TELEPHONE ENCOUNTER
----- Message from CHRIS Adrian sent at 6/29/2022 10:19 AM EDT -----  Please notify Marguerite that her levetiracetam and topiramate levels are within normal limits.  She should follow-up with CHRIS Escudero as scheduled in clinic.  Thanks, CHRIS Plascencia.

## 2022-06-29 NOTE — PROGRESS NOTES
Please notify Marguerite that her levetiracetam and topiramate levels are within normal limits.  She should follow-up with CHRIS Escudero as scheduled in clinic.  Thanks, CHRIS Plascencia.

## 2022-07-20 DIAGNOSIS — F41.0 PANIC DISORDER: Chronic | ICD-10-CM

## 2022-07-20 RX ORDER — CLONAZEPAM 0.5 MG/1
0.5 TABLET ORAL NIGHTLY PRN
Qty: 30 TABLET | Refills: 0 | Status: SHIPPED | OUTPATIENT
Start: 2022-07-20 | End: 2022-08-18 | Stop reason: SDUPTHER

## 2022-07-20 NOTE — TELEPHONE ENCOUNTER
Rx Refill Note  Requested Prescriptions     Pending Prescriptions Disp Refills   • clonazePAM (KlonoPIN) 0.5 MG tablet 30 tablet 0     Sig: Take 1 tablet by mouth At Night As Needed for Anxiety.      Last office visit with prescribing clinician: 9/17/2021      Next office visit with prescribing clinician: 9/21/2022            Chen Bean MA  07/20/22, 13:34 EDT

## 2022-07-22 ENCOUNTER — TELEPHONE (OUTPATIENT)
Dept: INTERNAL MEDICINE | Facility: CLINIC | Age: 52
End: 2022-07-22

## 2022-07-22 DIAGNOSIS — F39 MODERATE MOOD DISORDER: Chronic | ICD-10-CM

## 2022-07-22 DIAGNOSIS — F41.1 GAD (GENERALIZED ANXIETY DISORDER): ICD-10-CM

## 2022-07-22 RX ORDER — DULOXETIN HYDROCHLORIDE 60 MG/1
CAPSULE, DELAYED RELEASE ORAL
Qty: 30 CAPSULE | Refills: 2 | Status: SHIPPED | OUTPATIENT
Start: 2022-07-22 | End: 2022-09-21

## 2022-07-22 NOTE — TELEPHONE ENCOUNTER
Rx Refill Note  Requested Prescriptions     Pending Prescriptions Disp Refills   • DULoxetine (CYMBALTA) 60 MG capsule [Pharmacy Med Name: DULOXETINE DR 60MG CAPSULES] 30 capsule 2     Sig: TAKE 1 CAPSULE BY MOUTH DAILY IN ADDITION TO THE 30 MG      Last office visit with prescribing clinician: 9/17/2021      Next office visit with prescribing clinician: 9/21/2022            Chen Bean MA  07/22/22, 11:14 EDT

## 2022-07-22 NOTE — TELEPHONE ENCOUNTER
Patient did not complete  below  ordered on below. This order is too old to be used and has been cancelled.

## 2022-07-26 ENCOUNTER — TELEMEDICINE (OUTPATIENT)
Dept: PSYCHIATRY | Facility: CLINIC | Age: 52
End: 2022-07-26

## 2022-07-26 DIAGNOSIS — F39 MODERATE MOOD DISORDER: Primary | ICD-10-CM

## 2022-07-26 DIAGNOSIS — F41.0 PANIC DISORDER: ICD-10-CM

## 2022-07-26 DIAGNOSIS — F41.1 GAD (GENERALIZED ANXIETY DISORDER): ICD-10-CM

## 2022-07-26 PROCEDURE — 90834 PSYTX W PT 45 MINUTES: CPT | Performed by: SOCIAL WORKER

## 2022-07-27 NOTE — PROGRESS NOTES
Date of Service:7/26/2022  Time In: 1130  Time Out: 1210    PROGRESS NOTE  Data:  Marguerite Anderson is a 52 y.o. female. ..This was an audio and video enabled telemedicine encounter.  Patient started therapy session  Patient discussed daily life stress, health and wearing a boot after a fall yesterday, moms health is improving, getting out to take care of mom and informed patient on my resignation as of 7/27.    HPI: Depression, anxiety, panic attacks, chronic pain and seizure disorder      Clinical Maneuvering/Intervention:  Assisted patient in processing above session content; acknowledged and normalized patient’S thoughts, feelings, and concerns.  Assisted patient processing her thoughts and feelings related to mood, anxiety, panic attacks, chronic pain and seizure disorder.  Patient sees psychiatric nurse practitioner in this office.  Patient is to take her medications as prescribed and contact the nurse practitioner in this office for questions and concerns.  Patient denies SI, HI and self-harm.  Patient to see this therapist back in 4 weeks and as needed Educated on exposure therapy, anxiety coping skills and stress management during this stressful time. Continue to work on anxiety coping skills, finding some balance and mood stability. Referred to a therapist in this office to continue care    Allowed patient to freely discuss issues without interruption or judgment. Provided safe, confidential environment to facilitate the development of positive therapeutic relationship and encourage open, honest communication. Assisted patient in identifying risk factors which would indicate the need for higher level of care including thoughts to harm self or others and/or self-harming behavior and encouraged patient to contact this office, call 911, or present to the nearest emergency room should any of these events occur. Discussed crisis intervention services and means to access.  Patient adamantly and convincingly  denies current suicidal or homicidal ideation or perceptual disturbance.    Assessment     Diagnoses and all orders for this visit:    1. Moderate mood disorder (HCC) (Primary)    2. JAY (generalized anxiety disorder)    3. Panic disorder               REVIEW OF SYSTEMS:  Review of Systems       Mental Status Exam  Hygiene:   fair  Cooperation:  Cooperative  Eye Contact:  Good  Psychomotor Behavior:  Appropriate  Affect:  Appropriate  Hopelessness: 4  Speech:  Normal  Thought Process:  Goal directed  Thought Content:  Normal  Suicidal:  None  Homicidal:  None  Hallucinations:  None  Delusion:  None  Memory:  Deficits  Orientation:  Person, Place, Time and Situation  Reliability:  fair  Insight:  Good  Judgement:  Good  Impulse Control:  Good  Physical/Medical Issues:  Yes Chronic pain and seizure disorder    Patient's Support Network Includes:  , son, mother and extended family    Progress toward goal: Not at goal    Functional Status: Moderate impairment     Prognosis: Fair with Ongoing Treatment       Plan         Patient will adhere to medication regimen as prescribed and report any side effects. Patient will contact this office, call 911 or present to the nearest emergency room should suicidal or homicidal ideations occur. Provide Cognitive Behavioral Therapy and Integrative Therapy to improve functioning, maintain stability, and avoid decompensation and the need for higher level of care.          Return today (on 7/26/2022) for Referred to therapist at [facility] due to resignation.      This document is signed me Angela Gonzalez LCSW,   July 27, 2022 13:36 EDT

## 2022-08-01 ENCOUNTER — TELEPHONE (OUTPATIENT)
Dept: INTERNAL MEDICINE | Facility: CLINIC | Age: 52
End: 2022-08-01

## 2022-08-01 RX ORDER — FLUTICASONE PROPIONATE AND SALMETEROL 50; 250 UG/1; UG/1
POWDER RESPIRATORY (INHALATION)
Qty: 60 EACH | Refills: 11 | Status: SHIPPED | OUTPATIENT
Start: 2022-08-01

## 2022-08-01 NOTE — TELEPHONE ENCOUNTER
Caller: Marguerite Anderson    Relationship to patient: Self    Best call back number:[phone]    Date of positive COVID19 test: 08/01/2022    Date if possible COVID19 exposure:   EXPOSURE TO COVID BY  WHO WAS EXPOSED BY WORK EMPLOYEES' AND  TESTED POSITIVE TODAY 08/01/2022    COVID19 symptoms:   CONGESTION, RUNNY NOSE, COUGH, BODY ACHES, FATIGUE AND HEADACHES     Date of initial quarantine: 08/01/2022    Additional information or concerns:   PATIENT WOULD LIKE FOR MEDICATION TO BE CALLED INTO THE PHARMACY TO HELP WITH COVID SYMPTOMS      What is the patients preferred pharmacy:  [id number] [address] AT [facility] [address]  - [phone] [facility] [phone]   [phone]

## 2022-08-17 ENCOUNTER — TELEPHONE (OUTPATIENT)
Dept: NEUROLOGY | Facility: CLINIC | Age: 52
End: 2022-08-17

## 2022-08-17 DIAGNOSIS — N95.1 MENOPAUSAL SYMPTOMS: ICD-10-CM

## 2022-08-17 DIAGNOSIS — G43.709 CHRONIC MIGRAINE WITHOUT AURA, NOT INTRACTABLE, WITHOUT STATUS MIGRAINOSUS: ICD-10-CM

## 2022-08-17 RX ORDER — ESTRADIOL 0.1 MG/D
FILM, EXTENDED RELEASE TRANSDERMAL
Qty: 8 PATCH | Refills: 2 | Status: SHIPPED | OUTPATIENT
Start: 2022-08-17 | End: 2022-11-22 | Stop reason: SDUPTHER

## 2022-08-17 RX ORDER — ONABOTULINUMTOXINA 200 [USP'U]/1
INJECTION, POWDER, LYOPHILIZED, FOR SOLUTION INTRADERMAL; INTRAMUSCULAR
Qty: 1 EACH | Refills: 3 | Status: CANCELLED | OUTPATIENT
Start: 2022-08-17

## 2022-08-17 NOTE — TELEPHONE ENCOUNTER
Spoke to [facility] and let them know it was sent to them by mistake. The pharmacy should be [facility] because it will be run through the medical benefit. [employer] doesn't have a pharmacy benefit.

## 2022-08-17 NOTE — TELEPHONE ENCOUNTER
Pharmacy Name:  [facility]    Pharmacy representative phone number: [phone]    What medication are you calling in regards to: BOTOX    What question does the pharmacy have: SHE MENTIONED REFILL, AND A REFERRAL TO A NEW PHARMACY SO SHE NEEDS A NEW PRESCRIPTION ON FILE - CALL WAS TRANSFERRED TO CLINICAL ([facility] - BOTOX COORD) AS IM NOT SURE WHICH PROCESS THAT LADY WAS ACTUALLY NEEDING

## 2022-08-18 DIAGNOSIS — F41.0 PANIC DISORDER: Chronic | ICD-10-CM

## 2022-08-18 RX ORDER — CLONAZEPAM 0.5 MG/1
0.5 TABLET ORAL NIGHTLY PRN
Qty: 30 TABLET | Refills: 0 | Status: SHIPPED | OUTPATIENT
Start: 2022-08-18 | End: 2022-09-20 | Stop reason: SDUPTHER

## 2022-08-18 NOTE — TELEPHONE ENCOUNTER
Rx Refill Note  Requested Prescriptions     Pending Prescriptions Disp Refills   • clonazePAM (KlonoPIN) 0.5 MG tablet 30 tablet 0     Sig: Take 1 tablet by mouth At Night As Needed for Anxiety.      Last office visit with prescribing clinician: 9/17/2021      Next office visit with prescribing clinician: 9/21/2022            Patience Nowak Rep  08/18/22, 11:52 EDT

## 2022-08-19 DIAGNOSIS — G47.09 OTHER INSOMNIA: Chronic | ICD-10-CM

## 2022-08-19 DIAGNOSIS — F41.0 PANIC DISORDER: Chronic | ICD-10-CM

## 2022-08-19 DIAGNOSIS — F39 MODERATE MOOD DISORDER: Chronic | ICD-10-CM

## 2022-08-19 RX ORDER — ARIPIPRAZOLE 15 MG/1
15 TABLET ORAL DAILY
Qty: 30 TABLET | Refills: 2 | Status: SHIPPED | OUTPATIENT
Start: 2022-08-19 | End: 2022-11-14

## 2022-08-19 RX ORDER — QUETIAPINE FUMARATE 50 MG/1
TABLET, FILM COATED ORAL
Qty: 60 TABLET | Refills: 2 | Status: SHIPPED | OUTPATIENT
Start: 2022-08-19 | End: 2022-11-14

## 2022-08-19 RX ORDER — PROPRANOLOL HYDROCHLORIDE 20 MG/1
20 TABLET ORAL DAILY
Qty: 30 TABLET | Refills: 2 | Status: SHIPPED | OUTPATIENT
Start: 2022-08-19 | End: 2022-09-21

## 2022-08-23 ENCOUNTER — TELEMEDICINE (OUTPATIENT)
Dept: PSYCHIATRY | Facility: CLINIC | Age: 52
End: 2022-08-23

## 2022-08-23 DIAGNOSIS — F33.1 MODERATE EPISODE OF RECURRENT MAJOR DEPRESSIVE DISORDER: ICD-10-CM

## 2022-08-23 DIAGNOSIS — F41.1 GENERALIZED ANXIETY DISORDER: Primary | ICD-10-CM

## 2022-08-23 PROCEDURE — 90834 PSYTX W PT 45 MINUTES: CPT | Performed by: COUNSELOR

## 2022-08-23 NOTE — PROGRESS NOTES
Telehealth Encounter Note:  Date of Service: 2022  Patient Name: Marguerite Anderson  : 1970   MRN: [id number]   Time In: 11:03 AM  Time Out: 11:41 AM    This provider is located at [facility] [address], [place] [address] using a secure [employer] Video Visit through [facility]. Patient is being seen remotely via telehealth at home address in [place] and stated they are in a secure environment for this session. The patient's condition being diagnosed/treated is appropriate for telemedicine. The provider identified herself as well as her credentials. The patient, and/or patients guardian, consent to be seen remotely, and when consent is given they understand that the consent allows for patient identifiable information to be sent to a third party as needed. They may refuse to be seen remotely at any time. The electronic data is encrypted and password protected, and the patient and/or guardian has been advised of the potential risks to privacy not withstanding such measures.     You have chosen to receive care through a telehealth visit.  Do you consent to use a video/audio connection for your medical care today? Yes    Referring Provider: Juan Clemens MD  Pt is being referred to this writer by a therapist that has left the clinic.    PROGRESS NOTE    History of Present Illness:   Marguerite Anderson is a 52 y.o. female who is being seen today for follow up individual Psychotherapy session.     Chief Complaint:  Pt reports she struggles with anxiety and depression.  Pt reports her anxiety affects her the most at this time and feels overwhelming.  Pt reports she avoids a lot of things and stays home most of the time.  Pt reports she struggles greatly with going to the grocery store due to the amount of ppl that are there and fear of having a seizure.  Pt reports she has seizures often however reports that she has not had them in two months. Pt reports feeling accomplished in that she  was finally able to get out of the house and go to the salon to have her hair cut.        ICD-10-CM ICD-9-CM   1. Generalized anxiety disorder  F41.1 300.02   2. Moderate episode of recurrent major depressive disorder (HCC)  F33.1 296.32        Clinical Maneuvering/Intervention:   Assisted patient in processing above session content; acknowledged and normalized patient’s thoughts, feelings, and concerns by utilizing a person-centered approach in efforts to build appropriate rapport and a positive therapeutic relationship with open and honest communication.  Rationalized patient thought process regarding caring for her mother, fear of seizures in public.  Discussed triggers associated with patient's anxiety and depression.  Pt identified things that are going well as as reduction in seizures in engaging in a self care activity.  Pt reports as a means for coping she is spending time with her animals, uses meditation, deep breathing, socratic questioning/ reframing. Pt appears to have a positive support system.   Therapist encouraged pt to continue engaging in self-care activities, to avoid too much sedentary time and to start making small behavioral changes to assist with improving mood (I.e. getting sunlight/artificial light and avoid sitting in the dark, avoid too much time just sitting on the couch, get fresh air, take a hot shower and fix hair/get dressed for the day etc.).      Allowed patient to freely discuss issues without interruption or judgment. Provided safe, confidential environment to facilitate the development of positive therapeutic relationship and encourage open, honest communication. Assisted patient in identifying risk factors which would indicate the need for higher level of care including thoughts to harm self or others and/or self-harming behavior and encouraged patient to contact this office, call 911, or present to the nearest emergency room should any of these events occur. Discussed crisis  intervention services and means to access. Patient adamantly and convincingly denies current suicidal or homicidal ideation or perceptual disturbance.    Mental Status Exam:   Hygiene:   good  Cooperation:  Cooperative  Eye Contact:  Good  Psychomotor Behavior:  Appropriate  Affect:  Appropriate  Mood: anxious  Speech:  Normal  Thought Process:  Goal directed and Linear  Thought Content:  Normal and Mood congruent  Suicidal:  None  Homicidal:  None  Hallucinations:  None  Delusion:  None  Memory:  Intact  Orientation:  Person, Place, Time and Situation  Reliability:  good  Insight:  Good  Judgement:  Good  Impulse Control:  Good  Physical/Medical Issues:  Yes seizures     Patient's Support Network Includes:   and mother    Functional Status: Moderate impairment     Progress toward goal: Not at goal    Prognosis: Fair with Ongoing Treatment     Medications:     Current Outpatient Medications:   •  aclidinium bromide (Tudorza Pressair) 400 MCG/ACT aerosol powder  powder for inhalation, Inhale 1 puff 2 (Two) Times a Day., Disp: 1 each, Rfl: 11  •  Advair Diskus 250-50 MCG/ACT DISKUS, INHALE 1 PUFF BY MOUTH DAILY, Disp: 60 each, Rfl: 11  •  ARIPiprazole (ABILIFY) 15 MG tablet, TAKE 1 TABLET BY MOUTH DAILY, Disp: 30 tablet, Rfl: 2  •  aspirin (aspirin) 81 MG EC tablet, Take 1 tablet by mouth Daily., Disp: 90 tablet, Rfl: 3  •  B Complex Vitamins (VITAMIN B COMPLEX) capsule capsule, Take  by mouth., Disp: , Rfl:   •  clonazePAM (KlonoPIN) 0.5 MG tablet, Take 1 tablet by mouth At Night As Needed for Anxiety., Disp: 30 tablet, Rfl: 0  •  cyclobenzaprine (FLEXERIL) 10 MG tablet, Take 10 mg by mouth 2 (Two) Times a Day As Needed., Disp: , Rfl:   •  Diclofenac Sodium (VOLTAREN) 1 % gel gel, APPLY 4 GRAMS TOPICALLY TO AFFECTED AREA 4 TIMES A DAY AS NEEDED FOR PAIN, Disp: 100 g, Rfl: 3  •  DULoxetine (Cymbalta) 30 MG capsule, Take 1 capsule by mouth Every Night. Take in addition to the 60 mg, Disp: 30 capsule, Rfl: 2  •   DULoxetine (CYMBALTA) 60 MG capsule, TAKE 1 CAPSULE BY MOUTH DAILY IN ADDITION TO THE 30 MG, Disp: 30 capsule, Rfl: 2  •  estradiol (VIVELLE-DOT) 0.1 MG/24HR patch, APPLY 1 PATCH TOPICALLY TO THE SKIN 2 TIMES A WEEK AS DIRECTED, Disp: 8 patch, Rfl: 2  •  levETIRAcetam (KEPPRA) 750 MG tablet, Take 1 tablet by mouth 2 (Two) Times a Day., Disp: 60 tablet, Rfl: 5  •  OnabotulinumtoxinA (Botox) 200 units reconstituted solution, INJECT 200 UNITS INTRAMUSCULARLY INTO HEAD, NECK & SHOULDERS EVERY 12 WEEKS PER FDA PROTOCOL, Disp: 1 each, Rfl: 3  •  propranolol (INDERAL) 20 MG tablet, TAKE 1 TABLET BY MOUTH DAILY FOR ANXIETY, Disp: 30 tablet, Rfl: 2  •  QUEtiapine (SEROquel) 50 MG tablet, TAKE 1 TO 2 TABLETS BY MOUTH AT NIGHT, Disp: 60 tablet, Rfl: 2  •  SUMAtriptan (IMITREX) 20 MG/ACT nasal spray, USE 1 SPRAY IN 1 NOSTRIL IF NEEDED, Disp: 18 each, Rfl: 0  •  topiramate (TOPAMAX) 100 MG tablet, TAKE 1 TABLET BY MOUTH TWICE DAILY, Disp: 60 tablet, Rfl: 5    Visit Diagnosis/Orders Placed This Visit:    ICD-10-CM ICD-9-CM   1. Generalized anxiety disorder  F41.1 300.02   2. Moderate episode of recurrent major depressive disorder (HCC)  F33.1 296.32        PLAN:  1. Safety: No acute safety concerns  2. Risk Assessment: Risk of self-harm acutely is low. Risk of self-harm chronically is also low, but could be further elevated in the event of treatment noncompliance and/or AODA.    Crisis Plan:  Symptoms and/or behaviors to indicate a crisis: Excessive worry or fear, Feeling sad or low, Isolation and Lack of sleep    What calming techniques or other strategies will patient use to de-escalate and stay safe: slow down, breathe, visualize calming self, think it though, listen to music, change focus, take a walk    Who is one person patient can contact to assist with de-escalation?      Treatment Plan/Goals: Patient will continue supportive psychotherapy efforts and medication regimen as prescribed. Therapist will provide  Cognitive Behavioral Therapy to assist patient in improving functioning and gaining coping skills, maintaining stability, and avoiding decompensation and the need for higher level of care. Plan for treatment was discussed during today's visit. Patient acknowledged and verbally consented to continue with current treatment plan and was educated on the importance of compliance with treatment and follow-up appointments.     Patient will contact this office, call 911 or present to the nearest emergency room should suicidal or homicidal ideations occur.     Follow Up:   Return in about 3 weeks (around 9/13/2022) for Therapy session.      QUOC Peña   [facility] [place]     This document has been electronically signed by QUOC Peña   August 23, 2022 11:47 EDT

## 2022-09-19 NOTE — TELEPHONE ENCOUNTER
Rx Refill Note  Requested Prescriptions     Pending Prescriptions Disp Refills   • levETIRAcetam (KEPPRA) 750 MG tablet [Pharmacy Med Name: LEVETIRACETAM 750MG TABLETS] 60 tablet 5     Sig: TAKE 1 TABLET BY MOUTH TWICE DAILY      Last office visit with prescribing clinician: 4/27/2021      Next office visit with prescribing clinician: 10-           Juliana Bowers CMA  09/19/22, 08:14 EDT

## 2022-09-20 DIAGNOSIS — F41.0 PANIC DISORDER: Chronic | ICD-10-CM

## 2022-09-20 RX ORDER — CLONAZEPAM 0.5 MG/1
0.5 TABLET ORAL NIGHTLY PRN
Qty: 30 TABLET | Refills: 0 | Status: SHIPPED | OUTPATIENT
Start: 2022-09-20 | End: 2022-10-19 | Stop reason: SDUPTHER

## 2022-09-20 NOTE — PROGRESS NOTES
"This provider is located at [facility] ,[address], [address] [facility] in [place], [place],using a secure [employer] Video Visit through [employer]. Patient is being seen remotely via telehealth at their home address in [place], and stated they are in a secure environment for this session. The patient's condition being diagnosed/treated is appropriate for telemedicine. The provider identified herself as well as her credentials.   The patient, and/or patients guardian, consent to be seen remotely, and when consent is given they understand that the consent allows for patient identifiable information to be sent to a third party as needed.   They may refuse to be seen remotely at any time. The electronic data is encrypted and password protected, and the patient and/or guardian has been advised of the potential risks to privacy not withstanding such measures.    Chief Complaint  Mood disorder, anxiety, panic, and insomnia    Subjective          Marguerite Anderson presents today via [employer] through [employer] by herself for a follow up and medication check.     History of Present Illness: Marguerite states, \"I am okay.\" Marguerite tells me she purchased a house and is renovating the home. She plans on moving around November. She is currently renting. She tells me that purchasing the house has caused her to get out more. She tells me this increases anxiety and, at times, panic. She finds herself waiting until the last minute to leave the house or worrying something bad may happen to her. She tried to make herself do things and tries to remind herself that nothing will try to hurt her. She is sleeping better. She finds she will \"clock watch\" when she uses two tablets of Seroquel for sleep as opposed to one and a Clonazepam. She has a decreased appetite due to anxiety. She had a fall with sprain in her ankle.  She is compliant with her medications.  She is taking Abilify, Cymbalta, propranolol, " Seroquel, and clonazepam.  She denies any side effects of her current medication regiment. She denies any SI/HI/AVH.    Current Medications:   Current Outpatient Medications   Medication Sig Dispense Refill   • aclidinium bromide (Tudorza Pressair) 400 MCG/ACT aerosol powder  powder for inhalation Inhale 1 puff 2 (Two) Times a Day. 1 each 11   • Advair Diskus 250-50 MCG/ACT DISKUS INHALE 1 PUFF BY MOUTH DAILY 60 each 11   • ARIPiprazole (ABILIFY) 15 MG tablet TAKE 1 TABLET BY MOUTH DAILY 30 tablet 2   • aspirin (aspirin) 81 MG EC tablet Take 1 tablet by mouth Daily. 90 tablet 3   • clonazePAM (KlonoPIN) 0.5 MG tablet Take 1 tablet by mouth At Night As Needed for Anxiety. 30 tablet 0   • cyclobenzaprine (FLEXERIL) 10 MG tablet Take 10 mg by mouth 2 (Two) Times a Day As Needed.     • Diclofenac Sodium (VOLTAREN) 1 % gel gel APPLY 4 GRAMS TOPICALLY TO AFFECTED AREA 4 TIMES A DAY AS NEEDED FOR PAIN 100 g 3   • DULoxetine (CYMBALTA) 60 MG capsule Take 1 capsule by mouth 2 (Two) Times a Day. 60 capsule 2   • estradiol (VIVELLE-DOT) 0.1 MG/24HR patch APPLY 1 PATCH TOPICALLY TO THE SKIN 2 TIMES A WEEK AS DIRECTED 8 patch 2   • levETIRAcetam (KEPPRA) 750 MG tablet Take 1 tablet by mouth 2 (Two) Times a Day. 60 tablet 5   • QUEtiapine (SEROquel) 50 MG tablet TAKE 1 TO 2 TABLETS BY MOUTH AT NIGHT 60 tablet 2   • SUMAtriptan (IMITREX) 20 MG/ACT nasal spray USE 1 SPRAY IN 1 NOSTRIL IF NEEDED 18 each 0   • topiramate (TOPAMAX) 100 MG tablet TAKE 1 TABLET BY MOUTH TWICE DAILY 60 tablet 5   • B Complex Vitamins (VITAMIN B COMPLEX) capsule capsule Take  by mouth.     • OnabotulinumtoxinA (Botox) 200 units reconstituted solution INJECT 200 UNITS INTRAMUSCULARLY INTO HEAD, NECK & SHOULDERS EVERY 12 WEEKS PER FDA PROTOCOL 1 each 3     No current facility-administered medications for this visit.       Objective   Vital Signs:   There were no vitals taken for this visit.    Physical Exam  Nursing note reviewed. Vitals reviewed: Vitals  not obtained due to nature of telehealth visit.   Constitutional:       Appearance: Normal appearance.   Neurological:      General: No focal deficit present.      Mental Status: She is alert and oriented to person, place, and time.   Psychiatric:         Attention and Perception: Attention normal.         Mood and Affect: Mood is anxious.         Speech: Speech normal.         Behavior: Behavior normal. Hyperactive: restless. Behavior is cooperative.         Thought Content: Thought content normal.         Cognition and Memory: Cognition normal.         Judgment: Judgment normal.        Result Review :       The following data was reviewed by CHRIS Nick on 09/21/2022:           Assessment and Plan    Problem List Items Addressed This Visit    None     Visit Diagnoses     JAY (generalized anxiety disorder)  (Chronic)   -  Primary    Relevant Medications    DULoxetine (CYMBALTA) 60 MG capsule    Panic disorder  (Chronic)       Relevant Medications    DULoxetine (CYMBALTA) 60 MG capsule    Other insomnia  (Chronic)       Moderate mood disorder (HCC)  (Chronic)       Relevant Medications    DULoxetine (CYMBALTA) 60 MG capsule    Medication management        Relevant Orders    Urine Drug Screen - Urine, Clean Catch          Mental Status Exam:   Hygiene:   good  Cooperation:  Cooperative  Eye Contact:  Good  Psychomotor Behavior:  Appropriate  Affect:  Restricted  Mood: anxious  Speech:  Normal  Thought Process:  Goal directed and Linear  Thought Content:  Normal  Suicidal:  None  Homicidal:  None  Hallucinations:  None  Delusion:  None  Memory:  Intact  Orientation:  Person, Place, Time and Situation  Reliability:  good  Insight:  Good  Judgement:  Good  Impulse Control:  Good  Physical/Medical Issues:  Yes Chronic pain, seizures, and migraines      PHQ-9 Score:   PHQ-9 Total Score:      Impression/Plan:  -This is a follow up and medication check. Marguerite reports more anxiety since purchasing a home and  starting renovations. She is having to leave home more and this increases her anxiety. She tries to remind herself that she needs to leave and that nothing will hurt her. She does not find Propranolol helpful for anxiety. Clonazepam and Seroquel help but she is not finding her current regiment working. Therefore, I suggested using a Seroquel dose during the day when she is anxious and reserving Clonazepam for night. I also suggested increasing Cymbalta for further benefits on anxiety. She agrees. She is healing from a twisted ankle.   -Stop propranolol due to perceived lack of efficacy.  -Change Seroquel to 50 mg nightly and 25 mg during the day for anxiety and insomnia. Patient has refills.  -Increase Cymbalta to 120 mg daily for depression and anxiety.  Patient will take 60 mg twice daily.  -Continue clonazepam 0.5 mg nightly as needed for anxiety and insomnia. # 30 tablets given. Patient has refills.  -Continue Abilify 15 mg daily for mood disorder.  Patient will take in the a.m. Patient has refills.  -Continue therapy with Angela Gonzalez LCSW.  -Continue Keppra,Topamax, Imitrex, and Botox for seizure disorder and migraines. These medications are prescribed by another provider.  -The ASYA report, reviewed through PDMP , of the past 12 months were reviewed and is appropriate.  The patient/guardian reports taking the medication only as prescribed.  The patient/guardian denies any abuse or misuse of the medication.  The patient/guardian denies any other substance use or issues.  There are no apparent substance related issues.  The patient reports no side effects of the current medication usage.  The patient/guardian has reported significant improvement with medication usage and wishes to continue medication as prescribed.  The patient/guardian is appropriate to continue with current medication usage at this time.  Reinforced risks and side effects of medication usage, patient and/or guardian verbalize  understanding in their own words and are in agreement with current plan.  -Last UDS on 09/17/2021. Appropriate. I will place order for UDS to collect before next refill of Clonazepam.     MEDS ORDERED DURING VISIT:  New Medications Ordered This Visit   Medications   • DULoxetine (CYMBALTA) 60 MG capsule     Sig: Take 1 capsule by mouth 2 (Two) Times a Day.     Dispense:  60 capsule     Refill:  2         Follow Up   Return in about 3 months (around 12/21/2022) for Medication Check.  Patient was given instructions and counseling regarding her condition or for health maintenance advice. Please see specific information pulled into the AVS if appropriate.       TREATMENT PLAN/GOALS: Continue supportive psychotherapy efforts and medications as indicated. Treatment and medication options discussed during today's visit. Patient acknowledged and verbally consented to continue with current treatment plan and was educated on the importance of compliance with treatment and follow-up appointments.    MEDICATION ISSUES:  Discussed medication options and treatment plan of prescribed medication as well as the risks, benefits, and side effects including potential falls, possible impaired driving and metabolic adversities among others. Patient is agreeable to call the office with any worsening of symptoms or onset of side effects. Patient is agreeable to call 911 or go to the nearest ER should he/she begin having SI/HI.      This document has been electronically signed by CHRIS Witt, PMHNP-BC  September 21, 2022 19:00 EDT    Part of this note may be an electronic transcription/translation of spoken language to printed text using the Dragon Dictation System.

## 2022-09-21 ENCOUNTER — TELEMEDICINE (OUTPATIENT)
Dept: PSYCHIATRY | Facility: CLINIC | Age: 52
End: 2022-09-21

## 2022-09-21 DIAGNOSIS — F41.1 GAD (GENERALIZED ANXIETY DISORDER): Primary | Chronic | ICD-10-CM

## 2022-09-21 DIAGNOSIS — G47.09 OTHER INSOMNIA: Chronic | ICD-10-CM

## 2022-09-21 DIAGNOSIS — F41.0 PANIC DISORDER: Chronic | ICD-10-CM

## 2022-09-21 DIAGNOSIS — Z79.899 MEDICATION MANAGEMENT: ICD-10-CM

## 2022-09-21 DIAGNOSIS — F39 MODERATE MOOD DISORDER: Chronic | ICD-10-CM

## 2022-09-21 PROCEDURE — 99214 OFFICE O/P EST MOD 30 MIN: CPT | Performed by: NURSE PRACTITIONER

## 2022-09-21 RX ORDER — DULOXETIN HYDROCHLORIDE 60 MG/1
60 CAPSULE, DELAYED RELEASE ORAL 2 TIMES DAILY
Qty: 60 CAPSULE | Refills: 2 | Status: SHIPPED | OUTPATIENT
Start: 2022-09-21 | End: 2022-11-22 | Stop reason: SDUPTHER

## 2022-09-23 RX ORDER — LEVETIRACETAM 750 MG/1
TABLET ORAL
Qty: 60 TABLET | Refills: 0 | Status: SHIPPED | OUTPATIENT
Start: 2022-09-23 | End: 2022-10-19 | Stop reason: SDUPTHER

## 2022-09-27 ENCOUNTER — OFFICE VISIT (OUTPATIENT)
Dept: INTERNAL MEDICINE | Facility: CLINIC | Age: 52
End: 2022-09-27

## 2022-09-27 VITALS
WEIGHT: 154.4 LBS | SYSTOLIC BLOOD PRESSURE: 120 MMHG | HEART RATE: 78 BPM | BODY MASS INDEX: 26.36 KG/M2 | DIASTOLIC BLOOD PRESSURE: 72 MMHG | HEIGHT: 64 IN | TEMPERATURE: 98 F | OXYGEN SATURATION: 97 %

## 2022-09-27 DIAGNOSIS — R56.9 SEIZURES: ICD-10-CM

## 2022-09-27 DIAGNOSIS — Z72.0 TOBACCO ABUSE: ICD-10-CM

## 2022-09-27 DIAGNOSIS — K13.70 LESION OF UVULA: ICD-10-CM

## 2022-09-27 DIAGNOSIS — S92.034D CLOSED NONDISPLACED AVULSION FRACTURE OF TUBEROSITY OF RIGHT CALCANEUS WITH ROUTINE HEALING, SUBSEQUENT ENCOUNTER: ICD-10-CM

## 2022-09-27 DIAGNOSIS — Z23 NEED FOR INFLUENZA VACCINATION: Primary | ICD-10-CM

## 2022-09-27 PROCEDURE — 90686 IIV4 VACC NO PRSV 0.5 ML IM: CPT | Performed by: INTERNAL MEDICINE

## 2022-09-27 PROCEDURE — 99214 OFFICE O/P EST MOD 30 MIN: CPT | Performed by: INTERNAL MEDICINE

## 2022-09-27 PROCEDURE — 90471 IMMUNIZATION ADMIN: CPT | Performed by: INTERNAL MEDICINE

## 2022-09-27 RX ORDER — MULTIVITAMIN WITH IRON
1 TABLET ORAL DAILY
Qty: 90 TABLET | Refills: 3 | Status: SHIPPED | OUTPATIENT
Start: 2022-09-27 | End: 2023-09-27

## 2022-09-27 RX ORDER — NICOTINE 21 MG/24HR
1 PATCH, TRANSDERMAL 24 HOURS TRANSDERMAL EVERY 24 HOURS
Qty: 30 PATCH | Refills: 4 | Status: SHIPPED | OUTPATIENT
Start: 2022-09-27

## 2022-09-27 NOTE — PROGRESS NOTES
Subjective     Patient ID: Marguerite Anderson is a 52 y.o. female. Patient is here for management of multiple medical problems.     Chief Complaint   Patient presents with   • Follow-up     Patient states she was seen at [facility] for ankle injury, follow up     History of Present Illness     Still smoking.       Right ankle fx. Unable to get access to health care.  Need help.  Numbness and foot drop in flipflops.        The following portions of the patient's history were reviewed and updated as appropriate: allergies, current medications, past family history, past medical history, past social history, past surgical history and problem list.    Review of Systems    Current Outpatient Medications:   •  aclidinium bromide (Tudorza Pressair) 400 MCG/ACT aerosol powder  powder for inhalation, Inhale 1 puff 2 (Two) Times a Day., Disp: 1 each, Rfl: 11  •  Advair Diskus 250-50 MCG/ACT DISKUS, INHALE 1 PUFF BY MOUTH DAILY, Disp: 60 each, Rfl: 11  •  ARIPiprazole (ABILIFY) 15 MG tablet, TAKE 1 TABLET BY MOUTH DAILY, Disp: 30 tablet, Rfl: 2  •  aspirin (aspirin) 81 MG EC tablet, Take 1 tablet by mouth Daily., Disp: 90 tablet, Rfl: 3  •  B Complex Vitamins (VITAMIN B COMPLEX) capsule capsule, Take  by mouth., Disp: , Rfl:   •  clonazePAM (KlonoPIN) 0.5 MG tablet, Take 1 tablet by mouth At Night As Needed for Anxiety., Disp: 30 tablet, Rfl: 0  •  cyclobenzaprine (FLEXERIL) 10 MG tablet, Take 10 mg by mouth 2 (Two) Times a Day As Needed., Disp: , Rfl:   •  Diclofenac Sodium (VOLTAREN) 1 % gel gel, APPLY 4 GRAMS TOPICALLY TO AFFECTED AREA 4 TIMES A DAY AS NEEDED FOR PAIN, Disp: 100 g, Rfl: 3  •  DULoxetine (CYMBALTA) 60 MG capsule, Take 1 capsule by mouth 2 (Two) Times a Day., Disp: 60 capsule, Rfl: 2  •  estradiol (VIVELLE-DOT) 0.1 MG/24HR patch, APPLY 1 PATCH TOPICALLY TO THE SKIN 2 TIMES A WEEK AS DIRECTED, Disp: 8 patch, Rfl: 2  •  levETIRAcetam (KEPPRA) 750 MG tablet, TAKE 1 TABLET BY MOUTH TWICE DAILY, Disp: 60 tablet,  "Rfl: 0  •  QUEtiapine (SEROquel) 50 MG tablet, TAKE 1 TO 2 TABLETS BY MOUTH AT NIGHT, Disp: 60 tablet, Rfl: 2  •  SUMAtriptan (IMITREX) 20 MG/ACT nasal spray, USE 1 SPRAY IN 1 NOSTRIL IF NEEDED, Disp: 18 each, Rfl: 0  •  topiramate (TOPAMAX) 100 MG tablet, TAKE 1 TABLET BY MOUTH TWICE DAILY, Disp: 60 tablet, Rfl: 5  •  B Complex-C (B-complex with vitamin C) tablet, Take 1 tablet by mouth Daily., Disp: 90 tablet, Rfl: 3  •  nicotine (Nicoderm CQ) 21 MG/24HR patch, Place 1 patch on the skin as directed by provider Daily., Disp: 30 patch, Rfl: 4  •  OnabotulinumtoxinA (Botox) 200 units reconstituted solution, INJECT 200 UNITS INTRAMUSCULARLY INTO HEAD, NECK & SHOULDERS EVERY 12 WEEKS PER FDA PROTOCOL, Disp: 1 each, Rfl: 3    Objective      Blood pressure 120/72, pulse 78, temperature 98 °F (36.7 °C), temperature source Infrared, height 162.6 cm (64\"), weight 70 kg (154 lb 6.4 oz), SpO2 97 %, not currently breastfeeding.    Physical Exam     General Appearance:    Alert, cooperative, no distress, appears stated age   Head:    Normocephalic, without obvious abnormality, atraumatic   Eyes:    PERRL, conjunctiva/corneas clear, EOM's intact   Ears:    Normal TM's and external ear canals, both ears   Nose:   Nares normal, septum midline, mucosa normal, no drainage   or sinus tenderness   Throat:   Lips, mucosa, and tongue normal; teeth and gums normal   Neck:   Supple, symmetrical, trachea midline, no adenopathy;        thyroid:  No enlargement/tenderness/nodules; no carotid    bruit or JVD   Back:     Symmetric, no curvature, ROM normal, no CVA tenderness   Lungs:     Clear to auscultation bilaterally, respirations unlabored   Chest wall:    No tenderness or deformity   Heart:    Regular rate and rhythm, S1 and S2 normal, no murmur,        rub or gallop   Abdomen:     Soft, non-tender, bowel sounds active all four quadrants,     no masses, no organomegaly   Extremities:   Extremities normal, atraumatic, no cyanosis or edema "   Pulses:   2+ and symmetric all extremities   Skin:   Skin color, texture, turgor normal, no rashes or lesions   Lymph nodes:   Cervical, supraclavicular, and axillary nodes normal   Neurologic:   CNII-XII intact. Normal strength, sensation and reflexes       throughout      Results for orders placed or performed in visit on 06/23/22   CBC (No Diff)    Specimen: Blood   Result Value Ref Range    WBC 10.11 3.40 - 10.80 10*3/mm3    RBC 4.68 3.77 - 5.28 10*6/mm3    Hemoglobin 14.6 12.0 - 15.9 g/dL    Hematocrit 43.4 34.0 - 46.6 %    MCV 92.7 79.0 - 97.0 fL    MCH 31.2 26.6 - 33.0 pg    MCHC 33.6 31.5 - 35.7 g/dL    RDW 11.9 (L) 12.3 - 15.4 %    RDW-SD 40.2 37.0 - 54.0 fl    MPV 9.5 6.0 - 12.0 fL    Platelets 320 140 - 450 10*3/mm3   Levetiracetam Level (Keppra)    Specimen: Blood   Result Value Ref Range    Levetiracetam 22.9 10.0 - 40.0 ug/mL   Topiramate Level    Specimen: Blood   Result Value Ref Range    Topiramate 9.3 2.0 - 25.0 ug/mL   Comprehensive Metabolic Panel    Specimen: Blood   Result Value Ref Range    Glucose 84 65 - 99 mg/dL    BUN 13 6 - 20 mg/dL    Creatinine 0.79 0.57 - 1.00 mg/dL    Sodium 140 136 - 145 mmol/L    Potassium 5.3 (H) 3.5 - 5.2 mmol/L    Chloride 110 (H) 98 - 107 mmol/L    CO2 21.1 (L) 22.0 - 29.0 mmol/L    Calcium 9.2 8.6 - 10.5 mg/dL    Total Protein 7.0 6.0 - 8.5 g/dL    Albumin 4.40 3.50 - 5.20 g/dL    ALT (SGPT) 11 1 - 33 U/L    AST (SGOT) 17 1 - 32 U/L    Alkaline Phosphatase 79 39 - 117 U/L    Total Bilirubin <0.2 0.0 - 1.2 mg/dL    Globulin 2.6 gm/dL    A/G Ratio 1.7 g/dL    BUN/Creatinine Ratio 16.5 7.0 - 25.0    Anion Gap 8.9 5.0 - 15.0 mmol/L    eGFR 90.1 >60.0 mL/min/1.73         Assessment & Plan   Right ankle fx. Unable to get access to health care.  Need help.  Numbness and foot drop in flipflops.    Neurology        Diagnoses and all orders for this visit:    1. Need for influenza vaccination (Primary)  -     FluLaval/Fluarix/Fluzone >6 Months  -     Lipid Panel  -      CBC & Differential  -     Vitamin B12  -     T4, Free  -     TSH  -     Comprehensive Metabolic Panel    2. Closed nondisplaced avulsion fracture of tuberosity of right calcaneus with routine healing, subsequent encounter  -     Ambulatory Referral to Podiatry  -     Lipid Panel  -     CBC & Differential  -     Vitamin B12  -     T4, Free  -     TSH  -     Comprehensive Metabolic Panel    3. Seizures (HCC)  -     B Complex-C (B-complex with vitamin C) tablet; Take 1 tablet by mouth Daily.  Dispense: 90 tablet; Refill: 3  -     Ambulatory Referral to Neurology  -     Lipid Panel  -     CBC & Differential  -     Vitamin B12  -     T4, Free  -     TSH  -     Comprehensive Metabolic Panel    4. Lesion of uvula  -     Ambulatory Referral to ENT (Otolaryngology)    5. Tobacco abuse  -     Ambulatory Referral to ENT (Otolaryngology)    Other orders  -     nicotine (Nicoderm CQ) 21 MG/24HR patch; Place 1 patch on the skin as directed by provider Daily.  Dispense: 30 patch; Refill: 4      Return in about 6 months (around 3/27/2023).          There are no Patient Instructions on file for this visit.     Juan Clemens MD    Assessment & Plan       Answers for HPI/ROS submitted by the patient on 9/27/2022  Please describe your symptoms.: this is a follow up appt but I have been experiencing a nagging cough since I had covid last month  Have you had these symptoms before?: No  How long have you been having these symptoms?: Greater than 2 weeks  Please list any medications you are currently taking for this condition.: none  Please describe any probable cause for these symptoms. : left over symptons of covid  What is the primary reason for your visit?: Other

## 2022-10-04 ENCOUNTER — LAB (OUTPATIENT)
Dept: LAB | Facility: HOSPITAL | Age: 52
End: 2022-10-04

## 2022-10-04 DIAGNOSIS — Z79.899 MEDICATION MANAGEMENT: ICD-10-CM

## 2022-10-04 LAB
AMPHET+METHAMPHET UR QL: NEGATIVE
AMPHETAMINES UR QL: NEGATIVE
BARBITURATES UR QL SCN: NEGATIVE
BENZODIAZ UR QL SCN: NEGATIVE
BUPRENORPHINE SERPL-MCNC: NEGATIVE NG/ML
CANNABINOIDS SERPL QL: NEGATIVE
COCAINE UR QL: NEGATIVE
METHADONE UR QL SCN: NEGATIVE
OPIATES UR QL: NEGATIVE
OXYCODONE UR QL SCN: NEGATIVE
PCP UR QL SCN: NEGATIVE
PROPOXYPH UR QL: NEGATIVE
TRICYCLICS UR QL SCN: POSITIVE

## 2022-10-04 PROCEDURE — G0480 DRUG TEST DEF 1-7 CLASSES: HCPCS

## 2022-10-04 PROCEDURE — 80306 DRUG TEST PRSMV INSTRMNT: CPT

## 2022-10-10 LAB
AMITRIP UR QL CFM: NOT DETECTED
ANTIDEPRESSANTS UR QL: NEGATIVE
CLOMIPRAMINE UR QL: NOT DETECTED
DESIPRAMINE UR QL CFM: NOT DETECTED
DOXEPIN UR QL CFM: NOT DETECTED
IMIPRAMINE UR QL CFM: NOT DETECTED
LEVEL OF DETECTION:: NORMAL
NORCLOMIPRAMINE UR QL: NOT DETECTED
NORDOXEPIN UR QL: NOT DETECTED
NORTRIP UR QL CFM: NOT DETECTED
PROTRIP UR QL: NOT DETECTED
TRIMIPRAMINE UR QL: NOT DETECTED

## 2022-10-14 ENCOUNTER — TELEMEDICINE (OUTPATIENT)
Dept: PSYCHIATRY | Facility: CLINIC | Age: 52
End: 2022-10-14

## 2022-10-14 DIAGNOSIS — F41.1 GENERALIZED ANXIETY DISORDER: Primary | ICD-10-CM

## 2022-10-14 DIAGNOSIS — F33.1 MODERATE EPISODE OF RECURRENT MAJOR DEPRESSIVE DISORDER: ICD-10-CM

## 2022-10-14 PROCEDURE — 90832 PSYTX W PT 30 MINUTES: CPT | Performed by: COUNSELOR

## 2022-10-14 NOTE — PROGRESS NOTES
Telehealth Encounter Note:  Date of Service: 2022  Patient Name: Marguerite Anderson  : 1970   MRN: [id number]   Time In: 9:50 AM   Time Out: 10:27 AM     This provider is located at [facility] [address], [place] [address] using a secure MyChart Video Visit through [employer]. Patient is being seen remotely via telehealth at home address in [place] and stated they are in a secure environment for this session. The patient's condition being diagnosed/treated is appropriate for telemedicine. The provider identified herself as well as her credentials. The patient, and/or patients guardian, consent to be seen remotely, and when consent is given they understand that the consent allows for patient identifiable information to be sent to a third party as needed. They may refuse to be seen remotely at any time. The electronic data is encrypted and password protected, and the patient and/or guardian has been advised of the potential risks to privacy not withstanding such measures.     You have chosen to receive care through a telehealth visit.  Do you consent to use a video/audio connection for your medical care today? Yes    Referring Provider: Juan Clemens MD    PROGRESS NOTE    History of Present Illness:   Marguerite Anderson is a 52 y.o. female who is being seen today for follow up individual Psychotherapy session.     Chief Complaint:  Pt struggles with anxiety and depression and has a hx of seizures.  Pt recently bought a house which has added some stress and anxiety with the move coming up soon.  Pt reports that she has been getting out more to work on her new house.  Pt reports she has had some seizures where she has zoned out and loses track of time.  Pt reports this was confirmed to be a seizure through testing and not dissociation.  Pt has been getting out and going into stores more.  Pt reports her appetite has been poor and craves sweets at times.          ICD-10-CM  ICD-9-CM   1. Generalized anxiety disorder  F41.1 300.02   2. Moderate episode of recurrent major depressive disorder (HCC)  F33.1 296.32        Clinical Maneuvering/Intervention:   Assisted patient in processing above session content; acknowledged and normalized patient’s thoughts, feelings, and concerns by utilizing a person-centered approach in efforts to build appropriate rapport and a positive therapeutic relationship with open and honest communication.  Rationalized patient thought process regarding current life stressors with purchasing a new home and moving as well as addressed negative thought patterns.  Discussed triggers associated with patient's anxiety.  Also discussed coping skills for patient to implement such as self-care, reframing/socratic questioning, time with animals.  Therapist encouraged pt to find positives in things and things to look forward to and avoid focusing on negatives.  Pt appears to have good supports.          Allowed patient to freely discuss issues without interruption or judgment. Provided safe, confidential environment to facilitate the development of positive therapeutic relationship and encourage open, honest communication. Assisted patient in identifying risk factors which would indicate the need for higher level of care including thoughts to harm self or others and/or self-harming behavior and encouraged patient to contact this office, call 911, or present to the nearest emergency room should any of these events occur. Discussed crisis intervention services and means to access. Patient adamantly and convincingly denies current suicidal or homicidal ideation or perceptual disturbance.    Mental Status Exam:   Hygiene:   good  Cooperation:  Cooperative  Eye Contact:  Good  Psychomotor Behavior:  Appropriate  Affect:  Appropriate  Mood: normal  Speech:  Normal  Thought Process:  Goal directed and Linear  Thought Content:  Normal  Suicidal:  None  Homicidal:   None  Hallucinations:  None  Delusion:  None  Memory:  Intact  Orientation:  Person, Place, Time and Situation  Reliability:  good  Insight:  Good  Judgement:  Good  Impulse Control:  Good  Physical/Medical Issues:  No      Patient's Support Network Includes:      Functional Status: Moderate impairment     Progress toward goal: Not at goal    Prognosis: Good with Ongoing Treatment     Medications:     Current Outpatient Medications:   •  aclidinium bromide (Tudorza Pressair) 400 MCG/ACT aerosol powder  powder for inhalation, Inhale 1 puff 2 (Two) Times a Day., Disp: 1 each, Rfl: 11  •  Advair Diskus 250-50 MCG/ACT DISKUS, INHALE 1 PUFF BY MOUTH DAILY, Disp: 60 each, Rfl: 11  •  ARIPiprazole (ABILIFY) 15 MG tablet, TAKE 1 TABLET BY MOUTH DAILY, Disp: 30 tablet, Rfl: 2  •  aspirin (aspirin) 81 MG EC tablet, Take 1 tablet by mouth Daily., Disp: 90 tablet, Rfl: 3  •  B Complex Vitamins (VITAMIN B COMPLEX) capsule capsule, Take  by mouth., Disp: , Rfl:   •  B Complex-C (B-complex with vitamin C) tablet, Take 1 tablet by mouth Daily., Disp: 90 tablet, Rfl: 3  •  clonazePAM (KlonoPIN) 0.5 MG tablet, Take 1 tablet by mouth At Night As Needed for Anxiety., Disp: 30 tablet, Rfl: 0  •  cyclobenzaprine (FLEXERIL) 10 MG tablet, Take 10 mg by mouth 2 (Two) Times a Day As Needed., Disp: , Rfl:   •  Diclofenac Sodium (VOLTAREN) 1 % gel gel, APPLY 4 GRAMS TOPICALLY TO AFFECTED AREA 4 TIMES A DAY AS NEEDED FOR PAIN, Disp: 100 g, Rfl: 3  •  DULoxetine (CYMBALTA) 60 MG capsule, Take 1 capsule by mouth 2 (Two) Times a Day., Disp: 60 capsule, Rfl: 2  •  estradiol (VIVELLE-DOT) 0.1 MG/24HR patch, APPLY 1 PATCH TOPICALLY TO THE SKIN 2 TIMES A WEEK AS DIRECTED, Disp: 8 patch, Rfl: 2  •  levETIRAcetam (KEPPRA) 750 MG tablet, TAKE 1 TABLET BY MOUTH TWICE DAILY, Disp: 60 tablet, Rfl: 0  •  nicotine (Nicoderm CQ) 21 MG/24HR patch, Place 1 patch on the skin as directed by provider Daily., Disp: 30 patch, Rfl: 4  •  OnabotulinumtoxinA  (Botox) 200 units reconstituted solution, INJECT 200 UNITS INTRAMUSCULARLY INTO HEAD, NECK & SHOULDERS EVERY 12 WEEKS PER FDA PROTOCOL, Disp: 1 each, Rfl: 3  •  QUEtiapine (SEROquel) 50 MG tablet, TAKE 1 TO 2 TABLETS BY MOUTH AT NIGHT, Disp: 60 tablet, Rfl: 2  •  SUMAtriptan (IMITREX) 20 MG/ACT nasal spray, USE 1 SPRAY IN 1 NOSTRIL IF NEEDED, Disp: 18 each, Rfl: 0  •  topiramate (TOPAMAX) 100 MG tablet, TAKE 1 TABLET BY MOUTH TWICE DAILY, Disp: 60 tablet, Rfl: 5    Visit Diagnosis/Orders Placed This Visit:    ICD-10-CM ICD-9-CM   1. Generalized anxiety disorder  F41.1 300.02   2. Moderate episode of recurrent major depressive disorder (HCC)  F33.1 296.32        PLAN:  1. Safety: No acute safety concerns  2. Risk Assessment: Risk of self-harm acutely is low. Risk of self-harm chronically is also low, but could be further elevated in the event of treatment noncompliance and/or AODA.    Crisis Plan:  Symptoms and/or behaviors to indicate a crisis: Excessive worry or fear, Feeling sad or low and Isolation    What calming techniques or other strategies will patient use to de-escalate and stay safe: slow down, breathe, visualize calming self, think it though, listen to music, change focus, take a walk    Who is one person patient can contact to assist with de-escalation?     Treatment Plan/Goals: Patient will continue supportive psychotherapy efforts and medication regimen as prescribed. Therapist will provide Cognitive Behavioral Therapy to assist patient in improving functioning and gaining coping skills, maintaining stability, and avoiding decompensation and the need for higher level of care. Plan for treatment was discussed during today's visit. Patient acknowledged and verbally consented to continue with current treatment plan and was educated on the importance of compliance with treatment and follow-up appointments.     Patient will contact this office, call 911 or present to the nearest emergency room should  suicidal or homicidal ideations occur.     Follow Up:   Return in about 1 month (around 11/14/2022) for Therapy session.      QUOC Peña   [facility]     This document has been electronically signed by QUOC Peña   October 14, 2022 10:32 EDT

## 2022-10-19 ENCOUNTER — OFFICE VISIT (OUTPATIENT)
Dept: NEUROLOGY | Facility: CLINIC | Age: 52
End: 2022-10-19

## 2022-10-19 ENCOUNTER — TELEPHONE (OUTPATIENT)
Dept: NEUROLOGY | Facility: CLINIC | Age: 52
End: 2022-10-19

## 2022-10-19 VITALS
TEMPERATURE: 97.5 F | SYSTOLIC BLOOD PRESSURE: 120 MMHG | DIASTOLIC BLOOD PRESSURE: 86 MMHG | WEIGHT: 149.8 LBS | HEIGHT: 64 IN | BODY MASS INDEX: 25.57 KG/M2

## 2022-10-19 DIAGNOSIS — G40.909 SEIZURE DISORDER: Primary | ICD-10-CM

## 2022-10-19 DIAGNOSIS — G43.711 INTRACTABLE CHRONIC MIGRAINE WITHOUT AURA AND WITH STATUS MIGRAINOSUS: ICD-10-CM

## 2022-10-19 DIAGNOSIS — F41.0 PANIC DISORDER: Chronic | ICD-10-CM

## 2022-10-19 DIAGNOSIS — G43.009 MIGRAINE WITHOUT AURA AND WITHOUT STATUS MIGRAINOSUS, NOT INTRACTABLE: ICD-10-CM

## 2022-10-19 PROCEDURE — 99214 OFFICE O/P EST MOD 30 MIN: CPT | Performed by: NURSE PRACTITIONER

## 2022-10-19 RX ORDER — TOPIRAMATE 100 MG/1
100 TABLET, FILM COATED ORAL 2 TIMES DAILY
Qty: 180 TABLET | Refills: 3 | Status: SHIPPED | OUTPATIENT
Start: 2022-12-13

## 2022-10-19 RX ORDER — LEVETIRACETAM 750 MG/1
750 TABLET ORAL 2 TIMES DAILY
Qty: 180 TABLET | Refills: 3 | Status: SHIPPED | OUTPATIENT
Start: 2022-10-19

## 2022-10-19 RX ORDER — RIMEGEPANT SULFATE 75 MG/75MG
75 TABLET, ORALLY DISINTEGRATING ORAL TAKE AS DIRECTED
Qty: 16 TABLET | Refills: 5 | Status: SHIPPED | OUTPATIENT
Start: 2022-10-19

## 2022-10-19 RX ORDER — RIMEGEPANT SULFATE 75 MG/75MG
75 TABLET, ORALLY DISINTEGRATING ORAL AS NEEDED
Qty: 4 TABLET | Refills: 0 | COMMUNITY
Start: 2022-10-19 | End: 2022-10-31 | Stop reason: SDUPTHER

## 2022-10-19 RX ORDER — CLONAZEPAM 0.5 MG/1
0.5 TABLET ORAL NIGHTLY PRN
Qty: 30 TABLET | Refills: 0 | Status: SHIPPED | OUTPATIENT
Start: 2022-10-19 | End: 2022-11-18 | Stop reason: SDUPTHER

## 2022-10-19 RX ORDER — DULOXETIN HYDROCHLORIDE 30 MG/1
30 CAPSULE, DELAYED RELEASE ORAL NIGHTLY
COMMUNITY
End: 2022-11-22 | Stop reason: SDUPTHER

## 2022-10-19 NOTE — PROGRESS NOTES
"     Follow Up Neurology Office Visit      Patient Name: Marguerite Anderson    Referring Physician: No ref. provider found    Chief Complaint:    Chief Complaint   Patient presents with   • Follow-up     Pt in office to follow up on migraine/ seizure        History of Present Illness: Marguerite Anderson is a 52 y.o. female who is here to follow up with Neurology for headache and seizure disorder.  She has been followed by [facility] neurology for several years.  She has been well managed for both seizures and headache with Keppra, Topamax, and Botox treatments.    Previous neurology provider HPI April 2021:  Ms. Anderson is here today for follow-up on seizure disorder.  She reports to me a long history of seizures, previously diagnosed with partial complex seizures and absence seizures.  She has been a long-term patient of Dr. Ho for many years.  She is been on Keppra 750 mg twice a day as well as Topamax 100 mg twice a day for quite some time.  She is here asking that we fill out some papers for disability.  She reports that she has about 1 convulsive seizure every couple months and a couple \"absence seizures\" every couple months.  She saw Dr. Cross back in 2019, she was admitted for inpatient monitoring, her witnessed episodes were nonepileptic in nature, at that time Dr. Cross recommended that she continue her Keppra and Topamax as she was having rare convulsive seizures and he also sent her to psychiatry.  Her last mild seizure was about a week ago, occurred after her dog was killed, short duration with postictal time by report.     The following portions of the patient's history were reviewed and updated as appropriate: allergies, current medications, past family history, past medical history, past social history, past surgical history and problem list.    Subjective     Review of Systems:   Review of Systems   Neurological: Positive for seizures and headache.   All other systems reviewed and are " negative.    Medications:     Current Outpatient Medications:   •  aclidinium bromide (Tudorza Pressair) 400 MCG/ACT aerosol powder  powder for inhalation, Inhale 1 puff 2 (Two) Times a Day., Disp: 1 each, Rfl: 11  •  Advair Diskus 250-50 MCG/ACT DISKUS, INHALE 1 PUFF BY MOUTH DAILY, Disp: 60 each, Rfl: 11  •  ARIPiprazole (ABILIFY) 15 MG tablet, TAKE 1 TABLET BY MOUTH DAILY, Disp: 30 tablet, Rfl: 2  •  aspirin (aspirin) 81 MG EC tablet, Take 1 tablet by mouth Daily., Disp: 90 tablet, Rfl: 3  •  B Complex-C (B-complex with vitamin C) tablet, Take 1 tablet by mouth Daily., Disp: 90 tablet, Rfl: 3  •  clonazePAM (KlonoPIN) 0.5 MG tablet, Take 1 tablet by mouth At Night As Needed for Anxiety., Disp: 30 tablet, Rfl: 0  •  cyclobenzaprine (FLEXERIL) 10 MG tablet, Take 10 mg by mouth 2 (Two) Times a Day As Needed., Disp: , Rfl:   •  Diclofenac Sodium (VOLTAREN) 1 % gel gel, APPLY 4 GRAMS TOPICALLY TO AFFECTED AREA 4 TIMES A DAY AS NEEDED FOR PAIN, Disp: 100 g, Rfl: 3  •  DULoxetine (CYMBALTA) 30 MG capsule, Take 1 capsule by mouth Every Night., Disp: , Rfl:   •  DULoxetine (CYMBALTA) 60 MG capsule, Take 1 capsule by mouth 2 (Two) Times a Day. (Patient taking differently: Take 1 capsule by mouth Daily.), Disp: 60 capsule, Rfl: 2  •  estradiol (VIVELLE-DOT) 0.1 MG/24HR patch, APPLY 1 PATCH TOPICALLY TO THE SKIN 2 TIMES A WEEK AS DIRECTED, Disp: 8 patch, Rfl: 2  •  levETIRAcetam (KEPPRA) 750 MG tablet, Take 1 tablet by mouth 2 (Two) Times a Day., Disp: 180 tablet, Rfl: 3  •  nicotine (Nicoderm CQ) 21 MG/24HR patch, Place 1 patch on the skin as directed by provider Daily., Disp: 30 patch, Rfl: 4  •  OnabotulinumtoxinA (Botox) 200 units reconstituted solution, INJECT 200 UNITS INTRAMUSCULARLY INTO HEAD, NECK & SHOULDERS EVERY 12 WEEKS PER FDA PROTOCOL, Disp: 1 each, Rfl: 3  •  QUEtiapine (SEROquel) 50 MG tablet, TAKE 1 TO 2 TABLETS BY MOUTH AT NIGHT, Disp: 60 tablet, Rfl: 2  •  SUMAtriptan (IMITREX) 20 MG/ACT nasal spray, USE  "1 SPRAY IN 1 NOSTRIL IF NEEDED, Disp: 18 each, Rfl: 0  •  [START ON 12/13/2022] topiramate (TOPAMAX) 100 MG tablet, Take 1 tablet by mouth 2 (Two) Times a Day., Disp: 180 tablet, Rfl: 3  •  Rimegepant Sulfate (Nurtec) 75 MG tablet dispersible tablet, Place 1 tablet under the tongue Take As Directed., Disp: 16 tablet, Rfl: 5  •  Rimegepant Sulfate (Nurtec) 75 MG tablet dispersible tablet, Take 1 tablet by mouth As Needed (as needed for migraine)., Disp: 4 tablet, Rfl: 0    Allergies:   No Known Allergies    Objective     Physical Exam:  Vital Signs:   Vitals:    10/19/22 1002   BP: 120/86   BP Location: Left arm   Patient Position: Sitting   Cuff Size: Adult   Pulse: Comment: Unable to obtain   Temp: 97.5 °F (36.4 °C)   SpO2: Comment: unable to obtain   Weight: 67.9 kg (149 lb 12.8 oz)   Height: 162.6 cm (64\")   PainSc:   6   PainLoc: Back  Comment: neck       Physical Exam  Vitals and nursing note reviewed.   Eyes:      Extraocular Movements: EOM normal.      Pupils: Pupils are equal, round, and reactive to light.   Neck:      Vascular: No carotid bruit.   Cardiovascular:      Rate and Rhythm: Regular rhythm.      Heart sounds: Normal heart sounds.   Pulmonary:      Effort: Pulmonary effort is normal.      Breath sounds: Normal breath sounds.   Skin:     General: Skin is warm.   Neurological:      General: No focal deficit present.      Mental Status: She is oriented to person, place, and time.      Motor: Motor strength is normal.      Coordination: Romberg Test normal.      Gait: Gait is intact.   Psychiatric:         Mood and Affect: Mood normal.         Speech: Speech normal.         Behavior: Behavior normal.         Neurologic Exam     Mental Status   Oriented to person, place, and time.   Attention: normal. Concentration: normal.   Speech: speech is normal   Level of consciousness: alert  Normal comprehension.     Cranial Nerves     CN II   Visual fields full to confrontation.   Visual acuity: normal    CN " III, IV, VI   Pupils are equal, round, and reactive to light.  Extraocular motions are normal.   Right pupil: Shape: regular. Reactivity: brisk.   Left pupil: Shape: regular. Reactivity: brisk.   Diplopia: none  Ophthalmoparesis: none  Upgaze: normal  Downgaze: normal    CN V   Facial sensation intact.     CN VII   Facial expression full, symmetric.     CN VIII   CN VIII normal.     CN IX, X   CN IX normal.   CN X normal.     CN XI   CN XI normal.     CN XII   CN XII normal.     Motor Exam   Muscle bulk: normal  Overall muscle tone: normal    Strength   Strength 5/5 throughout.     Sensory Exam   Light touch normal.   Vibration normal.     Gait, Coordination, and Reflexes     Gait  Gait: normal    Coordination   Romberg: negative    Tremor   Resting tremor: absent  Intention tremor: absent    Reflexes   Reflexes 2+ except as noted.       Results Review:   I have reviewed the patient's other medical records to include, labs, radiology and referrals.   I reviewed the patient's other test results and agree with the interpretation   Previous neurology provider records reviewed    Assessment / Plan      Assessment/Plan:   Diagnoses and all orders for this visit:    1. Seizure disorder (HCC) (Primary)  -     levETIRAcetam (KEPPRA) 750 MG tablet; Take 1 tablet by mouth 2 (Two) Times a Day.  Dispense: 180 tablet; Refill: 3  -     topiramate (TOPAMAX) 100 MG tablet; Take 1 tablet by mouth 2 (Two) Times a Day.  Dispense: 180 tablet; Refill: 3    2. Migraine without aura and without status migrainosus, not intractable  -     levETIRAcetam (KEPPRA) 750 MG tablet; Take 1 tablet by mouth 2 (Two) Times a Day.  Dispense: 180 tablet; Refill: 3  -     topiramate (TOPAMAX) 100 MG tablet; Take 1 tablet by mouth 2 (Two) Times a Day.  Dispense: 180 tablet; Refill: 3  -     Rimegepant Sulfate (Nurtec) 75 MG tablet dispersible tablet; Place 1 tablet under the tongue Take As Directed.  Dispense: 16 tablet; Refill: 5    3. Intractable  chronic migraine without aura and with status migrainosus    Patient has tried multiple triptans with incomplete relief.  We discussed using Nurtec for preventative therapy, patient agreed to this today, dosing instructions provided.    This patient experiences 30 /30 headache days per month with at least 15 days being severe migraine headaches. Migraine headaches last >4hour/day and have been present for greater than 6 consecutive months. Characteristics of migraines include at least 2 of the following: unilateral, pulsating, moderate to severe intensity, worsened by physical exertion, photophobia, phonophobia, nausea and/or vomiting. Patient has tried and failed medications for migraine prevention for 3 months or greater: Topamax, Duloxetine, gabapentin, Propranolol, Relpax, Maxalt, SSRIs, BBs, AEDs, NSAIDs, Muscle Relaxers, Tylenol. I have recommended Botox using FDA approved protocol for chronic migraine prevention resistant to prior regimens as listed above. We have discussed risk and benefits of this procedure and common side effects including headache, neck pain, neck stiffness or weakness, ptosis, flu-like symptoms as well as more serious possible adverse effects including possible dysphagia, respiratory distress or even death (death has only been reported once with adults for Botox for migraines in another state when mixed with lidocaine solution which we do not use lidocaine solution in our practice for mixing Botox). Verbalizes understanding, accepts risks and agrees with moving forward with Botox injections for chronic migraine prevention.    Follow Up:   Return for Botox Injections.     CHRIS Lopez  [facility]   AS THE PROVIDER, I PERSONALLY WORE PPE DURING ENTIRE FACE TO FACE ENCOUNTER IN CLINIC WITH THE PATIENT. PATIENT ALSO WORE PPE DURING ENTIRE FACE TO FACE ENCOUNTER EXCEPT FOR A MAX OF 30 SECONDS DURING NEUROLOGICAL EVALUATION OF CRANIAL NERVES AND THEN MASK WAS  PLACED BACK OVER PATIENT FACE FOR REMAINDER OF VISIT. I WASHED MY HANDS BEFORE AND AFTER VISIT.  > 30 minutes total time spent in visit today reviewing previous neurology provider documentation, obtaining HPI, examining patient, discussing treatment options and developing plan of care.  Please note that portions of this note may have been completed with a voice recognition program. Efforts were made to edit the dictations, but occasionally words are mistranscribed.

## 2022-10-19 NOTE — TELEPHONE ENCOUNTER
Rx Refill Note  Requested Prescriptions     Pending Prescriptions Disp Refills   • clonazePAM (KlonoPIN) 0.5 MG tablet 30 tablet 0     Sig: Take 1 tablet by mouth At Night As Needed for Anxiety.      Last office visit with prescribing clinician: 9/17/2021      Next office visit with prescribing clinician: 11/22/2022            Marybeth Ballard  10/19/22, 09:17 EDT

## 2022-10-19 NOTE — PATIENT INSTRUCTIONS
You have been prescribed an as-needed migraine treatment called Nurtec (rimegepant). Dissolve one tablet under tongue at the start of migraine attack. Do not take additional tablet for 24 hours.     You can also take this every other day in the week or two before your next Botox treatment. I suggest [id number], or pick even or odd days to take it. A [employer] in [place] will be calling you about this, so be sure to answer the phone, especially if it's a [phone] number.

## 2022-10-24 ENCOUNTER — PATIENT ROUNDING (BHMG ONLY) (OUTPATIENT)
Dept: NEUROLOGY | Facility: CLINIC | Age: 52
End: 2022-10-24

## 2022-10-31 ENCOUNTER — OFFICE VISIT (OUTPATIENT)
Dept: INTERNAL MEDICINE | Facility: CLINIC | Age: 52
End: 2022-10-31

## 2022-10-31 VITALS
HEART RATE: 80 BPM | WEIGHT: 155.6 LBS | BODY MASS INDEX: 26.56 KG/M2 | TEMPERATURE: 98.1 F | HEIGHT: 64 IN | DIASTOLIC BLOOD PRESSURE: 62 MMHG | OXYGEN SATURATION: 97 % | SYSTOLIC BLOOD PRESSURE: 120 MMHG

## 2022-10-31 DIAGNOSIS — R23.8 VESICULAR SKIN LESIONS: Primary | ICD-10-CM

## 2022-10-31 PROCEDURE — 99214 OFFICE O/P EST MOD 30 MIN: CPT | Performed by: NURSE PRACTITIONER

## 2022-10-31 RX ORDER — VALACYCLOVIR HYDROCHLORIDE 1 G/1
1000 TABLET, FILM COATED ORAL 3 TIMES DAILY
Qty: 21 TABLET | Refills: 0 | Status: SHIPPED | OUTPATIENT
Start: 2022-10-31 | End: 2022-11-07

## 2022-10-31 NOTE — PROGRESS NOTES
"Chief Complaint   Patient presents with   • Herpes Zoster     Shingles break out started yesterday, R buttock;  concerned that she may need a second set of shingles vaccines     Subjective   Marguerite Anderson is a 52 y.o. female.     History of Present Illness     Patient presents with vesicular rash on right buttock. This is the 3rd outbreak she has had this year. Reports she has had shingles since before age 50 and she believe this to be shingles. She did obtain the vaccination 2 doses, about 10 months apart. Rash is vesicular, on right buttock, itching now but turns into vesicular lesions that blister and begin to hurt. Symptom onset of this rash, yesterday. Felt fatigued/ill yesterday then rash appeared. Pt states she does get oral cold sores in and around her mouth frequently. She has fever developed rash anywhere else on the body.      The following portions of the patient's history were reviewed and updated as appropriate: allergies, current medications, past family history, past medical history, past social history, past surgical history and problem list.    Review of Systems   Constitutional: Positive for fatigue.   Skin: Positive for rash (itching).   All other systems reviewed and are negative.      Objective   /62   Pulse 80   Temp 98.1 °F (36.7 °C) (Temporal)   Ht 162.6 cm (64\")   Wt 70.6 kg (155 lb 9.6 oz)   SpO2 97%   BMI 26.71 kg/m²   Body mass index is 26.71 kg/m².  Physical Exam  Vitals and nursing note reviewed.   Constitutional:       Appearance: Normal appearance.   HENT:      Head: Normocephalic and atraumatic.   Eyes:      Extraocular Movements: Extraocular movements intact.   Cardiovascular:      Rate and Rhythm: Normal rate and regular rhythm.   Pulmonary:      Effort: Pulmonary effort is normal.      Breath sounds: Normal breath sounds.   Musculoskeletal:         General: Normal range of motion.      Cervical back: Normal range of motion.   Skin:     General: Skin is warm and " dry.      Comments: Erythematous vesicular rash noted to right buttock, circular, not linear, no blistering/crusting/draining, pt states itches    Neurological:      Mental Status: She is alert and oriented to person, place, and time.   Psychiatric:         Mood and Affect: Mood normal.         Behavior: Behavior normal.         Assessment & Plan   Marguerite Anderson is here today and the following problems have been addressed:      Diagnoses and all orders for this visit:    1. Vesicular skin lesions (Primary)  -     CBC w AUTO Differential  -     HSV 1 & 2 - Specific Antibody, IgG  -     valACYclovir (Valtrex) 1000 MG tablet; Take 1 tablet by mouth 3 (Three) Times a Day for 7 days.  Dispense: 21 tablet; Refill: 0    Due to the common recurrence and being vaccinated for shingles, believe rash to be more HSV 1/2 localized to this area. Initiate valtrex, take as directed. Will obtain CBC, HSV 1/2 to determine is patient has been exposed/had virus before in the past. If positive, may benefit from prophylactic antiviral to prevent outbreaks.     Follow Up   Return if symptoms worsen or fail to improve.  Patient was given instructions and counseling regarding her condition or for health maintenance advice. Please see specific information pulled into the AVS if appropriate.     Eva PEREZ  [facility]

## 2022-11-01 LAB
ALBUMIN SERPL-MCNC: 4.4 G/DL (ref 3.5–5.2)
ALBUMIN/GLOB SERPL: 2 G/DL
ALP SERPL-CCNC: 72 U/L (ref 39–117)
ALT SERPL-CCNC: 7 U/L (ref 1–33)
AST SERPL-CCNC: 18 U/L (ref 1–32)
BASOPHILS # BLD AUTO: 0.05 10*3/MM3 (ref 0–0.2)
BASOPHILS # BLD AUTO: 0.05 10*3/MM3 (ref 0–0.2)
BASOPHILS NFR BLD AUTO: 0.6 % (ref 0–1.5)
BASOPHILS NFR BLD AUTO: 0.6 % (ref 0–1.5)
BILIRUB SERPL-MCNC: <0.2 MG/DL (ref 0–1.2)
BUN SERPL-MCNC: 9 MG/DL (ref 6–20)
BUN/CREAT SERPL: 11.5 (ref 7–25)
CALCIUM SERPL-MCNC: 9 MG/DL (ref 8.6–10.5)
CHLORIDE SERPL-SCNC: 105 MMOL/L (ref 98–107)
CHOLEST SERPL-MCNC: 237 MG/DL (ref 0–200)
CO2 SERPL-SCNC: 24.3 MMOL/L (ref 22–29)
CREAT SERPL-MCNC: 0.78 MG/DL (ref 0.57–1)
EGFRCR SERPLBLD CKD-EPI 2021: 91.5 ML/MIN/1.73
EOSINOPHIL # BLD AUTO: 0.04 10*3/MM3 (ref 0–0.4)
EOSINOPHIL # BLD AUTO: 0.05 10*3/MM3 (ref 0–0.4)
EOSINOPHIL NFR BLD AUTO: 0.5 % (ref 0.3–6.2)
EOSINOPHIL NFR BLD AUTO: 0.6 % (ref 0.3–6.2)
ERYTHROCYTE [DISTWIDTH] IN BLOOD BY AUTOMATED COUNT: 12.7 % (ref 12.3–15.4)
ERYTHROCYTE [DISTWIDTH] IN BLOOD BY AUTOMATED COUNT: 13 % (ref 12.3–15.4)
GLOBULIN SER CALC-MCNC: 2.2 GM/DL
GLUCOSE SERPL-MCNC: 83 MG/DL (ref 65–99)
HCT VFR BLD AUTO: 41.2 % (ref 34–46.6)
HCT VFR BLD AUTO: 42.2 % (ref 34–46.6)
HDLC SERPL-MCNC: 64 MG/DL (ref 40–60)
HGB BLD-MCNC: 14.2 G/DL (ref 12–15.9)
HGB BLD-MCNC: 14.3 G/DL (ref 12–15.9)
HSV1 IGG SER IA-ACNC: <0.91 INDEX (ref 0–0.9)
HSV2 IGG SER IA-ACNC: <0.91 INDEX (ref 0–0.9)
IMM GRANULOCYTES # BLD AUTO: 0.04 10*3/MM3 (ref 0–0.05)
IMM GRANULOCYTES # BLD AUTO: 0.04 10*3/MM3 (ref 0–0.05)
IMM GRANULOCYTES NFR BLD AUTO: 0.5 % (ref 0–0.5)
IMM GRANULOCYTES NFR BLD AUTO: 0.5 % (ref 0–0.5)
LDLC SERPL CALC-MCNC: 160 MG/DL (ref 0–100)
LYMPHOCYTES # BLD AUTO: 3.17 10*3/MM3 (ref 0.7–3.1)
LYMPHOCYTES # BLD AUTO: 3.17 10*3/MM3 (ref 0.7–3.1)
LYMPHOCYTES NFR BLD AUTO: 35.8 % (ref 19.6–45.3)
LYMPHOCYTES NFR BLD AUTO: 35.9 % (ref 19.6–45.3)
MCH RBC QN AUTO: 31.1 PG (ref 26.6–33)
MCH RBC QN AUTO: 31.3 PG (ref 26.6–33)
MCHC RBC AUTO-ENTMCNC: 33.6 G/DL (ref 31.5–35.7)
MCHC RBC AUTO-ENTMCNC: 34.7 G/DL (ref 31.5–35.7)
MCV RBC AUTO: 89.6 FL (ref 79–97)
MCV RBC AUTO: 93 FL (ref 79–97)
MONOCYTES # BLD AUTO: 0.5 10*3/MM3 (ref 0.1–0.9)
MONOCYTES # BLD AUTO: 0.5 10*3/MM3 (ref 0.1–0.9)
MONOCYTES NFR BLD AUTO: 5.6 % (ref 5–12)
MONOCYTES NFR BLD AUTO: 5.7 % (ref 5–12)
NEUTROPHILS # BLD AUTO: 5.02 10*3/MM3 (ref 1.7–7)
NEUTROPHILS # BLD AUTO: 5.06 10*3/MM3 (ref 1.7–7)
NEUTROPHILS NFR BLD AUTO: 56.7 % (ref 42.7–76)
NEUTROPHILS NFR BLD AUTO: 57 % (ref 42.7–76)
NRBC BLD AUTO-RTO: 0 /100 WBC (ref 0–0.2)
NRBC BLD AUTO-RTO: 0 /100 WBC (ref 0–0.2)
PLATELET # BLD AUTO: 286 10*3/MM3 (ref 140–450)
PLATELET # BLD AUTO: 287 10*3/MM3 (ref 140–450)
POTASSIUM SERPL-SCNC: 4.8 MMOL/L (ref 3.5–5.2)
PROT SERPL-MCNC: 6.6 G/DL (ref 6–8.5)
RBC # BLD AUTO: 4.54 10*6/MM3 (ref 3.77–5.28)
RBC # BLD AUTO: 4.6 10*6/MM3 (ref 3.77–5.28)
SODIUM SERPL-SCNC: 141 MMOL/L (ref 136–145)
T4 FREE SERPL-MCNC: 0.94 NG/DL (ref 0.93–1.7)
TRIGL SERPL-MCNC: 78 MG/DL (ref 0–150)
TSH SERPL DL<=0.005 MIU/L-ACNC: 0.69 UIU/ML (ref 0.27–4.2)
VIT B12 SERPL-MCNC: 217 PG/ML (ref 211–946)
VLDLC SERPL CALC-MCNC: 13 MG/DL (ref 5–40)
WBC # BLD AUTO: 8.83 10*3/MM3 (ref 3.4–10.8)
WBC # BLD AUTO: 8.86 10*3/MM3 (ref 3.4–10.8)

## 2022-11-08 ENCOUNTER — TELEMEDICINE (OUTPATIENT)
Dept: PSYCHIATRY | Facility: CLINIC | Age: 52
End: 2022-11-08

## 2022-11-08 DIAGNOSIS — F41.1 GENERALIZED ANXIETY DISORDER: Primary | ICD-10-CM

## 2022-11-08 DIAGNOSIS — F33.1 MODERATE EPISODE OF RECURRENT MAJOR DEPRESSIVE DISORDER: ICD-10-CM

## 2022-11-08 PROCEDURE — 90832 PSYTX W PT 30 MINUTES: CPT | Performed by: COUNSELOR
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
## 2022-11-13 DIAGNOSIS — G47.09 OTHER INSOMNIA: Chronic | ICD-10-CM

## 2022-11-13 DIAGNOSIS — F39 MODERATE MOOD DISORDER: Chronic | ICD-10-CM

## 2022-11-13 DIAGNOSIS — F41.0 PANIC DISORDER: Chronic | ICD-10-CM

## 2022-11-14 RX ORDER — QUETIAPINE FUMARATE 50 MG/1
TABLET, FILM COATED ORAL
Qty: 60 TABLET | Refills: 2 | Status: SHIPPED | OUTPATIENT
Start: 2022-11-14 | End: 2023-01-23

## 2022-11-14 RX ORDER — ARIPIPRAZOLE 15 MG/1
15 TABLET ORAL DAILY
Qty: 30 TABLET | Refills: 2 | Status: SHIPPED | OUTPATIENT
Start: 2022-11-14 | End: 2023-01-23 | Stop reason: SDUPTHER

## 2022-11-14 RX ORDER — PROPRANOLOL HYDROCHLORIDE 20 MG/1
20 TABLET ORAL DAILY
Qty: 30 TABLET | Refills: 2 | Status: SHIPPED | OUTPATIENT
Start: 2022-11-14 | End: 2022-11-22

## 2022-11-14 NOTE — TELEPHONE ENCOUNTER
Rx Refill Note  Requested Prescriptions     Pending Prescriptions Disp Refills   • QUEtiapine (SEROquel) 50 MG tablet [Pharmacy Med Name: QUETIAPINE 50MG  TABLETS] 60 tablet 2     Sig: TAKE 1 TO 2 TABLETS BY MOUTH AT NIGHT   • propranolol (INDERAL) 20 MG tablet [Pharmacy Med Name: PROPRANOLOL 20MG TABLETS] 30 tablet 2     Sig: TAKE 1 TABLET BY MOUTH DAILY FOR ANXIETY   • ARIPiprazole (ABILIFY) 15 MG tablet [Pharmacy Med Name: ARIPIPRAZOLE 15MG (FIFTEEN MG) TABS] 30 tablet 2     Sig: TAKE 1 TABLET BY MOUTH DAILY      Last office visit with prescribing clinician: Visit date not found      Next office visit with prescribing clinician: Visit date not found          {TIP  Is Refill Pharmacy correct?:23}  Chen Bean MA  11/14/22, 08:22 EST

## 2022-11-14 NOTE — TELEPHONE ENCOUNTER
Rx Refill Note  Requested Prescriptions     Pending Prescriptions Disp Refills   • QUEtiapine (SEROquel) 50 MG tablet [Pharmacy Med Name: QUETIAPINE 50MG  TABLETS] 60 tablet 2     Sig: TAKE 1 TO 2 TABLETS BY MOUTH AT NIGHT   • propranolol (INDERAL) 20 MG tablet [Pharmacy Med Name: PROPRANOLOL 20MG TABLETS] 30 tablet 2     Sig: TAKE 1 TABLET BY MOUTH DAILY FOR ANXIETY   • ARIPiprazole (ABILIFY) 15 MG tablet [Pharmacy Med Name: ARIPIPRAZOLE 15MG (FIFTEEN MG) TABS] 30 tablet 2     Sig: TAKE 1 TABLET BY MOUTH DAILY      Last office visit with prescribing clinician: Visit date not found      Next office visit with prescribing clinician: Visit date not found          {TIP  Is Refill Pharmacy correct?:23}  Chen Bean MA  11/14/22, 08:32 EST

## 2022-11-18 DIAGNOSIS — F41.0 PANIC DISORDER: Chronic | ICD-10-CM

## 2022-11-18 RX ORDER — CLONAZEPAM 0.5 MG/1
0.5 TABLET ORAL NIGHTLY PRN
Qty: 30 TABLET | Refills: 0 | Status: SHIPPED | OUTPATIENT
Start: 2022-11-18 | End: 2022-12-19 | Stop reason: SDUPTHER

## 2022-11-18 NOTE — TELEPHONE ENCOUNTER
Rx Refill Note  Requested Prescriptions     Pending Prescriptions Disp Refills   • clonazePAM (KlonoPIN) 0.5 MG tablet 30 tablet 0     Sig: Take 1 tablet by mouth At Night As Needed for Anxiety.      Last office visit with prescribing clinician: 9/17/2021      Next office visit with prescribing clinician: 11/22/2022            Kaleigh Calloway  11/18/22, 11:32 EST

## 2022-11-21 NOTE — PROGRESS NOTES
"This provider is located at [facility], [address] in [place], [place],using a secure [employer] Video Visit through [employer]. Patient is being seen remotely via telehealth at their home address in [place], and stated they are in a secure environment for this session. The patient's condition being diagnosed/treated is appropriate for telemedicine. The provider identified herself as well as her credentials.   The patient, and/or patients guardian, consent to be seen remotely, and when consent is given they understand that the consent allows for patient identifiable information to be sent to a third party as needed.   They may refuse to be seen remotely at any time. The electronic data is encrypted and password protected, and the patient and/or guardian has been advised of the potential risks to privacy not withstanding such measures.    Chief Complaint  Mood disorder, anxiety, panic, and insomnia    Subjective          Marguerite Anderson presents today via MyChart Video through [employer] by herself for a follow up and medication check.     History of Present Illness: Marguerite states, \"it has been tough.\" Marguerite tells me she is moved after living in her previous home of 6 years. They are renovating the new home while living there ans she reports this as being the major stressor. She had new furniture delivered today. She is sleeping but reports feeling tired at 8 PM nightly and waking early around 3:30 AM. She takes 2 Seroquel at night. She continues to have a poor appetite. She started therapy with Sherlyn and feels it could be helpful.  She is compliant with her medications.  She is taking Abilify, Cymbalta, Seroquel, and clonazepam.  She is not taking Propranolol most days as she has not found it helpful to manage anxiety. She denies any side effects of her current medication regiment. She denies any SI/HI/AVH.    Current Medications:   Current Outpatient Medications "   Medication Sig Dispense Refill   • Advair Diskus 250-50 MCG/ACT DISKUS INHALE 1 PUFF BY MOUTH DAILY 60 each 11   • ARIPiprazole (ABILIFY) 15 MG tablet TAKE 1 TABLET BY MOUTH DAILY 30 tablet 2   • aspirin (aspirin) 81 MG EC tablet Take 1 tablet by mouth Daily. 90 tablet 3   • B Complex-C (B-complex with vitamin C) tablet Take 1 tablet by mouth Daily. 90 tablet 3   • clonazePAM (KlonoPIN) 0.5 MG tablet Take 1 tablet by mouth At Night As Needed for Anxiety. 30 tablet 0   • Diclofenac Sodium (VOLTAREN) 1 % gel gel APPLY 4 GRAMS TOPICALLY TO AFFECTED AREA 4 TIMES A DAY AS NEEDED FOR PAIN 100 g 3   • DULoxetine (CYMBALTA) 30 MG capsule Take 1 capsule by mouth Every Night. Take in addition to 60 mg daily 90 capsule 1   • DULoxetine (CYMBALTA) 60 MG capsule Take 1 capsule by mouth Daily. 90 capsule 1   • estradiol (VIVELLE-DOT) 0.1 MG/24HR patch APPLY 1 PATCH TOPICALLY TO THE SKIN 2 TIMES A WEEK AS DIRECTED 8 patch 2   • levETIRAcetam (KEPPRA) 750 MG tablet Take 1 tablet by mouth 2 (Two) Times a Day. 180 tablet 3   • nicotine (Nicoderm CQ) 21 MG/24HR patch Place 1 patch on the skin as directed by provider Daily. 30 patch 4   • OnabotulinumtoxinA (Botox) 200 units reconstituted solution INJECT 200 UNITS INTRAMUSCULARLY INTO HEAD, NECK & SHOULDERS EVERY 12 WEEKS PER FDA PROTOCOL 1 each 3   • QUEtiapine (SEROquel) 50 MG tablet TAKE 1 TO 2 TABLETS BY MOUTH AT NIGHT 60 tablet 2   • Rimegepant Sulfate (Nurtec) 75 MG tablet dispersible tablet Place 1 tablet under the tongue Take As Directed. 16 tablet 5   • SUMAtriptan (IMITREX) 20 MG/ACT nasal spray USE 1 SPRAY IN 1 NOSTRIL IF NEEDED 18 each 0   • [START ON 12/13/2022] topiramate (TOPAMAX) 100 MG tablet Take 1 tablet by mouth 2 (Two) Times a Day. 180 tablet 3   • cyclobenzaprine (FLEXERIL) 10 MG tablet Take 10 mg by mouth 2 (Two) Times a Day As Needed.       No current facility-administered medications for this visit.       Objective   Vital Signs:   There were no vitals taken  for this visit.    Physical Exam  Nursing note reviewed. Vitals reviewed: Vitals not obtained due to nature of telehealth visit.   Constitutional:       Appearance: Normal appearance.   Neurological:      General: No focal deficit present.      Mental Status: She is alert and oriented to person, place, and time.   Psychiatric:         Attention and Perception: Attention normal.         Mood and Affect: Mood is anxious.         Speech: Speech normal.         Behavior: Behavior normal. Hyperactive: restless. Behavior is cooperative.         Thought Content: Thought content normal.         Cognition and Memory: Cognition normal.         Judgment: Judgment normal.        Result Review :       The following data was reviewed by CHRIS Nick on 11/22/2022:  HSV 1 & 2 - Specific Antibody, IgG (10/31/2022 14:12)  Lipid Panel (10/31/2022 14:13)  CBC & Differential (10/31/2022 14:13)  Vitamin B12 (10/31/2022 14:13)  T4, Free (10/31/2022 14:13)  TSH (10/31/2022 14:13)  Comprehensive Metabolic Panel (10/31/2022 14:13)    Telemedicine with Sherlyn Dickey [facility] (11/08/2022)  Office Visit with Eva Gonzalez APRN (10/31/2022)         Assessment and Plan    Problem List Items Addressed This Visit    None  Visit Diagnoses     JAY (generalized anxiety disorder)  (Chronic)   -  Primary    Relevant Medications    DULoxetine (CYMBALTA) 60 MG capsule    DULoxetine (CYMBALTA) 30 MG capsule    Moderate mood disorder (HCC)  (Chronic)       Relevant Medications    DULoxetine (CYMBALTA) 60 MG capsule    DULoxetine (CYMBALTA) 30 MG capsule    Other insomnia  (Chronic)       Panic disorder  (Chronic)       Relevant Medications    DULoxetine (CYMBALTA) 60 MG capsule    DULoxetine (CYMBALTA) 30 MG capsule          Mental Status Exam:   Hygiene:   good  Cooperation:  Cooperative  Eye Contact:  Good  Psychomotor Behavior:  Appropriate  Affect:  Restricted  Mood: anxious  Speech:  Normal  Thought Process:  Goal directed and  Linear  Thought Content:  Normal  Suicidal:  None  Homicidal:  None  Hallucinations:  None  Delusion:  None  Memory:  Intact  Orientation:  Person, Place, Time and Situation  Reliability:  good  Insight:  Good  Judgement:  Good  Impulse Control:  Good  Physical/Medical Issues:  Yes Chronic pain, seizures, and migraines      PHQ-9 Score:   PHQ-9 Total Score:      Impression/Plan:  -This is a follow up and medication check. Marguerite reports high levels of anxiety after a move. She is trying to manage but finds herself wanting to withdraw and isolate. She is taking all medications except Propranolol which she did not find helpful. She is meeting with Sherlyn in therapy. She and I discussed her regiment and I encouraged her to continue as directed but provided education that continuous use of benzodiazepines can lead to dependency, addiction, rebound anxiety, depression, and cognitive impairment, among other risks. She verbalizes understanding. She will continue in therapy to develop coping skills and will use medication as cautiously as possible.   -Increase Seroquel to 100 mg during the day for anxiety and insomnia. Patient has refills.  -Decrease Cymbalta to 90 mg daily for depression and anxiety.  Patient will take 60 mg mg daily and 30 mg nightly.  -Continue clonazepam 0.5 mg nightly as needed for anxiety and insomnia. # 30 tablets given. Patient has refills.  -Continue Abilify 15 mg daily for mood disorder.  Patient will take in the a.m. Patient has refills.  -Continue therapy with [facility].  -Continue Keppra,Topamax, Imitrex, and Botox for seizure disorder and migraines. These medications are prescribed by another provider.  -The ASYA report, reviewed through PDMP , of the past 12 months were reviewed and is appropriate.  The patient/guardian reports taking the medication only as prescribed.  The patient/guardian denies any abuse or misuse of the medication.  The patient/guardian denies any other  substance use or issues.  There are no apparent substance related issues.  The patient reports no side effects of the current medication usage.  The patient/guardian has reported significant improvement with medication usage and wishes to continue medication as prescribed.  The patient/guardian is appropriate to continue with current medication usage at this time.  Reinforced risks and side effects of medication usage, patient and/or guardian verbalize understanding in their own words and are in agreement with current plan.  -Last UDS on 10/04/22. Positive TCA and negative benzodiazepines.     MEDS ORDERED DURING VISIT:  New Medications Ordered This Visit   Medications   • DULoxetine (CYMBALTA) 60 MG capsule     Sig: Take 1 capsule by mouth Daily.     Dispense:  90 capsule     Refill:  1   • DULoxetine (CYMBALTA) 30 MG capsule     Sig: Take 1 capsule by mouth Every Night. Take in addition to 60 mg daily     Dispense:  90 capsule     Refill:  1         Follow Up   Return in about 2 months (around 1/22/2023) for Medication Check.  Patient was given instructions and counseling regarding her condition or for health maintenance advice. Please see specific information pulled into the AVS if appropriate.       TREATMENT PLAN/GOALS: Continue supportive psychotherapy efforts and medications as indicated. Treatment and medication options discussed during today's visit. Patient acknowledged and verbally consented to continue with current treatment plan and was educated on the importance of compliance with treatment and follow-up appointments.    MEDICATION ISSUES:  Discussed medication options and treatment plan of prescribed medication as well as the risks, benefits, and side effects including potential falls, possible impaired driving and metabolic adversities among others. Patient is agreeable to call the office with any worsening of symptoms or onset of side effects. Patient is agreeable to call 911 or go to the nearest ER  should he/she begin having SI/HI.      This document has been electronically signed by CHRIS Witt, PMHNP-BC  November 22, 2022 12:51 EST    Part of this note may be an electronic transcription/translation of spoken language to printed text using the Dragon Dictation System.

## 2022-11-22 ENCOUNTER — TELEMEDICINE (OUTPATIENT)
Dept: PSYCHIATRY | Facility: CLINIC | Age: 52
End: 2022-11-22

## 2022-11-22 DIAGNOSIS — F41.0 PANIC DISORDER: Chronic | ICD-10-CM

## 2022-11-22 DIAGNOSIS — F39 MODERATE MOOD DISORDER: Chronic | ICD-10-CM

## 2022-11-22 DIAGNOSIS — G47.09 OTHER INSOMNIA: Chronic | ICD-10-CM

## 2022-11-22 DIAGNOSIS — F41.1 GAD (GENERALIZED ANXIETY DISORDER): Primary | Chronic | ICD-10-CM

## 2022-11-22 PROCEDURE — 99214 OFFICE O/P EST MOD 30 MIN: CPT | Performed by: NURSE PRACTITIONER

## 2022-11-22 RX ORDER — DULOXETIN HYDROCHLORIDE 30 MG/1
30 CAPSULE, DELAYED RELEASE ORAL NIGHTLY
Qty: 90 CAPSULE | Refills: 1 | Status: SHIPPED | OUTPATIENT
Start: 2022-11-22 | End: 2023-01-23

## 2022-11-22 RX ORDER — DULOXETIN HYDROCHLORIDE 60 MG/1
60 CAPSULE, DELAYED RELEASE ORAL DAILY
Qty: 90 CAPSULE | Refills: 1 | Status: SHIPPED | OUTPATIENT
Start: 2022-11-22 | End: 2023-01-23

## 2022-11-23 ENCOUNTER — ANCILLARY ORDERS (OUTPATIENT)
Dept: NEUROLOGY | Facility: CLINIC | Age: 52
End: 2022-11-23

## 2022-11-23 ENCOUNTER — OFFICE VISIT (OUTPATIENT)
Dept: OBSTETRICS AND GYNECOLOGY | Facility: CLINIC | Age: 52
End: 2022-11-23

## 2022-11-23 VITALS
WEIGHT: 151 LBS | DIASTOLIC BLOOD PRESSURE: 76 MMHG | BODY MASS INDEX: 25.16 KG/M2 | HEIGHT: 65 IN | SYSTOLIC BLOOD PRESSURE: 122 MMHG

## 2022-11-23 DIAGNOSIS — Z79.890 HORMONE REPLACEMENT THERAPY (HRT): ICD-10-CM

## 2022-11-23 DIAGNOSIS — Z12.31 ENCOUNTER FOR SCREENING MAMMOGRAM FOR BREAST CANCER: ICD-10-CM

## 2022-11-23 DIAGNOSIS — N95.2 POSTMENOPAUSAL ATROPHIC VAGINITIS: ICD-10-CM

## 2022-11-23 DIAGNOSIS — N39.46 MIXED STRESS AND URGE URINARY INCONTINENCE: ICD-10-CM

## 2022-11-23 DIAGNOSIS — Z01.419 ENCOUNTER FOR GYNECOLOGICAL EXAMINATION (GENERAL) (ROUTINE) WITHOUT ABNORMAL FINDINGS: Primary | ICD-10-CM

## 2022-11-23 DIAGNOSIS — G43.711 INTRACTABLE CHRONIC MIGRAINE WITHOUT AURA AND WITH STATUS MIGRAINOSUS: ICD-10-CM

## 2022-11-23 DIAGNOSIS — N95.1 MENOPAUSAL SYMPTOMS: ICD-10-CM

## 2022-11-23 DIAGNOSIS — G43.009 MIGRAINE WITHOUT AURA AND WITHOUT STATUS MIGRAINOSUS, NOT INTRACTABLE: Primary | ICD-10-CM

## 2022-11-23 DIAGNOSIS — R39.9 URINARY TRACT INFECTION SYMPTOMS: ICD-10-CM

## 2022-11-23 PROCEDURE — 99396 PREV VISIT EST AGE 40-64: CPT | Performed by: OBSTETRICS & GYNECOLOGY

## 2022-11-23 RX ORDER — ESTRADIOL 0.1 MG/G
CREAM VAGINAL
Qty: 1 EACH | Refills: 11 | Status: SHIPPED | OUTPATIENT
Start: 2022-11-23

## 2022-11-23 RX ORDER — ESTRADIOL 0.1 MG/D
1 FILM, EXTENDED RELEASE TRANSDERMAL 2 TIMES WEEKLY
Qty: 8 PATCH | Refills: 11 | Status: SHIPPED | OUTPATIENT
Start: 2022-11-24

## 2022-11-23 NOTE — PROGRESS NOTES
Chief Complaint  Gynecologic Exam (Patient complains of urinary incontinence. )     History of Present Illness:  Patient is 52 y.o.  who presents to [facility] here for her annual examination.  Patient does report having the onset of urinary incontinence over the last several months.  She reports having both stress urinary incontinence as well as urgency and urge incontinence.  Patient does report she feels like she is emptying her bladder completely.  She denies any dysuria.  Patient does have urinary frequency.  Patient denies any heavy lifting or straining.  She denies any significant changes in her health or medications.  She denies any pressure or bulge per vagina.  Patient reports she has been restoring her home however she has not been doing any of the lifting.  Patient sees Dr. Tripathi for her primary care.  The patient did have recent labs with him.  She has not had a UA or culture.  Patient is due for mammogram.  Patient did have bone density scan in 2019.  She had colonoscopy 2 years ago.  Patient reports her menopausal symptoms have been well controlled with her hormone replacement therapy.  She has been using her estrogen patch consistently.  She desires to continue her current hormone replacement therapy.    History  Past Medical History:   Diagnosis Date   • Abdominal pain, epigastric    • Abnormal Pap smear of cervix hpv   • Acute sinusitis    • Acute UTI (urinary tract infection)    • Angina, intestinal (HCC)    • Anxiety    • Arthritis    • Asthma not sure    COPD   • Back pain    • Bipolar disorder (HCC)    • Breast mass, right    • Cancer (HCC) 2018    basal cell R ear   • Cervical dysplasia     was frozen off   • Chronic hepatitis C virus infection (HCC)    • COPD (chronic obstructive pulmonary disease) (HCC)    • Depression    • Diarrhea    • Elevated LFTs    • Fatigue    • Glaucoma 2020   • Hepatitis C     cured with meds   • History of  endometriosis    • History of Papanicolaou smear of cervix 04/02/2014    NORMAL    • HPV (human papilloma virus) infection 1996   • HTN (hypertension)    • IBS (irritable bowel syndrome)    • Insomnia    • Menopausal symptoms    • Migraine headache    • Nausea & vomiting    • Neck pain    • Other hyperlipidemia    • Ovarian cyst    • Pelvic adhesive disease    • Radicular pain of left lower extremity    • Restless leg syndrome    • Seizure disorder (HCC)    • Tobacco abuse    • Tobacco abuse    • Trochanteric bursitis    • Vitamin B 12 deficiency      Current Outpatient Medications on File Prior to Visit   Medication Sig Dispense Refill   • Advair Diskus 250-50 MCG/ACT DISKUS INHALE 1 PUFF BY MOUTH DAILY 60 each 11   • ARIPiprazole (ABILIFY) 15 MG tablet TAKE 1 TABLET BY MOUTH DAILY 30 tablet 2   • aspirin (aspirin) 81 MG EC tablet Take 1 tablet by mouth Daily. 90 tablet 3   • B Complex-C (B-complex with vitamin C) tablet Take 1 tablet by mouth Daily. 90 tablet 3   • clonazePAM (KlonoPIN) 0.5 MG tablet Take 1 tablet by mouth At Night As Needed for Anxiety. 30 tablet 0   • cyclobenzaprine (FLEXERIL) 10 MG tablet Take 10 mg by mouth 2 (Two) Times a Day As Needed.     • Diclofenac Sodium (VOLTAREN) 1 % gel gel APPLY 4 GRAMS TOPICALLY TO AFFECTED AREA 4 TIMES A DAY AS NEEDED FOR PAIN 100 g 3   • DULoxetine (CYMBALTA) 30 MG capsule Take 1 capsule by mouth Every Night. Take in addition to 60 mg daily 90 capsule 1   • DULoxetine (CYMBALTA) 60 MG capsule Take 1 capsule by mouth Daily. 90 capsule 1   • levETIRAcetam (KEPPRA) 750 MG tablet Take 1 tablet by mouth 2 (Two) Times a Day. 180 tablet 3   • nicotine (Nicoderm CQ) 21 MG/24HR patch Place 1 patch on the skin as directed by provider Daily. 30 patch 4   • OnabotulinumtoxinA (Botox) 200 units reconstituted solution INJECT 200 UNITS INTRAMUSCULARLY INTO HEAD, NECK & SHOULDERS EVERY 12 WEEKS PER FDA PROTOCOL 1 each 3   • QUEtiapine (SEROquel) 50 MG tablet TAKE 1 TO 2 TABLETS  BY MOUTH AT NIGHT 60 tablet 2   • Rimegepant Sulfate (Nurtec) 75 MG tablet dispersible tablet Place 1 tablet under the tongue Take As Directed. 16 tablet 5   • SUMAtriptan (IMITREX) 20 MG/ACT nasal spray USE 1 SPRAY IN 1 NOSTRIL IF NEEDED 18 each 0   • [START ON 2022] topiramate (TOPAMAX) 100 MG tablet Take 1 tablet by mouth 2 (Two) Times a Day. 180 tablet 3     No current facility-administered medications on file prior to visit.     No Known Allergies  Past Surgical History:   Procedure Laterality Date   • BILATERAL SALPINGO OOPHORECTOMY  03/10/2015    DR NATALY THOMPSON   • BREAST LUMPECTOMY     •  SECTION     • HYSTERECTOMY  03/10/2015    [facility] (DR NATALY THOMPSON)   • HYSTEROSCOPY      complete   • INGUINAL HERNIA REPAIR Right    • LAPAROSCOPIC ASSISTED VAGINAL HYSTERECTOMY SALPINGO OOPHORECTOMY      to remove cyst   • LYSIS OF ABDOMINAL ADHESIONS  03/10/2015    DR NATALY THOMPSON   • TUBAL ABDOMINAL LIGATION     • WISDOM TOOTH EXTRACTION       Family History   Problem Relation Age of Onset   • Alzheimer's disease Other    • Cancer Other    • Dementia Other    • Hypertension Other    • Parkinsonism Other    • Osteoporosis Mother    • Diabetes Mother    • Hypertension Mother    • Obesity Mother    • Depression Mother    • Arthritis Mother    • COPD Mother    • Hearing loss Mother    • Hyperlipidemia Mother    • Breast cancer Maternal Grandmother    • Osteoporosis Maternal Grandmother    • Diabetes Maternal Grandmother    • Dementia Maternal Grandmother    • Cancer Maternal Grandmother         Breast Cancer   • Hypertension Maternal Grandmother    • ADD / ADHD Sister    • Alcohol abuse Sister    • Anxiety disorder Sister    • Bipolar disorder Sister    • Depression Sister    • Drug abuse Sister    • Depression Father    • OCD Neg Hx    • Paranoid behavior Neg Hx    • Schizophrenia Neg Hx    • Seizures Neg Hx    • Self-Injurious Behavior  Neg Hx    • Suicide Attempts Neg Hx      Social History  "    Socioeconomic History   • Marital status:    • Number of children: 1   • Highest education level: High school graduate   Tobacco Use   • Smoking status: Light Smoker     Packs/day: 0.50     Years: 18.00     Pack years: 9.00     Types: Cigarettes   • Smokeless tobacco: Never   • Tobacco comments:     In process of quitting, using nicotine patches   Vaping Use   • Vaping Use: Never used   Substance and Sexual Activity   • Alcohol use: Not Currently     Comment: on occasion for an occasion or event   • Drug use: No   • Sexual activity: Yes     Partners: Male     Birth control/protection: Surgical, Post-menopausal       Physical Examination:  Vital Signs: /76   Ht 165.1 cm (65\")   Wt 68.5 kg (151 lb)   BMI 25.13 kg/m²     General Appearance: alert, appears stated age, and cooperative  Breasts: Examined in supine position  Symmetric without masses or skin dimpling  Nipples normal without inversion, lesions or discharge  There are no palpable axillary nodes  Abdomen: no masses, no hepatomegaly, no splenomegaly, soft non-tender, no guarding, and no rebound tenderness  Pelvic: Clinical staff was present for exam  External genitalia:  normal appearance of the external genitalia including Bartholin's and Le Mars's glands.  :  urethral meatus normal;  Vaginal:  atrophic mucosal changes are present;  Cervix:  absent.  Uterus:  absent.  Adnexa:  non palpable bilaterally.  Pap smear done and specimen sent using Thin-Prep technique    Data Review:  The following data was reviewed by: Sandra Rangel MD on 11/23/2022:     Labs:  CBC w AUTO Differential (10/31/2022 14:12)  HSV 1 & 2 - Specific Antibody, IgG (10/31/2022 14:12)  Lipid Panel (10/31/2022 14:13)  CBC & Differential (10/31/2022 14:13)  Vitamin B12 (10/31/2022 14:13)  T4, Free (10/31/2022 14:13)  TSH (10/31/2022 14:13)  Comprehensive Metabolic Panel (10/31/2022 14:13)    Imaging:  SCANNED - MAMMO (01/20/2021)    Medical Records:  None    Assessment and " Plan   1. Encounter for gynecological examination (general) (routine) without abnormal findings  Pap was done today.  If she does not receive the results of the Pap within 2 weeks  time, she was instructed to call to find out the results.  I explained to Marguerite that the recommendations for Pap smear interval in a low risk patient has lengthened to 3 years time if cytology alone normal or  5 years time if both cytology and HPV testing were normal.  I encouraged her to be seen yearly for a full physical exam including breast and pelvic exam even during the off years when PAP's will not be performed.  - LIQUID-BASED PAP SMEAR, [facility] ([facility])    2. Encounter for screening mammogram for breast cancer  It is recommended per ACOG, for women at average risk to start annual mammogram screening at the age of 40 until the age of 75 and an individualized decision be made for women after age 75.  She was encouraged to continue getting yearly mammograms.  She should report any palpable breast lump(s) or skin changes regardless of mammographic findings.  I explained to Marguerite that notification regarding her mammogram results will come from the center performing the study.  Our office will not be routinely calling with mammogram results.  It is her responsibility to make sure that the results from the mammogram are communicated to her by the breast center.  If she has any questions about the results, she is welcome to call our office anytime.  The patient reports she will schedule her mammogram.    Marguerite was counseled regarding having clinical breast exams and breast self-awareness.  Women aged 29-39 years of age should have clinical breast exams every 1-3 years and yearly aged 40 and older.  The patient was counseled regarding breast self-awareness focusing on having a sense of what is normal for her breasts so that she can tell if there are changes.  Even small changes should be reported to provider.  - Mammo Screening  Digital Tomosynthesis Bilateral With CAD; Future    3. Menopausal symptoms  I had a long discussion with the patient regarding the risk versus benefits of systemic hormone replacement therapy.  I have discussed with the patient is recommended to be on the lowest dose for the shortest period of time.  Prescription is given as noted.  Instructions and precautions have been given.  - estradiol (VIVELLE-DOT) 0.1 MG/24HR patch; Place 1 patch on the skin as directed by provider 2 (Two) Times a Week.  Dispense: 8 patch; Refill: 11    4. Urinary tract infection symptoms  We will send urine for clean-catch UA culture and sensitivity.  Patient is to call for the results.  - Urinalysis With Microscopic - Urine, Clean Catch  - Urine Culture - Urine, Urine, Clean Catch    5. Hormone replacement therapy (HRT)  I have discussed at length with the patient the risk versus benefits of hormone replacement therapy.  Prescription is given as noted.  Patient is to consider lower dose next year as discussed.  - estradiol (VIVELLE-DOT) 0.1 MG/24HR patch; Place 1 patch on the skin as directed by provider 2 (Two) Times a Week.  Dispense: 8 patch; Refill: 11    6. Mixed stress and urge urinary incontinence  Patient with new onset urinary incontinence over the last several months.  Marguerite Anderson was informed that urinary incontinence is the involuntary leaking of urine caused by a wide variety of factors.  It is a common condition in women that increases with age.  The patient was informed that there are different types of urinary incontinence to include stress urinary incontinence, urgency urinary incontinence, and mixed urinary incontinence as well as other subtypes.  The patient was informed that the correct diagnosis is important in the evaluation and treatment of her leaking.  The basic evaluation includes patient's history and physical examination.  A urinary tract infection needs to be ruled out as well as urinary retention and  overflow incontinence.  Sometimes additional specialized testing needs to be performed such as urodynamic testing and cystoscopy.  The various treatment options were discussed ranging from conservative  treatment to include pelvic floor exercises and modifications in lifestyle, pessary, pharmacologic, to surgical.  Locally administered estrogen may be of some benefit in women with vaginal atrophy. Because treatment options vary by incontinence type and effectiveness, it is important to determine the etiology and severity of symptoms.  Patient is instructed to apply the estrogen cream to the periurethral area.  Patient is to call if no improvement in her symptoms in 4 to 6 weeks.  - estradiol (ESTRACE VAGINAL) 0.1 MG/GM vaginal cream; Use 0.5 grams intravaginally twice weekly to control symptoms.  Dispense: 1 each; Refill: 11  - Urinalysis With Microscopic - Urine, Clean Catch  - Urine Culture - Urine, Urine, Clean Catch    7. Postmenopausal atrophic vaginitis  Discussed various options for relief of atrophic vaginal symptoms related to menopause. Discussed local therapy for treatment of vaginal symptoms only.  Discussed the different formulation options including cream, ring, and tablets.  Discussed the low risk of systemic absorption in postmenopausal women with atrophy using 25 mcgs of estradiol on a daily basis.  Recommend low dose use 2-3x/wk for maintenance of treatment.  Other treatment options were discussed including the use of water-based and silicone-based vaginal lubricants and moisturizers.  Also discussed was the FDA approved treatment option of ospemifene for moderate to severe dyspareunia.  - estradiol (ESTRACE VAGINAL) 0.1 MG/GM vaginal cream; Use 0.5 grams intravaginally twice weekly to control symptoms.  Dispense: 1 each; Refill: 11    Follow Up/Instructions:  Follow up as noted.  Patient was given instructions and counseling regarding her condition or for health maintenance advice. Please see  specific information pulled into the AVS if appropriate.     Note: Speech recognition transcription software may have been used to dictate portions of this document.  An attempt at proofreading has been made though minor errors in transcription may still be present.    This note was electronically signed.  Sandra Rangel M.D.

## 2022-11-25 LAB
APPEARANCE UR: CLEAR
BACTERIA #/AREA URNS HPF: NORMAL /HPF
BACTERIA UR CULT: NO GROWTH
BACTERIA UR CULT: NORMAL
BILIRUB UR QL STRIP: NEGATIVE
CASTS URNS MICRO: NORMAL
COLOR UR: YELLOW
EPI CELLS #/AREA URNS HPF: NORMAL /HPF
GLUCOSE UR QL STRIP: NEGATIVE
HGB UR QL STRIP: NEGATIVE
KETONES UR QL STRIP: NEGATIVE
LEUKOCYTE ESTERASE UR QL STRIP: NEGATIVE
NITRITE UR QL STRIP: NEGATIVE
PH UR STRIP: 6.5 [PH] (ref 5–8)
PROT UR QL STRIP: NEGATIVE
RBC #/AREA URNS HPF: NORMAL /HPF
SP GR UR STRIP: 1.01 (ref 1–1.03)
UROBILINOGEN UR STRIP-MCNC: NORMAL MG/DL
WBC #/AREA URNS HPF: NORMAL /HPF

## 2022-11-29 LAB — REF LAB TEST METHOD: NORMAL

## 2022-12-14 ENCOUNTER — TELEMEDICINE (OUTPATIENT)
Dept: PSYCHIATRY | Facility: CLINIC | Age: 52
End: 2022-12-14

## 2022-12-14 DIAGNOSIS — F39 MODERATE MOOD DISORDER: Primary | ICD-10-CM

## 2022-12-14 DIAGNOSIS — F41.1 GAD (GENERALIZED ANXIETY DISORDER): ICD-10-CM

## 2022-12-14 PROCEDURE — 90837 PSYTX W PT 60 MINUTES: CPT | Performed by: COUNSELOR

## 2022-12-14 NOTE — PROGRESS NOTES
Telehealth Encounter Note:  Date of Service: December 15, 2022  Patient Name: Marguerite Anderson  : 1970   MRN: [id number]   Time In: 12:34 PM   Time Out: 1:29 PM     This provider is located at [facility] [address], [place] [address] using a secure [place] Video Visit through [facility]. Patient is being seen remotely via telehealth at home address in [place] and stated they are in a secure environment for this session. The patient's condition being diagnosed/treated is appropriate for telemedicine. The provider identified herself as well as her credentials. The patient, and/or patients guardian, consent to be seen remotely, and when consent is given they understand that the consent allows for patient identifiable information to be sent to a third party as needed. They may refuse to be seen remotely at any time. The electronic data is encrypted and password protected, and the patient and/or guardian has been advised of the potential risks to privacy not withstanding such measures.     You have chosen to receive care through a telehealth visit.  Do you consent to use a video/audio connection for your medical care today? Yes    Referring Provider: Juan Clemens MD    PROGRESS NOTE    History of Present Illness:   Marguerite Anderson is a 52 y.o. female who is being seen today for follow up individual Psychotherapy session.     Chief Complaint: Pt struggles with anxiety and depression. Pt reports continued stress over moving into her new home due to work needed on the house and having to move in before wanting to.  Having many tasks to complete can be overwhelming.  Pt reports anxiety is high and mood is poor.  Pt reports she is filling for disability again and that she has been denied several times prior.  Pt reports that this time of year is hard for her due a previous suicide attempt years prior that she was traumatized by.  Pt reports the loss of her  traumatically has also been  replaying over in her mind.  Pt reports not sleeping well and finds herself napping during the day.  Pt wakes up throughout the night.  Pt has to leave the house today for medical appts. In which she has anxiety about.                                ICD-10-CM ICD-9-CM   1. Moderate mood disorder (HCC)  F39 296.90   2. JAY (generalized anxiety disorder)  F41.1 300.02      Clinical Maneuvering/Intervention:   Assisted patient in processing above session content; acknowledged and normalized patient’s thoughts, feelings, and concerns by utilizing a person-centered approach in efforts to build appropriate rapport and a positive therapeutic relationship with open and honest communication.  Processed and rationalized patients thoughts and feelings regarding trauma hx/grief and loss; .  Discussed triggers associated with patient's anxiety/depression.  Also discussed coping skills for patient to implement such as reframing negative thoughts, using artificial light during darker months, lean on positive supports, reframing negative thoughts, avoid isolation, identifying things to look forward and gratitude.  Pt is pushing through to get out when needed and has a get-together planned with friends at a restaurant at end of the month.      Allowed patient to freely discuss issues without interruption or judgment. Provided safe, confidential environment to facilitate the development of positive therapeutic relationship and encourage open, honest communication. Assisted patient in identifying risk factors which would indicate the need for higher level of care including thoughts to harm self or others and/or self-harming behavior and encouraged patient to contact this office, call 911, or present to the nearest emergency room should any of these events occur. Discussed crisis intervention services and means to access. Patient adamantly and convincingly denies current suicidal or homicidal ideation or perceptual  disturbance.    Mental Status Exam:   Hygiene:   good  Cooperation:  Cooperative  Eye Contact:  Good  Psychomotor Behavior:  Appropriate  Affect:  Blunted  Mood: depressed anxious  Speech:  Normal  Thought Process:  Goal directed and Linear  Thought Content:  Mood congruent  Suicidal:  None  Homicidal:  None  Hallucinations:  None  Delusion:  None  Memory:  Intact  Orientation:  Person, Place, Time and Situation  Reliability:  good  Insight:  Good  Judgement:  Good  Impulse Control:  Good      Patient's Support Network Includes:  significant other    Functional Status: Severe impairment    Progress toward goal: Not at goal    Prognosis: Good with Ongoing Treatment     Medications:     Current Outpatient Medications:   •  Advair Diskus 250-50 MCG/ACT DISKUS, INHALE 1 PUFF BY MOUTH DAILY, Disp: 60 each, Rfl: 11  •  ARIPiprazole (ABILIFY) 15 MG tablet, TAKE 1 TABLET BY MOUTH DAILY, Disp: 30 tablet, Rfl: 2  •  aspirin (aspirin) 81 MG EC tablet, Take 1 tablet by mouth Daily., Disp: 90 tablet, Rfl: 3  •  B Complex-C (B-complex with vitamin C) tablet, Take 1 tablet by mouth Daily., Disp: 90 tablet, Rfl: 3  •  clonazePAM (KlonoPIN) 0.5 MG tablet, Take 1 tablet by mouth At Night As Needed for Anxiety., Disp: 30 tablet, Rfl: 0  •  cyclobenzaprine (FLEXERIL) 10 MG tablet, Take 10 mg by mouth 2 (Two) Times a Day As Needed., Disp: , Rfl:   •  Diclofenac Sodium (VOLTAREN) 1 % gel gel, APPLY 4 GRAMS TOPICALLY TO AFFECTED AREA 4 TIMES A DAY AS NEEDED FOR PAIN, Disp: 100 g, Rfl: 3  •  DULoxetine (CYMBALTA) 30 MG capsule, Take 1 capsule by mouth Every Night. Take in addition to 60 mg daily, Disp: 90 capsule, Rfl: 1  •  DULoxetine (CYMBALTA) 60 MG capsule, Take 1 capsule by mouth Daily., Disp: 90 capsule, Rfl: 1  •  estradiol (ESTRACE VAGINAL) 0.1 MG/GM vaginal cream, Use 0.5 grams intravaginally twice weekly to control symptoms., Disp: 1 each, Rfl: 11  •  estradiol (VIVELLE-DOT) 0.1 MG/24HR patch, Place 1 patch on the skin as  directed by provider 2 (Two) Times a Week., Disp: 8 patch, Rfl: 11  •  levETIRAcetam (KEPPRA) 750 MG tablet, Take 1 tablet by mouth 2 (Two) Times a Day., Disp: 180 tablet, Rfl: 3  •  nicotine (Nicoderm CQ) 21 MG/24HR patch, Place 1 patch on the skin as directed by provider Daily., Disp: 30 patch, Rfl: 4  •  OnabotulinumtoxinA (Botox) 200 units reconstituted solution, INJECT 200 UNITS INTRAMUSCULARLY INTO HEAD, NECK & SHOULDERS EVERY 12 WEEKS PER FDA PROTOCOL, Disp: 1 each, Rfl: 3  •  QUEtiapine (SEROquel) 50 MG tablet, TAKE 1 TO 2 TABLETS BY MOUTH AT NIGHT, Disp: 60 tablet, Rfl: 2  •  Rimegepant Sulfate (Nurtec) 75 MG tablet dispersible tablet, Place 1 tablet under the tongue Take As Directed., Disp: 16 tablet, Rfl: 5  •  SUMAtriptan (IMITREX) 20 MG/ACT nasal spray, USE 1 SPRAY IN 1 NOSTRIL IF NEEDED, Disp: 18 each, Rfl: 0  •  topiramate (TOPAMAX) 100 MG tablet, Take 1 tablet by mouth 2 (Two) Times a Day., Disp: 180 tablet, Rfl: 3    Visit Diagnosis/Orders Placed This Visit:    ICD-10-CM ICD-9-CM   1. Moderate mood disorder (HCC)  F39 296.90   2. JAY (generalized anxiety disorder)  F41.1 300.02        PLAN:  1. Safety: No acute safety concerns  2. Risk Assessment: Risk of self-harm acutely is low. Risk of self-harm chronically is also low, but could be further elevated in the event of treatment noncompliance and/or AODA.    Crisis Plan:  Symptoms and/or behaviors to indicate a crisis: Excessive worry or fear, Feeling sad or low, Isolation, Lack of sleep and Thinking about suicide    What calming techniques or other strategies will patient use to de-escalate and stay safe: slow down, breathe, visualize calming self, think it though, listen to music, change focus, take a walk    Who is one person patient can contact to assist with de-escalation? SO    Treatment Plan/Goals: Patient will continue supportive psychotherapy efforts and medication regimen as prescribed. Therapist will provide Cognitive Behavioral Therapy to  assist patient in improving functioning and gaining coping skills, maintaining stability, and avoiding decompensation and the need for higher level of care. Plan for treatment was discussed during today's visit. Patient acknowledged and verbally consented to continue with current treatment plan and was educated on the importance of compliance with treatment and follow-up appointments.     Patient will contact this office, call 911 or present to the nearest emergency room should suicidal or homicidal ideations occur.     Follow Up:   Return in about 4 weeks (around 1/11/2023) for Therapy session.      QUOC Peña   [facility]     This document has been electronically signed by QUOC Peña   December 15, 2022 16:31 EST

## 2022-12-19 DIAGNOSIS — F41.0 PANIC DISORDER: Chronic | ICD-10-CM

## 2022-12-19 RX ORDER — CLONAZEPAM 0.5 MG/1
0.5 TABLET ORAL NIGHTLY PRN
Qty: 30 TABLET | Refills: 0 | Status: SHIPPED | OUTPATIENT
Start: 2022-12-19 | End: 2023-01-18 | Stop reason: SDUPTHER

## 2022-12-19 NOTE — TELEPHONE ENCOUNTER
Rx Refill Note  Requested Prescriptions     Pending Prescriptions Disp Refills   • clonazePAM (KlonoPIN) 0.5 MG tablet 30 tablet 0     Sig: Take 1 tablet by mouth At Night As Needed for Anxiety.      Last office visit with prescribing clinician: 9/17/2021   Last telemedicine visit with prescribing clinician: 1/23/2023   Next office visit with prescribing clinician: 1/23/2023                         Would you like a call back once the refill request has been completed: [] Yes [] No    If the office needs to give you a call back, can they leave a voicemail: [] Yes [] No    Raghav Grimaldo MA  12/19/22, 10:38 EST

## 2023-01-18 DIAGNOSIS — F41.0 PANIC DISORDER: Chronic | ICD-10-CM

## 2023-01-18 RX ORDER — CLONAZEPAM 0.5 MG/1
0.5 TABLET ORAL NIGHTLY PRN
Qty: 30 TABLET | Refills: 0 | Status: SHIPPED | OUTPATIENT
Start: 2023-01-18 | End: 2023-02-20 | Stop reason: SDUPTHER

## 2023-01-22 NOTE — PROGRESS NOTES
"This provider is located at [facility] ,[address], [address] in [place], [place],using a secure MyChart Video Visit through [employer]. Patient is being seen remotely via telehealth at their home address in [place], and stated they are in a secure environment for this session. The patient's condition being diagnosed/treated is appropriate for telemedicine. The provider identified herself as well as her credentials.   The patient, and/or patients guardian, consent to be seen remotely, and when consent is given they understand that the consent allows for patient identifiable information to be sent to a third party as needed.   They may refuse to be seen remotely at any time. The electronic data is encrypted and password protected, and the patient and/or guardian has been advised of the potential risks to privacy not withstanding such measures.    Chief Complaint  Mood disorder, anxiety, panic, and insomnia    Subjective          Marguerite Anderson presents today via MyChart Video through [employer] by herself for a follow up and medication check.     History of Present Illness: Marguerite states, \"I am doing fairly well.\"  Marguerite tells me she was able to make it through the holidays despite a depressed mood.  She tells me that she \"made herself get up and go and powered through.\"  She feels that her anxiety has \"been through the roof\" and her depression has been significant.  She reports feeling \"down in a slump\" since Rosa.  She tells me that she has had anhedonia, lack of motivation, increased need for sleep, and decreased appetite. She reports thoughts such as \"I'd be better off\" but denies intent or plan for suicide. She identifies her family as protective against suicide. She denies any weight loss.  She tells me that she does eat once daily and this is a very small meal.  For instance, yesterday she had a hot dog with chili sauce.  She tells me that she is sleeping more " throughout the day and will return to sleep after waking early at 3 AM.  She goes back to bed at 9 to 10 AM and sleeps until 2 PM.  Then she is ready to return to bed at 8 PM and wakes throughout the night.  She has tried to improve her sleep regimen and reports having guilt feelings and she cannot stop sleeping through the day.  She notes an increase in migraines as she has not had her Botox injections in sometimes.  She does plan on contacting neurology for a new appointment as her provider is leaving the practice. She is compliant with her medications.  She is taking Abilify, Cymbalta, Seroquel, and clonazepam. She denies any side effects of her current medication regiment. She is seeing Sherlyn for therapy. She denies any SI/HI/AVH.    Current Medications:   Current Outpatient Medications   Medication Sig Dispense Refill   • ARIPiprazole (ABILIFY) 15 MG tablet Take 1 tablet by mouth Daily. 30 tablet 2   • DULoxetine (CYMBALTA) 60 MG capsule Take 1 capsule by mouth 2 (Two) Times a Day. 180 capsule 1   • QUEtiapine (SEROquel) 50 MG tablet Take 1 tablet by mouth Every Night. 30 tablet 2   • Advair Diskus 250-50 MCG/ACT DISKUS INHALE 1 PUFF BY MOUTH DAILY 60 each 11   • aspirin (aspirin) 81 MG EC tablet Take 1 tablet by mouth Daily. 90 tablet 3   • B Complex-C (B-complex with vitamin C) tablet Take 1 tablet by mouth Daily. 90 tablet 3   • clonazePAM (KlonoPIN) 0.5 MG tablet Take 1 tablet by mouth At Night As Needed for Anxiety. 30 tablet 0   • cyclobenzaprine (FLEXERIL) 10 MG tablet Take 10 mg by mouth 2 (Two) Times a Day As Needed.     • Diclofenac Sodium (VOLTAREN) 1 % gel gel APPLY 4 GRAMS TOPICALLY TO AFFECTED AREA 4 TIMES A DAY AS NEEDED FOR PAIN 100 g 3   • estradiol (ESTRACE VAGINAL) 0.1 MG/GM vaginal cream Use 0.5 grams intravaginally twice weekly to control symptoms. 1 each 11   • estradiol (VIVELLE-DOT) 0.1 MG/24HR patch Place 1 patch on the skin as directed by provider 2 (Two) Times a Week. 8 patch 11   •  levETIRAcetam (KEPPRA) 750 MG tablet Take 1 tablet by mouth 2 (Two) Times a Day. 180 tablet 3   • nicotine (Nicoderm CQ) 21 MG/24HR patch Place 1 patch on the skin as directed by provider Daily. 30 patch 4   • OnabotulinumtoxinA (Botox) 200 units reconstituted solution INJECT 200 UNITS INTRAMUSCULARLY INTO HEAD, NECK & SHOULDERS EVERY 12 WEEKS PER FDA PROTOCOL 1 each 3   • Rimegepant Sulfate (Nurtec) 75 MG tablet dispersible tablet Place 1 tablet under the tongue Take As Directed. 16 tablet 5   • SUMAtriptan (IMITREX) 20 MG/ACT nasal spray USE 1 SPRAY IN 1 NOSTRIL IF NEEDED 18 each 0   • topiramate (TOPAMAX) 100 MG tablet Take 1 tablet by mouth 2 (Two) Times a Day. 180 tablet 3   • traZODone (DESYREL) 50 MG tablet Take 1 tablet by mouth Every Night. 30 tablet 2     No current facility-administered medications for this visit.       Objective   Vital Signs:   There were no vitals taken for this visit.    Physical Exam  Nursing note reviewed. Vitals reviewed: Vitals not obtained due to nature of telehealth visit.   Constitutional:       Appearance: Normal appearance. She is ill-appearing.   Skin:     Coloration: Skin is pale.   Neurological:      General: No focal deficit present.      Mental Status: She is alert and oriented to person, place, and time.   Psychiatric:         Attention and Perception: Attention normal.         Mood and Affect: Mood is depressed. Affect is flat.         Speech: Speech normal.         Behavior: Behavior is slowed. Behavior is cooperative.         Thought Content: Thought content includes suicidal ideation.         Cognition and Memory: Cognition normal.         Judgment: Judgment normal.        Result Review :       The following data was reviewed by CHRIS Nick on 11/22/2022:    Telemedicine with Sherlyn [facility] (12/14/2022)       Assessment and Plan    Problem List Items Addressed This Visit    None  Visit Diagnoses     Moderate mood disorder (HCC)  (Chronic)   -   Primary    Relevant Medications    DULoxetine (CYMBALTA) 60 MG capsule    ARIPiprazole (ABILIFY) 15 MG tablet    QUEtiapine (SEROquel) 50 MG tablet    traZODone (DESYREL) 50 MG tablet    Panic disorder  (Chronic)       Relevant Medications    DULoxetine (CYMBALTA) 60 MG capsule    ARIPiprazole (ABILIFY) 15 MG tablet    QUEtiapine (SEROquel) 50 MG tablet    traZODone (DESYREL) 50 MG tablet    JAY (generalized anxiety disorder)  (Chronic)       Relevant Medications    DULoxetine (CYMBALTA) 60 MG capsule    ARIPiprazole (ABILIFY) 15 MG tablet    QUEtiapine (SEROquel) 50 MG tablet    traZODone (DESYREL) 50 MG tablet    Other insomnia  (Chronic)       Relevant Medications    QUEtiapine (SEROquel) 50 MG tablet    traZODone (DESYREL) 50 MG tablet          Mental Status Exam:   Hygiene:   good  Cooperation:  Cooperative  Eye Contact:  Good  Psychomotor Behavior:  Slow  Affect:  flat  Mood: depressed  Speech:  Monotone  Thought Process:  Goal directed and Linear  Thought Content:  Normal  Suicidal:  None  Homicidal:  None  Hallucinations:  None  Delusion:  None  Memory:  Intact  Orientation:  Person, Place, Time and Situation  Reliability:  good  Insight:  Good  Judgement:  Good  Impulse Control:  Good  Physical/Medical Issues:  Yes Chronic pain, seizures, and migraines      PHQ-9 Score:   PHQ-9 Total Score:      Impression/Plan:  -This is a follow up and medication check. Marguerite report symptoms of depression with poor sleep, poor appetite, anhedonia, guilt feelings, lack of self care, and passive suicidal ideations. She denies intent or plan for suicide and identifies her family as protective against suicide. She agrees to safety planning. She is taking all medication as prescribed. She is having more migraines since not receiving Botox. She has a plan to contact neurology to set up an appointment with a new provider since the current one is leaving. She is seeing Sherlyn for therapy and has plans to continue. I suggested  going back to the previous dose of Cymbalta since depression appears to have worsened since last visit when a decrease was made. I suggested trying to improve the quality of sleep at night and avoiding sleeping during the day at all costs so her sleep will improve at night. She verbalizes understanding and will try. The patient was THC positive on last UDS which we did not discuss today but will at next visit as this is a known depressant and can interfere with sleep patterns.   -Initiate Trazodone 50 mg nightly for insomnia.   -Decrease Seroquel to 50 mg during the day for anxiety and insomnia.   -Increase Cymbalta to 120 mg daily for depression and anxiety.  Patient will take 60 mg mg twice daily.  -Continue clonazepam 0.5 mg daily as needed for anxiety and insomnia. # 30 tablets given. Patient has refills.  -Continue Abilify 15 mg daily for mood disorder.  Patient will take in the a.m.   -Continue therapy with QUOC Peña.  -Continue Keppra,Topamax, Imitrex, and Botox for seizure disorder and migraines. These medications are prescribed by another provider.  -The ASYA report, reviewed through PDMP , of the past 12 months were reviewed and is appropriate.  The patient/guardian reports taking the medication only as prescribed.  The patient/guardian denies any abuse or misuse of the medication.  The patient/guardian denies any other substance use or issues.  There are no apparent substance related issues.  The patient reports no side effects of the current medication usage.  The patient/guardian has reported significant improvement with medication usage and wishes to continue medication as prescribed.  The patient/guardian is appropriate to continue with current medication usage at this time.  Reinforced risks and side effects of medication usage, patient and/or guardian verbalize understanding in their own words and are in agreement with current plan.  -Last UDS on 10/04/22. Positive TCA and negative  benzodiazepines.     MEDS ORDERED DURING VISIT:  New Medications Ordered This Visit   Medications   • DULoxetine (CYMBALTA) 60 MG capsule     Sig: Take 1 capsule by mouth 2 (Two) Times a Day.     Dispense:  180 capsule     Refill:  1   • ARIPiprazole (ABILIFY) 15 MG tablet     Sig: Take 1 tablet by mouth Daily.     Dispense:  30 tablet     Refill:  2   • QUEtiapine (SEROquel) 50 MG tablet     Sig: Take 1 tablet by mouth Every Night.     Dispense:  30 tablet     Refill:  2   • traZODone (DESYREL) 50 MG tablet     Sig: Take 1 tablet by mouth Every Night.     Dispense:  30 tablet     Refill:  2         Follow Up   Return in about 2 months (around 3/23/2023) for Medication Check.  Patient was given instructions and counseling regarding her condition or for health maintenance advice. Please see specific information pulled into the AVS if appropriate.       TREATMENT PLAN/GOALS: Continue supportive psychotherapy efforts and medications as indicated. Treatment and medication options discussed during today's visit. Patient acknowledged and verbally consented to continue with current treatment plan and was educated on the importance of compliance with treatment and follow-up appointments.    MEDICATION ISSUES:  Discussed medication options and treatment plan of prescribed medication as well as the risks, benefits, and side effects including potential falls, possible impaired driving and metabolic adversities among others. Patient is agreeable to call the office with any worsening of symptoms or onset of side effects. Patient is agreeable to call 911 or go to the nearest ER should he/she begin having SI/HI.      This document has been electronically signed by CHRIS Witt, PMHNP-BC  January 23, 2023 12:25 EST    Part of this note may be an electronic transcription/translation of spoken language to printed text using the Dragon Dictation System.

## 2023-01-23 ENCOUNTER — TELEMEDICINE (OUTPATIENT)
Dept: PSYCHIATRY | Facility: CLINIC | Age: 53
End: 2023-01-23
Payer: MEDICAID

## 2023-01-23 DIAGNOSIS — G47.09 OTHER INSOMNIA: Chronic | ICD-10-CM

## 2023-01-23 DIAGNOSIS — F41.0 PANIC DISORDER: Chronic | ICD-10-CM

## 2023-01-23 DIAGNOSIS — F41.1 GAD (GENERALIZED ANXIETY DISORDER): Chronic | ICD-10-CM

## 2023-01-23 DIAGNOSIS — F39 MODERATE MOOD DISORDER: Primary | Chronic | ICD-10-CM

## 2023-01-23 PROCEDURE — 99214 OFFICE O/P EST MOD 30 MIN: CPT | Performed by: NURSE PRACTITIONER

## 2023-01-23 RX ORDER — ARIPIPRAZOLE 15 MG/1
15 TABLET ORAL DAILY
Qty: 30 TABLET | Refills: 2 | Status: SHIPPED | OUTPATIENT
Start: 2023-01-23 | End: 2023-03-23 | Stop reason: SDUPTHER

## 2023-01-23 RX ORDER — TRAZODONE HYDROCHLORIDE 50 MG/1
50 TABLET ORAL NIGHTLY
Qty: 30 TABLET | Refills: 2 | Status: SHIPPED | OUTPATIENT
Start: 2023-01-23 | End: 2023-03-23 | Stop reason: SDUPTHER

## 2023-01-23 RX ORDER — DULOXETIN HYDROCHLORIDE 60 MG/1
60 CAPSULE, DELAYED RELEASE ORAL 2 TIMES DAILY
Qty: 180 CAPSULE | Refills: 1 | Status: SHIPPED | OUTPATIENT
Start: 2023-01-23 | End: 2023-03-23 | Stop reason: SDUPTHER

## 2023-01-23 RX ORDER — QUETIAPINE FUMARATE 50 MG/1
50 TABLET, FILM COATED ORAL NIGHTLY
Qty: 30 TABLET | Refills: 2 | Status: SHIPPED | OUTPATIENT
Start: 2023-01-23 | End: 2023-03-23 | Stop reason: SDUPTHER

## 2023-02-08 DIAGNOSIS — F41.0 PANIC DISORDER: Chronic | ICD-10-CM

## 2023-02-08 RX ORDER — PROPRANOLOL HYDROCHLORIDE 20 MG/1
20 TABLET ORAL DAILY
Qty: 30 TABLET | Refills: 2 | OUTPATIENT
Start: 2023-02-08

## 2023-02-13 ENCOUNTER — TELEMEDICINE (OUTPATIENT)
Dept: PSYCHIATRY | Facility: CLINIC | Age: 53
End: 2023-02-13
Payer: MEDICAID

## 2023-02-13 DIAGNOSIS — F41.1 GAD (GENERALIZED ANXIETY DISORDER): ICD-10-CM

## 2023-02-13 DIAGNOSIS — F39 MODERATE MOOD DISORDER: Primary | ICD-10-CM

## 2023-02-13 PROCEDURE — 90832 PSYTX W PT 30 MINUTES: CPT | Performed by: COUNSELOR

## 2023-02-13 NOTE — PROGRESS NOTES
Telehealth Encounter Note:  Date of Service: 2023  Patient Name: Marguerite Anderson  : 1970   MRN: [id number]   Time In: 11:00 AM  Time Out: 11:36 AM     This provider is located at [facility] [address], [place] [address] using a secure [employer] Video Visit through [employer]. Patient is being seen remotely via telehealth at home address in [place] and stated they are in a secure environment for this session. The patient's condition being diagnosed/treated is appropriate for telemedicine. The provider identified herself as well as her credentials. The patient, and/or patients guardian, consent to be seen remotely, and when consent is given they understand that the consent allows for patient identifiable information to be sent to a third party as needed. They may refuse to be seen remotely at any time. The electronic data is encrypted and password protected, and the patient and/or guardian has been advised of the potential risks to privacy not withstanding such measures.     You have chosen to receive care through a telehealth visit.  Do you consent to use a video/audio connection for your medical care today? Yes    Referring Provider: Juan Clemens MD    PROGRESS NOTE    History of Present Illness:   Marguerite Anderson is a 53 y.o. female who is being seen today for follow up individual Psychotherapy session.     Chief Complaint:  Pt struggles with anxiety and depression. Pt reports continued stress over moving into her new home due to work needed on the house and having to move in before wanting to.  Having many tasks to complete can be overwhelming.  Pt reports she is filling for disability again and that she has been denied several times prior.  Pt reports anxiety and depression remain high at this time.  Pt reports needed work on the house is creating stress. Pt reports her  fell and broke his rib six weeks ago.  Pt reports lack of motivation to get anything done.   Pt reports pain when cooking large meals.  Pt reports she has been hard on herself not doing things she feels like she should be.  Pt reports some relational stressors with her mother.               ICD-10-CM ICD-9-CM   1. Moderate mood disorder (HCC)  F39 296.90   2. JAY (generalized anxiety disorder)  F41.1 300.02      Clinical Maneuvering/Intervention:  Assisted patient in processing above session content; acknowledged and normalized patient’s thoughts, feelings, and concerns by utilizing a person-centered approach in efforts to build appropriate rapport and a positive therapeutic relationship with open and honest communication.  Processed and rationalized patients thoughts and feelings regarding relational stressors, physical/mental health functioning, self pressures.  Discussed triggers associated with patient's anxiety/depression.  Also discussed coping skills for patient to implement such as healthy boundaries with others to protect emotional wellbeing, breaking tasks into smaller steps to avoid feeling overwhelmed/shutting down, saving future worries for a later date, reframing thoughts, think solution focused on making tasks easier for self.    Allowed patient to freely discuss issues without interruption or judgment. Provided safe, confidential environment to facilitate the development of positive therapeutic relationship and encourage open, honest communication. Assisted patient in identifying risk factors which would indicate the need for higher level of care including thoughts to harm self or others and/or self-harming behavior and encouraged patient to contact this office, call 911, or present to the nearest emergency room should any of these events occur. Discussed crisis intervention services and means to access. Patient adamantly and convincingly denies current suicidal or homicidal ideation or perceptual disturbance.    Mental Status Exam:   Hygiene:   good  Cooperation:  Cooperative  Eye Contact:   Good  Psychomotor Behavior:  Appropriate  Affect:  Blunted  Mood: depressed and anxious  Speech:  Normal  Thought Process:  Goal directed and Linear  Thought Content:  Mood congruent  Suicidal:  None  Homicidal:  None  Hallucinations:  None  Delusion:  None  Memory:  Intact  Orientation:  Person, Place, Time and Situation  Reliability:  good  Insight:  Good  Judgement:  Good  Impulse Control:  Good  Physical/Medical Issues: Yes    Patient's Support Network Includes:      Functional Status: Severe impairment    Progress toward goal: Not at goal    Prognosis: Fair with Ongoing Treatment     Medications:     Current Outpatient Medications:   •  Advair Diskus 250-50 MCG/ACT DISKUS, INHALE 1 PUFF BY MOUTH DAILY, Disp: 60 each, Rfl: 11  •  ARIPiprazole (ABILIFY) 15 MG tablet, Take 1 tablet by mouth Daily., Disp: 30 tablet, Rfl: 2  •  aspirin (aspirin) 81 MG EC tablet, Take 1 tablet by mouth Daily., Disp: 90 tablet, Rfl: 3  •  B Complex-C (B-complex with vitamin C) tablet, Take 1 tablet by mouth Daily., Disp: 90 tablet, Rfl: 3  •  clonazePAM (KlonoPIN) 0.5 MG tablet, Take 1 tablet by mouth At Night As Needed for Anxiety., Disp: 30 tablet, Rfl: 0  •  cyclobenzaprine (FLEXERIL) 10 MG tablet, Take 10 mg by mouth 2 (Two) Times a Day As Needed., Disp: , Rfl:   •  Diclofenac Sodium (VOLTAREN) 1 % gel gel, APPLY 4 GRAMS TOPICALLY TO AFFECTED AREA 4 TIMES A DAY AS NEEDED FOR PAIN, Disp: 100 g, Rfl: 3  •  DULoxetine (CYMBALTA) 60 MG capsule, Take 1 capsule by mouth 2 (Two) Times a Day., Disp: 180 capsule, Rfl: 1  •  estradiol (ESTRACE VAGINAL) 0.1 MG/GM vaginal cream, Use 0.5 grams intravaginally twice weekly to control symptoms., Disp: 1 each, Rfl: 11  •  estradiol (VIVELLE-DOT) 0.1 MG/24HR patch, Place 1 patch on the skin as directed by provider 2 (Two) Times a Week., Disp: 8 patch, Rfl: 11  •  levETIRAcetam (KEPPRA) 750 MG tablet, Take 1 tablet by mouth 2 (Two) Times a Day., Disp: 180 tablet, Rfl: 3  •  nicotine  (Nicoderm CQ) 21 MG/24HR patch, Place 1 patch on the skin as directed by provider Daily., Disp: 30 patch, Rfl: 4  •  OnabotulinumtoxinA (Botox) 200 units reconstituted solution, INJECT 200 UNITS INTRAMUSCULARLY INTO HEAD, NECK & SHOULDERS EVERY 12 WEEKS PER FDA PROTOCOL, Disp: 1 each, Rfl: 3  •  QUEtiapine (SEROquel) 50 MG tablet, Take 1 tablet by mouth Every Night., Disp: 30 tablet, Rfl: 2  •  Rimegepant Sulfate (Nurtec) 75 MG tablet dispersible tablet, Place 1 tablet under the tongue Take As Directed., Disp: 16 tablet, Rfl: 5  •  SUMAtriptan (IMITREX) 20 MG/ACT nasal spray, USE 1 SPRAY IN 1 NOSTRIL IF NEEDED, Disp: 18 each, Rfl: 0  •  topiramate (TOPAMAX) 100 MG tablet, Take 1 tablet by mouth 2 (Two) Times a Day., Disp: 180 tablet, Rfl: 3  •  traZODone (DESYREL) 50 MG tablet, Take 1 tablet by mouth Every Night., Disp: 30 tablet, Rfl: 2    Visit Diagnosis/Orders Placed This Visit:    ICD-10-CM ICD-9-CM   1. Moderate mood disorder (HCC)  F39 296.90   2. JAY (generalized anxiety disorder)  F41.1 300.02        PLAN:  1. Safety: No acute safety concerns  2. Risk Assessment: Risk of self-harm acutely is low. Risk of self-harm chronically is also low, but could be further elevated in the event of treatment noncompliance and/or AODA.    Crisis Plan:  Symptoms and/or behaviors to indicate a crisis: Excessive worry or fear, Feeling sad or low, Lack of sleep and Thinking about suicide    What calming techniques or other strategies will patient use to de-escalate and stay safe: slow down, breathe, visualize calming self, think it though, listen to music, change focus, take a walk    Who is one person patient can contact to assist with de-escalation?     Treatment Plan/Goals: Patient will continue supportive psychotherapy efforts and medication regimen as prescribed. Therapist will provide Cognitive Behavioral Therapy to assist patient in improving functioning and gaining coping skills, maintaining stability, and  avoiding decompensation and the need for higher level of care. Plan for treatment was discussed during today's visit. Patient acknowledged and verbally consented to continue with current treatment plan and was educated on the importance of compliance with treatment and follow-up appointments.     Patient will contact this office, call 911 or present to the nearest emergency room should suicidal or homicidal ideations occur.     Follow Up:   Return in about 4 weeks (around 3/13/2023) for Therapy session.      QUOC Peña   [facility] [place]     This document has been electronically signed by QUOC Peña   February 13, 2023 11:39 EST

## 2023-02-20 ENCOUNTER — TELEPHONE (OUTPATIENT)
Dept: NEUROLOGY | Facility: CLINIC | Age: 53
End: 2023-02-20
Payer: MEDICAID

## 2023-02-20 DIAGNOSIS — F41.0 PANIC DISORDER: Chronic | ICD-10-CM

## 2023-02-20 RX ORDER — CLONAZEPAM 0.5 MG/1
0.5 TABLET ORAL NIGHTLY PRN
Qty: 30 TABLET | Refills: 0 | Status: SHIPPED | OUTPATIENT
Start: 2023-02-20 | End: 2023-03-21 | Stop reason: SDUPTHER

## 2023-02-20 NOTE — TELEPHONE ENCOUNTER
Rx Refill Note  Requested Prescriptions     Pending Prescriptions Disp Refills   • clonazePAM (KlonoPIN) 0.5 MG tablet 30 tablet 0     Sig: Take 1 tablet by mouth At Night As Needed for Anxiety.      Last office visit with prescribing clinician: 9/17/2021   Last telemedicine visit with prescribing clinician: 3/13/2023   Next office visit with prescribing clinician: 3/23/2023                         Would you like a call back once the refill request has been completed: [] Yes [] No    If the office needs to give you a call back, can they leave a voicemail: [] Yes [] No    Marybeth Ballard  02/20/23, 09:18 EST

## 2023-02-20 NOTE — TELEPHONE ENCOUNTER
Provider: BARBIE  Caller: MERISSA  Relationship to Patient: SELF  Phone Number: [phone]    Reason for Call: PATIENT STATES SHE WAS SUPPOSED TO BEGIN BOTOX INJECTIONS WITH DIN[facility] BUT NEVER BEGAN THOSE.     PLEASE CALL AND ADVISE

## 2023-02-23 DIAGNOSIS — R39.9 URINARY TRACT INFECTION SYMPTOMS: Primary | ICD-10-CM

## 2023-02-25 LAB
APPEARANCE UR: CLEAR
BACTERIA #/AREA URNS HPF: NORMAL /HPF
BACTERIA UR CULT: NORMAL
BACTERIA UR CULT: NORMAL
BILIRUB UR QL STRIP: NEGATIVE
CASTS URNS MICRO: NORMAL
COLOR UR: YELLOW
EPI CELLS #/AREA URNS HPF: NORMAL /HPF
GLUCOSE UR QL STRIP: NEGATIVE
HGB UR QL STRIP: NEGATIVE
KETONES UR QL STRIP: NEGATIVE
LEUKOCYTE ESTERASE UR QL STRIP: NEGATIVE
NITRITE UR QL STRIP: NEGATIVE
PH UR STRIP: 7.5 [PH] (ref 5–8)
PROT UR QL STRIP: NEGATIVE
RBC #/AREA URNS HPF: NORMAL /HPF
SP GR UR STRIP: 1.01 (ref 1–1.03)
UROBILINOGEN UR STRIP-MCNC: NORMAL MG/DL
WBC #/AREA URNS HPF: NORMAL /HPF

## 2023-03-13 ENCOUNTER — TELEMEDICINE (OUTPATIENT)
Dept: PSYCHIATRY | Facility: CLINIC | Age: 53
End: 2023-03-13
Payer: MEDICAID

## 2023-03-13 DIAGNOSIS — F41.1 GAD (GENERALIZED ANXIETY DISORDER): Primary | ICD-10-CM

## 2023-03-13 DIAGNOSIS — F33.1 MODERATE EPISODE OF RECURRENT MAJOR DEPRESSIVE DISORDER: ICD-10-CM

## 2023-03-13 PROCEDURE — 1159F MED LIST DOCD IN RCRD: CPT | Performed by: COUNSELOR

## 2023-03-13 PROCEDURE — 90832 PSYTX W PT 30 MINUTES: CPT | Performed by: COUNSELOR

## 2023-03-13 PROCEDURE — 1160F RVW MEDS BY RX/DR IN RCRD: CPT | Performed by: COUNSELOR

## 2023-03-13 NOTE — PROGRESS NOTES
Telehealth Encounter Note:  Date of Service: 2023  Patient Name: Marguerite Anderson  : 1970   MRN: [id number]   Time In: 11:25 AM  Time Out: 11:55 AM    This provider is located at [facility] [address], [place] [address] using a secure [employer] Video Visit through [employer]. Patient is being seen remotely via telehealth at home address in [place] and stated they are in a secure environment for this session. The patient's condition being diagnosed/treated is appropriate for telemedicine. The provider identified herself as well as her credentials. The patient, and/or patients guardian, consent to be seen remotely, and when consent is given they understand that the consent allows for patient identifiable information to be sent to a third party as needed. They may refuse to be seen remotely at any time. The electronic data is encrypted and password protected, and the patient and/or guardian has been advised of the potential risks to privacy not withstanding such measures.     You have chosen to receive care through a telehealth visit.  Do you consent to use a video/audio connection for your medical care today? Yes    Referring Provider: Juan Clemens MD    PROGRESS NOTE    History of Present Illness:   Marguerite Anderson is a 53 y.o. female who is being seen today for follow up individual Psychotherapy session.     Chief Complaint:  Pt continues to struggle with anxiety and depression.  Pt continues to become stressed out over her home that needs continued renovations.  Pts  is recovered from his broken ribs.  Pt reports continued stress when having to leave the house.          ICD-10-CM ICD-9-CM   1. JAY (generalized anxiety disorder)  F41.1 300.02   2. Moderate episode of recurrent major depressive disorder (HCC)  F33.1 296.32      Clinical Maneuvering/Intervention:   Assisted patient in processing above session content; acknowledged and normalized patient’s thoughts,  feelings, and concerns by utilizing a person-centered approach in efforts to build appropriate rapport and a positive therapeutic relationship with open and honest communication.  Processed and rationalized patients thoughts and feelings regarding fear of leaving the home.  Discussed triggers associated with patient's anxiety.  Also discussed coping skills for patient to implement such as list making, positive self talk, reframing negative thought patterns, time with pets.  Pt has been getting more done around the home and is spending time with pets.      Allowed patient to freely discuss issues without interruption or judgment. Provided safe, confidential environment to facilitate the development of positive therapeutic relationship and encourage open, honest communication. Assisted patient in identifying risk factors which would indicate the need for higher level of care including thoughts to harm self or others and/or self-harming behavior and encouraged patient to contact this office, call 911, or present to the nearest emergency room should any of these events occur. Discussed crisis intervention services and means to access. Patient adamantly and convincingly denies current suicidal or homicidal ideation or perceptual disturbance.    Mental Status Exam:   Hygiene:   good  Cooperation:  Cooperative  Eye Contact:  Good  Psychomotor Behavior:  Appropriate  Affect:  Appropriate  Mood: depressed and anxious  Speech:  Normal  Thought Process:  Goal directed and Linear  Thought Content:  Mood congruent  Suicidal:  None  Homicidal:  None  Hallucinations:  None  Delusion:  None  Memory:  Intact  Orientation:  Person, Place, Time and Situation  Reliability:  good  Insight:  Good  Judgement:  Good  Impulse Control:  Good  Physical/Medical Issues:  Yes    Patient's Support Network Includes:      Functional Status: Severe impairment    Progress toward goal: Not at goal    Prognosis: Fair with Ongoing Treatment      Medications:     Current Outpatient Medications:   •  Advair Diskus 250-50 MCG/ACT DISKUS, INHALE 1 PUFF BY MOUTH DAILY, Disp: 60 each, Rfl: 11  •  ARIPiprazole (ABILIFY) 15 MG tablet, Take 1 tablet by mouth Daily., Disp: 30 tablet, Rfl: 2  •  aspirin (aspirin) 81 MG EC tablet, Take 1 tablet by mouth Daily., Disp: 90 tablet, Rfl: 3  •  B Complex-C (B-complex with vitamin C) tablet, Take 1 tablet by mouth Daily., Disp: 90 tablet, Rfl: 3  •  clonazePAM (KlonoPIN) 0.5 MG tablet, Take 1 tablet by mouth At Night As Needed for Anxiety., Disp: 30 tablet, Rfl: 0  •  cyclobenzaprine (FLEXERIL) 10 MG tablet, Take 10 mg by mouth 2 (Two) Times a Day As Needed., Disp: , Rfl:   •  Diclofenac Sodium (VOLTAREN) 1 % gel gel, APPLY 4 GRAMS TOPICALLY TO AFFECTED AREA 4 TIMES A DAY AS NEEDED FOR PAIN, Disp: 100 g, Rfl: 3  •  DULoxetine (CYMBALTA) 60 MG capsule, Take 1 capsule by mouth 2 (Two) Times a Day., Disp: 180 capsule, Rfl: 1  •  estradiol (ESTRACE VAGINAL) 0.1 MG/GM vaginal cream, Use 0.5 grams intravaginally twice weekly to control symptoms., Disp: 1 each, Rfl: 11  •  estradiol (VIVELLE-DOT) 0.1 MG/24HR patch, Place 1 patch on the skin as directed by provider 2 (Two) Times a Week., Disp: 8 patch, Rfl: 11  •  levETIRAcetam (KEPPRA) 750 MG tablet, Take 1 tablet by mouth 2 (Two) Times a Day., Disp: 180 tablet, Rfl: 3  •  nicotine (Nicoderm CQ) 21 MG/24HR patch, Place 1 patch on the skin as directed by provider Daily., Disp: 30 patch, Rfl: 4  •  OnabotulinumtoxinA (Botox) 200 units reconstituted solution, INJECT 200 UNITS INTRAMUSCULARLY INTO HEAD, NECK & SHOULDERS EVERY 12 WEEKS PER FDA PROTOCOL, Disp: 1 each, Rfl: 3  •  QUEtiapine (SEROquel) 50 MG tablet, Take 1 tablet by mouth Every Night., Disp: 30 tablet, Rfl: 2  •  Rimegepant Sulfate (Nurtec) 75 MG tablet dispersible tablet, Place 1 tablet under the tongue Take As Directed., Disp: 16 tablet, Rfl: 5  •  SUMAtriptan (IMITREX) 20 MG/ACT nasal spray, USE 1 SPRAY IN 1 NOSTRIL  IF NEEDED, Disp: 18 each, Rfl: 0  •  topiramate (TOPAMAX) 100 MG tablet, Take 1 tablet by mouth 2 (Two) Times a Day., Disp: 180 tablet, Rfl: 3  •  traZODone (DESYREL) 50 MG tablet, Take 1 tablet by mouth Every Night., Disp: 30 tablet, Rfl: 2    Visit Diagnosis/Orders Placed This Visit:    ICD-10-CM ICD-9-CM   1. JAY (generalized anxiety disorder)  F41.1 300.02   2. Moderate episode of recurrent major depressive disorder (HCC)  F33.1 296.32        PLAN:  1. Safety: No acute safety concerns  2. Risk Assessment: Risk of self-harm acutely is low. Risk of self-harm chronically is also low, but could be further elevated in the event of treatment noncompliance and/or AODA.    Crisis Plan:  Symptoms and/or behaviors to indicate a crisis: Excessive worry or fear, Feeling sad or low, Isolation and Lack of sleep    What calming techniques or other strategies will patient use to de-escalate and stay safe: slow down, breathe, visualize calming self, think it though, listen to music, change focus, take a walk    Who is one person patient can contact to assist with de-escalation?     Treatment Plan/Goals: Patient will continue supportive psychotherapy efforts and medication regimen as prescribed. Therapist will provide Cognitive Behavioral Therapy to assist patient in improving functioning and gaining coping skills, maintaining stability, and avoiding decompensation and the need for higher level of care. Plan for treatment was discussed during today's visit. Patient acknowledged and verbally consented to continue with current treatment plan and was educated on the importance of compliance with treatment and follow-up appointments.     Patient will contact this office, call 911 or present to the nearest emergency room should suicidal or homicidal ideations occur.     Follow Up:   Return in about 1 month (around 4/13/2023).      Sherlyn Dickey [facility]   Behavioral [facility]     This document has been electronically  signed by Sherlyn Dickey [facility]   March 13, 2023 11:55 EDT

## 2023-03-20 NOTE — PROGRESS NOTES
"This provider is located at [facility] ,[address], [address] in [place], [place],using a secure [employer] Video Visit through [employer].  The patient's camera was not working so I could not visualize her.  Patient is being seen remotely via telehealth at their home address in [place], and stated they are in a secure environment for this session. The patient's condition being diagnosed/treated is appropriate for telemedicine. The provider identified herself as well as her credentials.   The patient, and/or patients guardian, consent to be seen remotely, and when consent is given they understand that the consent allows for patient identifiable information to be sent to a third party as needed.   They may refuse to be seen remotely at any time. The electronic data is encrypted and password protected, and the patient and/or guardian has been advised of the potential risks to privacy not withstanding such measures.    Chief Complaint  Mood disorder, anxiety, panic, and insomnia    Subjective          Marguerite Anderson presents today via [employer] Video through [employer] by herself for a follow up and medication check.     History of Present Illness: Marguerite states, \"I am pretty good.\"  Marguerite tells me she continues to settle into her new home.  The ceilings and floors still need repair.  She tells me her stress level is up and anxiety is high.  She tries coping with putting her thoughts on trial and challenging them.  She tells me that she used this method the other day when she was panicking over losing a recipe and trying to cook dinner and complete laundry.  She has tried other methods such as deep breathing, grounding techniques, and muscle relaxation but does not feel they were helpful.  Her mood has been fluctuating from good to apathetic where she feels nothing.  She tells me she feels as if there are days that passed by.  She tells me that she will have occasional passive " "suicidal thoughts such as, \"I would be better off dead.\"  She denies any intent or plan.  She tells me these occur about once a week when she is \"into her head.\"  She continues to sleep either too much or not enough.  She will nap during the day if possible.  She finds that her mood is apathetic, lacking energy, and lacking motivation if she does not sleep enough.  If she sleeps too much, her mood is \"crabby\" and she becomes hard on herself.  She continues to have a poor appetite. She is compliant with her medications.  She is taking Trazodone, Abilify, Cymbalta, Seroquel, and clonazepam. She denies any side effects of her current medication regiment. She is seeing Sherlyn for therapy. She denies any SI/HI/AVH.    Current Medications:   Current Outpatient Medications   Medication Sig Dispense Refill   • Advair Diskus 250-50 MCG/ACT DISKUS INHALE 1 PUFF BY MOUTH DAILY 60 each 11   • ARIPiprazole (ABILIFY) 20 MG tablet Take 1 tablet by mouth Daily. 30 tablet 2   • aspirin (aspirin) 81 MG EC tablet Take 1 tablet by mouth Daily. 90 tablet 3   • B Complex-C (B-complex with vitamin C) tablet Take 1 tablet by mouth Daily. 90 tablet 3   • clonazePAM (KlonoPIN) 0.5 MG tablet Take 1 tablet by mouth At Night As Needed for Anxiety. 30 tablet 0   • cyclobenzaprine (FLEXERIL) 10 MG tablet Take 1 tablet by mouth 2 (Two) Times a Day As Needed.     • Diclofenac Sodium (VOLTAREN) 1 % gel gel APPLY 4 GRAMS TOPICALLY TO AFFECTED AREA 4 TIMES A DAY AS NEEDED FOR PAIN 100 g 3   • DULoxetine (CYMBALTA) 60 MG capsule Take 1 capsule by mouth 2 (Two) Times a Day. 180 capsule 1   • estradiol (ESTRACE VAGINAL) 0.1 MG/GM vaginal cream Use 0.5 grams intravaginally twice weekly to control symptoms. 1 each 11   • estradiol (VIVELLE-DOT) 0.1 MG/24HR patch Place 1 patch on the skin as directed by provider 2 (Two) Times a Week. 8 patch 11   • levETIRAcetam (KEPPRA) 750 MG tablet Take 1 tablet by mouth 2 (Two) Times a Day. 180 tablet 3   • nicotine " (Nicoderm CQ) 21 MG/24HR patch Place 1 patch on the skin as directed by provider Daily. 30 patch 4   • OnabotulinumtoxinA (Botox) 200 units reconstituted solution INJECT 200 UNITS INTRAMUSCULARLY INTO HEAD, NECK & SHOULDERS EVERY 12 WEEKS PER FDA PROTOCOL 1 each 3   • QUEtiapine (SEROquel) 50 MG tablet Take 1 tablet by mouth Every Night. 30 tablet 2   • Rimegepant Sulfate (Nurtec) 75 MG tablet dispersible tablet Place 1 tablet under the tongue Take As Directed. 16 tablet 5   • SUMAtriptan (IMITREX) 20 MG/ACT nasal spray USE 1 SPRAY IN 1 NOSTRIL IF NEEDED 18 each 0   • topiramate (TOPAMAX) 100 MG tablet Take 1 tablet by mouth 2 (Two) Times a Day. 180 tablet 3   • traZODone (DESYREL) 100 MG tablet Take 1 tablet by mouth Every Night. 30 tablet 2     No current facility-administered medications for this visit.       Objective   Vital Signs:   There were no vitals taken for this visit.    Physical Exam  Nursing note reviewed. Vitals reviewed: Vitals not obtained due to nature of telehealth visit.   Neurological:      General: No focal deficit present.      Mental Status: She is alert and oriented to person, place, and time.   Psychiatric:         Attention and Perception: Attention normal.         Mood and Affect: Mood is depressed. Affect is flat.         Speech: Speech normal.         Behavior: Behavior is slowed. Behavior is cooperative.         Thought Content: Thought content includes suicidal (passive without intent or plan) ideation.         Cognition and Memory: Cognition normal.         Judgment: Judgment normal.        Result Review :       The following data was reviewed by CHRIS Nick on 03/23/2023:    Telemedicine with Sherlyn Dickey [facility] (03/13/2023)  Telemedicine with Sherlyn Dickey [facility] (02/13/2023)       Assessment and Plan    Problem List Items Addressed This Visit    None  Visit Diagnoses     Moderate mood disorder (HCC)  (Chronic)       Relevant Medications    ARIPiprazole  (ABILIFY) 20 MG tablet    DULoxetine (CYMBALTA) 60 MG capsule    QUEtiapine (SEROquel) 50 MG tablet    traZODone (DESYREL) 100 MG tablet    JAY (generalized anxiety disorder)  (Chronic)       Relevant Medications    ARIPiprazole (ABILIFY) 20 MG tablet    DULoxetine (CYMBALTA) 60 MG capsule    QUEtiapine (SEROquel) 50 MG tablet    traZODone (DESYREL) 100 MG tablet    Other insomnia  (Chronic)       Relevant Medications    QUEtiapine (SEROquel) 50 MG tablet    traZODone (DESYREL) 100 MG tablet          Mental Status Exam:   Hygiene:   good  Cooperation:  Cooperative  Eye Contact:  Good  Psychomotor Behavior:  Slow  Affect:  flat  Mood: depressed  Speech:  Monotone  Thought Process:  Goal directed and Linear  Thought Content:  Normal  Suicidal:  Suicidal Ideation and passive without intent or plan  Homicidal:  None  Hallucinations:  None  Delusion:  None  Memory:  Intact  Orientation:  Person, Place, Time and Situation  Reliability:  good  Insight:  Good  Judgement:  Good  Impulse Control:  Good  Physical/Medical Issues:  Yes Chronic pain, seizures, and migraines      PHQ-9 Score:   PHQ-9 Total Score:      Impression/Plan:  -This is a follow up and medication check. Marguerite report ongoing symptoms of depression and anxiety.  She feels stressed at times and tries to challenge her anxious thoughts.  Sometimes this helps and sometimes it does not.  She has tried other coping mechanisms in the past and did not find them as effective.  She continues to meet with Sherlyn for therapy.  She would like to schedule more appointments.  She reports ongoing symptoms of depression with apathy, anhedonia, lack of energy, and lack of motivation.  She often has a poor appetite and does not eat well.  We discussed the importance of a regular sleep routine and eating consistently throughout the day.  I encouraged her to try setting a timer to remind herself to eat as this will help her regain an appetite.  I reminded her that this will help  her depression and anxiety.  She tells me she believes she can try this.  She did find the adjustment to Cymbalta helpful for her mood.  We will continue to adjust trazodone for sleep and increase Abilify for depression.  She agrees to these changes and will follow up in 2 months for an evaluation.  -Increase Trazodone to 100 mg nightly for insomnia.   -Increase Abilify to 20 mg daily for bipolar depression.  Patient will take in the a.m.   -Continue Seroquel 50 mg during the day for anxiety and insomnia.  Patient reminded of changes at last visit  -Continue Cymbalta 60 mg mg twice daily for bipolar depression and anxiety.  Patient has refills.  -Continue clonazepam 0.5 mg daily as needed for anxiety and insomnia. # 30 tablets given. Patient has refills.  -Continue therapy with QUOC Peña.  -Continue Keppra,Topamax, Imitrex, and Botox for seizure disorder and migraines. These medications are prescribed by another provider.  -The ASYA report, reviewed through PDMP , of the past 12 months were reviewed and is appropriate.  The patient/guardian reports taking the medication only as prescribed.  The patient/guardian denies any abuse or misuse of the medication.  The patient/guardian denies any other substance use or issues.  There are no apparent substance related issues.  The patient reports no side effects of the current medication usage.  The patient/guardian has reported significant improvement with medication usage and wishes to continue medication as prescribed.  The patient/guardian is appropriate to continue with current medication usage at this time.  Reinforced risks and side effects of medication usage, patient and/or guardian verbalize understanding in their own words and are in agreement with current plan.  -Last UDS on 10/04/22. Positive TCA and negative benzodiazepines.     MEDS ORDERED DURING VISIT:  New Medications Ordered This Visit   Medications   • ARIPiprazole (ABILIFY) 20 MG tablet      Sig: Take 1 tablet by mouth Daily.     Dispense:  30 tablet     Refill:  2   • DULoxetine (CYMBALTA) 60 MG capsule     Sig: Take 1 capsule by mouth 2 (Two) Times a Day.     Dispense:  180 capsule     Refill:  1   • QUEtiapine (SEROquel) 50 MG tablet     Sig: Take 1 tablet by mouth Every Night.     Dispense:  30 tablet     Refill:  2   • traZODone (DESYREL) 100 MG tablet     Sig: Take 1 tablet by mouth Every Night.     Dispense:  30 tablet     Refill:  2         Follow Up   Return in about 2 months (around 5/23/2023) for Medication Check.  Patient was given instructions and counseling regarding her condition or for health maintenance advice. Please see specific information pulled into the AVS if appropriate.       TREATMENT PLAN/GOALS: Continue supportive psychotherapy efforts and medications as indicated. Treatment and medication options discussed during today's visit. Patient acknowledged and verbally consented to continue with current treatment plan and was educated on the importance of compliance with treatment and follow-up appointments.    MEDICATION ISSUES:  Discussed medication options and treatment plan of prescribed medication as well as the risks, benefits, and side effects including potential falls, possible impaired driving and metabolic adversities among others. Patient is agreeable to call the office with any worsening of symptoms or onset of side effects. Patient is agreeable to call 911 or go to the nearest ER should he/she begin having SI/HI.      This document has been electronically signed by CHRIS Witt, PMHNP-BC  March 23, 2023 12:28 EDT    Part of this note may be an electronic transcription/translation of spoken language to printed text using the Dragon Dictation System.

## 2023-03-21 DIAGNOSIS — F41.0 PANIC DISORDER: Chronic | ICD-10-CM

## 2023-03-21 RX ORDER — CLONAZEPAM 0.5 MG/1
0.5 TABLET ORAL NIGHTLY PRN
Qty: 30 TABLET | Refills: 0 | Status: SHIPPED | OUTPATIENT
Start: 2023-03-21

## 2023-03-21 NOTE — TELEPHONE ENCOUNTER
Rx Refill Note  Requested Prescriptions     Pending Prescriptions Disp Refills   • clonazePAM (KlonoPIN) 0.5 MG tablet 30 tablet 0     Sig: Take 1 tablet by mouth At Night As Needed for Anxiety.      Last office visit with prescribing clinician: 9/17/2021   Last telemedicine visit with prescribing clinician: 3/23/2023   Next office visit with prescribing clinician: 3/23/2023                         Would you like a call back once the refill request has been completed: [] Yes [] No    If the office needs to give you a call back, can they leave a voicemail: [] Yes [] No    Promise Castellano CMA  03/21/23, 13:40 EDT

## 2023-03-23 ENCOUNTER — TELEMEDICINE (OUTPATIENT)
Dept: PSYCHIATRY | Facility: CLINIC | Age: 53
End: 2023-03-23
Payer: MEDICAID

## 2023-03-23 DIAGNOSIS — F41.0 PANIC DISORDER: Chronic | ICD-10-CM

## 2023-03-23 DIAGNOSIS — F39 MODERATE MOOD DISORDER: Primary | Chronic | ICD-10-CM

## 2023-03-23 DIAGNOSIS — G47.09 OTHER INSOMNIA: Chronic | ICD-10-CM

## 2023-03-23 DIAGNOSIS — F41.1 GAD (GENERALIZED ANXIETY DISORDER): Chronic | ICD-10-CM

## 2023-03-23 PROCEDURE — 1160F RVW MEDS BY RX/DR IN RCRD: CPT | Performed by: NURSE PRACTITIONER

## 2023-03-23 PROCEDURE — 1159F MED LIST DOCD IN RCRD: CPT | Performed by: NURSE PRACTITIONER

## 2023-03-23 PROCEDURE — 99214 OFFICE O/P EST MOD 30 MIN: CPT | Performed by: NURSE PRACTITIONER

## 2023-03-23 RX ORDER — QUETIAPINE FUMARATE 50 MG/1
50 TABLET, FILM COATED ORAL NIGHTLY
Qty: 30 TABLET | Refills: 2 | Status: SHIPPED | OUTPATIENT
Start: 2023-03-23

## 2023-03-23 RX ORDER — DULOXETIN HYDROCHLORIDE 30 MG/1
CAPSULE, DELAYED RELEASE ORAL
COMMUNITY
Start: 2023-02-19 | End: 2023-03-23

## 2023-03-23 RX ORDER — TRAZODONE HYDROCHLORIDE 100 MG/1
100 TABLET ORAL NIGHTLY
Qty: 30 TABLET | Refills: 2 | Status: SHIPPED | OUTPATIENT
Start: 2023-03-23

## 2023-03-23 RX ORDER — ARIPIPRAZOLE 20 MG/1
20 TABLET ORAL DAILY
Qty: 30 TABLET | Refills: 2 | Status: SHIPPED | OUTPATIENT
Start: 2023-03-23

## 2023-03-23 RX ORDER — DULOXETIN HYDROCHLORIDE 60 MG/1
60 CAPSULE, DELAYED RELEASE ORAL 2 TIMES DAILY
Qty: 180 CAPSULE | Refills: 1 | Status: SHIPPED | OUTPATIENT
Start: 2023-03-23

## 2023-03-27 ENCOUNTER — OFFICE VISIT (OUTPATIENT)
Dept: INTERNAL MEDICINE | Facility: CLINIC | Age: 53
End: 2023-03-27
Payer: MEDICAID

## 2023-03-27 VITALS
HEIGHT: 65 IN | RESPIRATION RATE: 16 BRPM | OXYGEN SATURATION: 99 % | SYSTOLIC BLOOD PRESSURE: 104 MMHG | HEART RATE: 89 BPM | DIASTOLIC BLOOD PRESSURE: 76 MMHG | BODY MASS INDEX: 26.99 KG/M2 | TEMPERATURE: 97 F | WEIGHT: 162 LBS

## 2023-03-27 DIAGNOSIS — I10 ESSENTIAL HYPERTENSION: Primary | ICD-10-CM

## 2023-03-27 DIAGNOSIS — M19.90 ARTHRITIS: ICD-10-CM

## 2023-03-27 DIAGNOSIS — R32 URINARY INCONTINENCE, UNSPECIFIED TYPE: ICD-10-CM

## 2023-03-27 DIAGNOSIS — E53.1 VITAMIN B6 DEFICIENCY: ICD-10-CM

## 2023-03-27 DIAGNOSIS — Z78.0 POSTMENOPAUSAL: ICD-10-CM

## 2023-03-27 NOTE — PROGRESS NOTES
Subjective     Patient ID: Marguerite Anderson is a 53 y.o. female. Patient is here for management of multiple medical problems.     Chief Complaint   Patient presents with   • Depression   • Hypertension     History of Present Illness     htn    Still with foot drop.    Tobacco. 1/2 ppd        The following portions of the patient's history were reviewed and updated as appropriate: allergies, current medications, past family history, past medical history, past social history, past surgical history and problem list.    Review of Systems    Current Outpatient Medications:   •  Advair Diskus 250-50 MCG/ACT DISKUS, INHALE 1 PUFF BY MOUTH DAILY, Disp: 60 each, Rfl: 11  •  ARIPiprazole (ABILIFY) 20 MG tablet, Take 1 tablet by mouth Daily., Disp: 30 tablet, Rfl: 2  •  aspirin (aspirin) 81 MG EC tablet, Take 1 tablet by mouth Daily., Disp: 90 tablet, Rfl: 3  •  B Complex-C (B-complex with vitamin C) tablet, Take 1 tablet by mouth Daily., Disp: 90 tablet, Rfl: 3  •  clonazePAM (KlonoPIN) 0.5 MG tablet, Take 1 tablet by mouth At Night As Needed for Anxiety., Disp: 30 tablet, Rfl: 0  •  cyclobenzaprine (FLEXERIL) 10 MG tablet, Take 1 tablet by mouth 2 (Two) Times a Day As Needed., Disp: , Rfl:   •  Diclofenac Sodium (VOLTAREN) 1 % gel gel, APPLY 4 GRAMS TOPICALLY TO AFFECTED AREA 4 TIMES A DAY AS NEEDED FOR PAIN, Disp: 100 g, Rfl: 3  •  DULoxetine (CYMBALTA) 60 MG capsule, Take 1 capsule by mouth 2 (Two) Times a Day., Disp: 180 capsule, Rfl: 1  •  estradiol (ESTRACE VAGINAL) 0.1 MG/GM vaginal cream, Use 0.5 grams intravaginally twice weekly to control symptoms., Disp: 1 each, Rfl: 11  •  estradiol (VIVELLE-DOT) 0.1 MG/24HR patch, Place 1 patch on the skin as directed by provider 2 (Two) Times a Week., Disp: 8 patch, Rfl: 11  •  levETIRAcetam (KEPPRA) 750 MG tablet, Take 1 tablet by mouth 2 (Two) Times a Day., Disp: 180 tablet, Rfl: 3  •  nicotine (Nicoderm CQ) 21 MG/24HR patch, Place 1 patch on the skin as directed by provider  "Daily., Disp: 30 patch, Rfl: 4  •  OnabotulinumtoxinA (Botox) 200 units reconstituted solution, INJECT 200 UNITS INTRAMUSCULARLY INTO HEAD, NECK & SHOULDERS EVERY 12 WEEKS PER FDA PROTOCOL, Disp: 1 each, Rfl: 3  •  QUEtiapine (SEROquel) 50 MG tablet, Take 1 tablet by mouth Every Night., Disp: 30 tablet, Rfl: 2  •  Rimegepant Sulfate (Nurtec) 75 MG tablet dispersible tablet, Place 1 tablet under the tongue Take As Directed., Disp: 16 tablet, Rfl: 5  •  topiramate (TOPAMAX) 100 MG tablet, Take 1 tablet by mouth 2 (Two) Times a Day., Disp: 180 tablet, Rfl: 3  •  traZODone (DESYREL) 100 MG tablet, Take 1 tablet by mouth Every Night., Disp: 30 tablet, Rfl: 2    Objective      Blood pressure 104/76, pulse 89, temperature 97 °F (36.1 °C), resp. rate 16, height 165.1 cm (65\"), weight 73.5 kg (162 lb), SpO2 99 %, not currently breastfeeding.    Physical Exam     General Appearance:    Alert, cooperative, no distress, appears stated age   Head:    Normocephalic, without obvious abnormality, atraumatic   Eyes:    PERRL, conjunctiva/corneas clear, EOM's intact   Ears:    Normal TM's and external ear canals, both ears   Nose:   Nares normal, septum midline, mucosa normal, no drainage   or sinus tenderness   Throat:   Lips, mucosa, and tongue normal; teeth and gums normal   Neck:   Supple, symmetrical, trachea midline, no adenopathy;        thyroid:  No enlargement/tenderness/nodules; no carotid    bruit or JVD   Back:     Symmetric, no curvature, ROM normal, no CVA tenderness   Lungs:     Clear to auscultation bilaterally, respirations unlabored   Chest wall:    No tenderness or deformity   Heart:    Regular rate and rhythm, S1 and S2 normal, no murmur,        rub or gallop   Abdomen:     Soft, non-tender, bowel sounds active all four quadrants,     no masses, no organomegaly   Extremities:   Extremities normal, atraumatic, no cyanosis or edema   Pulses:   2+ and symmetric all extremities   Skin:   Skin color, texture, turgor " normal, no rashes or lesions   Lymph nodes:   Cervical, supraclavicular, and axillary nodes normal   Neurologic:   CNII-XII intact. Normal strength, sensation and reflexes       throughout      Results for orders placed or performed in visit on 02/23/23   Urine Culture - Urine, Urine, Clean Catch    Specimen: Urine, Clean Catch    UC   Result Value Ref Range    Urine Culture Final report     Result 1 Comment    Microscopic Examination -   Result Value Ref Range    WBC, UA 0-2 /HPF    RBC, UA 0-2 /HPF    Epithelial Cells (non renal) 0-2 /HPF    Cast Type Comment     Bacteria, UA Comment None Seen /HPF   Urinalysis With Microscopic - Urine, Clean Catch    Specimen: Urine, Clean Catch   Result Value Ref Range    Specific Gravity, UA 1.006 1.005 - 1.030    pH, UA 7.5 5.0 - 8.0    Color, UA Yellow     Appearance, UA Clear Clear    Leukocytes, UA Negative Negative    Protein Negative Negative    Glucose, UA Negative Negative    Ketones Negative Negative    Blood, UA Negative Negative    Bilirubin, UA Negative Negative    Urobilinogen, UA Comment     Nitrite, UA Negative Negative         Assessment & Plan   htn    Still with foot drop.    Tobacco. 1/2 ppd    Dr Rangel working on incontiance for pt.    Depression.  Stable.  On abilify. Followed by Aida friedman.     Still with occ sz.  On topamax.  Get dexa.    Pt had hep c in past. Denies vaccine for Hep b.  Pt will go to the pharmacy for hep b vaccine.  tdap      Arthritis          Diagnoses and all orders for this visit:    1. Essential hypertension (Primary)  -     T4, Free  -     TSH  -     Comprehensive Metabolic Panel  -     Vitamin B12  -     CBC & Differential  -     Lipid Panel  -     Vitamin B6  -     [place] Spotted Fever, IgM  -     [place] Spotted Fever, IgG  -     Lyme Disease, PCR - , Arm, Left  -     Ehrlichia Antibody Panel  -     Cyclic Citrul Peptide Antibody, IgG / IgA  -     Rheumatoid Factor  -     Uric Acid  -     Sedimentation Rate  -      C-reactive Protein  -     Antistreptolysin O Titer  -     RPR    2. Urinary incontinence, unspecified type  -     T4, Free  -     TSH  -     Comprehensive Metabolic Panel  -     Vitamin B12  -     CBC & Differential  -     Lipid Panel  -     Vitamin B6  -     [place] Spotted Fever, IgM  -     [place] Spotted Fever, IgG  -     Lyme Disease, PCR - , Arm, Left  -     Ehrlichia Antibody Panel  -     Cyclic Citrul Peptide Antibody, IgG / IgA  -     Rheumatoid Factor  -     Uric Acid  -     Sedimentation Rate  -     C-reactive Protein  -     Antistreptolysin O Titer  -     RPR    3. Postmenopausal  -     DEXA Bone Density Axial  -     T4, Free  -     TSH  -     Comprehensive Metabolic Panel  -     Vitamin B12  -     CBC & Differential  -     Lipid Panel  -     Vitamin B6  -     [place] Spotted Fever, IgM  -     [place] Spotted Fever, IgG  -     Lyme Disease, PCR - , Arm, Left  -     Ehrlichia Antibody Panel  -     Cyclic Citrul Peptide Antibody, IgG / IgA  -     Rheumatoid Factor  -     Uric Acid  -     Sedimentation Rate  -     C-reactive Protein  -     Antistreptolysin O Titer  -     RPR    4. Arthritis  -     T4, Free  -     TSH  -     Comprehensive Metabolic Panel  -     Vitamin B12  -     CBC & Differential  -     Lipid Panel  -     Vitamin B6  -     [place] Spotted Fever, IgM  -     [place] Spotted Fever, IgG  -     Lyme Disease, PCR - , Arm, Left  -     Ehrlichia Antibody Panel  -     Cyclic Citrul Peptide Antibody, IgG / IgA  -     Rheumatoid Factor  -     Uric Acid  -     Sedimentation Rate  -     C-reactive Protein  -     Antistreptolysin O Titer  -     RPR    5. Vitamin B6 deficiency  -     T4, Free  -     TSH  -     Comprehensive Metabolic Panel  -     Vitamin B12  -     CBC & Differential  -     Lipid Panel  -     Vitamin B6  -     [place] Spotted Fever, IgM  -     [place] Spotted Fever, IgG  -     Lyme Disease, PCR - , Arm, Left  -     Ehrlichia Antibody Panel  -     Cyclic  Citrul Peptide Antibody, IgG / IgA  -     Rheumatoid Factor  -     Uric Acid  -     Sedimentation Rate  -     C-reactive Protein  -     Antistreptolysin O Titer  -     RPR      Return in about 6 weeks (around 5/8/2023).          There are no Patient Instructions on file for this visit.     Juan Clemens MD    Assessment & Plan       Answers for HPI/ROS submitted by the patient on 3/27/2023  Please describe your symptoms.: follow up  Have you had these symptoms before?: Yes  How long have you been having these symptoms?: Greater than 2 weeks  What is the primary reason for your visit?: Other

## 2023-03-31 LAB
A PHAGOCYTOPH IGG TITR SER IF: NEGATIVE {TITER}
A PHAGOCYTOPH IGM TITR SER IF: NEGATIVE {TITER}
ALBUMIN SERPL-MCNC: 4.3 G/DL (ref 3.5–5.2)
ALBUMIN/GLOB SERPL: 1.8 G/DL
ALP SERPL-CCNC: 81 U/L (ref 39–117)
ALT SERPL-CCNC: 6 U/L (ref 1–33)
ASO AB SERPL-ACNC: 226.4 IU/ML (ref 0–200)
AST SERPL-CCNC: 12 U/L (ref 1–32)
B BURGDOR DNA SPEC QL NAA+PROBE: NEGATIVE
BASOPHILS # BLD AUTO: 0.06 10*3/MM3 (ref 0–0.2)
BASOPHILS NFR BLD AUTO: 0.7 % (ref 0–1.5)
BILIRUB SERPL-MCNC: <0.2 MG/DL (ref 0–1.2)
BUN SERPL-MCNC: 8 MG/DL (ref 6–20)
BUN/CREAT SERPL: 9.6 (ref 7–25)
CALCIUM SERPL-MCNC: 9.5 MG/DL (ref 8.6–10.5)
CCP IGA+IGG SERPL IA-ACNC: 1 UNITS (ref 0–19)
CHLORIDE SERPL-SCNC: 108 MMOL/L (ref 98–107)
CHOLEST SERPL-MCNC: 262 MG/DL (ref 0–200)
CO2 SERPL-SCNC: 23 MMOL/L (ref 22–29)
CREAT SERPL-MCNC: 0.83 MG/DL (ref 0.57–1)
CRP SERPL-MCNC: <0.3 MG/DL (ref 0–0.5)
E CHAFFEENSIS IGG TITR SER IF: NEGATIVE {TITER}
E CHAFFEENSIS IGM TITR SER IF: NEGATIVE {TITER}
EGFRCR SERPLBLD CKD-EPI 2021: 84.4 ML/MIN/1.73
EOSINOPHIL # BLD AUTO: 0.24 10*3/MM3 (ref 0–0.4)
EOSINOPHIL NFR BLD AUTO: 2.7 % (ref 0.3–6.2)
ERYTHROCYTE [DISTWIDTH] IN BLOOD BY AUTOMATED COUNT: 12.2 % (ref 12.3–15.4)
ERYTHROCYTE [SEDIMENTATION RATE] IN BLOOD BY WESTERGREN METHOD: 18 MM/HR (ref 0–30)
GLOBULIN SER CALC-MCNC: 2.4 GM/DL
GLUCOSE SERPL-MCNC: 79 MG/DL (ref 65–99)
HCT VFR BLD AUTO: 41.5 % (ref 34–46.6)
HDLC SERPL-MCNC: 49 MG/DL (ref 40–60)
HGB BLD-MCNC: 14.3 G/DL (ref 12–15.9)
IMM GRANULOCYTES # BLD AUTO: 0.03 10*3/MM3 (ref 0–0.05)
IMM GRANULOCYTES NFR BLD AUTO: 0.3 % (ref 0–0.5)
LDLC SERPL CALC-MCNC: 190 MG/DL (ref 0–100)
LYMPHOCYTES # BLD AUTO: 2.97 10*3/MM3 (ref 0.7–3.1)
LYMPHOCYTES NFR BLD AUTO: 33.3 % (ref 19.6–45.3)
MCH RBC QN AUTO: 31.6 PG (ref 26.6–33)
MCHC RBC AUTO-ENTMCNC: 34.5 G/DL (ref 31.5–35.7)
MCV RBC AUTO: 91.8 FL (ref 79–97)
MONOCYTES # BLD AUTO: 0.57 10*3/MM3 (ref 0.1–0.9)
MONOCYTES NFR BLD AUTO: 6.4 % (ref 5–12)
NEUTROPHILS # BLD AUTO: 5.04 10*3/MM3 (ref 1.7–7)
NEUTROPHILS NFR BLD AUTO: 56.6 % (ref 42.7–76)
NRBC BLD AUTO-RTO: 0 /100 WBC (ref 0–0.2)
PLATELET # BLD AUTO: 281 10*3/MM3 (ref 140–450)
POTASSIUM SERPL-SCNC: 4.7 MMOL/L (ref 3.5–5.2)
PROT SERPL-MCNC: 6.7 G/DL (ref 6–8.5)
PYRIDOXAL PHOS SERPL-MCNC: 4.6 UG/L (ref 3.4–65.2)
R RICKETTSI IGG SER QL IA: NEGATIVE
R RICKETTSI IGM SER-ACNC: 0.56 INDEX (ref 0–0.89)
RBC # BLD AUTO: 4.52 10*6/MM3 (ref 3.77–5.28)
RHEUMATOID FACT SERPL-ACNC: <10 IU/ML
RPR SER QL: NON REACTIVE
SODIUM SERPL-SCNC: 140 MMOL/L (ref 136–145)
T4 FREE SERPL-MCNC: 0.87 NG/DL (ref 0.93–1.7)
TRIGL SERPL-MCNC: 129 MG/DL (ref 0–150)
TSH SERPL DL<=0.005 MIU/L-ACNC: 1.74 UIU/ML (ref 0.27–4.2)
URATE SERPL-MCNC: 2.6 MG/DL (ref 2.4–5.7)
VIT B12 SERPL-MCNC: 270 PG/ML (ref 211–946)
VLDLC SERPL CALC-MCNC: 23 MG/DL (ref 5–40)
WBC # BLD AUTO: 8.91 10*3/MM3 (ref 3.4–10.8)

## 2023-04-15 DIAGNOSIS — G47.09 OTHER INSOMNIA: Chronic | ICD-10-CM

## 2023-04-15 DIAGNOSIS — F39 MODERATE MOOD DISORDER: Chronic | ICD-10-CM

## 2023-04-17 RX ORDER — TRAZODONE HYDROCHLORIDE 50 MG/1
50 TABLET ORAL NIGHTLY
Qty: 30 TABLET | Refills: 2 | OUTPATIENT
Start: 2023-04-17

## 2023-04-17 RX ORDER — ARIPIPRAZOLE 15 MG/1
15 TABLET ORAL DAILY
Qty: 30 TABLET | Refills: 2 | OUTPATIENT
Start: 2023-04-17

## 2023-04-20 DIAGNOSIS — F41.0 PANIC DISORDER: Chronic | ICD-10-CM

## 2023-04-20 RX ORDER — CLONAZEPAM 0.5 MG/1
0.5 TABLET ORAL NIGHTLY PRN
Qty: 30 TABLET | Refills: 0 | Status: SHIPPED | OUTPATIENT
Start: 2023-04-20

## 2023-04-28 ENCOUNTER — OFFICE VISIT (OUTPATIENT)
Dept: INTERNAL MEDICINE | Facility: CLINIC | Age: 53
End: 2023-04-28
Payer: MEDICAID

## 2023-04-28 VITALS
RESPIRATION RATE: 16 BRPM | SYSTOLIC BLOOD PRESSURE: 124 MMHG | OXYGEN SATURATION: 99 % | TEMPERATURE: 96.6 F | HEART RATE: 70 BPM | HEIGHT: 65 IN | DIASTOLIC BLOOD PRESSURE: 66 MMHG | BODY MASS INDEX: 26.66 KG/M2 | WEIGHT: 160 LBS

## 2023-04-28 DIAGNOSIS — R53.83 OTHER FATIGUE: Primary | ICD-10-CM

## 2023-04-28 DIAGNOSIS — E53.8 VITAMIN B12 DEFICIENCY: ICD-10-CM

## 2023-04-28 DIAGNOSIS — E53.1 VITAMIN B6 DEFICIENCY: ICD-10-CM

## 2023-04-28 PROCEDURE — 3078F DIAST BP <80 MM HG: CPT | Performed by: INTERNAL MEDICINE

## 2023-04-28 PROCEDURE — 3074F SYST BP LT 130 MM HG: CPT | Performed by: INTERNAL MEDICINE

## 2023-04-28 PROCEDURE — 99214 OFFICE O/P EST MOD 30 MIN: CPT | Performed by: INTERNAL MEDICINE

## 2023-04-28 RX ORDER — LANOLIN ALCOHOL/MO/W.PET/CERES
1000 CREAM (GRAM) TOPICAL DAILY
Qty: 90 TABLET | Refills: 3 | Status: SHIPPED | OUTPATIENT
Start: 2023-04-28

## 2023-04-28 RX ORDER — DIPHENHYDRAMINE HYDROCHLORIDE 25 MG/1
25 CAPSULE ORAL DAILY
Qty: 90 TABLET | Refills: 3 | Status: SHIPPED | OUTPATIENT
Start: 2023-04-28

## 2023-04-28 NOTE — PROGRESS NOTES
Subjective     Patient ID: Marguerite Anderson is a 53 y.o. female. Patient is here for management of multiple medical problems.     Chief Complaint   Patient presents with   • Hypertension     History of Present Illness   htn    Low vit b12 vit 6.   Takes b complex daily          The following portions of the patient's history were reviewed and updated as appropriate: allergies, current medications, past family history, past medical history, past social history, past surgical history and problem list.    Review of Systems    Current Outpatient Medications:   •  Advair Diskus 250-50 MCG/ACT DISKUS, INHALE 1 PUFF BY MOUTH DAILY, Disp: 60 each, Rfl: 11  •  ARIPiprazole (ABILIFY) 20 MG tablet, Take 1 tablet by mouth Daily., Disp: 30 tablet, Rfl: 2  •  aspirin (aspirin) 81 MG EC tablet, Take 1 tablet by mouth Daily., Disp: 90 tablet, Rfl: 3  •  B Complex-C (B-complex with vitamin C) tablet, Take 1 tablet by mouth Daily., Disp: 90 tablet, Rfl: 3  •  clonazePAM (KlonoPIN) 0.5 MG tablet, Take 1 tablet by mouth At Night As Needed for Anxiety., Disp: 30 tablet, Rfl: 0  •  cyclobenzaprine (FLEXERIL) 10 MG tablet, Take 1 tablet by mouth 2 (Two) Times a Day As Needed., Disp: , Rfl:   •  Diclofenac Sodium (VOLTAREN) 1 % gel gel, APPLY 4 GRAMS TOPICALLY TO AFFECTED AREA 4 TIMES A DAY AS NEEDED FOR PAIN, Disp: 100 g, Rfl: 3  •  DULoxetine (CYMBALTA) 60 MG capsule, Take 1 capsule by mouth 2 (Two) Times a Day., Disp: 180 capsule, Rfl: 1  •  estradiol (ESTRACE VAGINAL) 0.1 MG/GM vaginal cream, Use 0.5 grams intravaginally twice weekly to control symptoms., Disp: 1 each, Rfl: 11  •  estradiol (VIVELLE-DOT) 0.1 MG/24HR patch, Place 1 patch on the skin as directed by provider 2 (Two) Times a Week., Disp: 8 patch, Rfl: 11  •  levETIRAcetam (KEPPRA) 750 MG tablet, Take 1 tablet by mouth 2 (Two) Times a Day., Disp: 180 tablet, Rfl: 3  •  nicotine (Nicoderm CQ) 21 MG/24HR patch, Place 1 patch on the skin as directed by provider Daily., Disp:  "30 patch, Rfl: 4  •  OnabotulinumtoxinA (Botox) 200 units reconstituted solution, INJECT 200 UNITS INTRAMUSCULARLY INTO HEAD, NECK & SHOULDERS EVERY 12 WEEKS PER FDA PROTOCOL, Disp: 1 each, Rfl: 3  •  QUEtiapine (SEROquel) 50 MG tablet, Take 1 tablet by mouth Every Night., Disp: 30 tablet, Rfl: 2  •  Rimegepant Sulfate (Nurtec) 75 MG tablet dispersible tablet, Place 1 tablet under the tongue Take As Directed., Disp: 16 tablet, Rfl: 5  •  topiramate (TOPAMAX) 100 MG tablet, Take 1 tablet by mouth 2 (Two) Times a Day., Disp: 180 tablet, Rfl: 3  •  traZODone (DESYREL) 100 MG tablet, Take 1 tablet by mouth Every Night., Disp: 30 tablet, Rfl: 2  •  vitamin B-12 (CYANOCOBALAMIN) 1000 MCG tablet, Take 1 tablet by mouth Daily., Disp: 90 tablet, Rfl: 3  •  vitamin B-6 (PYRIDOXINE) 25 MG tablet, Take 1 tablet by mouth Daily., Disp: 90 tablet, Rfl: 3    Objective      Blood pressure 124/66, pulse 70, temperature 96.6 °F (35.9 °C), resp. rate 16, height 165.1 cm (65\"), weight 72.6 kg (160 lb), SpO2 99 %, not currently breastfeeding.    Physical Exam     General Appearance:    Alert, cooperative, no distress, appears stated age   Head:    Normocephalic, without obvious abnormality, atraumatic   Eyes:    PERRL, conjunctiva/corneas clear, EOM's intact   Ears:    Normal TM's and external ear canals, both ears   Nose:   Nares normal, septum midline, mucosa normal, no drainage   or sinus tenderness   Throat:   Lips, mucosa, and tongue normal; teeth and gums normal   Neck:   Supple, symmetrical, trachea midline, no adenopathy;        thyroid:  No enlargement/tenderness/nodules; no carotid    bruit or JVD   Back:     Symmetric, no curvature, ROM normal, no CVA tenderness   Lungs:     Clear to auscultation bilaterally, respirations unlabored   Chest wall:    No tenderness or deformity   Heart:    Regular rate and rhythm, S1 and S2 normal, no murmur,        rub or gallop   Abdomen:     Soft, non-tender, bowel sounds active all four " quadrants,     no masses, no organomegaly   Extremities:   Extremities normal, atraumatic, no cyanosis or edema   Pulses:   2+ and symmetric all extremities   Skin:   Skin color, texture, turgor normal, no rashes or lesions   Lymph nodes:   Cervical, supraclavicular, and axillary nodes normal   Neurologic:   CNII-XII intact. Normal strength, sensation and reflexes       throughout      Results for orders placed or performed in visit on 03/27/23   Lyme Disease, PCR - , Arm, Left    Specimen: Arm, Left   Result Value Ref Range    Lyme Disease(B.burgdorferi)PCR Negative Negative   T4, Free    Specimen: Blood   Result Value Ref Range    Free T4 0.87 (L) 0.93 - 1.70 ng/dL   TSH    Specimen: Blood   Result Value Ref Range    TSH 1.740 0.270 - 4.200 uIU/mL   Comprehensive Metabolic Panel    Specimen: Blood   Result Value Ref Range    Glucose 79 65 - 99 mg/dL    BUN 8 6 - 20 mg/dL    Creatinine 0.83 0.57 - 1.00 mg/dL    EGFR Result 84.4 >60.0 mL/min/1.73    BUN/Creatinine Ratio 9.6 7.0 - 25.0    Sodium 140 136 - 145 mmol/L    Potassium 4.7 3.5 - 5.2 mmol/L    Chloride 108 (H) 98 - 107 mmol/L    Total CO2 23.0 22.0 - 29.0 mmol/L    Calcium 9.5 8.6 - 10.5 mg/dL    Total Protein 6.7 6.0 - 8.5 g/dL    Albumin 4.3 3.5 - 5.2 g/dL    Globulin 2.4 gm/dL    A/G Ratio 1.8 g/dL    Total Bilirubin <0.2 0.0 - 1.2 mg/dL    Alkaline Phosphatase 81 39 - 117 U/L    AST (SGOT) 12 1 - 32 U/L    ALT (SGPT) 6 1 - 33 U/L   Vitamin B12    Specimen: Blood   Result Value Ref Range    Vitamin B-12 270 211 - 946 pg/mL   Lipid Panel    Specimen: Blood   Result Value Ref Range    Total Cholesterol 262 (H) 0 - 200 mg/dL    Triglycerides 129 0 - 150 mg/dL    HDL Cholesterol 49 40 - 60 mg/dL    VLDL Cholesterol Brady 23 5 - 40 mg/dL    LDL Chol Calc (NIH) 190 (H) 0 - 100 mg/dL   Vitamin B6    Specimen: Blood   Result Value Ref Range    Vitamin B6 4.6 3.4 - 65.2 ug/L   [place] Spotted Fever, IgM    Specimen: Blood   Result Value Ref Range    RMSF IgM  0.56 0.00 - 0.89 index   [facility] Spotted Fever, IgG    Specimen: Blood   Result Value Ref Range    RMSF IgG Negative Negative   Ehrlichia Antibody Panel    Specimen: Blood   Result Value Ref Range    E. chaffeensis ([facility]) IgG Titer Negative Neg:<1:64    E. chaffeensis ([facility]) IgM Titer Negative Neg:<1:20    HGE IgG Titer Negative Neg:<1:64    HGE IgM Titer Negative Neg:<1:20   Cyclic Citrul Peptide Antibody, IgG / IgA    Specimen: Blood   Result Value Ref Range    CCP Antibodies IgG/IgA 1 0 - 19 units   Rheumatoid Factor    Specimen: Blood   Result Value Ref Range    RA Latex Turbid <10.0 <14.0 IU/mL   Uric Acid    Specimen: Blood   Result Value Ref Range    Uric Acid 2.6 2.4 - 5.7 mg/dL   Sedimentation Rate    Specimen: Blood   Result Value Ref Range    Sed Rate 18 0 - 30 mm/hr   C-reactive Protein    Specimen: Blood   Result Value Ref Range    C-Reactive Protein <0.30 0.00 - 0.50 mg/dL   Antistreptolysin O Titer    Specimen: Blood   Result Value Ref Range    .4 (H) 0.0 - 200.0 IU/mL   RPR    Specimen: Blood   Result Value Ref Range    RPR Non Reactive Non Reactive   CBC & Differential    Specimen: Blood   Result Value Ref Range    WBC 8.91 3.40 - 10.80 10*3/mm3    RBC 4.52 3.77 - 5.28 10*6/mm3    Hemoglobin 14.3 12.0 - 15.9 g/dL    Hematocrit 41.5 34.0 - 46.6 %    MCV 91.8 79.0 - 97.0 fL    MCH 31.6 26.6 - 33.0 pg    MCHC 34.5 31.5 - 35.7 g/dL    RDW 12.2 (L) 12.3 - 15.4 %    Platelets 281 140 - 450 10*3/mm3    Neutrophil Rel % 56.6 42.7 - 76.0 %    Lymphocyte Rel % 33.3 19.6 - 45.3 %    Monocyte Rel % 6.4 5.0 - 12.0 %    Eosinophil Rel % 2.7 0.3 - 6.2 %    Basophil Rel % 0.7 0.0 - 1.5 %    Neutrophils Absolute 5.04 1.70 - 7.00 10*3/mm3    Lymphocytes Absolute 2.97 0.70 - 3.10 10*3/mm3    Monocytes Absolute 0.57 0.10 - 0.90 10*3/mm3    Eosinophils Absolute 0.24 0.00 - 0.40 10*3/mm3    Basophils Absolute 0.06 0.00 - 0.20 10*3/mm3    Immature Granulocyte Rel % 0.3 0.0 - 0.5 %    Immature Grans Absolute 0.03  0.00 - 0.05 10*3/mm3    nRBC 0.0 0.0 - 0.2 /100 WBC         Assessment & Plan     arthrits may be related to elevated Aso titer. Not much I can do about this.     Low B6-12. Add extrea to  The b complex.      Diagnoses and all orders for this visit:    1. Other fatigue (Primary)  -     Vitamin B12  -     Vitamin B6  -     TSH  -     T4, Free    2. Vitamin B12 deficiency  -     Vitamin B12  -     Vitamin B6  -     TSH  -     T4, Free    3. Vitamin B6 deficiency  -     Vitamin B12  -     Vitamin B6  -     TSH  -     T4, Free    Other orders  -     vitamin B-6 (PYRIDOXINE) 25 MG tablet; Take 1 tablet by mouth Daily.  Dispense: 90 tablet; Refill: 3  -     vitamin B-12 (CYANOCOBALAMIN) 1000 MCG tablet; Take 1 tablet by mouth Daily.  Dispense: 90 tablet; Refill: 3      Return in about 3 months (around 7/28/2023) for please move apt out 3 months..          There are no Patient Instructions on file for this visit.     Juan Clemens MD    Assessment & Plan       Answers for HPI/ROS submitted by the patient on 4/28/2023  Please describe your symptoms.: Follow up  Have you had these symptoms before?: Yes  How long have you been having these symptoms?: Greater than 2 weeks  What is the primary reason for your visit?: Other

## 2023-05-03 ENCOUNTER — TELEPHONE (OUTPATIENT)
Dept: PSYCHIATRY | Facility: CLINIC | Age: 53
End: 2023-05-03
Payer: MEDICAID

## 2023-05-03 DIAGNOSIS — G47.09 OTHER INSOMNIA: Chronic | ICD-10-CM

## 2023-05-03 RX ORDER — QUETIAPINE FUMARATE 50 MG/1
50 TABLET, FILM COATED ORAL NIGHTLY
Qty: 30 TABLET | Refills: 2 | OUTPATIENT
Start: 2023-05-03

## 2023-05-03 NOTE — TELEPHONE ENCOUNTER
Patient called in was started on vitamin B12 and B6 on 04/28/23 by . Has taken them for 3 days now and states it makes her feel weird, states she is not sleeping, heart is racing, more jittery and nervous. Wants to know your thoughts if this will pass and vitamin B6 and 12 will help anxiety or not. She said she debated on calling you but knows you are very familiar with her psych meds and wants to make sure Vitamin B6 and 12 is ok and this feeling will pass or if she should call Dr. Clemens. Please advise

## 2023-05-04 DIAGNOSIS — R32 URINARY INCONTINENCE, UNSPECIFIED TYPE: Primary | ICD-10-CM

## 2023-05-11 ENCOUNTER — APPOINTMENT (OUTPATIENT)
Dept: BONE DENSITY | Facility: HOSPITAL | Age: 53
End: 2023-05-11
Payer: MEDICAID

## 2023-05-11 PROCEDURE — 77080 DXA BONE DENSITY AXIAL: CPT

## 2023-05-16 DIAGNOSIS — F39 MODERATE MOOD DISORDER: Chronic | ICD-10-CM

## 2023-05-16 RX ORDER — DULOXETIN HYDROCHLORIDE 30 MG/1
CAPSULE, DELAYED RELEASE ORAL
Qty: 30 CAPSULE | Refills: 2 | OUTPATIENT
Start: 2023-05-16

## 2023-05-20 NOTE — PROGRESS NOTES
"This provider is located at [facility] ,[address], [address] in [place], [place],using a secure [employer] Video Visit through [employer].  The patient's camera was not working so I could not visualize her.  Patient is being seen remotely via telehealth at their home address in [place], and stated they are in a secure environment for this session. The patient's condition being diagnosed/treated is appropriate for telemedicine. The provider identified herself as well as her credentials.   The patient, and/or patients guardian, consent to be seen remotely, and when consent is given they understand that the consent allows for patient identifiable information to be sent to a third party as needed.   They may refuse to be seen remotely at any time. The electronic data is encrypted and password protected, and the patient and/or guardian has been advised of the potential risks to privacy not withstanding such measures.    Chief Complaint  Mood disorder, anxiety, panic, and insomnia    Subjective          Marguerite Anderson presents today via [employer] Video through [employer] by herself for a follow up and medication check.     History of Present Illness: Marguerite states, \"I was somewhat better but this past week has been rough.\"  Marguerite tells me that her mother and niece/nephew have been \"dumping all their drama on her.\"  Her niece and nephew are having emotional and psychiatric problems which has been affecting her mother.  Ultimately, Marguerite worries have the stress may affect her mother.  She also feels the affects of stress on herself.  She does not feel she is able to handle the problems that are going on in her family.  She reports having ruminating thoughts and difficulty with sleep.  She tells me that she sleeps her best between 5 AM and 11 AM.  However, she does attempt to go to bed at a regular time but cannot fall asleep easily.  Once she asleep, she wakes up several times and takes " "a long while to return to sleep.  She feels her sleep quality is good while she is sleeping.  She reports having a lack of appetite during the day and begins to eat larger meals and snacks in the evening.  She woke up last night 3 times and ate each time she awoke.  She had previously been feeling more active due to improved depression and decreased anxiety.  She was planning her day around her chores and trying to find ways to simplify her evening regiment with dinner.  That is when her anxiety increases the most.  She continues to meet with Sherlyn for therapy.  She reports having low vitamin B12 and B6 levels.  She feels palpitations in her chest when she began the vitamin supplements.  She reports having periods of \"zoning out\" but denies grand mal seizure activity.  She continues to experience migraines and reports Nurtec is helping. She is compliant with her medications.  She is taking Trazodone, Abilify, Cymbalta, Seroquel, and clonazepam. She denies any side effects of her current medication regiment. She is seeing Sherlyn for therapy. She denies any SI/HI/AVH.    Current Medications:   Current Outpatient Medications   Medication Sig Dispense Refill   • Advair Diskus 250-50 MCG/ACT DISKUS INHALE 1 PUFF BY MOUTH DAILY 60 each 11   • ARIPiprazole (ABILIFY) 20 MG tablet Take 1 tablet by mouth Daily. 30 tablet 2   • aspirin (aspirin) 81 MG EC tablet Take 1 tablet by mouth Daily. 90 tablet 3   • clonazePAM (KlonoPIN) 0.5 MG tablet Take 1 tablet by mouth At Night As Needed for Anxiety. 30 tablet 0   • cyclobenzaprine (FLEXERIL) 10 MG tablet Take 1 tablet by mouth 2 (Two) Times a Day As Needed.     • Diclofenac Sodium (VOLTAREN) 1 % gel gel APPLY 4 GRAMS TOPICALLY TO AFFECTED AREA 4 TIMES A DAY AS NEEDED FOR PAIN 100 g 3   • DULoxetine (CYMBALTA) 60 MG capsule Take 1 capsule by mouth 2 (Two) Times a Day. 180 capsule 1   • estradiol (VIVELLE-DOT) 0.1 MG/24HR patch Place 1 patch on the skin as directed by provider 2 " (Two) Times a Week. 8 patch 11   • levETIRAcetam (KEPPRA) 750 MG tablet Take 1 tablet by mouth 2 (Two) Times a Day. 180 tablet 3   • QUEtiapine (SEROquel) 25 MG tablet Patient will take one tablet by mouth in late afternoon before supper and two tablets by mouth at bedtime 90 tablet 2   • traZODone (DESYREL) 100 MG tablet Take 1 tablet by mouth Every Night. 30 tablet 2   • vitamin B-12 (CYANOCOBALAMIN) 1000 MCG tablet Take 1 tablet by mouth Daily. 90 tablet 3   • vitamin B-6 (PYRIDOXINE) 25 MG tablet Take 1 tablet by mouth Daily. 90 tablet 3   • B Complex-C (B-complex with vitamin C) tablet Take 1 tablet by mouth Daily. (Patient not taking: Reported on 5/23/2023) 90 tablet 3   • estradiol (ESTRACE VAGINAL) 0.1 MG/GM vaginal cream Use 0.5 grams intravaginally twice weekly to control symptoms. (Patient not taking: Reported on 5/23/2023) 1 each 11   • nicotine (Nicoderm CQ) 21 MG/24HR patch Place 1 patch on the skin as directed by provider Daily. (Patient not taking: Reported on 5/23/2023) 30 patch 4   • OnabotulinumtoxinA (Botox) 200 units reconstituted solution INJECT 200 UNITS INTRAMUSCULARLY INTO HEAD, NECK & SHOULDERS EVERY 12 WEEKS PER FDA PROTOCOL (Patient not taking: Reported on 5/23/2023) 1 each 3   • Rimegepant Sulfate (Nurtec) 75 MG tablet dispersible tablet Place 1 tablet under the tongue Take As Directed. 16 tablet 5   • topiramate (TOPAMAX) 100 MG tablet Take 1 tablet by mouth 2 (Two) Times a Day. 180 tablet 3     No current facility-administered medications for this visit.       Objective   Vital Signs:   There were no vitals taken for this visit.    Physical Exam  Nursing note reviewed. Vitals reviewed: Vitals not obtained due to nature of telehealth visit.   Constitutional:       Appearance: Normal appearance.   Skin:     Coloration: Skin is pale.   Neurological:      General: No focal deficit present.      Mental Status: She is alert and oriented to person, place, and time.   Psychiatric:          "Attention and Perception: Attention normal.         Mood and Affect: Mood is anxious and depressed.         Speech: Speech normal.         Behavior: Behavior is slowed. Behavior is cooperative.         Thought Content: Thought content includes suicidal (passive without intent or plan) ideation.         Cognition and Memory: Cognition normal.         Judgment: Judgment normal.        Result Review :       The following data was reviewed by CHRIS Nick on 05/23/2023:         Assessment and Plan    Problem List Items Addressed This Visit    None  Visit Diagnoses     JAY (generalized anxiety disorder)  (Chronic)   -  Primary    Relevant Medications    QUEtiapine (SEROquel) 25 MG tablet    Other insomnia  (Chronic)       Relevant Medications    QUEtiapine (SEROquel) 25 MG tablet    Panic disorder  (Chronic)       Relevant Medications    QUEtiapine (SEROquel) 25 MG tablet    Moderate mood disorder  (Chronic)       Relevant Medications    QUEtiapine (SEROquel) 25 MG tablet          Mental Status Exam:   Hygiene:   good  Cooperation:  Cooperative  Eye Contact:  Good  Psychomotor Behavior:  Slow  Affect:  Appropriate  Mood: depressed and anxious  Speech:  Normal  Thought Process:  Goal directed and Linear  Thought Content:  Normal  Suicidal:  Suicidal Ideation and passive without intent or plan  Homicidal:  None  Hallucinations:  None  Delusion:  None  Memory:  Intact  Orientation:  Person, Place, Time and Situation  Reliability:  good  Insight:  Good  Judgement:  Good  Impulse Control:  Good  Physical/Medical Issues:  Yes Chronic pain, seizures, and migraines      PHQ-9 Score:   PHQ-9 Total Score:      Impression/Plan:  -This is a follow up and medication check. Marguerite report an increase in anxious worry due to family stress. Her niece and nephew are having emotional and mental problems and this is put on her mother. Her mother \"vents to the patient\" and she does not feel she is able to handle this emotionally " herself. She worries the stress will affect her mother as well. She is having trouble sleeping. It appears her cortisol levels are high in the late evening to nighttime and this is causing an increase in appetite and sleep disruptions. We talked about ways to reduce stress and she feels she is dealing with about all she can handle. She has been seeing Sherlyn for therapy and believes talking with her may be helpful. I will have reception find an availability is possible. Otherwise, her appointment is next week. She is compliant with medications and found the recent adjustments to be helpful on mood, sleep, and anxiety. I suggeted adding a low dose Seroquel around suppertime to help anxiety, mood, and possibly sleep. She agrees to try.   -Increase Seroquel to 25 mg daily before supper and 50 mg nightly for anxiety and insomnia.    -Continue Trazodone 100 mg nightly for insomnia.  Patient has refills.  -Continue Abilify 20 mg daily for bipolar depression.  Patient will take in the a.m. patient has refills.  -Continue Cymbalta 60 mg mg twice daily for bipolar depression and anxiety.  Patient has refills.  -Continue clonazepam 0.5 mg daily as needed for anxiety and insomnia. # 30 tablets given. Patient has refills.  -Continue therapy with Sherlyn [facility].  -Continue Keppra,Topamax, Imitrex, and Botox for seizure disorder and migraines. These medications are prescribed by another provider.  -The ASYA report, reviewed through PDMP , of the past 12 months were reviewed and is appropriate.  The patient/guardian reports taking the medication only as prescribed.  The patient/guardian denies any abuse or misuse of the medication.  The patient/guardian denies any other substance use or issues.  There are no apparent substance related issues.  The patient reports no side effects of the current medication usage.  The patient/guardian has reported significant improvement with medication usage and wishes to continue  medication as prescribed.  The patient/guardian is appropriate to continue with current medication usage at this time.  Reinforced risks and side effects of medication usage, patient and/or guardian verbalize understanding in their own words and are in agreement with current plan.  -Last UDS on 10/04/22. Positive TCA and negative benzodiazepines.     MEDS ORDERED DURING VISIT:  New Medications Ordered This Visit   Medications   • QUEtiapine (SEROquel) 25 MG tablet     Sig: Patient will take one tablet by mouth in late afternoon before supper and two tablets by mouth at bedtime     Dispense:  90 tablet     Refill:  2         Follow Up   Return in about 2 months (around 7/23/2023) for Medication Check.  Patient was given instructions and counseling regarding her condition or for health maintenance advice. Please see specific information pulled into the AVS if appropriate.       TREATMENT PLAN/GOALS: Continue supportive psychotherapy efforts and medications as indicated. Treatment and medication options discussed during today's visit. Patient acknowledged and verbally consented to continue with current treatment plan and was educated on the importance of compliance with treatment and follow-up appointments.    MEDICATION ISSUES:  Discussed medication options and treatment plan of prescribed medication as well as the risks, benefits, and side effects including potential falls, possible impaired driving and metabolic adversities among others. Patient is agreeable to call the office with any worsening of symptoms or onset of side effects. Patient is agreeable to call 911 or go to the nearest ER should he/she begin having SI/HI.      This document has been electronically signed by CHRIS Witt, PMHNP-BC  May 23, 2023 13:03 EDT    Part of this note may be an electronic transcription/translation of spoken language to printed text using the Dragon Dictation System.

## 2023-05-22 DIAGNOSIS — F41.0 PANIC DISORDER: Chronic | ICD-10-CM

## 2023-05-22 RX ORDER — CLONAZEPAM 0.5 MG/1
0.5 TABLET ORAL NIGHTLY PRN
Qty: 30 TABLET | Refills: 0 | Status: SHIPPED | OUTPATIENT
Start: 2023-05-22

## 2023-05-22 NOTE — TELEPHONE ENCOUNTER
Rx Refill Note  Requested Prescriptions     Pending Prescriptions Disp Refills   • clonazePAM (KlonoPIN) 0.5 MG tablet 30 tablet 0     Sig: Take 1 tablet by mouth At Night As Needed for Anxiety.      Last office visit with prescribing clinician: 9/17/2021   Last telemedicine visit with prescribing clinician: 5/16/2023   Next office visit with prescribing clinician: 5/23/2023                         Would you like a call back once the refill request has been completed: [] Yes [] No    If the office needs to give you a call back, can they leave a voicemail: [] Yes [] No    Promise Castellano CMA  05/22/23, 12:08 EDT

## 2023-05-23 ENCOUNTER — TELEMEDICINE (OUTPATIENT)
Dept: PSYCHIATRY | Facility: CLINIC | Age: 53
End: 2023-05-23
Payer: MEDICAID

## 2023-05-23 DIAGNOSIS — G47.09 OTHER INSOMNIA: Chronic | ICD-10-CM

## 2023-05-23 DIAGNOSIS — F41.1 GAD (GENERALIZED ANXIETY DISORDER): Primary | Chronic | ICD-10-CM

## 2023-05-23 DIAGNOSIS — F41.0 PANIC DISORDER: Chronic | ICD-10-CM

## 2023-05-23 DIAGNOSIS — F39 MODERATE MOOD DISORDER: Chronic | ICD-10-CM

## 2023-05-23 PROCEDURE — 1159F MED LIST DOCD IN RCRD: CPT | Performed by: NURSE PRACTITIONER

## 2023-05-23 PROCEDURE — 1160F RVW MEDS BY RX/DR IN RCRD: CPT | Performed by: NURSE PRACTITIONER

## 2023-05-23 PROCEDURE — 99214 OFFICE O/P EST MOD 30 MIN: CPT | Performed by: NURSE PRACTITIONER

## 2023-05-23 RX ORDER — QUETIAPINE FUMARATE 25 MG/1
TABLET, FILM COATED ORAL
Qty: 90 TABLET | Refills: 2 | Status: SHIPPED | OUTPATIENT
Start: 2023-05-23

## 2023-05-25 ENCOUNTER — TELEMEDICINE (OUTPATIENT)
Dept: PSYCHIATRY | Facility: CLINIC | Age: 53
End: 2023-05-25
Payer: MEDICAID

## 2023-05-25 DIAGNOSIS — F41.1 GENERALIZED ANXIETY DISORDER: ICD-10-CM

## 2023-05-25 DIAGNOSIS — F39 MODERATE MOOD DISORDER: Primary | ICD-10-CM

## 2023-05-25 NOTE — PROGRESS NOTES
Telehealth Encounter Note:  Date of Service: May 25, 2023  Patient Name: Marguerite Anderson  : 1970   MRN: [id number]   Time In: 12:34 PM  Time Out: 1:15 PM     This provider is located at [facility] [address], [place] [address] using a secure [employer] Video Visit through [employer]. Patient is being seen remotely via telehealth at home address in [place] and stated they are in a secure environment for this session. The patient's condition being diagnosed/treated is appropriate for telemedicine. The provider identified herself as well as her credentials. The patient, and/or patients guardian, consent to be seen remotely, and when consent is given they understand that the consent allows for patient identifiable information to be sent to a third party as needed. They may refuse to be seen remotely at any time. The electronic data is encrypted and password protected, and the patient and/or guardian has been advised of the potential risks to privacy not withstanding such measures.     You have chosen to receive care through a telehealth visit.  Do you consent to use a video/audio connection for your medical care today? Yes    Referring Provider: Juan Clemens MD    PROGRESS NOTE    History of Present Illness:   Marguerite Anderson is a 53 y.o. female who is being seen today for follow up individual Psychotherapy session.     Chief Complaint:  Pt struggles with depression and anxiety.  Pt reports anxiety is very high, as well as stress levels (is overwhelmed).  Pt reports several family stressors.  Pt reports her mother is under a lot of stress currently and confides in pt which in turns increases pts stress and anxiety levels.              ICD-10-CM ICD-9-CM   1. Moderate mood disorder  F39 296.90   2. Generalized anxiety disorder  F41.1 300.02        Clinical Maneuvering/Intervention:   Assisted patient in processing above session content; acknowledged and normalized patient’s thoughts,  feelings, and concerns by utilizing a person-centered approach in efforts to build appropriate rapport and a positive therapeutic relationship with open and honest communication.  Processed and rationalized patients thoughts and feelings regarding current familial stressors.  Discussed triggers associated with patient's anxiety.  Also discussed coping skills for patient to implement such as helping mother set healthy boundaries with family members, explored possible solutions to resolve current situation, avoid too much downtime, time with pets, lean on SO.   Pt reports when things get too stressful for her she will take a nap which can be helpful.      Allowed patient to freely discuss issues without interruption or judgment. Provided safe, confidential environment to facilitate the development of positive therapeutic relationship and encourage open, honest communication. Assisted patient in identifying risk factors which would indicate the need for higher level of care including thoughts to harm self or others and/or self-harming behavior and encouraged patient to contact this office, call 911, or present to the nearest emergency room should any of these events occur. Discussed crisis intervention services and means to access. Patient adamantly and convincingly denies current suicidal or homicidal ideation or perceptual disturbance.    Mental Status Exam:   Hygiene:   good  Cooperation:  Cooperative  Eye Contact:  Good  Psychomotor Behavior:  Appropriate  Affect:  Blunted  Mood: anxious  Speech:  Normal  Thought Process:  Linear  Thought Content:  Mood congruent  Suicidal:  None  Homicidal:  None  Hallucinations:  None  Delusion:  None  Memory:  Intact  Orientation:  Person, Place, Time and Situation  Reliability:  good  Insight:  Good  Judgement:  Good  Impulse Control:  Good  Physical/Medical Issues:  yes     Patient's Support Network Includes:  significant other    Functional Status: Severe  impairment    Progress toward goal: Not at goal    Prognosis: fair w/ ongoing treatment     Medications:     Current Outpatient Medications:   •  Advair Diskus 250-50 MCG/ACT DISKUS, INHALE 1 PUFF BY MOUTH DAILY, Disp: 60 each, Rfl: 11  •  ARIPiprazole (ABILIFY) 20 MG tablet, Take 1 tablet by mouth Daily., Disp: 30 tablet, Rfl: 2  •  aspirin (aspirin) 81 MG EC tablet, Take 1 tablet by mouth Daily., Disp: 90 tablet, Rfl: 3  •  B Complex-C (B-complex with vitamin C) tablet, Take 1 tablet by mouth Daily. (Patient not taking: Reported on 5/23/2023), Disp: 90 tablet, Rfl: 3  •  clonazePAM (KlonoPIN) 0.5 MG tablet, Take 1 tablet by mouth At Night As Needed for Anxiety., Disp: 30 tablet, Rfl: 0  •  cyclobenzaprine (FLEXERIL) 10 MG tablet, Take 1 tablet by mouth 2 (Two) Times a Day As Needed., Disp: , Rfl:   •  Diclofenac Sodium (VOLTAREN) 1 % gel gel, APPLY 4 GRAMS TOPICALLY TO AFFECTED AREA 4 TIMES A DAY AS NEEDED FOR PAIN, Disp: 100 g, Rfl: 3  •  DULoxetine (CYMBALTA) 60 MG capsule, Take 1 capsule by mouth 2 (Two) Times a Day., Disp: 180 capsule, Rfl: 1  •  estradiol (ESTRACE VAGINAL) 0.1 MG/GM vaginal cream, Use 0.5 grams intravaginally twice weekly to control symptoms. (Patient not taking: Reported on 5/23/2023), Disp: 1 each, Rfl: 11  •  estradiol (VIVELLE-DOT) 0.1 MG/24HR patch, Place 1 patch on the skin as directed by provider 2 (Two) Times a Week., Disp: 8 patch, Rfl: 11  •  levETIRAcetam (KEPPRA) 750 MG tablet, Take 1 tablet by mouth 2 (Two) Times a Day., Disp: 180 tablet, Rfl: 3  •  nicotine (Nicoderm CQ) 21 MG/24HR patch, Place 1 patch on the skin as directed by provider Daily. (Patient not taking: Reported on 5/23/2023), Disp: 30 patch, Rfl: 4  •  OnabotulinumtoxinA (Botox) 200 units reconstituted solution, INJECT 200 UNITS INTRAMUSCULARLY INTO HEAD, NECK & SHOULDERS EVERY 12 WEEKS PER FDA PROTOCOL (Patient not taking: Reported on 5/23/2023), Disp: 1 each, Rfl: 3  •  QUEtiapine (SEROquel) 25 MG tablet, Patient  will take one tablet by mouth in late afternoon before supper and two tablets by mouth at bedtime, Disp: 90 tablet, Rfl: 2  •  Rimegepant Sulfate (Nurtec) 75 MG tablet dispersible tablet, Place 1 tablet under the tongue Take As Directed., Disp: 16 tablet, Rfl: 5  •  topiramate (TOPAMAX) 100 MG tablet, Take 1 tablet by mouth 2 (Two) Times a Day., Disp: 180 tablet, Rfl: 3  •  traZODone (DESYREL) 100 MG tablet, Take 1 tablet by mouth Every Night., Disp: 30 tablet, Rfl: 2  •  vitamin B-12 (CYANOCOBALAMIN) 1000 MCG tablet, Take 1 tablet by mouth Daily., Disp: 90 tablet, Rfl: 3  •  vitamin B-6 (PYRIDOXINE) 25 MG tablet, Take 1 tablet by mouth Daily., Disp: 90 tablet, Rfl: 3    Visit Diagnosis/Orders Placed This Visit:    ICD-10-CM ICD-9-CM   1. Moderate mood disorder  F39 296.90   2. Generalized anxiety disorder  F41.1 300.02        PLAN:  1. Safety: No acute safety concerns  2. Risk Assessment: Risk of self-harm acutely is low. Risk of self-harm chronically is also low, but could be further elevated in the event of treatment noncompliance and/or AODA.    Crisis Plan:  Symptoms and/or behaviors to indicate a crisis: Excessive worry or fear, Feeling sad or low and Isolation    What calming techniques or other strategies will patient use to de-escalate and stay safe: slow down, breathe, visualize calming self, think it though, listen to music, change focus, take a walk    Who is one person patient can contact to assist with de-escalation?      Treatment Plan/Goals: Patient will continue supportive psychotherapy efforts and medication regimen as prescribed. Therapist will provide Cognitive Behavioral Therapy to assist patient in improving functioning and gaining coping skills, maintaining stability, and avoiding decompensation and the need for higher level of care. Plan for treatment was discussed during today's visit. Patient acknowledged and verbally consented to continue with current treatment plan and was educated  on the importance of compliance with treatment and follow-up appointments.     Patient will contact this office, call 911 or present to the nearest emergency room should suicidal or homicidal ideations occur.     Follow Up:   No follow-ups on file.      QUOC Peña   [facility] [place]     This document has been electronically signed by QUOC Peña   May 25, 2023 13:19 EDT

## 2023-06-12 ENCOUNTER — OFFICE VISIT (OUTPATIENT)
Dept: UROLOGY | Facility: CLINIC | Age: 53
End: 2023-06-12
Payer: MEDICAID

## 2023-06-12 VITALS
DIASTOLIC BLOOD PRESSURE: 86 MMHG | WEIGHT: 160 LBS | BODY MASS INDEX: 26.66 KG/M2 | SYSTOLIC BLOOD PRESSURE: 136 MMHG | HEIGHT: 65 IN

## 2023-06-12 DIAGNOSIS — R31.29 MICROSCOPIC HEMATURIA: ICD-10-CM

## 2023-06-12 DIAGNOSIS — N39.46 MIXED STRESS AND URGE URINARY INCONTINENCE: Primary | ICD-10-CM

## 2023-06-12 LAB
BILIRUB BLD-MCNC: NEGATIVE MG/DL
CLARITY, POC: CLEAR
COLOR UR: ABNORMAL
EXPIRATION DATE: ABNORMAL
GLUCOSE UR STRIP-MCNC: NEGATIVE MG/DL
KETONES UR QL: ABNORMAL
LEUKOCYTE EST, POC: NEGATIVE
Lab: ABNORMAL
NITRITE UR-MCNC: NEGATIVE MG/ML
PH UR: 6.5 [PH] (ref 5–8)
PROT UR STRIP-MCNC: NEGATIVE MG/DL
RBC # UR STRIP: ABNORMAL /UL
SP GR UR: 1.01 (ref 1–1.03)
UROBILINOGEN UR QL: NORMAL

## 2023-06-12 PROCEDURE — 51798 US URINE CAPACITY MEASURE: CPT | Performed by: NURSE PRACTITIONER

## 2023-06-12 PROCEDURE — 1160F RVW MEDS BY RX/DR IN RCRD: CPT | Performed by: NURSE PRACTITIONER

## 2023-06-12 PROCEDURE — 3075F SYST BP GE 130 - 139MM HG: CPT | Performed by: NURSE PRACTITIONER

## 2023-06-12 PROCEDURE — 99204 OFFICE O/P NEW MOD 45 MIN: CPT | Performed by: NURSE PRACTITIONER

## 2023-06-12 PROCEDURE — 1159F MED LIST DOCD IN RCRD: CPT | Performed by: NURSE PRACTITIONER

## 2023-06-12 PROCEDURE — 3079F DIAST BP 80-89 MM HG: CPT | Performed by: NURSE PRACTITIONER

## 2023-06-12 NOTE — PROGRESS NOTES
Chief Complaint  Chief Complaint   Patient presents with   • Urinary Incontinence          HPI  Ms. Anderson is a 53 y.o. female presents with complaint of urinary incontinence for 1 year  Patient reports today she has drank 2 cups of coffee and half a Sprite.    Has not previously seen urology.    Pt has primarily mixed urinary incontinence. Urge incontinence is worse     Severity: Pt uses 4-5 overnight pull ups a day and has tried nothing in the past  Associated Sx: Pt has  h/o daytime frequency, urgency and nocturia     No h/o poor stream, straining, hesitancy or incomplete voiding     No h/o recurrent UTI, hematuria, nephrolithiasis  Stool incontinence occasionally   No vaginal discharge or bleeding  No sensation of POP  yes Post-void dribbling  no Dyspareunia   no Constipation  none Vaginal deliveries    Past Medical History  Past Medical History:   Diagnosis Date   • Abdominal pain, epigastric    • Abnormal Pap smear of cervix hpv   • Acute sinusitis    • Acute UTI (urinary tract infection)    • ADHD (attention deficit hyperactivity disorder) 2008    asked to be tested not diagnosed officially   • Angina, intestinal    • Anxiety    • Arthritis    • Asthma not sure    COPD   • Back pain    • Bipolar disorder 2019   • Breast mass, right    • Cancer 12/20/2018    basal cell R ear   • Cervical dysplasia 1996    was frozen off   • Chronic hepatitis C virus infection    • COPD (chronic obstructive pulmonary disease)    • Depression    • Diarrhea    • Diverticulosis    • Elevated LFTs    • Fatigue    • Glaucoma 05/05/2020   • Hepatitis C 1994    cured with meds   • History of endometriosis    • History of Papanicolaou smear of cervix 04/02/2014    NORMAL    • HPV (human papilloma virus) infection 1996   • HTN (hypertension)    • IBS (irritable bowel syndrome)    • Inflammatory bowel disease    • Insomnia    • Low back pain    • Menopausal symptoms    • Migraine headache    • Nausea & vomiting    • Neck pain    •  Osteopenia    • Other hyperlipidemia    • Ovarian cyst    • Pelvic adhesive disease    • Radicular pain of left lower extremity    • Restless leg syndrome    • Seizure disorder    • Tobacco abuse    • Tobacco abuse    • Trochanteric bursitis    • Visual impairment     wear contacts/glasses   • Vitamin B 12 deficiency        Past Surgical History  Past Surgical History:   Procedure Laterality Date   • BILATERAL SALPINGO OOPHORECTOMY  03/10/2015    DR NATALY THOMPSON   • BREAST LUMPECTOMY     •  SECTION     • COLONOSCOPY     • COSMETIC SURGERY  2018    basal cell removal R Ear   • HYSTERECTOMY  03/10/2015    [facility] (DR NATALY THOMPSON)   • HYSTEROSCOPY  2013    complete   • INGUINAL HERNIA REPAIR Right    • LAPAROSCOPIC ASSISTED VAGINAL HYSTERECTOMY SALPINGO OOPHORECTOMY      to remove cyst   • LYSIS OF ABDOMINAL ADHESIONS  03/10/2015    DR NATALY THOMPSON   • SUBTOTAL HYSTERECTOMY  3/2013    Left ovary   • TUBAL ABDOMINAL LIGATION     • WISDOM TOOTH EXTRACTION         Medications  Current Outpatient Medications   Medication Sig Dispense Refill   • Advair Diskus 250-50 MCG/ACT DISKUS INHALE 1 PUFF BY MOUTH DAILY 60 each 11   • ARIPiprazole (ABILIFY) 20 MG tablet Take 1 tablet by mouth Daily. 30 tablet 2   • aspirin (aspirin) 81 MG EC tablet Take 1 tablet by mouth Daily. 90 tablet 3   • B Complex-C (B-complex with vitamin C) tablet Take 1 tablet by mouth Daily. (Patient not taking: Reported on 2023) 90 tablet 3   • clonazePAM (KlonoPIN) 0.5 MG tablet Take 1 tablet by mouth At Night As Needed for Anxiety. 30 tablet 0   • cyclobenzaprine (FLEXERIL) 10 MG tablet Take 1 tablet by mouth 2 (Two) Times a Day As Needed.     • Diclofenac Sodium (VOLTAREN) 1 % gel gel APPLY 4 GRAMS TOPICALLY TO AFFECTED AREA 4 TIMES A DAY AS NEEDED FOR PAIN 100 g 3   • DULoxetine (CYMBALTA) 60 MG capsule Take 1 capsule by mouth 2 (Two) Times a Day. 180 capsule 1   • estradiol (ESTRACE VAGINAL) 0.1 MG/GM vaginal cream Use 0.5  grams intravaginally twice weekly to control symptoms. (Patient not taking: Reported on 5/23/2023) 1 each 11   • estradiol (VIVELLE-DOT) 0.1 MG/24HR patch Place 1 patch on the skin as directed by provider 2 (Two) Times a Week. 8 patch 11   • levETIRAcetam (KEPPRA) 750 MG tablet Take 1 tablet by mouth 2 (Two) Times a Day. 180 tablet 3   • nicotine (Nicoderm CQ) 21 MG/24HR patch Place 1 patch on the skin as directed by provider Daily. (Patient not taking: Reported on 5/23/2023) 30 patch 4   • OnabotulinumtoxinA (Botox) 200 units reconstituted solution INJECT 200 UNITS INTRAMUSCULARLY INTO HEAD, NECK & SHOULDERS EVERY 12 WEEKS PER FDA PROTOCOL (Patient not taking: Reported on 5/23/2023) 1 each 3   • QUEtiapine (SEROquel) 25 MG tablet Patient will take one tablet by mouth in late afternoon before supper and two tablets by mouth at bedtime 90 tablet 2   • Rimegepant Sulfate (Nurtec) 75 MG tablet dispersible tablet Place 1 tablet under the tongue Take As Directed. 16 tablet 5   • topiramate (TOPAMAX) 100 MG tablet Take 1 tablet by mouth 2 (Two) Times a Day. 180 tablet 3   • traZODone (DESYREL) 100 MG tablet Take 1 tablet by mouth Every Night. 30 tablet 2   • vitamin B-12 (CYANOCOBALAMIN) 1000 MCG tablet Take 1 tablet by mouth Daily. 90 tablet 3   • vitamin B-6 (PYRIDOXINE) 25 MG tablet Take 1 tablet by mouth Daily. 90 tablet 3     No current facility-administered medications for this visit.       Allergies  No Known Allergies    Social History  Social History     Socioeconomic History   • Marital status:    • Number of children: 1   • Highest education level: High school graduate   Tobacco Use   • Smoking status: Light Smoker     Packs/day: 0.50     Years: 18.00     Pack years: 9.00     Types: Cigarettes   • Smokeless tobacco: Never   • Tobacco comments:     In process of quitting, using nicotine patches   Vaping Use   • Vaping Use: Never used   Substance and Sexual Activity   • Alcohol use: Not Currently      "Comment: on occasion for an occasion or event   • Drug use: No   • Sexual activity: Yes     Partners: Male     Birth control/protection: Surgical, Post-menopausal       Family History  Family History   Problem Relation Age of Onset   • Alzheimer's disease Other    • Cancer Other    • Dementia Other    • Hypertension Other    • Parkinsonism Other    • Osteoporosis Mother    • Diabetes Mother    • Hypertension Mother    • Obesity Mother    • Depression Mother    • Arthritis Mother    • COPD Mother    • Hearing loss Mother    • Hyperlipidemia Mother    • Breast cancer Maternal Grandmother    • Osteoporosis Maternal Grandmother    • Diabetes Maternal Grandmother    • Dementia Maternal Grandmother    • Cancer Maternal Grandmother         Breast Cancer   • Hypertension Maternal Grandmother    • ADD / ADHD Sister    • Alcohol abuse Sister    • Anxiety disorder Sister    • Bipolar disorder Sister    • Depression Sister    • Drug abuse Sister    • Depression Father    • Anxiety disorder Father    • Other Father          parkinsons disease   • OCD Neg Hx    • Paranoid behavior Neg Hx    • Schizophrenia Neg Hx    • Seizures Neg Hx    • Self-Injurious Behavior  Neg Hx    • Suicide Attempts Neg Hx          Physical Exam  Visit Vitals  /86 (BP Location: Left arm, Patient Position: Sitting, Cuff Size: Adult)   Ht 165.1 cm (65\")   Wt 72.6 kg (160 lb)   BMI 26.63 kg/m²     Physical exam was notable for NAD    Labs  Brief Urine Lab Results  (Last result in the past 365 days)      Color   Clarity   Blood   Leuk Est   Nitrite   Protein   CREAT   Urine HCG        23 1328 Dark Yellow   Clear   3+   Negative   Negative   Negative                 Lab Results   Component Value Date    GLUCOSE 79 2023    CALCIUM 9.5 2023     2023    K 4.7 2023    CO2 23.0 2023     (H) 2023    BUN 8 2023    CREATININE 0.83 2023    EGFRIFAFRI 108 2021    EGFRIFNONA 94 2021 "    BCR 9.6 03/27/2023    ANIONGAP 8.9 06/23/2022       Lab Results   Component Value Date    WBC 8.91 03/27/2023    HGB 14.3 03/27/2023    HCT 41.5 03/27/2023    MCV 91.8 03/27/2023     03/27/2023       PVR  Post-void residual performed by staff - 0mL    I have personally reviewed her labs and post void residual imaging.     Assessment  Ms. Anderson is a 53 y.o. female with mixed urinary incontinence.  Urge incontinence is most bothersome to patient.    We discussed the AUA guidelines for urge urinary incontinence.  We discussed lifestyle changes such as weight reduction and caffeine reduction, as well as medications such as anticholinergics and beta agonist.  We discussed third line therapies, such as Botox and InterStim. The patient has elected trial of beta agonist.  We discussed the risks, benefits, and alternatives to this approach.  She voiced her understanding and wished to proceed.    We discussed the AUA guidelines for management of stress urinary incontinence.  We discussed served of management with lifestyle changes like weight reduction and pelvic floor physical therapy.  We discussed surgical management with either mid urethral sling versus cystoscopy with bulking agent injection.  The patient has elected to to continue monitoring as stress incontinence is not as bothersome as urge incontinence.    We did discuss UA today shows microscopic hematuria with 3+ blood, is dark yellow and 1+ ketones.  Patient is a current smoker.  I recommend increased hydration to try and improve color of her urine and will send urine for microscopy to confirm if there is blood in urine and we will follow-up in 1 month for further recommendations      Plan  1.  Start Myrbetriq 25 mg daily  2.  Urine microscopy ordered  3.  Encouraged increased fluid intake of at least 64 ounces of water daily  4.  Follow-up 1 month      CHRIS Cuellar, NP-C  [facility] [place]

## 2023-06-13 LAB
APPEARANCE UR: CLEAR
BACTERIA #/AREA URNS HPF: ABNORMAL /HPF
BILIRUB UR QL STRIP: NEGATIVE
CASTS URNS MICRO: ABNORMAL
COLOR UR: ABNORMAL
EPI CELLS #/AREA URNS HPF: ABNORMAL /HPF
GLUCOSE UR QL STRIP: NEGATIVE
HGB UR QL STRIP: NEGATIVE
KETONES UR QL STRIP: NEGATIVE
LEUKOCYTE ESTERASE UR QL STRIP: ABNORMAL
MUCOUS THREADS URNS QL MICRO: ABNORMAL /HPF
NITRITE UR QL STRIP: NEGATIVE
PH UR STRIP: 6 [PH] (ref 5–8)
PROT UR QL STRIP: NEGATIVE
RBC #/AREA URNS HPF: ABNORMAL /HPF
SP GR UR STRIP: 1.02 (ref 1–1.03)
UROBILINOGEN UR STRIP-MCNC: ABNORMAL MG/DL
WBC #/AREA URNS HPF: ABNORMAL /HPF

## 2023-07-25 ENCOUNTER — TELEMEDICINE (OUTPATIENT)
Dept: PSYCHIATRY | Facility: CLINIC | Age: 53
End: 2023-07-25
Payer: MEDICAID

## 2023-07-25 DIAGNOSIS — F41.1 GENERALIZED ANXIETY DISORDER: ICD-10-CM

## 2023-07-25 DIAGNOSIS — F33.1 MODERATE EPISODE OF RECURRENT MAJOR DEPRESSIVE DISORDER: Primary | ICD-10-CM

## 2023-07-25 NOTE — PROGRESS NOTES
This encounter was created in error - please disregard.    Pt was doubled booked today therapy and med mgt.  Insurance will not pay for both.  Pt decided to cancel therapy appt and attend med mgt later this evening since she has not been seen for two months and will need refills.        Telehealth Encounter Note:  Date of Service: 2023  Patient Name: Marguerite Anderson  : 1970   MRN: [id number]   Time In: 9:14 AM  Time Out: 9:19    This provider is located at [facility] [address], [place] [address] using a secure iWelcome Video Visit through [employer]. Patient is being seen remotely via telehealth at home address in [place] and stated they are in a secure environment for this session. The patient's condition being diagnosed/treated is appropriate for telemedicine. The provider identified herself as well as her credentials. The patient, and/or patients guardian, consent to be seen remotely, and when consent is given they understand that the consent allows for patient identifiable information to be sent to a third party as needed. They may refuse to be seen remotely at any time. The electronic data is encrypted and password protected, and the patient and/or guardian has been advised of the potential risks to privacy not withstanding such measures.     You have chosen to receive care through a telehealth visit.  Do you consent to use a video/audio connection for your medical care today? Yes    Referring Provider: Juan Clemens MD    PROGRESS NOTE    History of Present Illness:   Marguerite Anderson is a 53 y.o. female who is being seen today for follow up individual Psychotherapy session.     Chief Complaint:      ICD-10-CM ICD-9-CM   1. Moderate episode of recurrent major depressive disorder  F33.1 296.32   2. Generalized anxiety disorder  F41.1 300.02        Follow Up:   No follow-ups on file. Next week - work in.      [facility] [place]     This  document has been electronically signed by QUOC Peña   July 25, 2023 09:23 EDT  This encounter was created in error - please disregard.

## 2023-07-26 ENCOUNTER — TELEMEDICINE (OUTPATIENT)
Dept: PSYCHIATRY | Facility: CLINIC | Age: 53
End: 2023-07-26
Payer: MEDICAID

## 2023-07-26 DIAGNOSIS — F39 MODERATE MOOD DISORDER: Chronic | ICD-10-CM

## 2023-07-26 DIAGNOSIS — G47.09 OTHER INSOMNIA: Chronic | ICD-10-CM

## 2023-07-26 DIAGNOSIS — G25.81 RLS (RESTLESS LEGS SYNDROME): ICD-10-CM

## 2023-07-26 DIAGNOSIS — F41.1 GENERALIZED ANXIETY DISORDER: Chronic | ICD-10-CM

## 2023-07-26 DIAGNOSIS — F41.0 PANIC DISORDER: Chronic | ICD-10-CM

## 2023-07-26 DIAGNOSIS — F41.1 GAD (GENERALIZED ANXIETY DISORDER): Primary | Chronic | ICD-10-CM

## 2023-07-26 PROCEDURE — 1160F RVW MEDS BY RX/DR IN RCRD: CPT | Performed by: NURSE PRACTITIONER

## 2023-07-26 PROCEDURE — 1159F MED LIST DOCD IN RCRD: CPT | Performed by: NURSE PRACTITIONER

## 2023-07-26 PROCEDURE — 99214 OFFICE O/P EST MOD 30 MIN: CPT | Performed by: NURSE PRACTITIONER

## 2023-07-26 RX ORDER — QUETIAPINE FUMARATE 25 MG/1
TABLET, FILM COATED ORAL
Qty: 270 TABLET | Refills: 1 | Status: SHIPPED | OUTPATIENT
Start: 2023-07-26

## 2023-07-26 RX ORDER — ROPINIROLE 0.25 MG/1
.25-.5 TABLET, FILM COATED ORAL NIGHTLY
Qty: 60 TABLET | Refills: 2 | Status: SHIPPED | OUTPATIENT
Start: 2023-07-26 | End: 2024-07-25

## 2023-07-26 RX ORDER — ARIPIPRAZOLE 20 MG/1
20 TABLET ORAL DAILY
Qty: 90 TABLET | Refills: 1 | Status: SHIPPED | OUTPATIENT
Start: 2023-07-26

## 2023-07-26 RX ORDER — DULOXETIN HYDROCHLORIDE 60 MG/1
60 CAPSULE, DELAYED RELEASE ORAL 2 TIMES DAILY
Qty: 180 CAPSULE | Refills: 1 | Status: SHIPPED | OUTPATIENT
Start: 2023-07-26

## 2023-07-26 NOTE — PROGRESS NOTES
"This provider is located at [facility] ,[address], [address] in [place], [place],using a secure [employer] Video Visit through [employer].  The patient's camera was not working so I could not visualize her.  Patient is being seen remotely via telehealth at their home address in [place], and stated they are in a secure environment for this session. The patient's condition being diagnosed/treated is appropriate for telemedicine. The provider identified herself as well as her credentials.   The patient, and/or patients guardian, consent to be seen remotely, and when consent is given they understand that the consent allows for patient identifiable information to be sent to a third party as needed.   They may refuse to be seen remotely at any time. The electronic data is encrypted and password protected, and the patient and/or guardian has been advised of the potential risks to privacy not withstanding such measures.    Chief Complaint  Mood disorder, anxiety, panic, and insomnia    Subjective          Marguerite Anderson presents today via [employer] Video through [employer] by herself for a follow up and medication check.     History of Present Illness: Marguerite states, \"I am doing fairly well.\" Marguerite tells me she is struggling with the adverse effects of a new medication for her bladder. She had to miss a wedding and rehearsal dinner due to the problems and the increase in anxiety. She reports her appetite to be poor as well due to the effects. She believes she has lost weight because her jeans are loose. She had ups and downs in moods this month after her best friend lost her spouse. This triggered her memories of losing her spouse, her son's father, and she was more down. She has tried to work through this and be supportive for her friend. She sleeps if she goes to bed early but also wakes between 2-3 AM. She tried to go back to bed if possible but there are mornings she cannot. She " would nap then. Her PCP has ruled out sleep apnea. She denies any other medical changes since May.  She continues to meet with Sherlyn for therapy. She is compliant with her medications.  She is taking Trazodone, Abilify, Cymbalta, Seroquel, and clonazepam. She denies any side effects of her current medication regiment. She is seeing Sherlyn for therapy. She denies any SI/HI/AVH.    Current Medications:   Current Outpatient Medications   Medication Sig Dispense Refill    Advair Diskus 250-50 MCG/ACT DISKUS INHALE 1 PUFF BY MOUTH DAILY 60 each 11    albuterol sulfate  (90 Base) MCG/ACT inhaler Inhale 2 puffs Every 4 (Four) Hours As Needed for Wheezing. 108 g 1    ARIPiprazole (ABILIFY) 20 MG tablet Take 1 tablet by mouth Daily. 90 tablet 1    aspirin (aspirin) 81 MG EC tablet Take 1 tablet by mouth Daily. 90 tablet 3    B Complex-C (B-complex with vitamin C) tablet Take 1 tablet by mouth Daily. 90 tablet 3    clonazePAM (KlonoPIN) 0.5 MG tablet Take 1 tablet by mouth At Night As Needed for Anxiety. 30 tablet 0    cyclobenzaprine (FLEXERIL) 10 MG tablet Take 1 tablet by mouth 2 (Two) Times a Day As Needed.      Diclofenac Sodium (VOLTAREN) 1 % gel gel APPLY 4 GRAMS TOPICALLY TO AFFECTED AREA 4 TIMES A DAY AS NEEDED FOR PAIN 100 g 3    DULoxetine (CYMBALTA) 60 MG capsule Take 1 capsule by mouth 2 (Two) Times a Day. 180 capsule 1    estradiol (ESTRACE VAGINAL) 0.1 MG/GM vaginal cream Use 0.5 grams intravaginally twice weekly to control symptoms. 1 each 11    estradiol (VIVELLE-DOT) 0.1 MG/24HR patch Place 1 patch on the skin as directed by provider 2 (Two) Times a Week. 8 patch 11    levETIRAcetam (KEPPRA) 750 MG tablet Take 1 tablet by mouth 2 (Two) Times a Day. 180 tablet 1    nicotine (Nicoderm CQ) 21 MG/24HR patch Place 1 patch on the skin as directed by provider Daily. 30 patch 4    QUEtiapine (SEROquel) 25 MG tablet Patient will take one tablet by mouth in late afternoon before supper and two tablets by mouth  at bedtime 270 tablet 1    Rimegepant Sulfate (Nurtec) 75 MG tablet dispersible tablet Place 1 tablet under the tongue Take As Directed. 16 tablet 5    solifenacin (VESIcare) 10 MG tablet Take 1 tablet by mouth Daily. 90 tablet 3    topiramate (TOPAMAX) 100 MG tablet Take 1 tablet by mouth 2 (Two) Times a Day. 180 tablet 1    traZODone (DESYREL) 100 MG tablet TAKE 1 TABLET BY MOUTH EVERY NIGHT 30 tablet 2    vitamin B-12 (CYANOCOBALAMIN) 1000 MCG tablet Take 1 tablet by mouth Daily. 90 tablet 3    vitamin B-6 (PYRIDOXINE) 25 MG tablet Take 1 tablet by mouth Daily. 90 tablet 3    rOPINIRole (REQUIP) 0.25 MG tablet Take 1-2 tablets by mouth Every Night. Take 1 hour before bedtime. 60 tablet 2     No current facility-administered medications for this visit.       Objective   Vital Signs:   There were no vitals taken for this visit.    Physical Exam  Nursing note reviewed. Vitals reviewed: Vitals not obtained due to nature of telehealth visit.  Constitutional:       Appearance: Normal appearance.   Skin:     Coloration: Skin is pale.   Neurological:      General: No focal deficit present.      Mental Status: She is alert and oriented to person, place, and time.   Psychiatric:         Attention and Perception: Attention normal.         Mood and Affect: Mood normal. Affect is blunt.         Speech: Speech normal.         Behavior: Behavior is slowed. Behavior is cooperative.         Thought Content: Thought content normal.         Cognition and Memory: Cognition normal.         Judgment: Judgment normal.      Comments: Patient appears to be falling asleep at times during the visit-eye will close and body sways slightly.       Result Review :       The following data was reviewed by CHRIS Nick on 07/26/2023:    Office Visit with Pamela Carter APRN (07/10/2023)  Office Visit with Candi Medeiros APRN (07/10/2023)   Telemedicine with [facility] (06/27/2023)     Assessment and Plan     Problem List Items Addressed This Visit    None  Visit Diagnoses       RLS (restless legs syndrome)    -  Primary    Relevant Medications    rOPINIRole (REQUIP) 0.25 MG tablet    Moderate mood disorder  (Chronic)       Relevant Medications    DULoxetine (CYMBALTA) 60 MG capsule    QUEtiapine (SEROquel) 25 MG tablet    ARIPiprazole (ABILIFY) 20 MG tablet    JAY (generalized anxiety disorder)  (Chronic)       Relevant Medications    DULoxetine (CYMBALTA) 60 MG capsule    QUEtiapine (SEROquel) 25 MG tablet    ARIPiprazole (ABILIFY) 20 MG tablet    Other insomnia  (Chronic)       Relevant Medications    QUEtiapine (SEROquel) 25 MG tablet    Generalized anxiety disorder  (Chronic)       Relevant Medications    DULoxetine (CYMBALTA) 60 MG capsule    QUEtiapine (SEROquel) 25 MG tablet    ARIPiprazole (ABILIFY) 20 MG tablet    Panic disorder  (Chronic)       Relevant Medications    DULoxetine (CYMBALTA) 60 MG capsule    QUEtiapine (SEROquel) 25 MG tablet    ARIPiprazole (ABILIFY) 20 MG tablet          Mental Status Exam:   Hygiene:   good  Cooperation:  Cooperative  Eye Contact:  Good  Psychomotor Behavior:  Slow  Affect:  Blunted  Mood: normal  Speech:  Normal  Thought Process:  Goal directed and Linear  Thought Content:  Normal  Suicidal:  None  Homicidal:  None  Hallucinations:  None  Delusion:  None  Memory:  Intact  Orientation:  Person, Place, Time and Situation  Reliability:  good  Insight:  Good  Judgement:  Good  Impulse Control:  Good  Physical/Medical Issues:  Yes Chronic pain, seizures, and migraines       PHQ-9 Score:   PHQ-9 Total Score:      Impression/Plan:  -This is a follow up and medication check. Marguerite reports ups and downs in mood and increased anxiety. She reports adverse effects of a new medication for bladder to be causing increased anxiety and poor appetite. She reports mood to be effected by her best friend's spouse dying which triggered memories of her spouse dying. She has been trying to work  through this so she can be there for her friend. She is having trouble with early morning wakening. She wakes between 2-3 AM and often cannot return to sleep. She had trouble last night and appears to be sleepy or falling asleep during the visit. The video can make it difficult to tell. She and I discussed her medications which she feels are working but is having RLS at night. She has used Requip and this helped so I will resume. She is unsure if Clonazepam is helping at this dose but we discussed that increasing will only lead to rebound anxiety so we will continue at her current dose.   -Initiate Requip 0.25-0.5 mg nightly 1 hour before bed for RLS.    -Continue Seroquel to 25 mg daily before supper and 50 mg nightly for anxiety and insomnia.    -Continue Trazodone 100 mg nightly for insomnia.  Patient has refills.  -Continue Abilify 20 mg daily for bipolar depression.  Patient will take in the a.m.   -Continue Cymbalta 60 mg mg twice daily for bipolar depression and anxiety.    -Continue clonazepam 0.5 mg daily as needed for anxiety and insomnia. # 30 tablets given. Patient has refills.  -Continue therapy with QUOC Peña.  -Continue Keppra,Topamax, Imitrex, and Botox for seizure disorder and migraines. These medications are prescribed by another provider.  -The ASYA report, reviewed through PDMP , of the past 12 months were reviewed and is appropriate.  The patient/guardian reports taking the medication only as prescribed.  The patient/guardian denies any abuse or misuse of the medication.  The patient/guardian denies any other substance use or issues.  There are no apparent substance related issues.  The patient reports no side effects of the current medication usage.  The patient/guardian has reported significant improvement with medication usage and wishes to continue medication as prescribed.  The patient/guardian is appropriate to continue with current medication usage at this time.  Reinforced  risks and side effects of medication usage, patient and/or guardian verbalize understanding in their own words and are in agreement with current plan.  -Last UDS on 10/04/22. Positive TCA and negative benzodiazepines.     MEDS ORDERED DURING VISIT:  New Medications Ordered This Visit   Medications    DULoxetine (CYMBALTA) 60 MG capsule     Sig: Take 1 capsule by mouth 2 (Two) Times a Day.     Dispense:  180 capsule     Refill:  1    QUEtiapine (SEROquel) 25 MG tablet     Sig: Patient will take one tablet by mouth in late afternoon before supper and two tablets by mouth at bedtime     Dispense:  270 tablet     Refill:  1    rOPINIRole (REQUIP) 0.25 MG tablet     Sig: Take 1-2 tablets by mouth Every Night. Take 1 hour before bedtime.     Dispense:  60 tablet     Refill:  2    ARIPiprazole (ABILIFY) 20 MG tablet     Sig: Take 1 tablet by mouth Daily.     Dispense:  90 tablet     Refill:  1         Follow Up   Return in about 2 months (around 9/26/2023) for Medication Check.  Patient was given instructions and counseling regarding her condition or for health maintenance advice. Please see specific information pulled into the AVS if appropriate.       TREATMENT PLAN/GOALS: Continue supportive psychotherapy efforts and medications as indicated. Treatment and medication options discussed during today's visit. Patient acknowledged and verbally consented to continue with current treatment plan and was educated on the importance of compliance with treatment and follow-up appointments.    MEDICATION ISSUES:  Discussed medication options and treatment plan of prescribed medication as well as the risks, benefits, and side effects including potential falls, possible impaired driving and metabolic adversities among others. Patient is agreeable to call the office with any worsening of symptoms or onset of side effects. Patient is agreeable to call 911 or go to the nearest ER should he/she begin having SI/HI.      This document has  been electronically signed by CHRIS Witt, PMHNP-BC  July 26, 2023 13:00 EDT    Part of this note may be an electronic transcription/translation of spoken language to printed text using the Dragon Dictation System.

## 2023-07-28 ENCOUNTER — OFFICE VISIT (OUTPATIENT)
Dept: INTERNAL MEDICINE | Facility: CLINIC | Age: 53
End: 2023-07-28
Payer: MEDICAID

## 2023-07-28 VITALS
BODY MASS INDEX: 27.66 KG/M2 | DIASTOLIC BLOOD PRESSURE: 82 MMHG | HEART RATE: 88 BPM | TEMPERATURE: 97.3 F | RESPIRATION RATE: 16 BRPM | WEIGHT: 162 LBS | OXYGEN SATURATION: 94 % | SYSTOLIC BLOOD PRESSURE: 108 MMHG | HEIGHT: 64 IN

## 2023-07-28 DIAGNOSIS — G47.33 OSA (OBSTRUCTIVE SLEEP APNEA): ICD-10-CM

## 2023-07-28 DIAGNOSIS — Z79.899 POLYPHARMACY: ICD-10-CM

## 2023-07-28 DIAGNOSIS — I10 ESSENTIAL HYPERTENSION: Primary | ICD-10-CM

## 2023-07-28 DIAGNOSIS — G47.09 OTHER INSOMNIA: ICD-10-CM

## 2023-07-28 DIAGNOSIS — F41.9 ANXIETY: ICD-10-CM

## 2023-07-28 DIAGNOSIS — F32.A DEPRESSION, UNSPECIFIED DEPRESSION TYPE: ICD-10-CM

## 2023-07-28 PROCEDURE — 3074F SYST BP LT 130 MM HG: CPT | Performed by: INTERNAL MEDICINE

## 2023-07-28 PROCEDURE — 3079F DIAST BP 80-89 MM HG: CPT | Performed by: INTERNAL MEDICINE

## 2023-07-28 PROCEDURE — 99214 OFFICE O/P EST MOD 30 MIN: CPT | Performed by: INTERNAL MEDICINE

## 2023-07-28 NOTE — PROGRESS NOTES
Subjective     Patient ID: Marguerite Anderson is a 53 y.o. female. Patient is here for management of multiple medical problems.     Chief Complaint   Patient presents with    Fatigue    Hypertension     History of Present Illness   Pt is tired. Hard to sleep.       Anxity and depression. Pt is on very high doses of multiple  meds.     Pt goes to bed 7-8 pm. Has to get up at 2 am with . Goes to couch.     No exercise.    Vit b6/b12 low   Thyroid funtion was off .  Need repeat labs.        The following portions of the patient's history were reviewed and updated as appropriate: allergies, current medications, past family history, past medical history, past social history, past surgical history and problem list.    Review of Systems    Current Outpatient Medications:     Advair Diskus 250-50 MCG/ACT DISKUS, INHALE 1 PUFF BY MOUTH DAILY, Disp: 60 each, Rfl: 11    albuterol sulfate  (90 Base) MCG/ACT inhaler, Inhale 2 puffs Every 4 (Four) Hours As Needed for Wheezing., Disp: 108 g, Rfl: 1    ARIPiprazole (ABILIFY) 20 MG tablet, Take 1 tablet by mouth Daily., Disp: 90 tablet, Rfl: 1    aspirin (aspirin) 81 MG EC tablet, Take 1 tablet by mouth Daily., Disp: 90 tablet, Rfl: 3    B Complex-C (B-complex with vitamin C) tablet, Take 1 tablet by mouth Daily., Disp: 90 tablet, Rfl: 3    clonazePAM (KlonoPIN) 0.5 MG tablet, Take 1 tablet by mouth At Night As Needed for Anxiety., Disp: 30 tablet, Rfl: 0    cyclobenzaprine (FLEXERIL) 10 MG tablet, Take 1 tablet by mouth 2 (Two) Times a Day As Needed., Disp: , Rfl:     Diclofenac Sodium (VOLTAREN) 1 % gel gel, APPLY 4 GRAMS TOPICALLY TO AFFECTED AREA 4 TIMES A DAY AS NEEDED FOR PAIN, Disp: 100 g, Rfl: 3    DULoxetine (CYMBALTA) 60 MG capsule, Take 1 capsule by mouth 2 (Two) Times a Day., Disp: 180 capsule, Rfl: 1    estradiol (ESTRACE VAGINAL) 0.1 MG/GM vaginal cream, Use 0.5 grams intravaginally twice weekly to control symptoms., Disp: 1 each, Rfl: 11    estradiol  "(VIVELLE-DOT) 0.1 MG/24HR patch, Place 1 patch on the skin as directed by provider 2 (Two) Times a Week., Disp: 8 patch, Rfl: 11    levETIRAcetam (KEPPRA) 750 MG tablet, Take 1 tablet by mouth 2 (Two) Times a Day., Disp: 180 tablet, Rfl: 1    nicotine (Nicoderm CQ) 21 MG/24HR patch, Place 1 patch on the skin as directed by provider Daily., Disp: 30 patch, Rfl: 4    QUEtiapine (SEROquel) 25 MG tablet, Patient will take one tablet by mouth in late afternoon before supper and two tablets by mouth at bedtime, Disp: 270 tablet, Rfl: 1    Rimegepant Sulfate (Nurtec) 75 MG tablet dispersible tablet, Place 1 tablet under the tongue Take As Directed., Disp: 16 tablet, Rfl: 5    rOPINIRole (REQUIP) 0.25 MG tablet, Take 1-2 tablets by mouth Every Night. Take 1 hour before bedtime., Disp: 60 tablet, Rfl: 2    solifenacin (VESIcare) 10 MG tablet, Take 1 tablet by mouth Daily., Disp: 90 tablet, Rfl: 3    topiramate (TOPAMAX) 100 MG tablet, Take 1 tablet by mouth 2 (Two) Times a Day., Disp: 180 tablet, Rfl: 1    traZODone (DESYREL) 100 MG tablet, TAKE 1 TABLET BY MOUTH EVERY NIGHT, Disp: 30 tablet, Rfl: 2    vitamin B-12 (CYANOCOBALAMIN) 1000 MCG tablet, Take 1 tablet by mouth Daily., Disp: 90 tablet, Rfl: 3    vitamin B-6 (PYRIDOXINE) 25 MG tablet, Take 1 tablet by mouth Daily., Disp: 90 tablet, Rfl: 3    Objective      Blood pressure 108/82, pulse 88, temperature 97.3 °F (36.3 °C), resp. rate 16, height 162.6 cm (64\"), weight 73.5 kg (162 lb), SpO2 94 %, not currently breastfeeding.    Physical Exam     General Appearance:    Alert, cooperative, no distress, appears stated age   Head:    Normocephalic, without obvious abnormality, atraumatic   Eyes:    PERRL, conjunctiva/corneas clear, EOM's intact   Ears:    Normal TM's and external ear canals, both ears   Nose:   Nares normal, septum midline, mucosa normal, no drainage   or sinus tenderness   Throat:   Lips, mucosa, and tongue normal; teeth and gums normal   Neck:   Supple, " symmetrical, trachea midline, no adenopathy;        thyroid:  No enlargement/tenderness/nodules; no carotid    bruit or JVD   Back:     Symmetric, no curvature, ROM normal, no CVA tenderness   Lungs:     Clear to auscultation bilaterally, respirations unlabored   Chest wall:    No tenderness or deformity   Heart:    Regular rate and rhythm, S1 and S2 normal, no murmur,        rub or gallop   Abdomen:     Soft, non-tender, bowel sounds active all four quadrants,     no masses, no organomegaly   Extremities:   Extremities normal, atraumatic, no cyanosis or edema   Pulses:   2+ and symmetric all extremities   Skin:   Skin color, texture, turgor normal, no rashes or lesions   Lymph nodes:   Cervical, supraclavicular, and axillary nodes normal   Neurologic:   CNII-XII intact. Normal strength, sensation and reflexes       throughout      Results for orders placed or performed in visit on 06/12/23   Microscopic Examination -    Urine  Release to stacy   Result Value Ref Range    WBC, UA 0-2 /HPF    RBC, UA Comment /HPF    Epithelial Cells (non renal) 0-2 /HPF    Cast Type Comment     Mucus, UA See below: (A) /HPF    Bacteria, UA Trace (A) None Seen /HPF   POC Urinalysis Dipstick, Automated    Specimen: Urine   Result Value Ref Range    Color Dark Yellow Yellow, Straw, Dark Yellow, Alissa    Clarity, UA Clear Clear    Specific Gravity  1.015 1.005 - 1.030    pH, Urine 6.5 5.0 - 8.0    Leukocytes Negative Negative    Nitrite, UA Negative Negative    Protein, POC Negative Negative mg/dL    Glucose, UA Negative Negative mg/dL    Ketones, UA 1+ (A) Negative    Urobilinogen, UA Normal Normal, 0.2 E.U./dL    Bilirubin Negative Negative    Blood, UA 3+ (A) Negative    Lot Number [id number]     Expiration Date 07/13/2024    Urinalysis With Microscopic - Urine, Clean Catch    Specimen: Urine, Clean Catch    Urine  Release to stacy   Result Value Ref Range    Specific Gravity, UA 1.020 1.005 - 1.030    pH, UA 6.0 5.0 - 8.0    Color,  UA See below: (A)     Appearance, UA Clear Clear    Leukocytes, UA Trace (A) Negative    Protein Negative Negative    Glucose, UA Negative Negative    Ketones Negative Negative    Blood, UA Negative Negative    Bilirubin, UA Negative Negative    Urobilinogen, UA Comment     Nitrite, UA Negative Negative         Assessment & Plan     Pt is tired. Hard to sleep.       Anxity and depression. Pt is on very high doses of multiple  meds.     Pt goes to bed 7-8 pm. Has to get up at 2 am with . Goes to couch.     No exercise.    Vit b6/b12 low   Thyroid funtion was off .  Need repeat labs.    Wt is up. Pt on 2 atypical antipsychotics for anxiety.  Managed by Liliya Bowden NP [facility].   Pt on duloxetine 100% more than max recommended dose.  SSRI/SNRI.  Suspect the SNRI adding to the anxiety.    Consider Medical management with MD in Psych if one can be arranged. RF in.      Still smoking 1/4 ppd.         Diagnoses and all orders for this visit:    1. Essential hypertension (Primary)    2. Other insomnia  -     Ambulatory Referral to Sleep Medicine    3. DEYSI (obstructive sleep apnea)  -     Ambulatory Referral to Sleep Medicine    4. Anxiety  -     Ambulatory Referral to Psychiatry    5. Depression, unspecified depression type  -     Ambulatory Referral to Psychiatry    6. Polypharmacy  -     Ambulatory Referral to Psychiatry      No follow-ups on file.          There are no Patient Instructions on file for this visit.     Juan Clemens MD    Assessment & Plan     Answers submitted by the patient for this visit:  Other (Submitted on 7/23/2023)  Please describe your symptoms.: Follow up visit  Have you had these symptoms before?: Yes  How long have you been having these symptoms?: Greater than 2 weeks  Please list any medications you are currently taking for this condition.: N/A  Please describe any probable cause for these symptoms. : COPD  Primary Reason for Visit (Submitted on 7/23/2023)  What is the  primary reason for your visit?: Other

## 2023-07-29 LAB
PYRIDOXAL PHOS SERPL-MCNC: NORMAL UG/L
T4 FREE SERPL-MCNC: 0.93 NG/DL (ref 0.82–1.77)
TSH SERPL DL<=0.005 MIU/L-ACNC: 0.94 UIU/ML (ref 0.45–4.5)
VIT B12 SERPL-MCNC: 459 PG/ML (ref 232–1245)

## 2023-08-03 LAB
PYRIDOXAL PHOS SERPL-MCNC: 34.1 UG/L (ref 3.4–65.2)
T4 FREE SERPL-MCNC: 0.93 NG/DL (ref 0.82–1.77)
TSH SERPL DL<=0.005 MIU/L-ACNC: 0.94 UIU/ML (ref 0.45–4.5)
VIT B12 SERPL-MCNC: 459 PG/ML (ref 232–1245)

## 2023-08-21 DIAGNOSIS — F41.0 PANIC DISORDER: Chronic | ICD-10-CM

## 2023-08-21 RX ORDER — CLONAZEPAM 0.5 MG/1
0.5 TABLET ORAL NIGHTLY PRN
Qty: 30 TABLET | Refills: 1 | Status: SHIPPED | OUTPATIENT
Start: 2023-08-21

## 2023-08-21 NOTE — TELEPHONE ENCOUNTER
Rx Refill Note  Requested Prescriptions     Pending Prescriptions Disp Refills    clonazePAM (KlonoPIN) 0.5 MG tablet 30 tablet 0     Sig: Take 1 tablet by mouth At Night As Needed for Anxiety.      Last office visit with prescribing clinician: 9/17/2021   Last telemedicine visit with prescribing clinician: 7/26/2023   Next office visit with prescribing clinician: 9/26/2023                         Would you like a call back once the refill request has been completed: [] Yes [] No    If the office needs to give you a call back, can they leave a voicemail: [] Yes [] No    Raghav Grimaldo MA  08/21/23, 12:12 EDT

## 2023-08-24 ENCOUNTER — TELEMEDICINE (OUTPATIENT)
Dept: PSYCHIATRY | Facility: CLINIC | Age: 53
End: 2023-08-24
Payer: MEDICAID

## 2023-08-24 DIAGNOSIS — F41.1 GAD (GENERALIZED ANXIETY DISORDER): Primary | ICD-10-CM

## 2023-08-24 DIAGNOSIS — F33.1 MODERATE EPISODE OF RECURRENT MAJOR DEPRESSIVE DISORDER: ICD-10-CM

## 2023-08-24 NOTE — PROGRESS NOTES
Telehealth Encounter Note:  Date of Service: 2023  Patient Name: Marguerite Anderson  : 1970   MRN: [id number]   Time In: 11:10 AM  Time Out: 11:56 PM    This provider is located at [facility] [address], [place] [address] using a secure [employer] Video Visit through [employer]. Patient is being seen remotely via telehealth at home address in [place] and stated they are in a secure environment for this session. The patient's condition being diagnosed/treated is appropriate for telemedicine. The provider identified herself as well as her credentials. The patient, and/or patients guardian, consent to be seen remotely, and when consent is given they understand that the consent allows for patient identifiable information to be sent to a third party as needed. They may refuse to be seen remotely at any time. The electronic data is encrypted and password protected, and the patient and/or guardian has been advised of the potential risks to privacy not withstanding such measures.     You have chosen to receive care through a telehealth visit.  Do you consent to use a video/audio connection for your medical care today? Yes    Referring Provider: Juan Clemens MD    PROGRESS NOTE    History of Present Illness:   Marguerite Anderson is a 53 y.o. female who is being seen today for follow up individual Psychotherapy session.     Chief Complaint:  Pt reports she has been having dreams of her late  following cousin in law passing in a car accident.  Pt reports she celebrated her 10th anniversary with  however it put pressure on pt because she had to leave the home/traffic created high anxiety and stomach issues.  Pt reports she is having chronic nausea with medications from urologist to help bladder control.  Pt reports she has no appetite however continues to eat.  Pt reports she is starting to break out in a red rash on her neck and chest that does not itch and occurs when  anxious.  Pt reports she had a medical appt for disability and the exam was a blur and did not answer questions fully due to anxiety being so high.  Pt is worried that this will affect her disability case.  Pt states they are currently living off of a generator for electricity - due to a breaker out on a pole needing repair.             ICD-10-CM ICD-9-CM   1. JAY (generalized anxiety disorder)  F41.1 300.02   2. Moderate episode of recurrent major depressive disorder  F33.1 296.32        Clinical Maneuvering/Intervention:   Assisted patient in processing above session content; acknowledged and normalized patient's thoughts, feelings, and concerns by utilizing a person-centered approach in efforts to build appropriate rapport and a positive therapeutic relationship with open and honest communication.  Processed and rationalized patients thoughts and feelings regarding trauma of loss of late , recent loss of CIL, managing anxiety.  Discussed triggers associated with patient's traumatic loss/anxiety.  Also discussed coping skills for patient to implement such as allowing self to process grief/loss and past trauma, time with pets, boundaries with others, grounding, positive self talk, push self to get out of the home.    Allowed patient to freely discuss issues without interruption or judgment. Provided safe, confidential environment to facilitate the development of positive therapeutic relationship and encourage open, honest communication. Assisted patient in identifying risk factors which would indicate the need for higher level of care including thoughts to harm self or others and/or self-harming behavior and encouraged patient to contact this office, call 911, or present to the nearest emergency room should any of these events occur. Discussed crisis intervention services and means to access. Patient adamantly and convincingly denies current suicidal or homicidal ideation or perceptual disturbance.    Mental  Status Exam:   Hygiene:   good  Cooperation:  Cooperative  Eye Contact:  Good  Psychomotor Behavior:  Appropriate  Affect:  Appropriate  Mood: depressed  Speech:  Normal  Thought Process:  Goal directed and Linear  Thought Content:  Mood congruent  Suicidal:  None  Homicidal:  None  Hallucinations:  None  Delusion:  None  Memory:  Intact  Orientation:  Person, Place, Time, and Situation  Reliability:  good  Insight:  Good  Judgement:  Good  Impulse Control:  Good  Physical/Medical Issues:  Yes        Patient's Support Network Includes:   and mother    Functional Status: Moderate impairment     Progress toward goal: Not at goal    Prognosis: Fair with Ongoing Treatment     Medications:     Current Outpatient Medications:     Advair Diskus 250-50 MCG/ACT DISKUS, INHALE 1 PUFF BY MOUTH DAILY, Disp: 60 each, Rfl: 11    albuterol sulfate  (90 Base) MCG/ACT inhaler, Inhale 2 puffs Every 4 (Four) Hours As Needed for Wheezing., Disp: 108 g, Rfl: 1    ARIPiprazole (ABILIFY) 20 MG tablet, Take 1 tablet by mouth Daily., Disp: 90 tablet, Rfl: 1    aspirin (aspirin) 81 MG EC tablet, Take 1 tablet by mouth Daily., Disp: 90 tablet, Rfl: 3    B Complex-C (B-complex with vitamin C) tablet, Take 1 tablet by mouth Daily., Disp: 90 tablet, Rfl: 3    clonazePAM (KlonoPIN) 0.5 MG tablet, Take 1 tablet by mouth At Night As Needed for Anxiety., Disp: 30 tablet, Rfl: 1    cyclobenzaprine (FLEXERIL) 10 MG tablet, Take 1 tablet by mouth 2 (Two) Times a Day As Needed., Disp: , Rfl:     Diclofenac Sodium (VOLTAREN) 1 % gel gel, APPLY 4 GRAMS TOPICALLY TO AFFECTED AREA 4 TIMES A DAY AS NEEDED FOR PAIN, Disp: 100 g, Rfl: 3    DULoxetine (CYMBALTA) 60 MG capsule, Take 1 capsule by mouth 2 (Two) Times a Day., Disp: 180 capsule, Rfl: 1    estradiol (ESTRACE VAGINAL) 0.1 MG/GM vaginal cream, Use 0.5 grams intravaginally twice weekly to control symptoms., Disp: 1 each, Rfl: 11    estradiol (VIVELLE-DOT) 0.1 MG/24HR patch, Place 1 patch  on the skin as directed by provider 2 (Two) Times a Week., Disp: 8 patch, Rfl: 11    levETIRAcetam (KEPPRA) 750 MG tablet, Take 1 tablet by mouth 2 (Two) Times a Day., Disp: 180 tablet, Rfl: 1    nicotine (Nicoderm CQ) 21 MG/24HR patch, Place 1 patch on the skin as directed by provider Daily., Disp: 30 patch, Rfl: 4    QUEtiapine (SEROquel) 25 MG tablet, Patient will take one tablet by mouth in late afternoon before supper and two tablets by mouth at bedtime, Disp: 270 tablet, Rfl: 1    Rimegepant Sulfate (Nurtec) 75 MG tablet dispersible tablet, Place 1 tablet under the tongue Take As Directed., Disp: 16 tablet, Rfl: 5    rOPINIRole (REQUIP) 0.25 MG tablet, Take 1-2 tablets by mouth Every Night. Take 1 hour before bedtime., Disp: 60 tablet, Rfl: 2    solifenacin (VESIcare) 10 MG tablet, Take 1 tablet by mouth Daily., Disp: 90 tablet, Rfl: 3    topiramate (TOPAMAX) 100 MG tablet, Take 1 tablet by mouth 2 (Two) Times a Day., Disp: 180 tablet, Rfl: 1    traZODone (DESYREL) 100 MG tablet, TAKE 1 TABLET BY MOUTH EVERY NIGHT, Disp: 30 tablet, Rfl: 2    vitamin B-12 (CYANOCOBALAMIN) 1000 MCG tablet, Take 1 tablet by mouth Daily., Disp: 90 tablet, Rfl: 3    vitamin B-6 (PYRIDOXINE) 25 MG tablet, Take 1 tablet by mouth Daily., Disp: 90 tablet, Rfl: 3    Visit Diagnosis/Orders Placed This Visit:    ICD-10-CM ICD-9-CM   1. JAY (generalized anxiety disorder)  F41.1 300.02   2. Moderate episode of recurrent major depressive disorder  F33.1 296.32        PLAN:  Safety: No acute safety concerns  Risk Assessment: Risk of self-harm acutely is low. Risk of self-harm chronically is also low, but could be further elevated in the event of treatment noncompliance and/or AODA.    Crisis Plan:  Symptoms and/or behaviors to indicate a crisis: Excessive worry or fear, Feeling sad or low, and Isolation    What calming techniques or other strategies will patient use to de-escalate and stay safe: slow down, breathe, visualize calming self,  think it though, listen to music, change focus, take a walk    Who is one person patient can contact to assist with de-escalation?      Treatment Plan/Goals: Patient will continue supportive psychotherapy efforts and medication regimen as prescribed. Therapist will provide Cognitive Behavioral Therapy to assist patient in improving functioning and gaining coping skills, maintaining stability, and avoiding decompensation and the need for higher level of care. Plan for treatment was discussed during today's visit. Patient acknowledged and verbally consented to continue with current treatment plan and was educated on the importance of compliance with treatment and follow-up appointments.     Patient will contact this office, call 911 or present to the nearest emergency room should suicidal or homicidal ideations occur.     Follow Up:   Return in about 4 weeks (around 9/21/2023) for Therapy session.      QUOC Peña   [facility]     This document has been electronically signed by QUOC Peña   August 24, 2023 12:43 EDT

## 2023-09-13 ENCOUNTER — TELEMEDICINE (OUTPATIENT)
Dept: SLEEP MEDICINE | Facility: CLINIC | Age: 53
End: 2023-09-13
Payer: MEDICAID

## 2023-09-13 VITALS — BODY MASS INDEX: 27.66 KG/M2 | WEIGHT: 162 LBS | HEIGHT: 64 IN

## 2023-09-13 DIAGNOSIS — G47.19 EXCESSIVE DAYTIME SLEEPINESS: ICD-10-CM

## 2023-09-13 DIAGNOSIS — R56.9 SEIZURES: ICD-10-CM

## 2023-09-13 DIAGNOSIS — R29.818 SUSPECTED SLEEP APNEA: ICD-10-CM

## 2023-09-13 DIAGNOSIS — I10 ESSENTIAL HYPERTENSION: Primary | ICD-10-CM

## 2023-09-13 PROCEDURE — 99213 OFFICE O/P EST LOW 20 MIN: CPT | Performed by: NURSE PRACTITIONER

## 2023-09-13 PROCEDURE — 1160F RVW MEDS BY RX/DR IN RCRD: CPT | Performed by: NURSE PRACTITIONER

## 2023-09-13 PROCEDURE — 1159F MED LIST DOCD IN RCRD: CPT | Performed by: NURSE PRACTITIONER

## 2023-09-13 NOTE — PROGRESS NOTES
Chief Complaint:   Chief Complaint   Patient presents with    Sleeping Problem       HPI:    Marguerite Anderson is a 53 y.o. female here establish care.  Patient sees Dr. Hamilton as primary care provider.  Patient has history as below  Past Medical History:   Diagnosis Date    Abdominal pain, epigastric     Abnormal Pap smear of cervix hpv    Acute sinusitis     Acute UTI (urinary tract infection)     ADHD (attention deficit hyperactivity disorder) 2008    asked to be tested not diagnosed officially    Angina, intestinal     Anxiety     Arthritis     Asthma not sure    COPD    Back pain     Bipolar disorder 2019    Breast mass, right     Cancer 12/20/2018    basal cell R ear    Cervical dysplasia 1996    was frozen off    Chronic hepatitis C virus infection     COPD (chronic obstructive pulmonary disease)     Depression     Diarrhea     Diverticulosis     Elevated LFTs     Fatigue     Glaucoma 05/05/2020    Hepatitis C 1994    cured with meds    History of endometriosis     History of Papanicolaou smear of cervix 04/02/2014    NORMAL     HPV (human papilloma virus) infection 1996    HTN (hypertension)     IBS (irritable bowel syndrome)     Inflammatory bowel disease     Insomnia     Low back pain     Menopausal symptoms     Migraine headache     Nausea & vomiting     Neck pain     Osteopenia     Other hyperlipidemia     Ovarian cyst     Pelvic adhesive disease     Radicular pain of left lower extremity     Restless leg syndrome     Seizure disorder     Tobacco abuse     Tobacco abuse     Trochanteric bursitis     Urinary incontinence     Urinary incontinence     Visual impairment     wear contacts/glasses    Vitamin B 12 deficiency        Patient states she is always tired.  She did have a sleep study in 2020 that appeared to be done at home that was negative for sleep apnea.  She was sent here again as a consult for blood pressure and suspected sleep apnea being a cause of this.    Patient keeps the same sleep  schedule weekend and weekday going to bed at 7 to 8 PM and awaken 11 AM.  At 2 AM she will usually get up and go to the couch and have 2 cups of coffee but has no difficulty going back to sleep.  Patient does toss and turn frequently.  Patient puts her [place] score 10/24.  It does denies snoring, witnessed apneas, or gasping for breath.  Patient has no morning headaches.  Patient denies nasal fracture, head injury, hypnagogic hallucinations and sleep paralysis.    We did speak today about the consequences of untreated sleep apnea such as hypertension, atrial fibrillation, stroke, and early onset dementia.  We also discussed different therapies available to her such as CPAP, MAD, or ENT referral.  Patient verbalizes understanding.    Social history  This is a very pleasant 53-year-old female.  Patient does smoke 1/2 pack/day of cigarettes but is a nondrinker.  She has 2 cups of regular coffee and 3 sprites daily.      Family History   Problem Relation Age of Onset    Alzheimer's disease Other     Cancer Other     Dementia Other     Hypertension Other     Parkinsonism Other     Osteoporosis Mother     Diabetes Mother     Hypertension Mother     Obesity Mother     Depression Mother     Arthritis Mother     COPD Mother     Hearing loss Mother     Hyperlipidemia Mother     Breast cancer Maternal Grandmother     Osteoporosis Maternal Grandmother     Diabetes Maternal Grandmother     Dementia Maternal Grandmother     Cancer Maternal Grandmother         Breast Cancer    Hypertension Maternal Grandmother     ADD / ADHD Sister     Alcohol abuse Sister     Anxiety disorder Sister     Bipolar disorder Sister     Depression Sister     Drug abuse Sister     Depression Father     Anxiety disorder Father     Other Father          parkinsons disease    OCD Neg Hx     Paranoid behavior Neg Hx     Schizophrenia Neg Hx     Seizures Neg Hx     Self-Injurious Behavior  Neg Hx     Suicide Attempts Neg Hx      Past Surgical History:    Procedure Laterality Date    BILATERAL SALPINGO OOPHORECTOMY  03/10/2015    DR NATALY THOMPSON    BREAST LUMPECTOMY       SECTION      COLONOSCOPY      COSMETIC SURGERY  2018    basal cell removal R Ear    HYSTERECTOMY  03/10/2015    [facility] (DR NATALY THOMPSON)    HYSTEROSCOPY  2013    complete    INGUINAL HERNIA REPAIR Right 2008    LAPAROSCOPIC ASSISTED VAGINAL HYSTERECTOMY SALPINGO OOPHORECTOMY  2012    to remove cyst    LYSIS OF ABDOMINAL ADHESIONS  03/10/2015    DR NATALY THOMPSON    SUBTOTAL HYSTERECTOMY  3/2013    Left ovary    TUBAL ABDOMINAL LIGATION      WISDOM TOOTH EXTRACTION           Current medications are:   Current Outpatient Medications:     Advair Diskus 250-50 MCG/ACT DISKUS, INHALE 1 PUFF BY MOUTH DAILY, Disp: 60 each, Rfl: 11    albuterol sulfate  (90 Base) MCG/ACT inhaler, Inhale 2 puffs Every 4 (Four) Hours As Needed for Wheezing., Disp: 108 g, Rfl: 1    ARIPiprazole (ABILIFY) 20 MG tablet, Take 1 tablet by mouth Daily., Disp: 90 tablet, Rfl: 1    aspirin (aspirin) 81 MG EC tablet, Take 1 tablet by mouth Daily., Disp: 90 tablet, Rfl: 3    B Complex-C (B-complex with vitamin C) tablet, Take 1 tablet by mouth Daily., Disp: 90 tablet, Rfl: 3    clonazePAM (KlonoPIN) 0.5 MG tablet, Take 1 tablet by mouth At Night As Needed for Anxiety., Disp: 30 tablet, Rfl: 1    cyclobenzaprine (FLEXERIL) 10 MG tablet, Take 1 tablet by mouth 2 (Two) Times a Day As Needed., Disp: , Rfl:     Diclofenac Sodium (VOLTAREN) 1 % gel gel, APPLY 4 GRAMS TOPICALLY TO AFFECTED AREA 4 TIMES A DAY AS NEEDED FOR PAIN, Disp: 100 g, Rfl: 3    DULoxetine (CYMBALTA) 60 MG capsule, Take 1 capsule by mouth 2 (Two) Times a Day., Disp: 180 capsule, Rfl: 1    estradiol (ESTRACE VAGINAL) 0.1 MG/GM vaginal cream, Use 0.5 grams intravaginally twice weekly to control symptoms., Disp: 1 each, Rfl: 11    estradiol (VIVELLE-DOT) 0.1 MG/24HR patch, Place 1 patch on the skin as directed by provider 2 (Two) Times a Week., Disp:  8 patch, Rfl: 11    levETIRAcetam (KEPPRA) 750 MG tablet, Take 1 tablet by mouth 2 (Two) Times a Day., Disp: 180 tablet, Rfl: 1    nicotine (Nicoderm CQ) 21 MG/24HR patch, Place 1 patch on the skin as directed by provider Daily., Disp: 30 patch, Rfl: 4    QUEtiapine (SEROquel) 25 MG tablet, Patient will take one tablet by mouth in late afternoon before supper and two tablets by mouth at bedtime, Disp: 270 tablet, Rfl: 1    Rimegepant Sulfate (Nurtec) 75 MG tablet dispersible tablet, Place 1 tablet under the tongue Take As Directed., Disp: 16 tablet, Rfl: 5    rOPINIRole (REQUIP) 0.25 MG tablet, Take 1-2 tablets by mouth Every Night. Take 1 hour before bedtime., Disp: 60 tablet, Rfl: 2    solifenacin (VESIcare) 10 MG tablet, Take 1 tablet by mouth Daily., Disp: 90 tablet, Rfl: 3    topiramate (TOPAMAX) 100 MG tablet, Take 1 tablet by mouth 2 (Two) Times a Day., Disp: 180 tablet, Rfl: 1    traZODone (DESYREL) 100 MG tablet, TAKE 1 TABLET BY MOUTH EVERY NIGHT, Disp: 30 tablet, Rfl: 2    vitamin B-12 (CYANOCOBALAMIN) 1000 MCG tablet, Take 1 tablet by mouth Daily., Disp: 90 tablet, Rfl: 3    vitamin B-6 (PYRIDOXINE) 25 MG tablet, Take 1 tablet by mouth Daily., Disp: 90 tablet, Rfl: 3.      The patient's relevant past medical, surgical, family and social history were reviewed and updated in Epic as appropriate.       Review of Systems   Constitutional:  Positive for activity change, appetite change and fatigue.   HENT:  Positive for sinus pressure, sinus pain and sneezing.    Eyes:  Positive for visual disturbance.   Respiratory:  Positive for cough, chest tightness, shortness of breath and wheezing.    Cardiovascular:  Positive for chest pain and leg swelling.   Gastrointestinal:  Positive for diarrhea.        Heartburn   Genitourinary:  Positive for frequency and urgency.   Musculoskeletal:  Positive for arthralgias, back pain, myalgias and neck pain.   Allergic/Immunologic: Positive for environmental allergies.    Neurological:  Positive for seizures and headaches.   Psychiatric/Behavioral:  Positive for dysphoric mood and sleep disturbance. The patient is nervous/anxious.    All other systems reviewed and are negative.      Objective:    Physical Exam  HENT:      Head: Normocephalic and atraumatic.   Pulmonary:      Effort: Pulmonary effort is normal.   Neurological:      Mental Status: She is alert and oriented to person, place, and time.   Psychiatric:         Mood and Affect: Mood normal.         Behavior: Behavior normal.         Thought Content: Thought content normal.         Judgment: Judgment normal.       ASSESSMENT/PLAN    Diagnoses and all orders for this visit:    1. Essential hypertension (Primary)  -     Polysomnography 4 or More Parameters; Future    2. Seizures  -     Polysomnography 4 or More Parameters; Future    3. Suspected sleep apnea  -     Polysomnography 4 or More Parameters; Future    4. Excessive daytime sleepiness  -     Polysomnography 4 or More Parameters; Future        Counseled patient regarding multimodal approach with healthy nutrition, healthy sleep, regular physical activity, social activities, counseling, and medications. Encouraged to practice lateral sleep position. Avoid alcohol and sedatives close to bedtime.    Due to consequences of untreated sleep apnea and possible sleep apnea diagnosis we will move forward with in lab PSG with seizure montage.  Patient gave consent for video visit.    I have reviewed the results of my evaluation and impression and discussed my recommendations in detail with the patient.      Signed by  CHRIS Majano    September 13, 2023      CC: Juan Clemens MD Geile, Michael A, MD

## 2023-09-14 RX ORDER — FLUTICASONE PROPIONATE AND SALMETEROL 50; 250 UG/1; UG/1
POWDER RESPIRATORY (INHALATION)
Qty: 60 EACH | Refills: 11 | Status: SHIPPED | OUTPATIENT
Start: 2023-09-14

## 2023-09-14 NOTE — TELEPHONE ENCOUNTER
Rx Refill Note  Requested Prescriptions     Pending Prescriptions Disp Refills    Advair Diskus 250-50 MCG/ACT DISKUS [Pharmacy Med Name: ADVAIR DISKUS 250/50MCG (YELLOW) 60] 60 each 11     Sig: INHALE 1 PUFF BY MOUTH DAILY      Last office visit with prescribing clinician: 7/28/2023   Last telemedicine visit with prescribing clinician: Visit date not found   Next office visit with prescribing clinician: 11/3/2023     Isela Carrillo MA  09/14/23, 14:07 EDT

## 2023-09-22 DIAGNOSIS — G47.09 OTHER INSOMNIA: Chronic | ICD-10-CM

## 2023-09-22 RX ORDER — TRAZODONE HYDROCHLORIDE 100 MG/1
100 TABLET ORAL NIGHTLY
Qty: 30 TABLET | Refills: 2 | Status: SHIPPED | OUTPATIENT
Start: 2023-09-22

## 2023-09-24 NOTE — PROGRESS NOTES
"This provider is located at [facility] ,[address], [address] in [place], [place],using a secure [employer]ogn[employer] Video Visit through [employer].  The patient's camera was not working so I could not visualize her.  Patient is being seen remotely via telehealth at their home address in [place], and stated they are in a secure environment for this session. The patient's condition being diagnosed/treated is appropriate for telemedicine. The provider identified herself as well as her credentials.   The patient, and/or patients guardian, consent to be seen remotely, and when consent is given they understand that the consent allows for patient identifiable information to be sent to a third party as needed.   They may refuse to be seen remotely at any time. The electronic data is encrypted and password protected, and the patient and/or guardian has been advised of the potential risks to privacy not withstanding such measures.    Chief Complaint  Mood disorder, anxiety, panic, and insomnia    Subjective          Marguerite Anderson presents today via MyChart Video through [employer] by herself for a follow up and medication check.     History of Present Illness: Marguerite states, \"I am okay.\" Marguerite tells me she is still very anxious and cannot go out with experiencing panic or near-panic. She tried to celebrate her anniversary with lunch [employer] restaurant and got GI distress when out. She attempted to shop at [employer] and states, \"I was a nervous wreck the whole time.\" She is developing red splotches on her neck and chest when she is anxious. She continues to struggles with staying asleep but has found the Requip to be helpful for RLS. She continues to have a poor appetite. She is followed by urology, sleep medicine, psychiatry, primary care, gynecology, and neurology. Her PCP reported concerns for her doses of psychiatric medication and referred her to Dr. Sy in [place] who did not accept insurance. "  She is compliant with her medications.  She is taking Trazodone, Abilify, Cymbalta, Seroquel, and clonazepam. She denies any side effects of her current medication regiment. She is seeing Sherlyn for therapy. She denies any SI/HI/AVH.    Current Medications:   Current Outpatient Medications   Medication Sig Dispense Refill    rOPINIRole (REQUIP) 0.5 MG tablet Take 1 tablet by mouth Every Night. Take 1 hour before bedtime. 30 tablet 2    Advair Diskus 250-50 MCG/ACT DISKUS INHALE 1 PUFF BY MOUTH DAILY 60 each 11    albuterol sulfate  (90 Base) MCG/ACT inhaler Inhale 2 puffs Every 4 (Four) Hours As Needed for Wheezing. 108 g 1    ARIPiprazole (ABILIFY) 20 MG tablet Take 1 tablet by mouth Daily. 90 tablet 1    aspirin (aspirin) 81 MG EC tablet Take 1 tablet by mouth Daily. 90 tablet 3    B Complex-C (B-complex with vitamin C) tablet Take 1 tablet by mouth Daily. 90 tablet 3    clonazePAM (KlonoPIN) 0.5 MG tablet Take 1 tablet by mouth At Night As Needed for Anxiety. 30 tablet 1    cyclobenzaprine (FLEXERIL) 10 MG tablet Take 1 tablet by mouth 2 (Two) Times a Day As Needed.      Diclofenac Sodium (VOLTAREN) 1 % gel gel APPLY 4 GRAMS TOPICALLY TO AFFECTED AREA 4 TIMES A DAY AS NEEDED FOR PAIN 100 g 3    DULoxetine (CYMBALTA) 60 MG capsule Take 1 capsule by mouth 2 (Two) Times a Day. 180 capsule 1    estradiol (ESTRACE VAGINAL) 0.1 MG/GM vaginal cream Use 0.5 grams intravaginally twice weekly to control symptoms. 1 each 11    estradiol (VIVELLE-DOT) 0.1 MG/24HR patch Place 1 patch on the skin as directed by provider 2 (Two) Times a Week. 8 patch 11    levETIRAcetam (KEPPRA) 750 MG tablet Take 1 tablet by mouth 2 (Two) Times a Day. 180 tablet 1    nicotine (Nicoderm CQ) 21 MG/24HR patch Place 1 patch on the skin as directed by provider Daily. 30 patch 4    QUEtiapine (SEROquel) 25 MG tablet Patient will take one tablet by mouth in late afternoon before supper and two tablets by mouth at bedtime 270 tablet 1     Rimegepant Sulfate (Nurtec) 75 MG tablet dispersible tablet Place 1 tablet under the tongue Take As Directed. 16 tablet 5    solifenacin (VESIcare) 10 MG tablet Take 1 tablet by mouth Daily. 90 tablet 3    topiramate (TOPAMAX) 100 MG tablet Take 1 tablet by mouth 2 (Two) Times a Day. 180 tablet 1    traZODone (DESYREL) 100 MG tablet TAKE 1 TABLET BY MOUTH EVERY NIGHT 30 tablet 2    vitamin B-12 (CYANOCOBALAMIN) 1000 MCG tablet Take 1 tablet by mouth Daily. 90 tablet 3    vitamin B-6 (PYRIDOXINE) 25 MG tablet Take 1 tablet by mouth Daily. 90 tablet 3     No current facility-administered medications for this visit.       Objective   Vital Signs:   There were no vitals taken for this visit.    Physical Exam  Nursing note reviewed. Vitals reviewed: Vitals not obtained due to nature of telehealth visit.  Constitutional:       Appearance: Normal appearance.   Skin:     Coloration: Skin is pale.   Neurological:      General: No focal deficit present.      Mental Status: She is alert and oriented to person, place, and time.   Psychiatric:         Attention and Perception: Attention normal.         Mood and Affect: Mood normal. Affect is blunt.         Speech: Speech normal.         Behavior: Behavior is slowed. Behavior is cooperative.         Thought Content: Thought content normal.         Cognition and Memory: Cognition normal.         Judgment: Judgment normal.      Comments: Patient appears to be falling asleep at times during the visit-eye will close and body sways slightly.       Result Review :       The following data was reviewed by CHRIS Nick on 09/26/2023:  Vitamin B12 (07/28/2023 14:13)  Vitamin B6 (07/28/2023 14:13)  TSH (07/28/2023 14:13)  T4, Free (07/28/2023 14:13)  Telemedicine with Va Botello APRN (09/13/2023)   [facility]   Office Visit with Juan Clemens MD (07/28/2023)   Assessment and Plan    Problem List Items Addressed This Visit     None  Visit Diagnoses       RLS (restless legs syndrome)    -  Primary    Relevant Medications    rOPINIRole (REQUIP) 0.5 MG tablet    JAY (generalized anxiety disorder)  (Chronic)       Other insomnia  (Chronic)       Panic disorder  (Chronic)       Moderate mood disorder  (Chronic)           Mental Status Exam:   Hygiene:   good  Cooperation:  Cooperative  Eye Contact:  Good  Psychomotor Behavior:  Slow  Affect:  Blunted  Mood: normal  Speech:  Normal  Thought Process:  Goal directed and Linear  Thought Content:  Normal  Suicidal:  None  Homicidal:  None  Hallucinations:  None  Delusion:  None  Memory:  Intact  Orientation:  Person, Place, Time and Situation  Reliability:  good  Insight:  Good  Judgement:  Good  Impulse Control:  Good  Physical/Medical Issues:  Yes Chronic pain, seizures, and migraines       PHQ-9 Score:   PHQ-9 Total Score:      Impression/Plan:  -This is a follow up and medication check. Marguerite reports ongoing anxiety with panic. She is using Clonazepam daily and is not finding the current dose to be helpful. Dr. Clemens reported concerns for her psychiatric medications and made an attempt to refer to Dr. Sy who did not accept insurance. She and I reviewed her medication including rationale for using Abilify and Seroquel together while they are in the same class of medications. I also expressed my concern for her dose of Cymbalta but she has found the medication to be helpful and worries about making a change. I provided additional information about the long-term risks of benzodiazepine use. It has been two years since the Clonazepam was started. I suggested changing Abilify, Seroquel, and Cymbalta to Symbyax but the patient is unsure if she is ready to make a change. She agrees to meeting with Dr. Flynn or Dr. Gonzalez, judith, for a medication evaluation.   -Continue Requip 0.5 mg nightly 1 hour before bed for RLS.    -Continue Seroquel to 25 mg daily before supper and 50 mg nightly for  anxiety and insomnia.  Patient has refills.  -Continue Trazodone 100 mg nightly for insomnia.  Patient has refills.  -Continue Abilify 20 mg daily for bipolar depression.  Patient will take in the a.m. Patient has refills.   -Continue Cymbalta 60 mg mg twice daily for bipolar depression and anxiety.  Patient has refills.   -Continue clonazepam 0.5 mg daily as needed for anxiety and insomnia. # 30 tablets given. Patient has refills.  -Continue therapy with QUOC Peña.  -Continue Keppra,Topamax, Imitrex, and Botox for seizure disorder and migraines. These medications are prescribed by another provider.  -The ASYA report, reviewed through PDMP , of the past 12 months were reviewed and is appropriate.  The patient/guardian reports taking the medication only as prescribed.  The patient/guardian denies any abuse or misuse of the medication.  The patient/guardian denies any other substance use or issues.  There are no apparent substance related issues.  The patient reports no side effects of the current medication usage.  The patient/guardian has reported significant improvement with medication usage and wishes to continue medication as prescribed.  The patient/guardian is appropriate to continue with current medication usage at this time.  Reinforced risks and side effects of medication usage, patient and/or guardian verbalize understanding in their own words and are in agreement with current plan.  -Last UDS on 10/04/22. Positive TCA and negative benzodiazepines. I will order UDS for compliance.     MEDS ORDERED DURING VISIT:  New Medications Ordered This Visit   Medications    rOPINIRole (REQUIP) 0.5 MG tablet     Sig: Take 1 tablet by mouth Every Night. Take 1 hour before bedtime.     Dispense:  30 tablet     Refill:  2         Follow Up   Return if symptoms worsen or fail to improve.  Patient was given instructions and counseling regarding her condition or for health maintenance advice. Please see  specific information pulled into the AVS if appropriate.       TREATMENT PLAN/GOALS: Continue supportive psychotherapy efforts and medications as indicated. Treatment and medication options discussed during today's visit. Patient acknowledged and verbally consented to continue with current treatment plan and was educated on the importance of compliance with treatment and follow-up appointments.    MEDICATION ISSUES:  Discussed medication options and treatment plan of prescribed medication as well as the risks, benefits, and side effects including potential falls, possible impaired driving and metabolic adversities among others. Patient is agreeable to call the office with any worsening of symptoms or onset of side effects. Patient is agreeable to call 911 or go to the nearest ER should he/she begin having SI/HI.      This document has been electronically signed by CHRIS Witt, PMHNP-BC  September 26, 2023 13:08 EDT    Part of this note may be an electronic transcription/translation of spoken language to printed text using the Dragon Dictation System.

## 2023-09-25 ENCOUNTER — TELEMEDICINE (OUTPATIENT)
Dept: PSYCHIATRY | Facility: CLINIC | Age: 53
End: 2023-09-25

## 2023-09-25 DIAGNOSIS — F33.1 MODERATE EPISODE OF RECURRENT MAJOR DEPRESSIVE DISORDER: Primary | ICD-10-CM

## 2023-09-25 DIAGNOSIS — F41.1 GAD (GENERALIZED ANXIETY DISORDER): ICD-10-CM

## 2023-09-25 NOTE — PROGRESS NOTES
Telehealth Encounter Note:  Date of Service: 2023  Patient Name: Marguerite Anderson  : 1970   MRN: [id number]   Time In: 11:05 AM   Time Out: 11:52 AM     This provider is located at [facility] [address], [place] [address] using a secure [employer] Video Visit through [employer]. Patient is being seen remotely via telehealth at home address in [place] and stated they are in a secure environment for this session. The patient's condition being diagnosed/treated is appropriate for telemedicine. The provider identified herself as well as her credentials. The patient, and/or patients guardian, consent to be seen remotely, and when consent is given they understand that the consent allows for patient identifiable information to be sent to a third party as needed. They may refuse to be seen remotely at any time. The electronic data is encrypted and password protected, and the patient and/or guardian has been advised of the potential risks to privacy not withstanding such measures.     You have chosen to receive care through a telehealth visit.  Do you consent to use a video/audio connection for your medical care today? Yes    Referring Provider: Juan Clemens MD    PROGRESS NOTE    History of Present Illness:   Marguerite Anderson is a 53 y.o. female who is being seen today for follow up individual Psychotherapy session.     Chief Complaint:  Pt reports depression remains poor - feels that she has been really down and anxiety has been high.  Pt reports her mood is better when she takes her Klonopin however it does not last a full day.  She takes this 1x per day.  Pt may ask if a 2nd dose is possible.  Pt reports they recently had a cookout and she became overwhelmed with amount of cleaning that needed to be done  however was able to receive help.  Pt reports she also received a letter from disability that brought about anxiety however she opened it after several days - and avoided a  letter from her  for a week.  Disability was denied for the second time and pt will have to appeal.  Pt will next have a hearing for reconsideration.  Pt reports she pushed herself to go to [employer] however struggled with anxiety while there and rushed to get what she needed.  Pt reports it can be overwhelming due to trouble making decisions in what to buy.  Pt reports she continues to get red pigmentation on her chest and neck when stress/anxiety is high.                    ICD-10-CM ICD-9-CM   1. Moderate episode of recurrent major depressive disorder  F33.1 296.32   2. JAY (generalized anxiety disorder)  F41.1 300.02      Clinical Maneuvering/Intervention:   Assisted patient in processing above session content; acknowledged and normalized patient’s thoughts, feelings, and concerns by utilizing a person-centered approach in efforts to build appropriate rapport and a positive therapeutic relationship with open and honest communication.  Processed and rationalized patients thoughts and feelings regarding current stressors and managing MH.  Discussed triggers associated with patient's anxiety/depression.  Also discussed coping skills for patient to implement such as reframing negative thought patterns, leaning on  for support, artificial light during darker months, making a list for grocery, small amounts of time outside, time with dog, giving self anika/not putting so much pressure on self ('shoulds and shouldnts').        Allowed patient to freely discuss issues without interruption or judgment. Provided safe, confidential environment to facilitate the development of positive therapeutic relationship and encourage open, honest communication. Assisted patient in identifying risk factors which would indicate the need for higher level of care including thoughts to harm self or others and/or self-harming behavior and encouraged patient to contact this office, call 911, or present to the nearest emergency  room should any of these events occur. Discussed crisis intervention services and means to access. Patient adamantly and convincingly denies current suicidal or homicidal ideation or perceptual disturbance.    Mental Status Exam:   Hygiene:   good  Cooperation:  Cooperative  Eye Contact:  Good  Psychomotor Behavior:  Appropriate  Affect:  Appropriate  Mood: fluctates  Speech:  Normal  Thought Process:  Goal directed and Linear  Thought Content:  Mood congruent  Suicidal:  None  Homicidal:  None  Hallucinations:  None  Delusion:  None  Memory:  Intact  Orientation:  Person, Place, Time, and Situation  Reliability:  good  Insight:  Good  Judgement:  Good  Impulse Control:  Good  Physical/Medical Issues:  Yes        Patient's Support Network Includes:   and mother    Functional Status: Severe impairment    Progress toward goal: Not at goal    Prognosis: Fair with Ongoing Treatment     Medications:     Current Outpatient Medications:     Advair Diskus 250-50 MCG/ACT DISKUS, INHALE 1 PUFF BY MOUTH DAILY, Disp: 60 each, Rfl: 11    albuterol sulfate  (90 Base) MCG/ACT inhaler, Inhale 2 puffs Every 4 (Four) Hours As Needed for Wheezing., Disp: 108 g, Rfl: 1    ARIPiprazole (ABILIFY) 20 MG tablet, Take 1 tablet by mouth Daily., Disp: 90 tablet, Rfl: 1    aspirin (aspirin) 81 MG EC tablet, Take 1 tablet by mouth Daily., Disp: 90 tablet, Rfl: 3    B Complex-C (B-complex with vitamin C) tablet, Take 1 tablet by mouth Daily., Disp: 90 tablet, Rfl: 3    clonazePAM (KlonoPIN) 0.5 MG tablet, Take 1 tablet by mouth At Night As Needed for Anxiety., Disp: 30 tablet, Rfl: 1    cyclobenzaprine (FLEXERIL) 10 MG tablet, Take 1 tablet by mouth 2 (Two) Times a Day As Needed., Disp: , Rfl:     Diclofenac Sodium (VOLTAREN) 1 % gel gel, APPLY 4 GRAMS TOPICALLY TO AFFECTED AREA 4 TIMES A DAY AS NEEDED FOR PAIN, Disp: 100 g, Rfl: 3    DULoxetine (CYMBALTA) 60 MG capsule, Take 1 capsule by mouth 2 (Two) Times a Day., Disp: 180  capsule, Rfl: 1    estradiol (ESTRACE VAGINAL) 0.1 MG/GM vaginal cream, Use 0.5 grams intravaginally twice weekly to control symptoms., Disp: 1 each, Rfl: 11    estradiol (VIVELLE-DOT) 0.1 MG/24HR patch, Place 1 patch on the skin as directed by provider 2 (Two) Times a Week., Disp: 8 patch, Rfl: 11    levETIRAcetam (KEPPRA) 750 MG tablet, Take 1 tablet by mouth 2 (Two) Times a Day., Disp: 180 tablet, Rfl: 1    nicotine (Nicoderm CQ) 21 MG/24HR patch, Place 1 patch on the skin as directed by provider Daily., Disp: 30 patch, Rfl: 4    QUEtiapine (SEROquel) 25 MG tablet, Patient will take one tablet by mouth in late afternoon before supper and two tablets by mouth at bedtime, Disp: 270 tablet, Rfl: 1    Rimegepant Sulfate (Nurtec) 75 MG tablet dispersible tablet, Place 1 tablet under the tongue Take As Directed., Disp: 16 tablet, Rfl: 5    rOPINIRole (REQUIP) 0.25 MG tablet, Take 1-2 tablets by mouth Every Night. Take 1 hour before bedtime., Disp: 60 tablet, Rfl: 2    solifenacin (VESIcare) 10 MG tablet, Take 1 tablet by mouth Daily., Disp: 90 tablet, Rfl: 3    topiramate (TOPAMAX) 100 MG tablet, Take 1 tablet by mouth 2 (Two) Times a Day., Disp: 180 tablet, Rfl: 1    traZODone (DESYREL) 100 MG tablet, TAKE 1 TABLET BY MOUTH EVERY NIGHT, Disp: 30 tablet, Rfl: 2    vitamin B-12 (CYANOCOBALAMIN) 1000 MCG tablet, Take 1 tablet by mouth Daily., Disp: 90 tablet, Rfl: 3    vitamin B-6 (PYRIDOXINE) 25 MG tablet, Take 1 tablet by mouth Daily., Disp: 90 tablet, Rfl: 3    Visit Diagnosis/Orders Placed This Visit:    ICD-10-CM ICD-9-CM   1. Moderate episode of recurrent major depressive disorder  F33.1 296.32   2. JAY (generalized anxiety disorder)  F41.1 300.02        PLAN:  Safety: No acute safety concerns  Risk Assessment: Risk of self-harm acutely is low. Risk of self-harm chronically is also low, but could be further elevated in the event of treatment noncompliance and/or AODA.    Crisis Plan:  Symptoms and/or behaviors to  indicate a crisis: Excessive worry or fear, Feeling sad or low, Isolation, Lack of sleep, and Self-doubt    What calming techniques or other strategies will patient use to de-escalate and stay safe: slow down, breathe, visualize calming self, think it though, listen to music, change focus, take a walk    Who is one person patient can contact to assist with de-escalation?     Treatment Plan/Goals: Patient will continue supportive psychotherapy efforts and medication regimen as prescribed. Therapist will provide Cognitive Behavioral Therapy to assist patient in improving functioning and gaining coping skills, maintaining stability, and avoiding decompensation and the need for higher level of care. Plan for treatment was discussed during today's visit. Patient acknowledged and verbally consented to continue with current treatment plan and was educated on the importance of compliance with treatment and follow-up appointments.     Patient will contact this office, call 911 or present to the nearest emergency room should suicidal or homicidal ideations occur.     Follow Up:   Return in about 3 weeks (around 10/16/2023) for Therapy session.      QUOC Peña   [facility]     This document has been electronically signed by QUOC Peña   September 25, 2023 11:53 EDT

## 2023-09-26 ENCOUNTER — TELEMEDICINE (OUTPATIENT)
Dept: PSYCHIATRY | Facility: CLINIC | Age: 53
End: 2023-09-26
Payer: MEDICAID

## 2023-09-26 DIAGNOSIS — G25.81 RLS (RESTLESS LEGS SYNDROME): Primary | ICD-10-CM

## 2023-09-26 DIAGNOSIS — F41.1 GAD (GENERALIZED ANXIETY DISORDER): Chronic | ICD-10-CM

## 2023-09-26 DIAGNOSIS — F41.0 PANIC DISORDER: Chronic | ICD-10-CM

## 2023-09-26 DIAGNOSIS — F39 MODERATE MOOD DISORDER: Chronic | ICD-10-CM

## 2023-09-26 DIAGNOSIS — G47.09 OTHER INSOMNIA: Chronic | ICD-10-CM

## 2023-09-26 PROCEDURE — 1160F RVW MEDS BY RX/DR IN RCRD: CPT | Performed by: NURSE PRACTITIONER

## 2023-09-26 PROCEDURE — 1159F MED LIST DOCD IN RCRD: CPT | Performed by: NURSE PRACTITIONER

## 2023-09-26 PROCEDURE — 99214 OFFICE O/P EST MOD 30 MIN: CPT | Performed by: NURSE PRACTITIONER

## 2023-09-26 RX ORDER — ROPINIROLE 0.5 MG/1
0.5 TABLET, FILM COATED ORAL NIGHTLY
Qty: 30 TABLET | Refills: 2 | Status: SHIPPED | OUTPATIENT
Start: 2023-09-26 | End: 2024-09-25

## 2023-10-03 RX ORDER — ALBUTEROL SULFATE 90 UG/1
AEROSOL, METERED RESPIRATORY (INHALATION)
Qty: 18 G | Refills: 1 | Status: SHIPPED | OUTPATIENT
Start: 2023-10-03

## 2023-10-04 ENCOUNTER — TELEPHONE (OUTPATIENT)
Dept: UROLOGY | Facility: CLINIC | Age: 53
End: 2023-10-04
Payer: MEDICAID

## 2023-10-04 NOTE — TELEPHONE ENCOUNTER
Left vm for patient to give us a call back to get moved to the next vidal day 09-16-23      CD,CMA

## 2023-10-11 NOTE — PROGRESS NOTES
Subjective     Patient ID: Magruerite Fink is a 49 y.o. female. Patient is here for management of multiple medical problems.     Chief Complaint   Patient presents with   • Cough     chilling, runny nose, coughing, body aches     History of Present Illness     Cough chilles. X 3 5 days. Getting worse. Soa. Took alk+ cold and flu.    Using inh.  Still with soa.  Body aching.    + sick contact.      The following portions of the patient's history were reviewed and updated as appropriate: allergies, current medications, past family history, past medical history, past social history, past surgical history and problem list.    Review of Systems   Constitutional: Positive for diaphoresis and fatigue.   Respiratory: Positive for choking, chest tightness, shortness of breath and wheezing.    Cardiovascular: Positive for chest pain and palpitations.   All other systems reviewed and are negative.      Current Outpatient Medications:   •  atorvastatin (LIPITOR) 10 MG tablet, Take 1 tablet by mouth Daily., Disp: 30 tablet, Rfl: 11  •  B-12, Methylcobalamin, 1000 MCG sublingual tablet, Place 1 tablet under the tongue Daily. (Patient taking differently: Place 2,000 mg under the tongue Daily.), Disp: 90 tablet, Rfl: 3  •  clonazePAM (KlonoPIN) 1 MG tablet, Take 1 mg by mouth., Disp: , Rfl:   •  diltiaZEM SR (CARDIZEM SR) 120 MG 12 hr capsule, Take 1 capsule by mouth 2 (Two) Times a Day., Disp: 60 capsule, Rfl: 5  •  escitalopram (LEXAPRO) 10 MG tablet, Take 1 tablet by mouth Daily., Disp: 30 tablet, Rfl: 11  •  estradiol (VIVELLE-DOT) 0.1 MG/24HR patch, Place 1 patch on the skin as directed by provider 2 (Two) Times a Week., Disp: 8 patch, Rfl: 12  •  fluticasone (FLONASE) 50 MCG/ACT nasal spray, 1 spray into each nostril Daily., Disp: 1 bottle, Rfl: 2  •  levETIRAcetam (KEPPRA) 500 MG tablet, Take 1 tablet by mouth Every 12 (Twelve) Hours., Disp: 60 tablet, Rfl: 11  •  lisinopril (PRINIVIL,ZESTRIL) 10 MG tablet, Take 1 tablet by  "mouth Daily., Disp: 90 tablet, Rfl: 3  •  oxyCODONE-acetaminophen (ENDOCET)  MG per tablet, Every 6 (Six) Hours., Disp: , Rfl:   •  pantoprazole (PROTONIX) 40 MG EC tablet, Take 40 mg by mouth., Disp: , Rfl:   •  rOPINIRole (REQUIP) 0.25 MG tablet, Take 0.25 mg by mouth., Disp: , Rfl:   •  SUMAtriptan (IMITREX) 100 MG tablet, Take 1 tablet by mouth 1 (One) Time As Needed for Migraine for up to 1 dose., Disp: 9 tablet, Rfl: 11  •  topiramate (TOPAMAX) 100 MG tablet, Take 1 tablet by mouth Daily., Disp: 30 tablet, Rfl: 11  •  umeclidinium-vilanterol (ANORO ELLIPTA) 62.5-25 MCG/INH aerosol powder  inhaler, Inhale 1 puff Daily., Disp: 1 each, Rfl: 11  •  valACYclovir (VALTREX) 1000 MG tablet, Take 1 tablet by mouth 2 (Two) Times a Day., Disp: 20 tablet, Rfl: 0  •  doxycycline (MONODOX) 100 MG capsule, Take 1 capsule by mouth Every 12 (Twelve) Hours., Disp: 20 capsule, Rfl: 0  •  promethazine-dextromethorphan (PROMETHAZINE-DM) 6.25-15 MG/5ML syrup, Take 5 mL by mouth 4 (Four) Times a Day As Needed for Cough., Disp: 240 mL, Rfl: 0    Objective      Blood pressure 119/47, pulse 62, temperature 98.3 °F (36.8 °C), temperature source Oral, resp. rate 16, height 162.6 cm (64\"), weight 64 kg (141 lb), SpO2 100 %, not currently breastfeeding.    Physical Exam     General Appearance:    Alert, cooperative, no distress, appears stated age   Head:    Normocephalic, without obvious abnormality, atraumatic   Eyes:    PERRL, conjunctiva/corneas clear, EOM's intact   Ears:    Normal TM's and external ear canals, both ears   Nose:   Nares normal, septum midline, mucosa normal, no drainage   or sinus tenderness   Throat:   Lips, mucosa, and tongue normal; teeth and gums normal   Neck:   Supple, symmetrical, trachea midline, no adenopathy;        thyroid:  No enlargement/tenderness/nodules; no carotid    bruit or JVD   Back:     Symmetric, no curvature, ROM normal, no CVA tenderness   Lungs:     wheezing to auscultation bilaterally, " Show Aperture Variable?: Yes Consent: The patient's consent was obtained including but not limited to risks of crusting, scabbing, blistering, scarring, darker or lighter pigmentary change, recurrence, incomplete removal and infection. respirations unlabored   Chest wall:    No tenderness or deformity   Heart:    Regular rate and rhythm, S1 and S2 normal, no murmur,        rub or gallop   Abdomen:     Soft, non-tender, bowel sounds active all four quadrants,     no masses, no organomegaly   Extremities:   Extremities normal, atraumatic, no cyanosis or edema   Pulses:   2+ and symmetric all extremities   Skin:   Skin color, texture, turgor normal, no rashes or lesions   Lymph nodes:   Cervical, supraclavicular, and axillary nodes normal   Neurologic:   CNII-XII intact. Normal strength, sensation and reflexes       throughout      Results for orders placed or performed in visit on 02/12/19   POCT Influenza A/B   Result Value Ref Range    Rapid Influenza A Ag Negative Negative    Rapid Influenza B Ag Negative Negative    Internal Control Passed Passed    Lot Number [id number]     Expiration Date 09/21/2021          Assessment/Plan       Marguerite was seen today for cough.    Diagnoses and all orders for this visit:    Fever and chills  -     POCT Influenza A/B    Moderate persistent asthma, unspecified whether complicated  -     doxycycline (MONODOX) 100 MG capsule; Take 1 capsule by mouth Every 12 (Twelve) Hours.    Bronchitis  -     doxycycline (MONODOX) 100 MG capsule; Take 1 capsule by mouth Every 12 (Twelve) Hours.  -     promethazine-dextromethorphan (PROMETHAZINE-DM) 6.25-15 MG/5ML syrup; Take 5 mL by mouth 4 (Four) Times a Day As Needed for Cough.      No Follow-up on file.          There are no Patient Instructions on file for this visit.     Juan Clemens MD    Assessment/Plan        Post-Care Instructions: I reviewed with the patient in detail post-care instructions. Patient is to wear sunprotection, and avoid picking at any of the treated lesions. Pt may apply Vaseline to crusted or scabbing areas. Render Post-Care Instructions In Note?: no Duration Of Freeze Thaw-Cycle (Seconds): 0 Detail Level: Detailed

## 2023-10-16 ENCOUNTER — OFFICE VISIT (OUTPATIENT)
Dept: UROLOGY | Facility: CLINIC | Age: 53
End: 2023-10-16
Payer: MEDICAID

## 2023-10-16 VITALS
BODY MASS INDEX: 27.66 KG/M2 | TEMPERATURE: 97.6 F | HEIGHT: 64 IN | WEIGHT: 162 LBS | DIASTOLIC BLOOD PRESSURE: 78 MMHG | SYSTOLIC BLOOD PRESSURE: 116 MMHG

## 2023-10-16 DIAGNOSIS — R15.9 INCONTINENCE OF FECES, UNSPECIFIED FECAL INCONTINENCE TYPE: ICD-10-CM

## 2023-10-16 DIAGNOSIS — N39.41 URGE INCONTINENCE OF URINE: Primary | ICD-10-CM

## 2023-10-16 PROCEDURE — 3074F SYST BP LT 130 MM HG: CPT | Performed by: NURSE PRACTITIONER

## 2023-10-16 PROCEDURE — 3078F DIAST BP <80 MM HG: CPT | Performed by: NURSE PRACTITIONER

## 2023-10-16 PROCEDURE — 1159F MED LIST DOCD IN RCRD: CPT | Performed by: NURSE PRACTITIONER

## 2023-10-16 PROCEDURE — 99213 OFFICE O/P EST LOW 20 MIN: CPT | Performed by: NURSE PRACTITIONER

## 2023-10-16 PROCEDURE — 1160F RVW MEDS BY RX/DR IN RCRD: CPT | Performed by: NURSE PRACTITIONER

## 2023-10-16 NOTE — PROGRESS NOTES
Office Visit General Established Female Patient     Patient Name: Marguerite Anderson  : 1970   MRN: [id number]     Chief Complaint:   Chief Complaint   Patient presents with    Follow-up     Incontinence         Referring Provider: No ref. provider found    History of Present Illness: Marguerite Anderson is a 53 y.o. female with history below in assessment, who presents for follow up.   Has tried vesicare for about 3 weeks had  severe nausea and had to stop  Continues to wear 4-5 overnight pull up per day  Most bothersome is urge incontinence  Has an occasional episode of FI       Subjective      Review of System:   As noted in HPI     Past Medical History:   Past Medical History:   Diagnosis Date    Abdominal pain, epigastric     Abnormal Pap smear of cervix hpv    Acute sinusitis     Acute UTI (urinary tract infection)     ADHD (attention deficit hyperactivity disorder)     asked to be tested not diagnosed officially    Angina, intestinal     Anxiety     Arthritis     Asthma not sure    COPD    Back pain     Bipolar disorder 2019    Breast mass, right     Cancer 2018    basal cell R ear    Cervical dysplasia     was frozen off    Chronic hepatitis C virus infection     COPD (chronic obstructive pulmonary disease)     Depression     Diarrhea     Diverticulosis     Elevated LFTs     Fatigue     Glaucoma 2020    Hepatitis C 1994    cured with meds    History of endometriosis     History of Papanicolaou smear of cervix 2014    NORMAL     HPV (human papilloma virus) infection     HTN (hypertension)     IBS (irritable bowel syndrome)     Inflammatory bowel disease     Insomnia     Low back pain     Menopausal symptoms     Migraine headache     Nausea & vomiting     Neck pain     Osteopenia     Other hyperlipidemia     Ovarian cyst     Pelvic adhesive disease     Radicular pain of left lower extremity     Restless leg syndrome     Seizure disorder     Tobacco abuse     Tobacco  abuse     Trochanteric bursitis     Urinary incontinence     Urinary incontinence     Visual impairment     wear contacts/glasses    Vitamin B 12 deficiency        Past Surgical History:   Past Surgical History:   Procedure Laterality Date    BILATERAL SALPINGO OOPHORECTOMY  03/10/2015    DR NATALY THOMPSON    BREAST LUMPECTOMY       SECTION  1991    COLONOSCOPY      COSMETIC SURGERY  2018    basal cell removal R Ear    HYSTERECTOMY  03/10/2015    [facility] (DR NATALY THOMPSON)    HYSTEROSCOPY  2013    complete    INGUINAL HERNIA REPAIR Right 2008    LAPAROSCOPIC ASSISTED VAGINAL HYSTERECTOMY SALPINGO OOPHORECTOMY  2012    to remove cyst    LYSIS OF ABDOMINAL ADHESIONS  03/10/2015    DR NATALY THOMPSON    SUBTOTAL HYSTERECTOMY  3/2013    Left ovary    TUBAL ABDOMINAL LIGATION  2010    WISDOM TOOTH EXTRACTION         Family History:   Family History   Problem Relation Age of Onset    Alzheimer's disease Other     Cancer Other     Dementia Other     Hypertension Other     Parkinsonism Other     Osteoporosis Mother     Diabetes Mother     Hypertension Mother     Obesity Mother     Depression Mother     Arthritis Mother     COPD Mother     Hearing loss Mother     Hyperlipidemia Mother     Breast cancer Maternal Grandmother     Osteoporosis Maternal Grandmother     Diabetes Maternal Grandmother     Dementia Maternal Grandmother     Cancer Maternal Grandmother         Breast Cancer    Hypertension Maternal Grandmother     ADD / ADHD Sister     Alcohol abuse Sister     Anxiety disorder Sister     Bipolar disorder Sister     Depression Sister     Drug abuse Sister     Depression Father     Anxiety disorder Father     Other Father          parkinsons disease    OCD Neg Hx     Paranoid behavior Neg Hx     Schizophrenia Neg Hx     Seizures Neg Hx     Self-Injurious Behavior  Neg Hx     Suicide Attempts Neg Hx        Social History:   Social History     Socioeconomic History    Marital status:     Number of children: 1     Highest education level: High school graduate   Tobacco Use    Smoking status: Light Smoker     Packs/day: 0.50     Years: 18.00     Additional pack years: 0.00     Total pack years: 9.00     Types: Cigarettes    Smokeless tobacco: Never    Tobacco comments:     In process of quitting, using nicotine patches   Vaping Use    Vaping Use: Never used   Substance and Sexual Activity    Alcohol use: Not Currently     Comment: on occasion for an occasion or event    Drug use: No    Sexual activity: Yes     Partners: Male     Birth control/protection: Surgical, Post-menopausal       Medications:     Current Outpatient Medications:     Advair Diskus 250-50 MCG/ACT DISKUS, INHALE 1 PUFF BY MOUTH DAILY, Disp: 60 each, Rfl: 11    albuterol sulfate  (90 Base) MCG/ACT inhaler, INHALE 2 PUFFS INTO THE LUNGS EVERY 4 HOURS AS NEEDED, Disp: 18 g, Rfl: 1    ARIPiprazole (ABILIFY) 20 MG tablet, Take 1 tablet by mouth Daily., Disp: 90 tablet, Rfl: 1    clonazePAM (KlonoPIN) 0.5 MG tablet, Take 1 tablet by mouth At Night As Needed for Anxiety., Disp: 30 tablet, Rfl: 1    cyclobenzaprine (FLEXERIL) 10 MG tablet, Take 1 tablet by mouth 2 (Two) Times a Day As Needed., Disp: , Rfl:     Diclofenac Sodium (VOLTAREN) 1 % gel gel, APPLY 4 GRAMS TOPICALLY TO AFFECTED AREA 4 TIMES A DAY AS NEEDED FOR PAIN, Disp: 100 g, Rfl: 3    DULoxetine (CYMBALTA) 60 MG capsule, Take 1 capsule by mouth 2 (Two) Times a Day., Disp: 180 capsule, Rfl: 1    estradiol (ESTRACE VAGINAL) 0.1 MG/GM vaginal cream, Use 0.5 grams intravaginally twice weekly to control symptoms., Disp: 1 each, Rfl: 11    estradiol (VIVELLE-DOT) 0.1 MG/24HR patch, Place 1 patch on the skin as directed by provider 2 (Two) Times a Week., Disp: 8 patch, Rfl: 11    levETIRAcetam (KEPPRA) 750 MG tablet, Take 1 tablet by mouth 2 (Two) Times a Day., Disp: 180 tablet, Rfl: 1    nicotine (Nicoderm CQ) 21 MG/24HR patch, Place 1 patch on the skin as directed by provider Daily., Disp: 30  "patch, Rfl: 4    QUEtiapine (SEROquel) 25 MG tablet, Patient will take one tablet by mouth in late afternoon before supper and two tablets by mouth at bedtime, Disp: 270 tablet, Rfl: 1    Rimegepant Sulfate (Nurtec) 75 MG tablet dispersible tablet, Place 1 tablet under the tongue Take As Directed., Disp: 16 tablet, Rfl: 5    rOPINIRole (REQUIP) 0.5 MG tablet, Take 1 tablet by mouth Every Night. Take 1 hour before bedtime., Disp: 30 tablet, Rfl: 2    solifenacin (VESIcare) 10 MG tablet, Take 1 tablet by mouth Daily., Disp: 90 tablet, Rfl: 3    topiramate (TOPAMAX) 100 MG tablet, Take 1 tablet by mouth 2 (Two) Times a Day., Disp: 180 tablet, Rfl: 1    traZODone (DESYREL) 100 MG tablet, TAKE 1 TABLET BY MOUTH EVERY NIGHT, Disp: 30 tablet, Rfl: 2    vitamin B-12 (CYANOCOBALAMIN) 1000 MCG tablet, Take 1 tablet by mouth Daily., Disp: 90 tablet, Rfl: 3    vitamin B-6 (PYRIDOXINE) 25 MG tablet, Take 1 tablet by mouth Daily., Disp: 90 tablet, Rfl: 3    Allergies:   No Known Allergies    Objective     Physical Exam:   Vital Signs:   Visit Vitals  /78 (BP Location: Left arm, Patient Position: Sitting, Cuff Size: Adult)   Temp 97.6 °F (36.4 °C) (Temporal)   Ht 162.6 cm (64.02\")   Wt 73.5 kg (162 lb)   BMI 27.79 kg/m²      Body mass index is 27.79 kg/m².     Constitutional: NAD, WDWN.   Neurological: A + O x 3   Psychiatric:  Normal mood and affect    Labs  Brief Urine Lab Results  (Last result in the past 365 days)        Color   Clarity   Blood   Leuk Est   Nitrite   Protein   CREAT   Urine HCG        06/13/23 0831 See below:  Comment: Dark Yellow  REFERENCE RANGE: Yellow, Straw     Clear   Negative   Trace   Negative   Negative                   Lab Results   Component Value Date    GLUCOSE 79 03/27/2023    CALCIUM 9.5 03/27/2023     03/27/2023    K 4.7 03/27/2023    CO2 23.0 03/27/2023     (H) 03/27/2023    BUN 8 03/27/2023    CREATININE 0.83 03/27/2023    EGFRIFAFRI 108 09/14/2021    EGFRIFNONA 94 " 09/14/2021    BCR 9.6 03/27/2023    ANIONGAP 8.9 06/23/2022       Lab Results   Component Value Date    WBC 8.91 03/27/2023    HGB 14.3 03/27/2023    HCT 41.5 03/27/2023    MCV 91.8 03/27/2023     03/27/2023       Urine Culture          11/23/2022    13:37 2/23/2023    12:04   Urine Culture   Urine Culture Final report  Final report         Radiographic Studies  No Images in the past 120 days found..    I have reviewed the above labs and imaging.     Assessment / Plan      Assessment/Plan:   Diagnoses and all orders for this visit:    1. Urge incontinence of urine (Primary)    2. Incontinence of feces, unspecified fecal incontinence type     52 yo female with UUI has trialed and failed both anticholinergic and beta agonist medications due to side effects of severe nausea.  We discussed with failure of both groups of medication I would recommend moving to third line therapies for urge incontinence.  We discussed bladder Botox and PNE trial for InterStim.  Due to patient having occasional mild FI InterStim would be a beneficial option to offer improvements in both forms of incontinence.  We reviewed risk and benefits PNE trial is done under local patient verbalizes understanding    Schedule PNE with Dr. Louis    Surgical concerns: Chronic back pain gets injections, COPD     CHRIS Cuellar, NP-C  [facility]

## 2023-10-17 ENCOUNTER — HOSPITAL ENCOUNTER (OUTPATIENT)
Dept: SLEEP MEDICINE | Facility: HOSPITAL | Age: 53
Discharge: HOME | End: 2023-10-17
Admitting: NURSE PRACTITIONER
Payer: MEDICAID

## 2023-10-17 VITALS — HEIGHT: 64 IN | BODY MASS INDEX: 27.66 KG/M2 | WEIGHT: 162.04 LBS

## 2023-10-17 DIAGNOSIS — I10 ESSENTIAL HYPERTENSION: ICD-10-CM

## 2023-10-17 DIAGNOSIS — R29.818 SUSPECTED SLEEP APNEA: ICD-10-CM

## 2023-10-17 DIAGNOSIS — G47.19 EXCESSIVE DAYTIME SLEEPINESS: ICD-10-CM

## 2023-10-17 DIAGNOSIS — R56.9 SEIZURES: ICD-10-CM

## 2023-10-17 PROCEDURE — 95810 POLYSOM 6/> YRS 4/> PARAM: CPT

## 2023-10-18 ENCOUNTER — TELEPHONE (OUTPATIENT)
Dept: PSYCHIATRY | Facility: CLINIC | Age: 53
End: 2023-10-18
Payer: MEDICAID

## 2023-10-18 DIAGNOSIS — F41.0 PANIC DISORDER: Chronic | ICD-10-CM

## 2023-10-18 RX ORDER — CLONAZEPAM 1 MG/1
0.5 TABLET ORAL NIGHTLY PRN
Qty: 15 TABLET | Refills: 0 | Status: SHIPPED | OUTPATIENT
Start: 2023-10-18

## 2023-10-18 NOTE — TELEPHONE ENCOUNTER
Patient called in and states that she is needing a refill of Klonopin 0.5 mg but that they are out of Klonopin 0.5 mg so she will need the Klonopin 1 mg to take a half. She is will be seeing  on 11/02/2023 but  told her they would not be able to prescribe anything until she was seen in the office. Please send to [facility].

## 2023-10-22 DIAGNOSIS — G25.81 RLS (RESTLESS LEGS SYNDROME): ICD-10-CM

## 2023-10-23 ENCOUNTER — TELEMEDICINE (OUTPATIENT)
Dept: PSYCHIATRY | Facility: CLINIC | Age: 53
End: 2023-10-23
Payer: MEDICAID

## 2023-10-23 DIAGNOSIS — F41.1 GAD (GENERALIZED ANXIETY DISORDER): ICD-10-CM

## 2023-10-23 DIAGNOSIS — F33.1 MODERATE EPISODE OF RECURRENT MAJOR DEPRESSIVE DISORDER: Primary | ICD-10-CM

## 2023-10-23 PROCEDURE — 1159F MED LIST DOCD IN RCRD: CPT | Performed by: COUNSELOR

## 2023-10-23 PROCEDURE — 90832 PSYTX W PT 30 MINUTES: CPT | Performed by: COUNSELOR

## 2023-10-23 PROCEDURE — 1160F RVW MEDS BY RX/DR IN RCRD: CPT | Performed by: COUNSELOR

## 2023-10-23 RX ORDER — ROPINIROLE 0.25 MG/1
0.25-0.5 TABLET, FILM COATED ORAL NIGHTLY
Qty: 60 TABLET | Refills: 2 | OUTPATIENT
Start: 2023-10-23

## 2023-10-23 NOTE — PROGRESS NOTES
"Telehealth Encounter Note:  Date of Service: 2023  Patient Name: Marguerite Anderson  : 1970   MRN: [id number]   Time In: 11:05 AM  Time Out: 11:29 AM    This provider is located at [facility] [address], [place] [address] using a secure MyChart Video Visit through [facility]. Patient is being seen remotely via telehealth at home address in [place] and stated they are in a secure environment for this session. The patient's condition being diagnosed/treated is appropriate for telemedicine. The provider identified herself as well as her credentials. The patient, and/or patients guardian, consent to be seen remotely, and when consent is given they understand that the consent allows for patient identifiable information to be sent to a third party as needed. They may refuse to be seen remotely at any time. The electronic data is encrypted and password protected, and the patient and/or guardian has been advised of the potential risks to privacy not withstanding such measures.     You have chosen to receive care through a telehealth visit.  Do you consent to use a video/audio connection for your medical care today? Yes    Referring Provider: Juan Clemens MD    PROGRESS NOTE    History of Present Illness:   Marguerite Anderson is a 53 y.o. female who is being seen today for follow up individual Psychotherapy session.     Chief Complaint:  Pt reports she is doing \"okay\" - however has been in a lot of pain lately and anxiety has been high with panic attacks.  Pt reports she went to a festival at the request of her  which triggered her anxiety and panic (lasting several minutes).  The festival was crowded at times.  Pt reports she got worked up before even getting there.  Pt reports she was able to enjoy the food and purchased two new pillows.  Pt reports recent fall after being pulled by her dog who was on a leash.  Pt reports anxiety with going in person for various doctor " appt's.  Pt reports her PCP was questioning some of her medications and wanted to refer her to a psychiatrist MD.  Her in house med provider referred her to Alissa Dow MD out of [facility] in [place].  Pt has an upcoming surgery as well.          ICD-10-CM ICD-9-CM   1. Moderate episode of recurrent major depressive disorder  F33.1 296.32   2. JAY (generalized anxiety disorder)  F41.1 300.02      Clinical Maneuvering/Intervention:   Assisted patient in processing above session content; acknowledged and normalized patient’s thoughts, feelings, and concerns by utilizing a person-centered approach in efforts to build appropriate rapport and a positive therapeutic relationship with open and honest communication.  Processed and rationalized patients thoughts and feelings regarding panic attacks, managing MH, upcoming worries.  Discussed triggers associated with patient's anxiety/depression.  Also discussed coping skills for patient to implement such as postponing worries for a future date, reframing negative thought patterns, methods for relaxation, time with pets, deep breathing, positive self talk.    Allowed patient to freely discuss issues without interruption or judgment. Provided safe, confidential environment to facilitate the development of positive therapeutic relationship and encourage open, honest communication. Assisted patient in identifying risk factors which would indicate the need for higher level of care including thoughts to harm self or others and/or self-harming behavior and encouraged patient to contact this office, call 911, or present to the nearest emergency room should any of these events occur. Discussed crisis intervention services and means to access. Patient adamantly and convincingly denies current suicidal or homicidal ideation or perceptual disturbance.    Mental Status Exam:   Hygiene:   good  Cooperation:  Cooperative  Eye Contact:  Good  Psychomotor Behavior:  Appropriate  Affect:   Restricted  Mood: anxious  Speech:  Normal  Thought Process:  Goal directed and Linear  Thought Content:  Mood congruent  Suicidal:  None  Homicidal:  None  Hallucinations:  None  Delusion:  None  Memory:  Intact  Orientation:  Person, Place, Time, and Situation  Reliability:  good  Insight:  Good  Judgement:  Good  Impulse Control:  Good  Physical/Medical Issues:  Yes        Patient's Support Network Includes:   and children    Functional Status: Severe impairment due to anxiety and difficulty leaving the home    Progress toward goal: Not at goal    Prognosis: Fair with Ongoing Treatment     Medications:     Current Outpatient Medications:     Advair Diskus 250-50 MCG/ACT DISKUS, INHALE 1 PUFF BY MOUTH DAILY, Disp: 60 each, Rfl: 11    albuterol sulfate  (90 Base) MCG/ACT inhaler, INHALE 2 PUFFS INTO THE LUNGS EVERY 4 HOURS AS NEEDED, Disp: 18 g, Rfl: 1    ARIPiprazole (ABILIFY) 20 MG tablet, Take 1 tablet by mouth Daily., Disp: 90 tablet, Rfl: 1    clonazePAM (KlonoPIN) 1 MG tablet, Take 0.5 tablets by mouth At Night As Needed for Anxiety., Disp: 15 tablet, Rfl: 0    cyclobenzaprine (FLEXERIL) 10 MG tablet, Take 1 tablet by mouth 2 (Two) Times a Day As Needed., Disp: , Rfl:     Diclofenac Sodium (VOLTAREN) 1 % gel gel, APPLY 4 GRAMS TOPICALLY TO AFFECTED AREA 4 TIMES A DAY AS NEEDED FOR PAIN, Disp: 100 g, Rfl: 3    DULoxetine (CYMBALTA) 60 MG capsule, Take 1 capsule by mouth 2 (Two) Times a Day., Disp: 180 capsule, Rfl: 1    estradiol (ESTRACE VAGINAL) 0.1 MG/GM vaginal cream, Use 0.5 grams intravaginally twice weekly to control symptoms., Disp: 1 each, Rfl: 11    estradiol (VIVELLE-DOT) 0.1 MG/24HR patch, Place 1 patch on the skin as directed by provider 2 (Two) Times a Week., Disp: 8 patch, Rfl: 11    levETIRAcetam (KEPPRA) 750 MG tablet, Take 1 tablet by mouth 2 (Two) Times a Day., Disp: 180 tablet, Rfl: 1    nicotine (Nicoderm CQ) 21 MG/24HR patch, Place 1 patch on the skin as directed by provider  Daily., Disp: 30 patch, Rfl: 4    QUEtiapine (SEROquel) 25 MG tablet, Patient will take one tablet by mouth in late afternoon before supper and two tablets by mouth at bedtime, Disp: 270 tablet, Rfl: 1    Rimegepant Sulfate (Nurtec) 75 MG tablet dispersible tablet, Place 1 tablet under the tongue Take As Directed., Disp: 16 tablet, Rfl: 5    rOPINIRole (REQUIP) 0.5 MG tablet, Take 1 tablet by mouth Every Night. Take 1 hour before bedtime., Disp: 30 tablet, Rfl: 2    solifenacin (VESIcare) 10 MG tablet, Take 1 tablet by mouth Daily., Disp: 90 tablet, Rfl: 3    topiramate (TOPAMAX) 100 MG tablet, Take 1 tablet by mouth 2 (Two) Times a Day., Disp: 180 tablet, Rfl: 1    traZODone (DESYREL) 100 MG tablet, TAKE 1 TABLET BY MOUTH EVERY NIGHT, Disp: 30 tablet, Rfl: 2    vitamin B-12 (CYANOCOBALAMIN) 1000 MCG tablet, Take 1 tablet by mouth Daily., Disp: 90 tablet, Rfl: 3    vitamin B-6 (PYRIDOXINE) 25 MG tablet, Take 1 tablet by mouth Daily., Disp: 90 tablet, Rfl: 3    Visit Diagnosis/Orders Placed This Visit:    ICD-10-CM ICD-9-CM   1. Moderate episode of recurrent major depressive disorder  F33.1 296.32   2. JAY (generalized anxiety disorder)  F41.1 300.02        PLAN:  Safety: No acute safety concerns  Risk Assessment: Risk of self-harm acutely is low. Risk of self-harm chronically is also low, but could be further elevated in the event of treatment noncompliance and/or AODA.    Crisis Plan:  Symptoms and/or behaviors to indicate a crisis: Excessive worry or fear, Feeling sad or low, Isolation, and Self-doubt    What calming techniques or other strategies will patient use to de-escalate and stay safe: slow down, breathe, visualize calming self, think it though, listen to music, change focus, take a walk    Who is one person patient can contact to assist with de-escalation?     Treatment Plan/Goals: Patient will continue supportive psychotherapy efforts and medication regimen as prescribed. Therapist will provide  Cognitive Behavioral Therapy to assist patient in improving functioning and gaining coping skills, maintaining stability, and avoiding decompensation and the need for higher level of care. Plan for treatment was discussed during today's visit. Patient acknowledged and verbally consented to continue with current treatment plan and was educated on the importance of compliance with treatment and follow-up appointments.     Patient will contact this office, call 911 or present to the nearest emergency room should suicidal or homicidal ideations occur.     Follow Up:   Return in about 4 weeks (around 11/20/2023) for Therapy session.      QUOC Peña   [facility] [place]     This document has been electronically signed by QUOC Peña   October 23, 2023 11:30 EDT

## 2023-10-24 ENCOUNTER — TELEPHONE (OUTPATIENT)
Dept: SLEEP MEDICINE | Facility: HOSPITAL | Age: 53
End: 2023-10-24
Payer: MEDICAID

## 2023-10-24 NOTE — TELEPHONE ENCOUNTER
Jimm for patient to return call to schedule a follow up visit to discuss her sleep study results with marv padilla

## 2023-10-30 ENCOUNTER — HOSPITAL ENCOUNTER (OUTPATIENT)
Dept: GENERAL RADIOLOGY | Facility: HOSPITAL | Age: 53
Discharge: HOME | End: 2023-10-30
Admitting: PHYSICIAN ASSISTANT
Payer: MEDICAID

## 2023-10-30 ENCOUNTER — TRANSCRIBE ORDERS (OUTPATIENT)
Dept: GENERAL RADIOLOGY | Facility: HOSPITAL | Age: 53
End: 2023-10-30
Payer: MEDICAID

## 2023-10-30 DIAGNOSIS — M25.552 LEFT HIP PAIN: ICD-10-CM

## 2023-10-30 DIAGNOSIS — M54.6 PAIN IN THORACIC SPINE: ICD-10-CM

## 2023-10-30 DIAGNOSIS — M25.551 RIGHT HIP PAIN: ICD-10-CM

## 2023-10-30 DIAGNOSIS — M54.50 LOW BACK PAIN, UNSPECIFIED BACK PAIN LATERALITY, UNSPECIFIED CHRONICITY, UNSPECIFIED WHETHER SCIATICA PRESENT: Primary | ICD-10-CM

## 2023-10-30 DIAGNOSIS — R52 PAIN: ICD-10-CM

## 2023-10-30 DIAGNOSIS — M54.50 LOW BACK PAIN, UNSPECIFIED BACK PAIN LATERALITY, UNSPECIFIED CHRONICITY, UNSPECIFIED WHETHER SCIATICA PRESENT: ICD-10-CM

## 2023-10-30 PROCEDURE — 73522 X-RAY EXAM HIPS BI 3-4 VIEWS: CPT

## 2023-10-30 PROCEDURE — 72100 X-RAY EXAM L-S SPINE 2/3 VWS: CPT

## 2023-10-30 PROCEDURE — 72070 X-RAY EXAM THORAC SPINE 2VWS: CPT

## 2023-11-02 ENCOUNTER — OFFICE VISIT (OUTPATIENT)
Age: 53
End: 2023-11-02
Payer: MEDICAID

## 2023-11-02 VITALS
DIASTOLIC BLOOD PRESSURE: 84 MMHG | HEART RATE: 82 BPM | SYSTOLIC BLOOD PRESSURE: 132 MMHG | HEIGHT: 64 IN | WEIGHT: 165.6 LBS | OXYGEN SATURATION: 98 % | BODY MASS INDEX: 28.27 KG/M2

## 2023-11-02 DIAGNOSIS — F33.1 MAJOR DEPRESSIVE DISORDER, RECURRENT EPISODE, MODERATE DEGREE: Primary | ICD-10-CM

## 2023-11-02 DIAGNOSIS — F41.1 GENERALIZED ANXIETY DISORDER: ICD-10-CM

## 2023-11-02 RX ORDER — CLONAZEPAM 1 MG/1
0.5 TABLET ORAL NIGHTLY PRN
Qty: 15 TABLET | Refills: 0 | Status: SHIPPED | OUTPATIENT
Start: 2023-11-02

## 2023-11-02 RX ORDER — NICOTINE 21 MG/24HR
1 PATCH, TRANSDERMAL 24 HOURS TRANSDERMAL EVERY 24 HOURS
Qty: 30 PATCH | Refills: 4 | Status: SHIPPED | OUTPATIENT
Start: 2023-11-02

## 2023-11-02 RX ORDER — ARIPIPRAZOLE 10 MG/1
10 TABLET ORAL DAILY
Qty: 14 TABLET | Refills: 0 | Status: SHIPPED | OUTPATIENT
Start: 2023-11-02

## 2023-11-02 RX ORDER — LURASIDONE HYDROCHLORIDE 40 MG/1
40 TABLET, FILM COATED ORAL DAILY
Qty: 30 TABLET | Refills: 0 | Status: SHIPPED | OUTPATIENT
Start: 2023-11-02
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
## 2023-11-03 ENCOUNTER — OFFICE VISIT (OUTPATIENT)
Dept: INTERNAL MEDICINE | Facility: CLINIC | Age: 53
End: 2023-11-03
Payer: MEDICAID

## 2023-11-03 VITALS
TEMPERATURE: 97.6 F | HEIGHT: 64 IN | WEIGHT: 166 LBS | OXYGEN SATURATION: 97 % | HEART RATE: 82 BPM | SYSTOLIC BLOOD PRESSURE: 132 MMHG | RESPIRATION RATE: 16 BRPM | DIASTOLIC BLOOD PRESSURE: 94 MMHG | BODY MASS INDEX: 28.34 KG/M2

## 2023-11-03 DIAGNOSIS — R56.9 SEIZURE: Primary | ICD-10-CM

## 2023-11-03 DIAGNOSIS — M54.6 CHRONIC RIGHT-SIDED THORACIC BACK PAIN: ICD-10-CM

## 2023-11-03 DIAGNOSIS — G89.29 CHRONIC RIGHT-SIDED THORACIC BACK PAIN: ICD-10-CM

## 2023-11-03 DIAGNOSIS — Z79.890 HORMONE REPLACEMENT THERAPY (HRT): ICD-10-CM

## 2023-11-03 DIAGNOSIS — I10 ESSENTIAL HYPERTENSION: ICD-10-CM

## 2023-11-03 PROCEDURE — 90471 IMMUNIZATION ADMIN: CPT | Performed by: INTERNAL MEDICINE

## 2023-11-03 PROCEDURE — 90686 IIV4 VACC NO PRSV 0.5 ML IM: CPT | Performed by: INTERNAL MEDICINE

## 2023-11-03 PROCEDURE — 3080F DIAST BP >= 90 MM HG: CPT | Performed by: INTERNAL MEDICINE

## 2023-11-03 PROCEDURE — 3075F SYST BP GE 130 - 139MM HG: CPT | Performed by: INTERNAL MEDICINE

## 2023-11-03 PROCEDURE — 99214 OFFICE O/P EST MOD 30 MIN: CPT | Performed by: INTERNAL MEDICINE

## 2023-11-03 NOTE — PROGRESS NOTES
Subjective     Patient ID: Marguerite Anderson is a 53 y.o. female. Patient is here for management of multiple medical problems.     Chief Complaint   Patient presents with    Hypertension     History of Present Illness   Pain in mid back new.  Pain in neck and lower back chronic. Followed by PTC.     Seen Psych yesterday.  Really likes her. Pt is in with Alissa Flynn MD.,           The following portions of the patient's history were reviewed and updated as appropriate: allergies, current medications, past family history, past medical history, past social history, past surgical history and problem list.    Review of Systems    Current Outpatient Medications:     Advair Diskus 250-50 MCG/ACT DISKUS, INHALE 1 PUFF BY MOUTH DAILY, Disp: 60 each, Rfl: 11    albuterol sulfate  (90 Base) MCG/ACT inhaler, INHALE 2 PUFFS INTO THE LUNGS EVERY 4 HOURS AS NEEDED, Disp: 18 g, Rfl: 1    ARIPiprazole (ABILIFY) 10 MG tablet, Take 1 tablet by mouth Daily., Disp: 14 tablet, Rfl: 0    clonazePAM (KlonoPIN) 1 MG tablet, Take 0.5 tablets by mouth At Night As Needed for Anxiety., Disp: 15 tablet, Rfl: 0    cyclobenzaprine (FLEXERIL) 10 MG tablet, Take 1 tablet by mouth 2 (Two) Times a Day As Needed., Disp: , Rfl:     Diclofenac Sodium (VOLTAREN) 1 % gel gel, APPLY 4 GRAMS TOPICALLY TO AFFECTED AREA 4 TIMES A DAY AS NEEDED FOR PAIN, Disp: 100 g, Rfl: 3    DULoxetine (CYMBALTA) 60 MG capsule, Take 1 capsule by mouth 2 (Two) Times a Day., Disp: 180 capsule, Rfl: 1    estradiol (ESTRACE VAGINAL) 0.1 MG/GM vaginal cream, Use 0.5 grams intravaginally twice weekly to control symptoms., Disp: 1 each, Rfl: 11    estradiol (VIVELLE-DOT) 0.1 MG/24HR patch, Place 1 patch on the skin as directed by provider 2 (Two) Times a Week., Disp: 8 patch, Rfl: 11    levETIRAcetam (KEPPRA) 750 MG tablet, Take 1 tablet by mouth 2 (Two) Times a Day., Disp: 180 tablet, Rfl: 1    lurasidone (Latuda) 40 MG tablet tablet, Take 1 tablet by mouth Daily., Disp: 30  "tablet, Rfl: 0    nicotine (Nicoderm CQ) 21 MG/24HR patch, Place 1 patch on the skin as directed by provider Daily., Disp: 30 patch, Rfl: 4    Rimegepant Sulfate (Nurtec) 75 MG tablet dispersible tablet, Place 1 tablet under the tongue Take As Directed., Disp: 16 tablet, Rfl: 5    rOPINIRole (REQUIP) 0.5 MG tablet, Take 1 tablet by mouth Every Night. Take 1 hour before bedtime., Disp: 30 tablet, Rfl: 2    topiramate (TOPAMAX) 100 MG tablet, Take 1 tablet by mouth 2 (Two) Times a Day., Disp: 180 tablet, Rfl: 1    traZODone (DESYREL) 100 MG tablet, TAKE 1 TABLET BY MOUTH EVERY NIGHT, Disp: 30 tablet, Rfl: 2    valACYclovir (VALTREX) 500 MG tablet, Take 1 mg by mouth 2 (Two) Times a Day., Disp: , Rfl:     vitamin B-12 (CYANOCOBALAMIN) 1000 MCG tablet, Take 1 tablet by mouth Daily., Disp: 90 tablet, Rfl: 3    vitamin B-6 (PYRIDOXINE) 25 MG tablet, Take 1 tablet by mouth Daily., Disp: 90 tablet, Rfl: 3    Objective      Blood pressure 132/94, pulse 82, temperature 97.6 °F (36.4 °C), resp. rate 16, height 162.6 cm (64.02\"), weight 75.3 kg (166 lb), SpO2 97%, not currently breastfeeding.    Physical Exam     General Appearance:    Alert, cooperative, no distress, appears stated age   Head:    Normocephalic, without obvious abnormality, atraumatic   Eyes:    PERRL, conjunctiva/corneas clear, EOM's intact   Ears:    Normal TM's and external ear canals, both ears   Nose:   Nares normal, septum midline, mucosa normal, no drainage   or sinus tenderness   Throat:   Lips, mucosa, and tongue normal; teeth and gums normal   Neck:   Supple, symmetrical, trachea midline, no adenopathy;        thyroid:  No enlargement/tenderness/nodules; no carotid    bruit or JVD   Back:     Symmetric, no curvature, ROM normal, no CVA tenderness   Lungs:     Clear to auscultation bilaterally, respirations unlabored   Chest wall:    No tenderness or deformity   Heart:    Regular rate and rhythm, S1 and S2 normal, no murmur,        rub or gallop "   Abdomen:     Soft, non-tender, bowel sounds active all four quadrants,     no masses, no organomegaly   Extremities:   Extremities normal, atraumatic, no cyanosis or edema   Pulses:   2+ and symmetric all extremities   Skin:   Skin color, texture, turgor normal, no rashes or lesions   Lymph nodes:   Cervical, supraclavicular, and axillary nodes normal   Neurologic:   CNII-XII intact. Normal strength, sensation and reflexes       throughout      Results for orders placed or performed in visit on 06/12/23   Microscopic Examination -    Urine  Release to stacy   Result Value Ref Range    WBC, UA 0-2 /HPF    RBC, UA Comment /HPF    Epithelial Cells (non renal) 0-2 /HPF    Cast Type Comment     Mucus, UA See below: (A) /HPF    Bacteria, UA Trace (A) None Seen /HPF   POC Urinalysis Dipstick, Automated    Specimen: Urine   Result Value Ref Range    Color Dark Yellow Yellow, Straw, Dark Yellow, Alissa    Clarity, UA Clear Clear    Specific Gravity  1.015 1.005 - 1.030    pH, Urine 6.5 5.0 - 8.0    Leukocytes Negative Negative    Nitrite, UA Negative Negative    Protein, POC Negative Negative mg/dL    Glucose, UA Negative Negative mg/dL    Ketones, UA 1+ (A) Negative    Urobilinogen, UA Normal Normal, 0.2 E.U./dL    Bilirubin Negative Negative    Blood, UA 3+ (A) Negative    Lot Number [id number]     Expiration Date 07/13/2024    Urinalysis With Microscopic - Urine, Clean Catch    Specimen: Urine, Clean Catch    Urine  Release to stacy   Result Value Ref Range    Specific Gravity, UA 1.020 1.005 - 1.030    pH, UA 6.0 5.0 - 8.0    Color, UA See below: (A)     Appearance, UA Clear Clear    Leukocytes, UA Trace (A) Negative    Protein Negative Negative    Glucose, UA Negative Negative    Ketones Negative Negative    Blood, UA Negative Negative    Bilirubin, UA Negative Negative    Urobilinogen, UA Comment     Nitrite, UA Negative Negative         Assessment & Plan     Pain in mid back new.  Pain in neck and lower back  chronic. Followed by PTC.     Seen Psych yesterday.  Really likes her. Pt is in with Alissa Flynn MD.,     Jaylon no jaylon. Low oxygen but not under 88% for more than 5 min.    Neurology. Dr Trent.    Sz activity found on sleep study.        Pt is smoking. On etrogen patch. Age 53. She needs to come off the patch. She will discuss with [facility] on next visit before end of this year.      Pt states she is on asa 81 mg a dy.    T spine pain on the right side.   Start tens unit.,        Diagnoses and all orders for this visit:    1. Seizure (Primary)    2. Essential hypertension    3. Hormone replacement therapy (HRT)    4. Chronic right-sided thoracic back pain    Other orders  -     Fluzone >6 Months (1090-8334)      No follow-ups on file.  Pt wants to consider another ptc in [place].            There are no Patient Instructions on file for this visit.     Juan Clemens MD    Assessment & Plan       Answers submitted by the patient for this visit:  Other (Submitted on 10/28/2023)  Please describe your symptoms.: F/u on sleep study, psychiatrist referral and if needed request x-rays on bilateral hips  Have you had these symptoms before?: Yes  How long have you been having these symptoms?: Greater than 2 weeks  Primary Reason for Visit (Submitted on 10/28/2023)  What is the primary reason for your visit?: Other

## 2023-11-06 ENCOUNTER — OFFICE VISIT (OUTPATIENT)
Dept: UROLOGY | Facility: CLINIC | Age: 53
End: 2023-11-06
Payer: MEDICAID

## 2023-11-06 ENCOUNTER — HOSPITAL ENCOUNTER (OUTPATIENT)
Dept: CT IMAGING | Facility: HOSPITAL | Age: 53
Discharge: HOME | End: 2023-11-06
Admitting: NURSE PRACTITIONER
Payer: MEDICAID

## 2023-11-06 ENCOUNTER — TELEPHONE (OUTPATIENT)
Dept: NEUROLOGY | Facility: CLINIC | Age: 53
End: 2023-11-06
Payer: MEDICAID

## 2023-11-06 ENCOUNTER — TELEPHONE (OUTPATIENT)
Age: 53
End: 2023-11-06
Payer: MEDICAID

## 2023-11-06 VITALS — DIASTOLIC BLOOD PRESSURE: 86 MMHG | WEIGHT: 166 LBS | BODY MASS INDEX: 28.48 KG/M2 | SYSTOLIC BLOOD PRESSURE: 128 MMHG

## 2023-11-06 DIAGNOSIS — N39.41 URGE INCONTINENCE OF URINE: Primary | ICD-10-CM

## 2023-11-06 DIAGNOSIS — R10.9 FLANK PAIN: ICD-10-CM

## 2023-11-06 LAB
1,25(OH)2D SERPL-MCNC: 26.5 PG/ML (ref 24.8–81.5)
1OH-MIDAZOLAM UR QL SCN: NOT DETECTED NG/MG CREAT
6MAM UR QL SCN: NEGATIVE NG/ML
7AMINOCLONAZEPAM/CREAT UR: 160 NG/MG CREAT
A-OH ALPRAZ/CREAT UR: NOT DETECTED NG/MG CREAT
A-OH-TRIAZOLAM/CREAT UR CFM: NOT DETECTED NG/MG CREAT
ACP UR QL CFM: NOT DETECTED
ALBUMIN SERPL-MCNC: 4.6 G/DL (ref 3.8–4.9)
ALBUMIN/GLOB SERPL: 2.1 {RATIO} (ref 1.2–2.2)
ALP SERPL-CCNC: 72 IU/L (ref 44–121)
ALPRAZ/CREAT UR CFM: NOT DETECTED NG/MG CREAT
ALT SERPL-CCNC: 8 IU/L (ref 0–32)
AMPHETAMINES UR QL SCN: NEGATIVE NG/ML
APAP UR QL SCN: NEGATIVE UG/ML
AST SERPL-CCNC: 13 IU/L (ref 0–40)
BARBITURATES UR QL SCN: NEGATIVE NG/ML
BENZODIAZ SCN METH UR: NORMAL
BILIRUB BLD-MCNC: NEGATIVE MG/DL
BILIRUB SERPL-MCNC: 0.3 MG/DL (ref 0–1.2)
BUN SERPL-MCNC: 7 MG/DL (ref 6–24)
BUN/CREAT SERPL: 8 (ref 9–23)
BUPRENORPHINE UR QL SCN: NEGATIVE
BUPRENORPHINE/CREAT UR: NOT DETECTED NG/MG CREAT
CALCIUM SERPL-MCNC: 9.2 MG/DL (ref 8.7–10.2)
CANNABINOIDS UR QL SCN: NEGATIVE NG/ML
CARISOPRODOL UR QL: NEGATIVE NG/ML
CHLORIDE SERPL-SCNC: 105 MMOL/L (ref 96–106)
CLARITY, POC: CLEAR
CLONAZEPAM/CREAT UR CFM: NOT DETECTED NG/MG CREAT
CO2 SERPL-SCNC: 22 MMOL/L (ref 20–29)
COCAINE+BZE UR QL SCN: NEGATIVE NG/ML
COLOR UR: YELLOW
CREAT SERPL-MCNC: 0.83 MG/DL (ref 0.57–1)
CREAT UR-MCNC: 62 MG/DL
D-METHORPHAN UR-MCNC: NOT DETECTED NG/ML
D-METHORPHAN+LEVORPHANOL UR QL: NOT DETECTED
DESALKYLFLURAZ/CREAT UR: NOT DETECTED NG/MG CREAT
DIAZEPAM/CREAT UR: NOT DETECTED NG/MG CREAT
EGFRCR SERPLBLD CKD-EPI 2021: 84 ML/MIN/1.73
ERYTHROCYTE [DISTWIDTH] IN BLOOD BY AUTOMATED COUNT: 12.2 % (ref 11.7–15.4)
ETHANOL UR QL SCN: NEGATIVE G/DL
ETHANOL UR QL SCN: NEGATIVE NG/ML
EXPIRATION DATE: ABNORMAL
FENTANYL CTO UR SCN-MCNC: NEGATIVE NG/ML
FENTANYL/CREAT UR: NOT DETECTED NG/MG CREAT
FLUNITRAZEPAM UR QL SCN: NOT DETECTED NG/MG CREAT
FOLATE SERPL-MCNC: 4.4 NG/ML
GABAPENTIN UR-MCNC: NEGATIVE UG/ML
GLOBULIN SER CALC-MCNC: 2.2 G/DL (ref 1.5–4.5)
GLUCOSE SERPL-MCNC: 88 MG/DL (ref 70–99)
GLUCOSE UR STRIP-MCNC: NEGATIVE MG/DL
HCT VFR BLD AUTO: 41.8 % (ref 34–46.6)
HGB BLD-MCNC: 14 G/DL (ref 11.1–15.9)
HYPNOTICS UR QL SCN: NEGATIVE
KETAMINE UR QL: NOT DETECTED
KETONES UR QL: NEGATIVE
LEUKOCYTE EST, POC: NEGATIVE
LORAZEPAM/CREAT UR: NOT DETECTED NG/MG CREAT
Lab: ABNORMAL
MCH RBC QN AUTO: 31.3 PG (ref 26.6–33)
MCHC RBC AUTO-ENTMCNC: 33.5 G/DL (ref 31.5–35.7)
MCV RBC AUTO: 94 FL (ref 79–97)
MEPERIDINE UR QL SCN: NEGATIVE NG/ML
METHADONE UR QL SCN: NEGATIVE NG/ML
METHADONE+METAB UR QL SCN: NEGATIVE NG/ML
MIDAZOLAM/CREAT UR CFM: NOT DETECTED NG/MG CREAT
MISCELLANEOUS, UR: NEGATIVE
NITRITE UR-MCNC: NEGATIVE MG/ML
NORBUPRENORPHINE/CREAT UR: NOT DETECTED NG/MG CREAT
NORDIAZEPAM/CREAT UR: NOT DETECTED NG/MG CREAT
NORFENTANYL/CREAT UR: NOT DETECTED NG/MG CREAT
NORFLUNITRAZEPAM UR-MCNC: NOT DETECTED NG/MG CREAT
NORKETAMINE UR-MCNC: NOT DETECTED UG/ML
NOROXYCODONE UR CFM-MCNC: 523 NG/MG CREAT
NOROXYMORPHONE/CREAT UR CFM: NOT DETECTED NG/MG CREAT
OPIATES UR SCN-MCNC: NEGATIVE NG/ML
OTHER HALLUCINOGENS UR: NEGATIVE
OXAZEPAM/CREAT UR: NOT DETECTED NG/MG CREAT
OXYCODONE CTO UR SCN-MCNC: NORMAL NG/ML
OXYCODONE UR CFM-MCNC: NOT DETECTED NG/MG CREAT
OXYCODONE UR QL CFM: NORMAL
OXYMORPHONE UR CFM-MCNC: 137 NG/MG CREAT
PCP UR QL SCN: NEGATIVE NG/ML
PH UR: 6.5 [PH] (ref 5–8)
PLATELET # BLD AUTO: 290 X10E3/UL (ref 150–450)
POTASSIUM SERPL-SCNC: 5 MMOL/L (ref 3.5–5.2)
PRESCRIBED MEDICATIONS: NORMAL
PRESCRIBED MEDICATIONS: NORMAL
PROPOXYPH UR QL SCN: NEGATIVE NG/ML
PROT SERPL-MCNC: 6.8 G/DL (ref 6–8.5)
PROT UR STRIP-MCNC: NEGATIVE MG/DL
RBC # BLD AUTO: 4.47 X10E6/UL (ref 3.77–5.28)
RBC # UR STRIP: ABNORMAL /UL
SODIUM SERPL-SCNC: 140 MMOL/L (ref 134–144)
SP GR UR: 1.01 (ref 1–1.03)
T4 FREE SERPL-MCNC: 1.16 NG/DL (ref 0.82–1.77)
TAPENTADOL CTO UR SCN-MCNC: NEGATIVE NG/ML
TEMAZEPAM/CREAT UR: NOT DETECTED NG/MG CREAT
TRAMADOL UR QL SCN: NEGATIVE NG/ML
TSH SERPL DL<=0.005 MIU/L-ACNC: 1.03 UIU/ML (ref 0.45–4.5)
UROBILINOGEN UR QL: NORMAL
VIT B12 SERPL-MCNC: 233 PG/ML (ref 232–1245)
WBC # BLD AUTO: 10.4 X10E3/UL (ref 3.4–10.8)
ZALEPLON UR-MCNC: NOT DETECTED NG/ML
ZOLPIDEM PHENYL-4-CARB UR QL SCN: NOT DETECTED
ZOLPIDEM UR QL SCN: NOT DETECTED
ZOPICLONE-N-OXIDE UR-MCNC: NOT DETECTED NG/ML

## 2023-11-06 PROCEDURE — 74176 CT ABD & PELVIS W/O CONTRAST: CPT

## 2023-11-06 PROCEDURE — 1160F RVW MEDS BY RX/DR IN RCRD: CPT | Performed by: NURSE PRACTITIONER

## 2023-11-06 PROCEDURE — 99213 OFFICE O/P EST LOW 20 MIN: CPT | Performed by: NURSE PRACTITIONER

## 2023-11-06 PROCEDURE — 1159F MED LIST DOCD IN RCRD: CPT | Performed by: NURSE PRACTITIONER

## 2023-11-06 PROCEDURE — 3079F DIAST BP 80-89 MM HG: CPT | Performed by: NURSE PRACTITIONER

## 2023-11-06 PROCEDURE — 3074F SYST BP LT 130 MM HG: CPT | Performed by: NURSE PRACTITIONER

## 2023-11-06 NOTE — TELEPHONE ENCOUNTER
Patient called to report that their labs to be done by 11/07 have been completed and are waiting on lab [facility] to process them. Patient also wanted Dr. Flynn to review Sleep Study from 10/17/2023 to see if that ECG 12 Lead completed in the sleep study is sufficient or if the patient needs to repeat it.

## 2023-11-06 NOTE — PROGRESS NOTES
Office Visit General Established Female Patient     Patient Name: Marguerite Anderson  : 1970   MRN: [id number]     Chief Complaint:   Chief Complaint   Patient presents with    Follow-up     Back pain          Referring Provider: No ref. provider found    History of Present Illness: Marguerite Anderson is a 53 y.o. female with history below in assessment, who presents for follow up.   Went to the ER was treated for back pain, she is having left flank pain that radiates to her hip and around the front of her abdomen, and nausea  She is concerned the pain is from her kidney she saw in her medical record from somewhere that she had chronic kidney disease and at some point in time she was told she had kidney stones that were floating around in her kidneys but not a problem, she has frequency, urgency and nocturia  The only thing she thinks she may have done to have injured her back was walking up 2 flights of stairs      Subjective      Review of System:   As noted in HPI     Past Medical History:   Past Medical History:   Diagnosis Date    Abdominal pain, epigastric     Abnormal Pap smear of cervix hpv    Acute sinusitis     Acute UTI (urinary tract infection)     ADHD (attention deficit hyperactivity disorder)     asked to be tested not diagnosed officially    Angina, intestinal     Anxiety     Arthritis     Asthma not sure    COPD    Back pain     Bipolar disorder 2019    Breast mass, right     Cancer 2018    basal cell R ear    Cervical dysplasia     was frozen off    Chronic hepatitis C virus infection     COPD (chronic obstructive pulmonary disease)     Depression     Diarrhea     Diverticulosis     Elevated LFTs     Fatigue     Glaucoma 2020    Hepatitis C 1994    cured with meds    History of endometriosis     History of Papanicolaou smear of cervix 2014    NORMAL     HPV (human papilloma virus) infection     HTN (hypertension)     IBS (irritable bowel syndrome)      Inflammatory bowel disease     Insomnia     Low back pain     Menopausal symptoms     Migraine headache     Nausea & vomiting     Neck pain     Osteopenia     Other hyperlipidemia     Ovarian cyst     Pelvic adhesive disease     Radicular pain of left lower extremity     Restless leg syndrome     Seizure disorder     Tobacco abuse     Tobacco abuse     Trochanteric bursitis     Urinary incontinence     Urinary incontinence     Visual impairment     wear contacts/glasses    Vitamin B 12 deficiency        Past Surgical History:   Past Surgical History:   Procedure Laterality Date    BILATERAL SALPINGO OOPHORECTOMY  03/10/2015    DR NATALY THOMPSON    BREAST LUMPECTOMY       SECTION  1991    COLONOSCOPY      COSMETIC SURGERY  2018    basal cell removal R Ear    HYSTERECTOMY  03/10/2015    [facility] (DR NATALY THOMPSON)    HYSTEROSCOPY  2013    complete    INGUINAL HERNIA REPAIR Right 2008    LAPAROSCOPIC ASSISTED VAGINAL HYSTERECTOMY SALPINGO OOPHORECTOMY  2012    to remove cyst    LYSIS OF ABDOMINAL ADHESIONS  03/10/2015    DR NATALY THOMPSON    SUBTOTAL HYSTERECTOMY  3/2013    Left ovary    TUBAL ABDOMINAL LIGATION  2010    WISDOM TOOTH EXTRACTION  1986       Family History:   Family History   Problem Relation Age of Onset    Alzheimer's disease Other     Cancer Other     Dementia Other     Hypertension Other     Parkinsonism Other     Osteoporosis Mother     Diabetes Mother     Hypertension Mother     Obesity Mother     Depression Mother     Arthritis Mother     COPD Mother     Hearing loss Mother     Hyperlipidemia Mother     Breast cancer Maternal Grandmother     Osteoporosis Maternal Grandmother     Diabetes Maternal Grandmother     Dementia Maternal Grandmother     Cancer Maternal Grandmother         Breast Cancer    Hypertension Maternal Grandmother     ADD / ADHD Sister     Alcohol abuse Sister     Anxiety disorder Sister     Bipolar disorder Sister     Depression Sister     Drug abuse Sister     Depression Father      Anxiety disorder Father     Other Father          parkinsons disease    OCD Neg Hx     Paranoid behavior Neg Hx     Schizophrenia Neg Hx     Seizures Neg Hx     Self-Injurious Behavior  Neg Hx     Suicide Attempts Neg Hx        Social History:   Social History     Socioeconomic History    Marital status:     Number of children: 1    Highest education level: High school graduate   Tobacco Use    Smoking status: Light Smoker     Packs/day: 0.50     Years: 18.00     Additional pack years: 0.00     Total pack years: 9.00     Types: Cigarettes     Passive exposure: Current    Smokeless tobacco: Never    Tobacco comments:     In process of quitting, using nicotine patches   Vaping Use    Vaping Use: Never used   Substance and Sexual Activity    Alcohol use: Not Currently     Comment: on occasion for an occasion or event    Drug use: No    Sexual activity: Yes     Partners: Male     Birth control/protection: Surgical, Post-menopausal       Medications:     Current Outpatient Medications:     Advair Diskus 250-50 MCG/ACT DISKUS, INHALE 1 PUFF BY MOUTH DAILY, Disp: 60 each, Rfl: 11    albuterol sulfate  (90 Base) MCG/ACT inhaler, INHALE 2 PUFFS INTO THE LUNGS EVERY 4 HOURS AS NEEDED, Disp: 18 g, Rfl: 1    ARIPiprazole (ABILIFY) 10 MG tablet, Take 1 tablet by mouth Daily., Disp: 14 tablet, Rfl: 0    clonazePAM (KlonoPIN) 1 MG tablet, Take 0.5 tablets by mouth At Night As Needed for Anxiety., Disp: 15 tablet, Rfl: 0    cyclobenzaprine (FLEXERIL) 10 MG tablet, Take 1 tablet by mouth 2 (Two) Times a Day As Needed., Disp: , Rfl:     Diclofenac Sodium (VOLTAREN) 1 % gel gel, APPLY 4 GRAMS TOPICALLY TO AFFECTED AREA 4 TIMES A DAY AS NEEDED FOR PAIN, Disp: 100 g, Rfl: 3    DULoxetine (CYMBALTA) 60 MG capsule, Take 1 capsule by mouth 2 (Two) Times a Day., Disp: 180 capsule, Rfl: 1    estradiol (ESTRACE VAGINAL) 0.1 MG/GM vaginal cream, Use 0.5 grams intravaginally twice weekly to control symptoms., Disp: 1 each,  Rfl: 11    estradiol (VIVELLE-DOT) 0.1 MG/24HR patch, Place 1 patch on the skin as directed by provider 2 (Two) Times a Week., Disp: 8 patch, Rfl: 11    levETIRAcetam (KEPPRA) 750 MG tablet, Take 1 tablet by mouth 2 (Two) Times a Day., Disp: 180 tablet, Rfl: 1    lurasidone (Latuda) 40 MG tablet tablet, Take 1 tablet by mouth Daily., Disp: 30 tablet, Rfl: 0    nicotine (Nicoderm CQ) 21 MG/24HR patch, Place 1 patch on the skin as directed by provider Daily., Disp: 30 patch, Rfl: 4    Rimegepant Sulfate (Nurtec) 75 MG tablet dispersible tablet, Place 1 tablet under the tongue Take As Directed., Disp: 16 tablet, Rfl: 5    rOPINIRole (REQUIP) 0.5 MG tablet, Take 1 tablet by mouth Every Night. Take 1 hour before bedtime., Disp: 30 tablet, Rfl: 2    topiramate (TOPAMAX) 100 MG tablet, Take 1 tablet by mouth 2 (Two) Times a Day., Disp: 180 tablet, Rfl: 1    traZODone (DESYREL) 100 MG tablet, TAKE 1 TABLET BY MOUTH EVERY NIGHT, Disp: 30 tablet, Rfl: 2    valACYclovir (VALTREX) 500 MG tablet, Take 1 mg by mouth 2 (Two) Times a Day., Disp: , Rfl:     vitamin B-12 (CYANOCOBALAMIN) 1000 MCG tablet, Take 1 tablet by mouth Daily., Disp: 90 tablet, Rfl: 3    vitamin B-6 (PYRIDOXINE) 25 MG tablet, Take 1 tablet by mouth Daily., Disp: 90 tablet, Rfl: 3    Allergies:   No Known Allergies    Objective     Physical Exam:   Vital Signs:   Visit Vitals  /86 (BP Location: Left arm, Patient Position: Sitting, Cuff Size: Adult)   Wt 75.3 kg (166 lb)   BMI 28.48 kg/m²      Body mass index is 28.48 kg/m².     Constitutional: NAD, WDWN.   Musculoskeletal: Difficulty standing up from chair and walking slow due to pain    Labs  Brief Urine Lab Results  (Last result in the past 365 days)        Color   Clarity   Blood   Leuk Est   Nitrite   Protein   CREAT   Urine HCG        11/06/23 1402 Yellow   Clear   Trace   Negative   Negative   Negative                   Lab Results   Component Value Date    GLUCOSE 88 11/03/2023    CALCIUM 9.2  11/03/2023     11/03/2023    K 5.0 11/03/2023    CO2 22 11/03/2023     11/03/2023    BUN 7 11/03/2023    CREATININE 0.83 11/03/2023    EGFRIFAFRI 108 09/14/2021    EGFRIFNONA 94 09/14/2021    BCR 8 (L) 11/03/2023    ANIONGAP 8.9 06/23/2022       Lab Results   Component Value Date    WBC 10.4 11/03/2023    HGB 14.0 11/03/2023    HCT 41.8 11/03/2023    MCV 94 11/03/2023     11/03/2023       Urine Culture          11/23/2022    13:37 2/23/2023    12:04   Urine Culture   Urine Culture Final report  Final report         Assessment / Plan      Assessment/Plan:   Diagnoses and all orders for this visit:    1. Urge incontinence of urine (Primary)  -     POC Urinalysis Dipstick, Automated    2. Flank pain  -     CT Abdomen Pelvis Stone Protocol; Future    53-year-old female with a history of UUI and most recently an acute episode of flank low back pain on the left side.  We discussed frequency, urgency, nocturia and UUI patient has trialed and failed medications due to side effects and is scheduled for PNE trial on 11/16 with Dr. Louis for urge incontinence and OAB.  We discussed labs on 11/3/2023 when patient went to ER showed normal kidney function no concerns for chronic kidney disease and chronic kidney disease would not cause patient to have acute flank pain.  Her recent x-ray of the spine did not note any kidney stones but she is concerned her symptoms are related to kidney stones will send patient for stat CT to rule this out as underlying cause for her flank pain.    Patient is scheduled for PNE with Dr. Louis  CT stone protocol stat      CHRIS Cuellar, NP-C  [facility]

## 2023-11-06 NOTE — TELEPHONE ENCOUNTER
"I dont see the full EKG report, but if she has access to that I can use it from the sleep study. The most important range I am looking for is her \"Qtc interval\". And I will be looking out for the labs. Thanks!"

## 2023-11-06 NOTE — TELEPHONE ENCOUNTER
Please let her know I reviewed the sleep study on 10/23/2023 and it did not show any seizure activity. Thanks.    [Non-Contributory] : Non-contributory [Unremarkable] : Unremarkable [Duration: ___ wks] : duration: [unfilled] weeks [] :  [Normal?] : normal delivery [Was child in NICU?] : Child was not in NICU

## 2023-11-06 NOTE — TELEPHONE ENCOUNTER
Provider: BARBIE FREIRE    Caller: PATIENT    Relationship to Patient: SELF    Phone Number: [phone]    Reason for Call: PATIENT HAD A SLEEP STUDY DONE 10/17/23 THAT SHOWED THAT SHE HAS SEIZURE ACTIVITY IN HER SLEEP. WAS ADVISED TO INFORM PROVIDER. PLEASE REVIEW, THANK YOU.

## 2023-11-07 ENCOUNTER — TELEPHONE (OUTPATIENT)
Dept: SLEEP MEDICINE | Facility: HOSPITAL | Age: 53
End: 2023-11-07

## 2023-11-07 NOTE — TELEPHONE ENCOUNTER
Okay. Well, I guess she should discuss further with them. Because the official reading doesn't show that.

## 2023-11-07 NOTE — TELEPHONE ENCOUNTER
Called and notified patient however she stated stated that Dr. Clemens and Dr. Botello told her it did show seizure activity.

## 2023-11-07 NOTE — TELEPHONE ENCOUNTER
PT CALLED AND SAID THAT HER NEUROLOGIST DID NOT FIND ANY SEIZURES WHEN PT HAD SLEEP STUDY. PT STATED THAT AMALIA WAS SUPPOSED TO CALL HER NEUROLOGIST YIMI PANCHAL TO DISCUSS SLEEP STUDY RESULTS.

## 2023-11-07 NOTE — TELEPHONE ENCOUNTER
"CALLED PATIENT BACK AGAIN AND TOLD HER THAT BARBIE REVIEWED THE REPORT AGAIN AND IT STATES '' A seizure montage was utilized due to her history of seizures though none were apparent on this night. \"  "

## 2023-11-08 NOTE — TELEPHONE ENCOUNTER
"Called PT to relay providers message:  I dont see the full EKG report, but if she has access to that I can use it from the sleep study. The most important range I am looking for is her \"Qtc interval\". And I will be looking out for the labs. Thanks!     Asked PT if she has access to her full EKG report, PT stated she did not and that shes been having issues with accessing her labs and has no physical copies. PT asked if she should get the EKG repeated. I informed her I would ask the provider sand get back to her, PT verbalized understanding.     Should she repeat the EKG? Please advise  "

## 2023-11-09 ENCOUNTER — TELEPHONE (OUTPATIENT)
Dept: NEUROLOGY | Facility: CLINIC | Age: 53
End: 2023-11-09
Payer: MEDICAID

## 2023-11-09 ENCOUNTER — TELEPHONE (OUTPATIENT)
Dept: OBSTETRICS AND GYNECOLOGY | Facility: CLINIC | Age: 53
End: 2023-11-09
Payer: MEDICAID

## 2023-11-09 RX ORDER — ESTRADIOL 0.5 MG/.5G
1 GEL TOPICAL DAILY
Qty: 30 EACH | Refills: 3 | Status: SHIPPED | OUTPATIENT
Start: 2023-11-09

## 2023-11-09 NOTE — TELEPHONE ENCOUNTER
Called PT to relay providers orders about getting EKG done. Explained to PT that her sleep study EKG does not have the full breakdown needed for Dr. Flynn to properly assess. PT verbalized understanding and said she will call her PCP and get it scheduled. Informed PT to call back at anytime with future questions or concerns.

## 2023-11-09 NOTE — TELEPHONE ENCOUNTER
Caller: Marguerite Anderson    Relationship: Self    Best call back number: [phone]    What was the call regarding: PT CALLED BACK TO LET BARBIE AND SUE KNOW THAT SHE AND HER PCP WERE MISTAKEN WHEN LOOKING AT HER SLEEP STUDY RESULTS. THE TECHNICIAN WHO COMPLETED THE TEST IS WHO HAD MENTIONED POSSIBLE SEIZURE-LIKE ACTIVITY, HOWEVER, THE FINALIZED REPORT INDICATED NO SEIZURE-LIKE ACTIVITY.    PT DOES ASK IF CHRIS KULKARNI WOULD LIKE TO ORDER ANY LABS FOR HER TO HAVE COMPLETED AS SHE STATES IT HAS BEEN SOME TIME SINCE SHE HAS HAD ANY COMPLETED.    Do you require a callback: YES, PLEASE.    PLEASE REVIEW AND ADVISE.

## 2023-11-09 NOTE — TELEPHONE ENCOUNTER
Regarding: FW: HRT PATCHES   Contact: [phone]  Need to inform pt I have sent in new Rx.  Thank you.  ----- Message -----  From: Mickie Valladares MA  Sent: 11/7/2023   1:02 PM EST  To: Sandra Rangel MD  Subject: HRT PATCHES                                      ----- Message from Mickie Valladares MA sent at 11/7/2023  1:02 PM EST -----       ----- Message from Marguerite Anderson to Sandra Rangel MD sent at 11/7/2023 12:49 PM -----   Dr. Rangel I would like to know if you would change my HRT prescription Estradiol 0.1 patch to the Estradiol topical gel? Thank you for your consideration, Marguerite

## 2023-11-13 RX ORDER — TIZANIDINE 4 MG/1
4 TABLET ORAL 2 TIMES DAILY
COMMUNITY
Start: 2023-11-08

## 2023-11-16 ENCOUNTER — HOSPITAL ENCOUNTER (OUTPATIENT)
Dept: PREOP | Facility: HOSPITAL | Age: 53
Discharge: HOME | End: 2023-11-16
Admitting: UROLOGY
Payer: MEDICAID

## 2023-11-16 VITALS
OXYGEN SATURATION: 93 % | DIASTOLIC BLOOD PRESSURE: 79 MMHG | TEMPERATURE: 97.2 F | HEART RATE: 93 BPM | RESPIRATION RATE: 18 BRPM | SYSTOLIC BLOOD PRESSURE: 110 MMHG

## 2023-11-16 DIAGNOSIS — R15.9 INCONTINENCE OF FECES, UNSPECIFIED FECAL INCONTINENCE TYPE: ICD-10-CM

## 2023-11-16 DIAGNOSIS — N39.41 URGE INCONTINENCE OF URINE: Primary | ICD-10-CM

## 2023-11-16 PROCEDURE — C1897 LEAD, NEUROSTIM TEST KIT: HCPCS

## 2023-11-16 PROCEDURE — C1787 PATIENT PROGR, NEUROSTIM: HCPCS

## 2023-11-16 RX ORDER — SULFAMETHOXAZOLE AND TRIMETHOPRIM 800; 160 MG/1; MG/1
1 TABLET ORAL ONCE
Status: COMPLETED | OUTPATIENT
Start: 2023-11-16 | End: 2023-11-16

## 2023-11-16 RX ORDER — LIDOCAINE HYDROCHLORIDE AND EPINEPHRINE 10; 10 MG/ML; UG/ML
40 INJECTION, SOLUTION INFILTRATION; PERINEURAL ONCE
Status: COMPLETED | OUTPATIENT
Start: 2023-11-16 | End: 2023-11-16

## 2023-11-16 RX ADMIN — SULFAMETHOXAZOLE AND TRIMETHOPRIM 1 TABLET: 800; 160 TABLET ORAL at 08:53

## 2023-11-16 RX ADMIN — LIDOCAINE HYDROCHLORIDE AND EPINEPHRINE 40 ML: 10; 10 INJECTION, SOLUTION INFILTRATION; PERINEURAL at 10:03

## 2023-11-16 NOTE — PROCEDURES
Procedure Note     SURGEON: Adarsh Louis MD    PREOPERATIVE DIAGNOSIS: Refractory Urge Urinary Incontinence     POSTOPERATIVE DIAGNOSIS: Same     PROCEDURE PERFORMED:   1. Basic Evaluation (PNE) without fluoroscopy using the enhanced Verify™ System - (CPT Code: 19347-33), bilateral lead placement    ANESTHESIA: Lidocaine 2%     BLOOD LOSS: Minimal.     SPECIMEN: None.     COMPLICATION: None.     INDICATION FOR PROCEDURE: Pt Marguerite Anderson is a 53 y.o. y.o.-year-old with Refractory Overactive bladder / UUI.  After discussion a decision was made to proceed with a trial of InterStim placement with basic evaluation.     DESCRIPTION OF THE PROCEDURE:   The Patient was properly identified and placed in prone position. Pillows were placed under lower abdomen to flatten sacrum and under shins to allow the toes to dangle freely. A ground pad was placed on the bottom of the patient’s foot and the proximal ends of the test stimulation cable were connected to the ground pad and the external neurostimulator (ENS). Patient was prepped and draped in usual sterile fashion.   The sciatic notches and sacral midline were identified via palpation. The level of S3 was identified by measuring 9cm from the tip of the coccyx. Local injection of 2% Lidocaine was administered at foramen needle entry point located 2cm lateral to the sacral midline and 2cm cephalad of sciatic notch level. A foramen needle was introduced at an approximate 60 degree angle, feeling for the foraminal margins until S3 was identified. Proper needle position was confirmed by the patient identifying location of stimulation sensation; and direct observation of the lifting of the perineum or “bellowing,” and plantar flexion of the great toe utilizing the test stimulation cable, ENS and Verify™ .     The foramen needle stylet was removed, and a percutaneous lead was inserted to proper depth identified by markers on the lead. The lead was tested by  connecting the test stimulation cable to the proximal lead electrode and testing with the ENS and . Upon response confirmation, the foramen needle was removed by sliding over the percutaneous lead. The foramen needle and lead stylet were removed while securing lead location. Retesting to confirm appropriate lead response was completed.   The above procedure was performed again on the contralateral side.     The leads were connected to the patient cables. The Patient’s skin was cleaned and external cabling was secured by covering with transparent dressing. Estimated blood loss was minimal. Post procedure, the patient was programmed with the ENS and Verify™  to optimum sensation via the lead and provided utilization instructions prior to discharge. Patient will complete a bladder diary during testing period to help document results of this procedure. The patient will follow up in 1 week for full interstim implantation if they have >50% benefit.

## 2023-11-16 NOTE — DISCHARGE INSTRUCTIONS
Home Care After Interstim Test Leads  The following instructions will help you care for yourself, or be cared for upon your return home today.  These are guidelines for your care right after the procedure only.     Diet  Continue your regular diet today.    Activity  Start normal activities in twenty-four (24) hours  Try to avoid any extreme physical activity while the leads are in.    Wound Care and Hygiene  Front showering or sponge bath is ok    What to Expect after Procedure  Soreness over the needle site  Mild bleeding at the needle site.    Call your Doctor  Severe pain not controlled by oral medication  Temperature above 101.5 degrees  Inability to urinate within eight (8) hours after surgery  Drainage from the incision    Other Contacts  Urology Office:  [id number]   [place] [address]  [phone] office  [phone] fax    Follow up Appointment  You have a follow up scheduled with Dr. Louis next week

## 2023-11-20 NOTE — H&P
CC  No chief complaint on file.       HPI  Marguerite Anderson is a 53 y.o. with history of   1. Urge incontinence of urine    2. Incontinence of feces, unspecified fecal incontinence type         No recent fevers or new LUTS  Does not take any blood thinners    Past Medical History  Past Medical History:   Diagnosis Date    Abdominal pain, epigastric     Abnormal Pap smear of cervix hpv    Acute sinusitis     Acute UTI (urinary tract infection)     ADHD (attention deficit hyperactivity disorder) 2008    asked to be tested not diagnosed officially    Angina, intestinal     Anxiety     Anxiety     Arthritis     Asthma not sure    COPD    Back pain     chronic    Bipolar disorder 2019    Breast mass, right     Cancer 12/20/2018    basal cell R ear    Cervical dysplasia 1996    was frozen off    Chronic hepatitis C virus infection     COPD (chronic obstructive pulmonary disease)     Depression     Diarrhea     Diverticulosis     Elevated LFTs     Fatigue     Glaucoma 05/05/2020    Hepatitis C 1994    cured with meds    History of echocardiogram     History of endometriosis     History of Papanicolaou smear of cervix 04/02/2014    NORMAL     History of stress test     denies any current palpitations, chest pain, dizziness - 11/13/23    HPV (human papilloma virus) infection 1996    HTN (hypertension)     IBS (irritable bowel syndrome)     Inflammatory bowel disease     Insomnia     Kidney stone     Low back pain     Menopausal symptoms     Migraine headache     Nausea & vomiting     Neck pain     Osteopenia     Other hyperlipidemia     Ovarian cyst     Pelvic adhesive disease     Radicular pain of left lower extremity     Restless leg syndrome     Seizure disorder     grand mal infrequent; focal siezures as often as daily - has a neurologist    Tobacco abuse     Trochanteric bursitis     Urinary incontinence     Visual impairment     wear contacts/glasses    Vitamin B 12 deficiency        Past Surgical History  Past  Surgical History:   Procedure Laterality Date    BILATERAL SALPINGO OOPHORECTOMY  03/10/2015    DR NATALY THOMPSON    BREAST LUMPECTOMY       SECTION      COLONOSCOPY      COSMETIC SURGERY  2018    basal cell removal R Ear    HYSTERECTOMY  03/10/2015    [facility] (DR NATALY THOMPSON)    HYSTEROSCOPY  2013    complete    INGUINAL HERNIA REPAIR Right 2008    LAPAROSCOPIC ASSISTED VAGINAL HYSTERECTOMY SALPINGO OOPHORECTOMY      to remove cyst    LYSIS OF ABDOMINAL ADHESIONS  03/10/2015    DR NATALY THOMPSON    SUBTOTAL HYSTERECTOMY  3/2013    Left ovary    TUBAL ABDOMINAL LIGATION      WISDOM TOOTH EXTRACTION         Medications    Current Outpatient Medications:     Advair Diskus 250-50 MCG/ACT DISKUS, INHALE 1 PUFF BY MOUTH DAILY, Disp: 60 each, Rfl: 11    albuterol sulfate  (90 Base) MCG/ACT inhaler, INHALE 2 PUFFS INTO THE LUNGS EVERY 4 HOURS AS NEEDED, Disp: 18 g, Rfl: 1    clonazePAM (KlonoPIN) 1 MG tablet, Take 0.5 tablets by mouth At Night As Needed for Anxiety., Disp: 15 tablet, Rfl: 0    Diclofenac Sodium (VOLTAREN) 1 % gel gel, APPLY 4 GRAMS TOPICALLY TO AFFECTED AREA 4 TIMES A DAY AS NEEDED FOR PAIN, Disp: 100 g, Rfl: 3    DULoxetine (CYMBALTA) 60 MG capsule, Take 1 capsule by mouth 2 (Two) Times a Day., Disp: 180 capsule, Rfl: 1    estradiol 0.5 MG/0.5GM gel, Place 1 application  on the skin as directed by provider Daily., Disp: 30 each, Rfl: 3    levETIRAcetam (KEPPRA) 750 MG tablet, Take 1 tablet by mouth 2 (Two) Times a Day., Disp: 180 tablet, Rfl: 1    lurasidone (Latuda) 40 MG tablet tablet, Take 1 tablet by mouth Daily., Disp: 30 tablet, Rfl: 0    nicotine (Nicoderm CQ) 21 MG/24HR patch, Place 1 patch on the skin as directed by provider Daily., Disp: 30 patch, Rfl: 4    Rimegepant Sulfate (Nurtec) 75 MG tablet dispersible tablet, Place 1 tablet under the tongue Take As Directed., Disp: 16 tablet, Rfl: 5    rOPINIRole (REQUIP) 0.5 MG tablet, Take 1 tablet by mouth Every Night. Take 1  hour before bedtime., Disp: 30 tablet, Rfl: 2    tiZANidine (ZANAFLEX) 4 MG tablet, Take 1 tablet by mouth 2 (Two) Times a Day., Disp: , Rfl:     topiramate (TOPAMAX) 100 MG tablet, Take 1 tablet by mouth 2 (Two) Times a Day., Disp: 180 tablet, Rfl: 1    traZODone (DESYREL) 100 MG tablet, TAKE 1 TABLET BY MOUTH EVERY NIGHT, Disp: 30 tablet, Rfl: 2    valACYclovir (VALTREX) 500 MG tablet, Take 1 mg by mouth 2 (Two) Times a Day As Needed., Disp: , Rfl:     vitamin B-12 (CYANOCOBALAMIN) 1000 MCG tablet, Take 1 tablet by mouth Daily., Disp: 90 tablet, Rfl: 3    vitamin B-6 (PYRIDOXINE) 25 MG tablet, Take 1 tablet by mouth Daily., Disp: 90 tablet, Rfl: 3    Allergies  No Known Allergies    Social History  Social History     Socioeconomic History    Marital status:     Number of children: 1    Highest education level: High school graduate   Tobacco Use    Smoking status: Light Smoker     Packs/day: 0.50     Years: 18.00     Additional pack years: 0.00     Total pack years: 9.00     Types: Cigarettes     Passive exposure: Current    Smokeless tobacco: Never    Tobacco comments:     In process of quitting, using nicotine patches   Vaping Use    Vaping Use: Never used   Substance and Sexual Activity    Alcohol use: Not Currently    Drug use: No    Sexual activity: Defer     Partners: Male     Birth control/protection: Surgical, Post-menopausal       Review of Systems  Constitutional: No fevers or chills  Skin: Negative for rash  Endocrine: No heat/cold intolerance   Cardiovascular: Negative for chest pain or dyspnea on exertion  Respiratory: Negative for shortness of breath or wheezing  Gastrointestinal: No constipation, nausea or vomiting  Genitourinary: Negative for new lower urinary tract symptoms, current gross hematuria or dysuria.  Musculoskeletal: No flank pain  Neurological:  Negative for frequent headaches or dizziness  Lymph/Heme: Negative for leg swelling or calf pain.    Physical Exam  Visit Vitals  BP  110/79 (BP Location: Right arm, Patient Position: Sitting)   Pulse 93   Temp 97.2 °F (36.2 °C) (Temporal)   Resp 18   SpO2 93%     Constitutional: NAD, WDWN.   HEENT: NCAT. Conjunctivae normal.  MMM.    Cardiovascular: Regular rate.  Pulmonary/Chest: Respirations are even and unlabored bilaterally.  Abdominal: Soft. No distension, tenderness, masses or guarding. No CVA tenderness.  Neurological: A + O x 3.  Cranial Nerves II-XII grossly intact. Normal gait.  Extremities: JAYNE x 4, Warm. No clubbing.  No cyanosis.    Skin: Pink, warm and dry.  No rashes noted.  Psychiatric:  Normal mood and affect    Labs & Imaging  Lab Results   Component Value Date    GLUCOSE 88 11/03/2023    CALCIUM 9.2 11/03/2023     11/03/2023    K 5.0 11/03/2023    CO2 22 11/03/2023     11/03/2023    BUN 7 11/03/2023    CREATININE 0.83 11/03/2023    EGFRIFAFRI 108 09/14/2021    EGFRIFNONA 94 09/14/2021    BCR 8 (L) 11/03/2023    ANIONGAP 8.9 06/23/2022     Lab Results   Component Value Date    WBC 10.4 11/03/2023    HGB 14.0 11/03/2023    HCT 41.8 11/03/2023    MCV 94 11/03/2023     11/03/2023     Brief Urine Lab Results  (Last result in the past 365 days)        Color   Clarity   Blood   Leuk Est   Nitrite   Protein   CREAT   Urine HCG        11/06/23 1402 Yellow   Clear   Trace   Negative   Negative   Negative                 Urine Culture          2/23/2023    12:04   Urine Culture   Urine Culture Final report         Sacral Neuromodulation Evaluation    Result Date: 11/16/2023  Please see procedure note for physician narrative.    CT Abdomen Pelvis Stone Protocol    Result Date: 11/6/2023  CT ABDOMEN PELVIS STONE PROTOCOL Date of Exam: 11/6/2023 2:00 PM CST Indication: Nephrolithiasis. Comparison: CT abdomen and pelvis 2/5/2020 Technique: Axial CT images were obtained of the abdomen and pelvis without the administration of contrast. Reconstructed coronal and sagittal images were also obtained. Automated exposure control  and iterative construction methods were used. Findings: LUNG BASES:  Unremarkable without mass or infiltrate. LIVER:  Unremarkable parenchyma without focal lesion. BILIARY/GALLBLADDER:  Unremarkable SPLEEN: There are calcified splenic granulomata. PANCREAS:  Unremarkable ADRENAL:  Unremarkable KIDNEYS: Evaluation of the right kidney demonstrates stable appearance to a cyst along the lateral cortex. There is a nonobstructing 2 mm calculus in the upper pole. The left kidney is unremarkable in appearance. No evidence for bilateral hydroureter or ureterolithiasis. No obstructive uropathy. Gas is noted in the left buttock region likely due to injection. GASTROINTESTINAL/MESENTERY:  No evidence of obstruction nor inflammation.  The appendix is normal in caliber. AORTA/IVC:  Normal caliber. RETROPERITONEUM/LYMPH NODES:  Unremarkable REPRODUCTIVE: The uterus is surgically absent. BLADDER:  Unremarkable OSSEUS STRUCTURES:  Typical for age with no acute process identified.     Impression: 1. Right renal nephrolithiasis without obstructive uropathy. Electronically Signed: Pat Solorzano MD  11/6/2023 2:13 PM CST  Workstation ID: [id number]    XR Spine Thoracic 2 View    Result Date: 10/30/2023  PROCEDURE: XR SPINE THORACIC 2 VW-  HISTORY: BACK PAIN; R52-Pain, unspecified  FINDINGS: 2 views of the thoracic spine were obtained. There is no acute fracture or subluxation. There is no malalignment. There are no significant degenerative changes appreciated.      No acute process.    Images were reviewed, interpreted, and dictated by Dr. Francis Mora MD Transcribed by Lulu Hill PA-C.  This report was signed and finalized on 10/30/2023 12:54 PM by Francis Mora MD.      XR Hips Bilateral With or Without Pelvis 3-4 View    Result Date: 10/30/2023  PROCEDURE: XR HIPS BILATERAL W OR WO PELVIS 3-4 VIEW-  HISTORY: PAIN; M54.50-Low back pain, unspecified; M54.6-Pain in thoracic spine; M25.551-Pain in right hip; M25.552-Pain in left  hip  COMPARISON: None.  FINDINGS:  A 3 view exam demonstrates no acute fracture or dislocation. The joint spaces are preserved.  No soft tissue abnormality is seen.      No acute fracture.       Images were reviewed, interpreted, and dictated by Dr. Francis Mora MD Transcribed by Lulu Hill PA-C.  This report was signed and finalized on 10/30/2023 12:48 PM by Francis Mora MD.      XR Spine Lumbar AP & Lateral    Result Date: 10/30/2023  PROCEDURE: XR SPINE LUMBAR AP AND LATERAL-  HISTORY: PAIN; M54.50-Low back pain, unspecified; M54.6-Pain in thoracic spine; M25.551-Pain in right hip; M25.552-Pain in left hip  FINDINGS:  No fracture is identified. There is mild diffuse degenerative disc disease. Alignment is normal. The vertebral body heights are normal.      No acute process.    Images were reviewed, interpreted, and dictated by Dr. Fracnis Mora MD Transcribed by Lulu Hill PA-C.  This report was signed and finalized on 10/30/2023 12:45 PM by Francis Mora MD.            Assessment  Marguerite Anderson is a 53 y.o. female who presents with the following diagnosis:  1. Urge incontinence of urine    2. Incontinence of feces, unspecified fecal incontinence type         Plan  1. PNE    Adarsh Louis MD

## 2023-11-22 ENCOUNTER — OFFICE VISIT (OUTPATIENT)
Dept: UROLOGY | Facility: CLINIC | Age: 53
End: 2023-11-22
Payer: MEDICAID

## 2023-11-22 DIAGNOSIS — N39.41 URGE INCONTINENCE OF URINE: Primary | ICD-10-CM

## 2023-11-22 DIAGNOSIS — R15.9 INCONTINENCE OF FECES, UNSPECIFIED FECAL INCONTINENCE TYPE: ICD-10-CM

## 2023-11-22 PROCEDURE — 99024 POSTOP FOLLOW-UP VISIT: CPT | Performed by: NURSE PRACTITIONER

## 2023-11-22 NOTE — PROGRESS NOTES
Office Visit General Established Female Patient     Patient Name: Marguerite Anderson  : 1970   MRN: [id number]     Chief Complaint: No chief complaint on file.      Referring Provider: No ref. provider found    History of Present Illness: Marguerite Anderson is a 53 y.o. female with history below in assessment, who presents for follow up.   Patient completed PNE trial is satisfied with 50% improvement in urinary symptoms.  She was very satisfied with her decrease in leaks and frequency      Subjective      Review of System:   As noted in HPI     Past Medical History:   Past Medical History:   Diagnosis Date    Abdominal pain, epigastric     Abnormal Pap smear of cervix hpv    Acute sinusitis     Acute UTI (urinary tract infection)     ADHD (attention deficit hyperactivity disorder)     asked to be tested not diagnosed officially    Angina, intestinal     Anxiety     Anxiety     Arthritis     Asthma not sure    COPD    Back pain     chronic    Bipolar disorder 2019    Breast mass, right     Cancer 2018    basal cell R ear    Cervical dysplasia 1996    was frozen off    Chronic hepatitis C virus infection     COPD (chronic obstructive pulmonary disease)     Depression     Diarrhea     Diverticulosis     Elevated LFTs     Fatigue     Glaucoma 2020    Hepatitis C     cured with meds    History of echocardiogram     History of endometriosis     History of Papanicolaou smear of cervix 2014    NORMAL     History of stress test     denies any current palpitations, chest pain, dizziness - 23    HPV (human papilloma virus) infection     HTN (hypertension)     IBS (irritable bowel syndrome)     Inflammatory bowel disease     Insomnia     Kidney stone     Low back pain     Menopausal symptoms     Migraine headache     Nausea & vomiting     Neck pain     Osteopenia     Other hyperlipidemia     Ovarian cyst     Pelvic adhesive disease     Radicular pain of left lower extremity      Restless leg syndrome     Seizure disorder     grand mal infrequent; focal siezures as often as daily - has a neurologist    Tobacco abuse     Trochanteric bursitis     Urinary incontinence     Visual impairment     wear contacts/glasses    Vitamin B 12 deficiency        Past Surgical History:   Past Surgical History:   Procedure Laterality Date    BILATERAL SALPINGO OOPHORECTOMY  03/10/2015    DR NATALY THOMPSON    BREAST LUMPECTOMY       SECTION  1991    COLONOSCOPY      COSMETIC SURGERY  2018    basal cell removal R Ear    HYSTERECTOMY  03/10/2015    [facility] (DR NATALY THOMPSON)    HYSTEROSCOPY  2013    complete    INGUINAL HERNIA REPAIR Right 2008    LAPAROSCOPIC ASSISTED VAGINAL HYSTERECTOMY SALPINGO OOPHORECTOMY  2012    to remove cyst    LYSIS OF ABDOMINAL ADHESIONS  03/10/2015    DR NATALY THOMPSON    SUBTOTAL HYSTERECTOMY  3/2013    Left ovary    TUBAL ABDOMINAL LIGATION      WISDOM TOOTH EXTRACTION         Family History:   Family History   Problem Relation Age of Onset    Alzheimer's disease Other     Cancer Other     Dementia Other     Hypertension Other     Parkinsonism Other     Osteoporosis Mother     Diabetes Mother     Hypertension Mother     Obesity Mother     Depression Mother     Arthritis Mother     COPD Mother     Hearing loss Mother     Hyperlipidemia Mother     Breast cancer Maternal Grandmother     Osteoporosis Maternal Grandmother     Diabetes Maternal Grandmother     Dementia Maternal Grandmother     Cancer Maternal Grandmother         Breast Cancer    Hypertension Maternal Grandmother     ADD / ADHD Sister     Alcohol abuse Sister     Anxiety disorder Sister     Bipolar disorder Sister     Depression Sister     Drug abuse Sister     Depression Father     Anxiety disorder Father     Other Father          parkinsons disease    OCD Neg Hx     Paranoid behavior Neg Hx     Schizophrenia Neg Hx     Seizures Neg Hx     Self-Injurious Behavior  Neg Hx     Suicide Attempts Neg Hx         Social History:   Social History     Socioeconomic History    Marital status:     Number of children: 1    Highest education level: High school graduate   Tobacco Use    Smoking status: Light Smoker     Packs/day: 0.50     Years: 18.00     Additional pack years: 0.00     Total pack years: 9.00     Types: Cigarettes     Passive exposure: Current    Smokeless tobacco: Never    Tobacco comments:     In process of quitting, using nicotine patches   Vaping Use    Vaping Use: Never used   Substance and Sexual Activity    Alcohol use: Not Currently    Drug use: No    Sexual activity: Defer     Partners: Male     Birth control/protection: Surgical, Post-menopausal       Medications:     Current Outpatient Medications:     Advair Diskus 250-50 MCG/ACT DISKUS, INHALE 1 PUFF BY MOUTH DAILY, Disp: 60 each, Rfl: 11    albuterol sulfate  (90 Base) MCG/ACT inhaler, INHALE 2 PUFFS INTO THE LUNGS EVERY 4 HOURS AS NEEDED, Disp: 18 g, Rfl: 1    clonazePAM (KlonoPIN) 1 MG tablet, Take 0.5 tablets by mouth At Night As Needed for Anxiety., Disp: 15 tablet, Rfl: 0    Diclofenac Sodium (VOLTAREN) 1 % gel gel, APPLY 4 GRAMS TOPICALLY TO AFFECTED AREA 4 TIMES A DAY AS NEEDED FOR PAIN, Disp: 100 g, Rfl: 3    DULoxetine (CYMBALTA) 60 MG capsule, Take 1 capsule by mouth 2 (Two) Times a Day., Disp: 180 capsule, Rfl: 1    estradiol 0.5 MG/0.5GM gel, Place 1 application  on the skin as directed by provider Daily., Disp: 30 each, Rfl: 3    levETIRAcetam (KEPPRA) 750 MG tablet, Take 1 tablet by mouth 2 (Two) Times a Day., Disp: 180 tablet, Rfl: 1    lurasidone (Latuda) 40 MG tablet tablet, Take 1 tablet by mouth Daily., Disp: 30 tablet, Rfl: 0    nicotine (Nicoderm CQ) 21 MG/24HR patch, Place 1 patch on the skin as directed by provider Daily., Disp: 30 patch, Rfl: 4    Rimegepant Sulfate (Nurtec) 75 MG tablet dispersible tablet, Place 1 tablet under the tongue Take As Directed., Disp: 16 tablet, Rfl: 5    rOPINIRole (REQUIP) 0.5  MG tablet, Take 1 tablet by mouth Every Night. Take 1 hour before bedtime., Disp: 30 tablet, Rfl: 2    tiZANidine (ZANAFLEX) 4 MG tablet, Take 1 tablet by mouth 2 (Two) Times a Day., Disp: , Rfl:     topiramate (TOPAMAX) 100 MG tablet, Take 1 tablet by mouth 2 (Two) Times a Day., Disp: 180 tablet, Rfl: 1    traZODone (DESYREL) 100 MG tablet, TAKE 1 TABLET BY MOUTH EVERY NIGHT, Disp: 30 tablet, Rfl: 2    valACYclovir (VALTREX) 500 MG tablet, Take 1 mg by mouth 2 (Two) Times a Day As Needed., Disp: , Rfl:     vitamin B-12 (CYANOCOBALAMIN) 1000 MCG tablet, Take 1 tablet by mouth Daily., Disp: 90 tablet, Rfl: 3    vitamin B-6 (PYRIDOXINE) 25 MG tablet, Take 1 tablet by mouth Daily., Disp: 90 tablet, Rfl: 3    Allergies:   No Known Allergies    Objective     Physical Exam:   Vital Signs:   There were no vitals taken for this visit.   There is no height or weight on file to calculate BMI.     Sacrum: Leads CDI no redness or swelling    Labs  Brief Urine Lab Results  (Last result in the past 365 days)        Color   Clarity   Blood   Leuk Est   Nitrite   Protein   CREAT   Urine HCG        11/06/23 1402 Yellow   Clear   Trace   Negative   Negative   Negative                   Lab Results   Component Value Date    GLUCOSE 88 11/03/2023    CALCIUM 9.2 11/03/2023     11/03/2023    K 5.0 11/03/2023    CO2 22 11/03/2023     11/03/2023    BUN 7 11/03/2023    CREATININE 0.83 11/03/2023    EGFRIFAFRI 108 09/14/2021    EGFRIFNONA 94 09/14/2021    BCR 8 (L) 11/03/2023    ANIONGAP 8.9 06/23/2022       Lab Results   Component Value Date    WBC 10.4 11/03/2023    HGB 14.0 11/03/2023    HCT 41.8 11/03/2023    MCV 94 11/03/2023     11/03/2023       Urine Culture          2/23/2023    12:04   Urine Culture   Urine Culture Final report         Assessment / Plan      Assessment/Plan:   Diagnoses and all orders for this visit:    1. Urge incontinence of urine (Primary)    2. Incontinence of feces, unspecified fecal  incontinence type    53-year-old female here for PNE lead removal had greater than 50% improvement in urinary symptoms with trial.  Patient is satisfied with these results and ready to schedule full InterStim stage I and II implant.  We discussed risk of surgery including infection, bleeding, anesthesia complications patient wishes to proceed with scheduling.  Leads removed intact, no bleeding noted, 4 x 4 dressing and tape placed    Schedule InterStim stage I and II implant with Dr. Heriberto Carter, CHRIS, NP-C  [facility]

## 2023-11-28 ENCOUNTER — OFFICE VISIT (OUTPATIENT)
Dept: INTERNAL MEDICINE | Facility: CLINIC | Age: 53
End: 2023-11-28
Payer: MEDICAID

## 2023-11-28 VITALS
HEART RATE: 92 BPM | WEIGHT: 174 LBS | DIASTOLIC BLOOD PRESSURE: 80 MMHG | OXYGEN SATURATION: 97 % | SYSTOLIC BLOOD PRESSURE: 116 MMHG | BODY MASS INDEX: 29.71 KG/M2 | RESPIRATION RATE: 16 BRPM | TEMPERATURE: 97.3 F | HEIGHT: 64 IN

## 2023-11-28 DIAGNOSIS — M54.2 CERVICAL PAIN (NECK): ICD-10-CM

## 2023-11-28 DIAGNOSIS — G89.29 CHRONIC BILATERAL LOW BACK PAIN WITHOUT SCIATICA: ICD-10-CM

## 2023-11-28 DIAGNOSIS — M54.6 THORACIC SPINE PAIN: Primary | ICD-10-CM

## 2023-11-28 DIAGNOSIS — M54.50 CHRONIC BILATERAL LOW BACK PAIN WITHOUT SCIATICA: ICD-10-CM

## 2023-11-28 DIAGNOSIS — G25.81 RLS (RESTLESS LEGS SYNDROME): ICD-10-CM

## 2023-11-28 DIAGNOSIS — N39.41 URGE INCONTINENCE OF URINE: Primary | ICD-10-CM

## 2023-11-28 PROCEDURE — 3074F SYST BP LT 130 MM HG: CPT | Performed by: INTERNAL MEDICINE

## 2023-11-28 PROCEDURE — 3079F DIAST BP 80-89 MM HG: CPT | Performed by: INTERNAL MEDICINE

## 2023-11-28 PROCEDURE — 99214 OFFICE O/P EST MOD 30 MIN: CPT | Performed by: INTERNAL MEDICINE

## 2023-11-28 RX ORDER — VALACYCLOVIR HYDROCHLORIDE 500 MG/1
500 TABLET, FILM COATED ORAL 2 TIMES DAILY PRN
Qty: 90 TABLET | Refills: 1 | Status: SHIPPED | OUTPATIENT
Start: 2023-11-28

## 2023-11-28 RX ORDER — ROPINIROLE 0.5 MG/1
0.5 TABLET, FILM COATED ORAL NIGHTLY
Qty: 30 TABLET | Refills: 2 | Status: SHIPPED | OUTPATIENT
Start: 2023-11-28 | End: 2024-11-27

## 2023-11-28 NOTE — PROGRESS NOTES
Subjective     Patient ID: Marguerite Anderson is a 53 y.o. female. Patient is here for management of multiple medical problems.     Chief Complaint   Patient presents with    Hypertension    Seizures     History of Present Illness   Pt off seroquel. On Latuda. In with Dr Flynn. MD Psych.     Pain in cervical thorasic and lower back and hips with pain.  Want to see what pain management has to offer.   and  The following portions of the patient's history were reviewed and updated as appropriate: allergies, current medications, past family history, past medical history, past social history, past surgical history and problem list.    Review of Systems    Current Outpatient Medications:     Advair Diskus 250-50 MCG/ACT DISKUS, INHALE 1 PUFF BY MOUTH DAILY, Disp: 60 each, Rfl: 11    albuterol sulfate  (90 Base) MCG/ACT inhaler, INHALE 2 PUFFS INTO THE LUNGS EVERY 4 HOURS AS NEEDED, Disp: 18 g, Rfl: 1    clonazePAM (KlonoPIN) 1 MG tablet, Take 0.5 tablets by mouth At Night As Needed for Anxiety., Disp: 15 tablet, Rfl: 0    Diclofenac Sodium (VOLTAREN) 1 % gel gel, APPLY 4 GRAMS TOPICALLY TO AFFECTED AREA 4 TIMES A DAY AS NEEDED FOR PAIN, Disp: 100 g, Rfl: 3    DULoxetine (CYMBALTA) 60 MG capsule, Take 1 capsule by mouth 2 (Two) Times a Day., Disp: 180 capsule, Rfl: 1    estradiol 0.5 MG/0.5GM gel, Place 1 application  on the skin as directed by provider Daily., Disp: 30 each, Rfl: 3    levETIRAcetam (KEPPRA) 750 MG tablet, Take 1 tablet by mouth 2 (Two) Times a Day., Disp: 180 tablet, Rfl: 1    lurasidone (Latuda) 40 MG tablet tablet, Take 1 tablet by mouth Daily., Disp: 30 tablet, Rfl: 0    nicotine (Nicoderm CQ) 21 MG/24HR patch, Place 1 patch on the skin as directed by provider Daily., Disp: 30 patch, Rfl: 4    Rimegepant Sulfate (Nurtec) 75 MG tablet dispersible tablet, Place 1 tablet under the tongue Take As Directed., Disp: 16 tablet, Rfl: 5    rOPINIRole (REQUIP) 0.5 MG tablet, Take 1 tablet by mouth Every  "Night. Take 1 hour before bedtime., Disp: 30 tablet, Rfl: 2    tiZANidine (ZANAFLEX) 4 MG tablet, Take 1 tablet by mouth 2 (Two) Times a Day., Disp: , Rfl:     topiramate (TOPAMAX) 100 MG tablet, Take 1 tablet by mouth 2 (Two) Times a Day., Disp: 180 tablet, Rfl: 1    traZODone (DESYREL) 100 MG tablet, TAKE 1 TABLET BY MOUTH EVERY NIGHT, Disp: 30 tablet, Rfl: 2    valACYclovir (VALTREX) 500 MG tablet, Take 1 tablet by mouth 2 (Two) Times a Day As Needed (as needed for fever blisters)., Disp: 90 tablet, Rfl: 1    vitamin B-12 (CYANOCOBALAMIN) 1000 MCG tablet, Take 1 tablet by mouth Daily., Disp: 90 tablet, Rfl: 3    vitamin B-6 (PYRIDOXINE) 25 MG tablet, Take 1 tablet by mouth Daily., Disp: 90 tablet, Rfl: 3    Objective      Blood pressure 116/80, pulse 92, temperature 97.3 °F (36.3 °C), resp. rate 16, height 162.6 cm (64.02\"), weight 78.9 kg (174 lb), SpO2 97%, not currently breastfeeding.            Physical Exam     General Appearance:    Alert, cooperative, no distress, appears stated age   Head:    Normocephalic, without obvious abnormality, atraumatic   Eyes:    PERRL, conjunctiva/corneas clear, EOM's intact   Ears:    Normal TM's and external ear canals, both ears   Nose:   Nares normal, septum midline, mucosa normal, no drainage   or sinus tenderness   Throat:   Lips, mucosa, and tongue normal; teeth and gums normal   Neck:   Supple, symmetrical, trachea midline, no adenopathy;        thyroid:  No enlargement/tenderness/nodules; no carotid    bruit or JVD   Back:     Symmetric, no curvature, ROM normal, no CVA tenderness   Lungs:     Clear to auscultation bilaterally, respirations unlabored   Chest wall:    No tenderness or deformity   Heart:    Regular rate and rhythm, S1 and S2 normal, no murmur,        rub or gallop   Abdomen:     Soft, non-tender, bowel sounds active all four quadrants,     no masses, no organomegaly   Extremities:   Extremities normal, atraumatic, no cyanosis or edema   Pulses:   2+ and " symmetric all extremities   Skin:   Skin color, texture, turgor normal, no rashes or lesions   Lymph nodes:   Cervical, supraclavicular, and axillary nodes normal   Neurologic:   CNII-XII intact. Normal strength, sensation and reflexes       throughout      Results for orders placed or performed in visit on 11/06/23   POC Urinalysis Dipstick, Automated    Specimen: Urine   Result Value Ref Range    Color Yellow Yellow, Straw, Dark Yellow, Alissa    Clarity, UA Clear Clear    Specific Gravity  1.010 1.005 - 1.030    pH, Urine 6.5 5.0 - 8.0    Leukocytes Negative Negative    Nitrite, UA Negative Negative    Protein, POC Negative Negative mg/dL    Glucose, UA Negative Negative mg/dL    Ketones, UA Negative Negative    Urobilinogen, UA Normal Normal, 0.2 E.U./dL    Bilirubin Negative Negative    Blood, UA Trace (A) Negative    Lot Number [id number]     Expiration Date 07/13/2024          Assessment & Plan   Spine pain.   Will start with PT and diet changes. Pt want to go to [facility] [place].    Pt off seroquel. On [facility]. In with Dr Flynn. MD Psych.     Pain in cervical thorasic and lower back and hips with pain.  Want to see what pain management has to offer.   a    Diagnoses and all orders for this visit:    1. Thoracic spine pain (Primary)  -     Ambulatory Referral to Physical Therapy Evaluate and treat; Electrotherapy, Heat; Stretching, Strengthening, ROM  -     Ambulatory Referral to Pain Management  -     Ambulatory Referral to Physical Therapy    2. RLS (restless legs syndrome)  -     rOPINIRole (REQUIP) 0.5 MG tablet; Take 1 tablet by mouth Every Night. Take 1 hour before bedtime.  Dispense: 30 tablet; Refill: 2  -     Ambulatory Referral to Physical Therapy Evaluate and treat; Electrotherapy, Heat; Stretching, Strengthening, ROM  -     Ambulatory Referral to Pain Management  -     Ambulatory Referral to Physical Therapy    3. Chronic bilateral low back pain without sciatica  -     Ambulatory  Referral to Physical Therapy Evaluate and treat; Electrotherapy, Heat; Stretching, Strengthening, ROM  -     Ambulatory Referral to Pain Management  -     Ambulatory Referral to Physical Therapy    4. Cervical pain (neck)  -     Ambulatory Referral to Physical Therapy Evaluate and treat; Electrotherapy, Heat; Stretching, Strengthening, ROM  -     Ambulatory Referral to Pain Management  -     Ambulatory Referral to Physical Therapy    Other orders  -     valACYclovir (VALTREX) 500 MG tablet; Take 1 tablet by mouth 2 (Two) Times a Day As Needed (as needed for fever blisters).  Dispense: 90 tablet; Refill: 1      Return in about 6 months (around 5/28/2024).          There are no Patient Instructions on file for this visit.     Juan Clemens MD    Assessment & Plan       Answers submitted by the patient for this visit:  Other (Submitted on 11/21/2023)  Please describe your symptoms.: Discuss referral to new pain management clinic  Have you had these symptoms before?: Yes  How long have you been having these symptoms?: Greater than 2 weeks  Please list any medications you are currently taking for this condition.: Tylenol and ibuprofen  Please describe any probable cause for these symptoms. : I've had chronic back since the birth of my 11.5 lbs baby, wear and tear on my body. I have chronic neck pain from bulging disc. My main source of pain is in my thoracic back, lowback and hips.  Primary Reason for Visit (Submitted on 11/21/2023)  What is the primary reason for your visit?: Other

## 2023-11-30 DIAGNOSIS — F41.1 GENERALIZED ANXIETY DISORDER: ICD-10-CM

## 2023-11-30 DIAGNOSIS — F33.1 MAJOR DEPRESSIVE DISORDER, RECURRENT EPISODE, MODERATE DEGREE: ICD-10-CM

## 2023-11-30 RX ORDER — LURASIDONE HYDROCHLORIDE 40 MG/1
40 TABLET, FILM COATED ORAL DAILY
Qty: 30 TABLET | Refills: 0 | Status: SHIPPED | OUTPATIENT
Start: 2023-11-30 | End: 2023-12-30

## 2023-12-04 ENCOUNTER — TELEPHONE (OUTPATIENT)
Dept: UROLOGY | Facility: CLINIC | Age: 53
End: 2023-12-04

## 2023-12-04 ENCOUNTER — TELEMEDICINE (OUTPATIENT)
Dept: PSYCHIATRY | Facility: CLINIC | Age: 53
End: 2023-12-04
Payer: MEDICAID

## 2023-12-04 DIAGNOSIS — F41.1 GAD (GENERALIZED ANXIETY DISORDER): ICD-10-CM

## 2023-12-04 DIAGNOSIS — F33.1 MODERATE EPISODE OF RECURRENT MAJOR DEPRESSIVE DISORDER: Primary | ICD-10-CM

## 2023-12-04 PROCEDURE — 90834 PSYTX W PT 45 MINUTES: CPT | Performed by: COUNSELOR

## 2023-12-04 NOTE — TELEPHONE ENCOUNTER
The [facility] received a fax that requires your attention. The document has been indexed to the patient’s chart for your review.      Reason for sending:  [facility] MEDICAL SUPPLY ORDER. REQUEST PROVIDER REVIEW.     Documents Description:  MED SUPPLY REQ 12-04-23    Name of Sender:  [facility]     Date Indexed: 12/04/23    Notes (if needed):

## 2023-12-05 ENCOUNTER — OFFICE VISIT (OUTPATIENT)
Age: 53
End: 2023-12-05
Payer: MEDICAID

## 2023-12-05 VITALS
SYSTOLIC BLOOD PRESSURE: 116 MMHG | OXYGEN SATURATION: 99 % | DIASTOLIC BLOOD PRESSURE: 72 MMHG | HEART RATE: 76 BPM | BODY MASS INDEX: 29.72 KG/M2 | WEIGHT: 174.1 LBS | HEIGHT: 64 IN

## 2023-12-05 DIAGNOSIS — F33.1 MAJOR DEPRESSIVE DISORDER, RECURRENT EPISODE, MODERATE DEGREE: ICD-10-CM

## 2023-12-05 DIAGNOSIS — F41.1 GENERALIZED ANXIETY DISORDER: ICD-10-CM

## 2023-12-05 PROCEDURE — 3078F DIAST BP <80 MM HG: CPT | Performed by: STUDENT IN AN ORGANIZED HEALTH CARE EDUCATION/TRAINING PROGRAM

## 2023-12-05 PROCEDURE — 1159F MED LIST DOCD IN RCRD: CPT | Performed by: STUDENT IN AN ORGANIZED HEALTH CARE EDUCATION/TRAINING PROGRAM

## 2023-12-05 PROCEDURE — 3074F SYST BP LT 130 MM HG: CPT | Performed by: STUDENT IN AN ORGANIZED HEALTH CARE EDUCATION/TRAINING PROGRAM

## 2023-12-05 PROCEDURE — 99214 OFFICE O/P EST MOD 30 MIN: CPT | Performed by: STUDENT IN AN ORGANIZED HEALTH CARE EDUCATION/TRAINING PROGRAM

## 2023-12-05 PROCEDURE — 1160F RVW MEDS BY RX/DR IN RCRD: CPT | Performed by: STUDENT IN AN ORGANIZED HEALTH CARE EDUCATION/TRAINING PROGRAM

## 2023-12-05 RX ORDER — LURASIDONE HYDROCHLORIDE 60 MG/1
60 TABLET, FILM COATED ORAL DAILY
Qty: 30 TABLET | Refills: 0 | Status: SHIPPED | OUTPATIENT
Start: 2023-12-05 | End: 2024-01-04

## 2023-12-05 RX ORDER — CLONAZEPAM 0.5 MG/1
0.5 TABLET ORAL NIGHTLY PRN
Qty: 30 TABLET | Refills: 0 | Status: SHIPPED | OUTPATIENT
Start: 2023-12-05

## 2023-12-05 RX ORDER — TRAZODONE HYDROCHLORIDE 100 MG/1
100 TABLET ORAL NIGHTLY
Qty: 30 TABLET | Refills: 0 | Status: SHIPPED | OUTPATIENT
Start: 2023-12-05

## 2023-12-05 RX ORDER — DULOXETIN HYDROCHLORIDE 60 MG/1
60 CAPSULE, DELAYED RELEASE ORAL 2 TIMES DAILY
Qty: 60 CAPSULE | Refills: 0 | Status: SHIPPED | OUTPATIENT
Start: 2023-12-05

## 2023-12-05 NOTE — PROGRESS NOTES
[facility]             Follow Up Office Visit      Date: 2023   Patient Name: Marguerite Anderson  : 1970   MRN: [id number]     Referring Provider: Juan Clemens MD    Chief Complaint:      ICD-10-CM ICD-9-CM   1. Major depressive disorder, recurrent episode, moderate degree  F33.1 296.32   2. Generalized anxiety disorder  F41.1 300.02   3. Other insomnia  G47.09 780.52   4. Moderate mood disorder  F39 296.90   5. JAY (generalized anxiety disorder)  F41.1 300.02        History of Present Illness:   Marguerite Anderson is a 53 y.o. female who is here today for follow up with MDD/JAY.    Patient is seen and evaluated in the office.  She appears to be well groomed and is dressed nicely. She is pleasant and  presents much brighter today. She appears to be in no acute distress at this time.  She is calm and cooperative with the evaluation and exhibits a linear and goal-directed thought process. Patient states that since starting Latuda, she has noticed that her mood is starting to improve. She feels as though she has a little bit more motivation. She notices this in things such as her desire to shower, put on make up, and get dressed today prior to appointment. Patient states that she tolerated titration off of Seroquel and Abilify well. However, she has experienced more difficulties with sleep since she is not taking Seroquel anymore. She is  still taking Trazodone. Anxiety has decreased some, but does still get significant at times like when she has to get ready to go out in public. She went to [facility] over the weekend and was able to handle this situation well. Patient is happy with Latuda so far. She has been taking it at night with food. She is agreeable to increasing dosage today to help with mood and sleep. Writer recommended plan to DC trazodone if patient is able to sleep with Latuda. We reviewed plan to DC Klonopin in the future and patient is still agreeable to  this. She denies any suicidal ideation/plan/intent at this time. We will increase latuda today and follow up in 1 mo.     Previous Medication Trials:  Klonopin, Celexa, Wellbutrin, Lexapro, Abilify, Seroquel    Subjective     Review of Systems:   Review of Systems   Constitutional:  Negative for chills, fatigue and fever.   HENT:  Negative for congestion, hearing loss, sore throat and trouble swallowing.    Eyes:  Negative for blurred vision and double vision.   Respiratory:  Negative for cough, chest tightness and shortness of breath.    Cardiovascular:  Negative for chest pain and palpitations.   Gastrointestinal:  Negative for abdominal pain, constipation, diarrhea, nausea and vomiting.   Endocrine: Negative for polydipsia and polyuria.   Genitourinary:  Negative for hematuria and urgency.   Musculoskeletal:  Negative for arthralgias.   Skin:  Negative for skin lesions and bruise.   Neurological:  Negative for dizziness, tremors, seizures and light-headedness.   Hematological:  Negative for adenopathy.     Medications:     Current Outpatient Medications:     Advair Diskus 250-50 MCG/ACT DISKUS, INHALE 1 PUFF BY MOUTH DAILY, Disp: 60 each, Rfl: 11    albuterol sulfate  (90 Base) MCG/ACT inhaler, INHALE 2 PUFFS INTO THE LUNGS EVERY 4 HOURS AS NEEDED, Disp: 18 g, Rfl: 1    clonazePAM (KlonoPIN) 0.5 MG tablet, Take 1 tablet by mouth At Night As Needed for Anxiety., Disp: 30 tablet, Rfl: 0    Diclofenac Sodium (VOLTAREN) 1 % gel gel, APPLY 4 GRAMS TOPICALLY TO AFFECTED AREA 4 TIMES A DAY AS NEEDED FOR PAIN, Disp: 100 g, Rfl: 3    DULoxetine (CYMBALTA) 60 MG capsule, Take 1 capsule by mouth 2 (Two) Times a Day., Disp: 60 capsule, Rfl: 0    estradiol 0.5 MG/0.5GM gel, Place 1 application  on the skin as directed by provider Daily., Disp: 30 each, Rfl: 3    levETIRAcetam (KEPPRA) 750 MG tablet, Take 1 tablet by mouth 2 (Two) Times a Day., Disp: 180 tablet, Rfl: 1    lurasidone HCl (Latuda) 60 MG tablet tablet,  "Take 1 tablet by mouth Daily for 30 days., Disp: 30 tablet, Rfl: 0    nicotine (Nicoderm CQ) 21 MG/24HR patch, Place 1 patch on the skin as directed by provider Daily., Disp: 30 patch, Rfl: 4    Rimegepant Sulfate (Nurtec) 75 MG tablet dispersible tablet, Place 1 tablet under the tongue Take As Directed., Disp: 16 tablet, Rfl: 5    rOPINIRole (REQUIP) 0.5 MG tablet, Take 1 tablet by mouth Every Night. Take 1 hour before bedtime., Disp: 30 tablet, Rfl: 2    tiZANidine (ZANAFLEX) 4 MG tablet, Take 1 tablet by mouth 2 (Two) Times a Day., Disp: , Rfl:     topiramate (TOPAMAX) 100 MG tablet, Take 1 tablet by mouth 2 (Two) Times a Day., Disp: 180 tablet, Rfl: 1    traZODone (DESYREL) 100 MG tablet, Take 1 tablet by mouth Every Night., Disp: 30 tablet, Rfl: 0    valACYclovir (VALTREX) 500 MG tablet, Take 1 tablet by mouth 2 (Two) Times a Day As Needed (as needed for fever blisters)., Disp: 90 tablet, Rfl: 1    vitamin B-12 (CYANOCOBALAMIN) 1000 MCG tablet, Take 1 tablet by mouth Daily., Disp: 90 tablet, Rfl: 3    vitamin B-6 (PYRIDOXINE) 25 MG tablet, Take 1 tablet by mouth Daily., Disp: 90 tablet, Rfl: 3    Medication Considerations:  ASYA reviewed and appropriate.     Allergies:   No Known Allergies    Objective     Vital Signs:   Vitals:    12/05/23 1456   BP: 116/72   Pulse: 76   SpO2: 99%   Weight: 79 kg (174 lb 1.6 oz)   Height: 162.6 cm (64\")     Body mass index is 29.88 kg/m².     Mental Status Exam:   MENTAL STATUS EXAM   General Appearance:  Cleanly groomed and dressed  Eye Contact:  Good eye contact  Attitude:  Cooperative  Motor Activity:  Normal gait, posture  Muscle Strength:  Normal  Speech:  Normal rate, tone, volume  Language:  Spontaneous  Mood and affect:  Normal, pleasant and appropriate  Hopelessness:  Denies  Thought Process:  Logical and goal-directed  Associations/ Thought Content:  No delusions  Hallucinations:  None  Suicidal Ideations:  Not present  Homicidal Ideation:  Not present  Sensorium:  " Alert  Orientation:  Person, place, time and situation  Immediate Recall, Recent, and Remote Memory:  Intact  Attention Span/ Concentration:  Good  Fund of Knowledge:  Appropriate for age and educational level  Intellectual Functioning:  Average range  Insight:  Fair  Judgement:  Fair  Reliability:  Fair  Impulse Control:  Fair        SUICIDE RISK ASSESSMENT/CSSRS:  1. Does patient have thoughts of suicide? denies  2. Does patient have intent for suicide? denies  3. Does patient have a current plan for suicide? denies  4. History of suicide attempts: yes; in 2010 when 1st  was diagnosed with liver cirrhosis  5. Family history of suicide or attempts: denies  6. History of violent behaviors towards others or property or thoughts of committing suicide: denies  7. History of sexual aggression toward others: denies  8. Access to firearms or weapons: denies    Assessment / Plan      Visit Diagnosis/Orders Placed This Visit:  Diagnoses and all orders for this visit:    1. Major depressive disorder, recurrent episode, moderate degree  -     clonazePAM (KlonoPIN) 0.5 MG tablet; Take 1 tablet by mouth At Night As Needed for Anxiety.  Dispense: 30 tablet; Refill: 0  -     lurasidone HCl (Latuda) 60 MG tablet tablet; Take 1 tablet by mouth Daily for 30 days.  Dispense: 30 tablet; Refill: 0    2. Generalized anxiety disorder  -     clonazePAM (KlonoPIN) 0.5 MG tablet; Take 1 tablet by mouth At Night As Needed for Anxiety.  Dispense: 30 tablet; Refill: 0  -     lurasidone HCl (Latuda) 60 MG tablet tablet; Take 1 tablet by mouth Daily for 30 days.  Dispense: 30 tablet; Refill: 0    3. Other insomnia  -     traZODone (DESYREL) 100 MG tablet; Take 1 tablet by mouth Every Night.  Dispense: 30 tablet; Refill: 0    4. Moderate mood disorder  -     DULoxetine (CYMBALTA) 60 MG capsule; Take 1 capsule by mouth 2 (Two) Times a Day.  Dispense: 60 capsule; Refill: 0    5. JAY (generalized anxiety disorder)  -     DULoxetine (CYMBALTA)  60 MG capsule; Take 1 capsule by mouth 2 (Two) Times a Day.  Dispense: 60 capsule; Refill: 0         1. Major depressive disorder, recurrent episode, moderate degree (Primary)  2. Generalized anxiety disorder  Ordered labs and EKG at last visit (not completed yet): CBC/CMP/Lipids/Folate/HgbA1C/Hepatic Panel/Vit D/Vit B12/TSH/T4  Increase Latuda to 60 mg po qpm  Continue Trazodone 100 mg po qpm   Continue Klonopin 0.5 mg po daily for now (plan to DC in future)  Continue Cymbalta 60 mg po BID for now (plan to titrate off of this as well)  Continue therapy  UDS Reviewed: UDS from 11/2/23 reviewed  Labs Reviewed : labs from 3/27/23 reviewed  Chart Reviewed      Functional Status: Mild impairment      Prognosis: Good with Ongoing Treatment     Impression/Formulation:  Patient appeared alert and oriented.  Patient is voluntarily requesting to begin outpatient therapy at [facility].  Patient is receptive to assistance with maintaining a stable lifestyle.  Patient presents with history of     ICD-10-CM ICD-9-CM   1. Major depressive disorder, recurrent episode, moderate degree  F33.1 296.32   2. Generalized anxiety disorder  F41.1 300.02   3. Other insomnia  G47.09 780.52   4. Moderate mood disorder  F39 296.90   5. JAY (generalized anxiety disorder)  F41.1 300.02   .     Treatment Plan:     Patient will continue supportive psychotherapy efforts and medications as indicated. Clinic will obtain release of information for current treatment team for continuity of care as needed. Patient will contact this office, call 911 or present to the nearest emergency room should suicidal or homicidal ideations occur.  Discussed medication options and treatment plan of prescribed medication(s) as well as the risks, benefits, and potential side effects. Patient ackowledged and verbally consented to continue with current treatment plan and was educated on the importance of compliance with treatment and follow-up  appointments.     Quality Measures:  Tobacco: Marguerite Anderson  reports that she has been smoking cigarettes. She has a 9.00 pack-year smoking history. She has been exposed to tobacco smoke. She has never used smokeless tobacco.. I have educated her on the risk of diseases from using tobacco products such as cancer, COPD, and heart disease.     I advised her to quit and she is not willing to quit.    I spent 5 minutes counseling the patient.    Depression (PHQ >11): Patient screened positive for depression based on a PHQ-9 score of 17 on 12/5/2023. Follow-up recommendations include:  follow up with writer in 1 mo, continue medications as prescribed, continue therapy .     Follow Up:   Return in about 1 month (around 1/5/2024) for Medication Management.    Alissa Flynn MD  [facility] [address]     This is electronically signed by Alissa Flynn MD   12/05/2023 14:47 EST

## 2023-12-06 NOTE — PROGRESS NOTES
"  Date:2023   Patient Name: Marguerite Anderson  : 1970   MRN: [id number]   Time IN: 11:03 AM    Time OUT: 11:44 AM     Referring Provider: Juan Clemens MD    PROGRESS NOTE    History of Present Illness:   Marguerite Anderson is a 53 y.o. female who is being seen today for follow up individual Psychotherapy session.     Chief Complaint:  Pt reports she is doing \"pretty good.\"  Pt reports depression has been improving however does have SI without intent or planning at times.  She was discharged from Abilify and Seroquel and was put on Latuda.  Pt reports she is not sleeping well without Seroquel.  Pt will follow up with med provider tomorrow.  Pt reports chronic pain still gets her down which is understandable.  Pt continues to have anxiety with getting out and going to stores - making decisions/too many choices.  Pt is doing better with hygiene and daily living chores.  Pt reports she is having surgery in . To have E-stem modulator planted into her bladder.  Pt hopes this will relieve anxiety from incontinence.                 ICD-10-CM ICD-9-CM   1. Moderate episode of recurrent major depressive disorder  F33.1 296.32   2. JAY (generalized anxiety disorder)  F41.1 300.02        Clinical Maneuvering/Intervention:   Assisted patient in processing above session content; acknowledged and normalized patient’s thoughts, feelings, and concerns to build appropriate rapport and a positive therapeutic relationship with open and honest communication.  Processed and rationalized patients thoughts and feelings regarding managing MH, holidays.  Discussed triggers associated with patient's anxiety/depression.  Also discussed coping skills for patient to implement such as challenging negative thought patterns, positive self talk, celebrate small successes, lean on positive supports.  Pt enjoyed Thanksgiving with family with her son hosting.  Pt is having a girls night out this weekend with six other ladies.  Pt " reports she has been redirecting her thoughts when getting down on herself.        Allowed patient to freely discuss issues without interruption or judgment. Provided safe, confidential environment to facilitate the development of positive therapeutic relationship and encourage open, honest communication. Assisted patient in identifying risk factors which would indicate the need for higher level of care including thoughts to harm self or others and/or self-harming behavior and encouraged patient to contact this office, call 911, or present to the nearest emergency room should any of these events occur. Discussed crisis intervention services and means to access. Patient adamantly and convincingly denies current suicidal or homicidal ideation or perceptual disturbance.    Mental Status Exam:   Hygiene:   good  Cooperation:  Cooperative  Eye Contact:  Good  Psychomotor Behavior:  Appropriate  Affect:  Appropriate  Mood: anxious  Speech:  Normal  Thought Process:  Goal directed and Linear  Thought Content:  Mood congruent  Suicidal:  None  Homicidal:  None  Hallucinations:  None  Delusion:  None  Memory:  Intact  Orientation:  Person, Place, Time, and Situation  Reliability:  good  Insight:  Good  Judgement:  Good  Impulse Control:  Good  Physical/Medical Issues:  Yes        Patient's Support Network Includes:  , son, and mother    Functional Status: Moderate impairment     Progress toward goal: Not at goal    Prognosis: Good with Ongoing Treatment     Medications:     Current Outpatient Medications:     Advair Diskus 250-50 MCG/ACT DISKUS, INHALE 1 PUFF BY MOUTH DAILY, Disp: 60 each, Rfl: 11    albuterol sulfate  (90 Base) MCG/ACT inhaler, INHALE 2 PUFFS INTO THE LUNGS EVERY 4 HOURS AS NEEDED, Disp: 18 g, Rfl: 1    clonazePAM (KlonoPIN) 1 MG tablet, Take 0.5 tablets by mouth At Night As Needed for Anxiety., Disp: 15 tablet, Rfl: 0    Diclofenac Sodium (VOLTAREN) 1 % gel gel, APPLY 4 GRAMS TOPICALLY TO  AFFECTED AREA 4 TIMES A DAY AS NEEDED FOR PAIN, Disp: 100 g, Rfl: 3    DULoxetine (CYMBALTA) 60 MG capsule, Take 1 capsule by mouth 2 (Two) Times a Day., Disp: 180 capsule, Rfl: 1    estradiol 0.5 MG/0.5GM gel, Place 1 application  on the skin as directed by provider Daily., Disp: 30 each, Rfl: 3    levETIRAcetam (KEPPRA) 750 MG tablet, Take 1 tablet by mouth 2 (Two) Times a Day., Disp: 180 tablet, Rfl: 1    lurasidone (Latuda) 40 MG tablet tablet, Take 1 tablet by mouth Daily for 30 days., Disp: 30 tablet, Rfl: 0    nicotine (Nicoderm CQ) 21 MG/24HR patch, Place 1 patch on the skin as directed by provider Daily., Disp: 30 patch, Rfl: 4    Rimegepant Sulfate (Nurtec) 75 MG tablet dispersible tablet, Place 1 tablet under the tongue Take As Directed., Disp: 16 tablet, Rfl: 5    rOPINIRole (REQUIP) 0.5 MG tablet, Take 1 tablet by mouth Every Night. Take 1 hour before bedtime., Disp: 30 tablet, Rfl: 2    tiZANidine (ZANAFLEX) 4 MG tablet, Take 1 tablet by mouth 2 (Two) Times a Day., Disp: , Rfl:     topiramate (TOPAMAX) 100 MG tablet, Take 1 tablet by mouth 2 (Two) Times a Day., Disp: 180 tablet, Rfl: 1    traZODone (DESYREL) 100 MG tablet, TAKE 1 TABLET BY MOUTH EVERY NIGHT, Disp: 30 tablet, Rfl: 2    valACYclovir (VALTREX) 500 MG tablet, Take 1 tablet by mouth 2 (Two) Times a Day As Needed (as needed for fever blisters)., Disp: 90 tablet, Rfl: 1    vitamin B-12 (CYANOCOBALAMIN) 1000 MCG tablet, Take 1 tablet by mouth Daily., Disp: 90 tablet, Rfl: 3    vitamin B-6 (PYRIDOXINE) 25 MG tablet, Take 1 tablet by mouth Daily., Disp: 90 tablet, Rfl: 3    Visit Diagnosis/Orders Placed This Visit:    ICD-10-CM ICD-9-CM   1. Moderate episode of recurrent major depressive disorder  F33.1 296.32   2. JAY (generalized anxiety disorder)  F41.1 300.02        PLAN:  Safety: No acute safety concerns SI at times without intent or planning.  Risk Assessment: Risk of self-harm acutely is low. Risk of self-harm chronically is also low, but  could be further elevated in the event of treatment noncompliance and/or AODA.    Crisis Plan:  Symptoms and/or behaviors to indicate a crisis: Excessive worry or fear, Feeling sad or low, and Self-doubt SI with intent or planning    What calming techniques or other strategies will patient use to de-escalate and stay safe: slow down, breathe, visualize calming self, think it though, listen to music, change focus, take a walk    Who is one person patient can contact to assist with de-escalation?     Treatment Plan/Goals: Patient will continue supportive psychotherapy efforts and medication regimen as prescribed. Therapist will provide Cognitive Behavioral Therapy to assist patient in improving functioning and gaining coping skills, maintaining stability, and avoiding decompensation and the need for higher level of care. Plan for treatment was discussed during today's visit. Patient acknowledged and verbally consented to continue with current treatment plan and was educated on the importance of compliance with treatment and follow-up appointments.     Patient will contact this office, call 911 or present to the nearest emergency room should suicidal or homicidal ideations occur.     Follow Up:   Return in about 1 month (around 1/4/2024) for Therapy session.      QUOC Peña   [facility]     This document has been electronically signed by QUOC Peña   December 4, 2023 11:46 EST   Detail Level: Detailed Depth Of Biopsy: dermis Was A Bandage Applied: Yes Size Of Lesion In Cm: 0 Biopsy Type: H and E Biopsy Method: 15 blade Anesthesia Type: 1% lidocaine with epinephrine Anesthesia Volume In Cc: 0.5 Hemostasis: Aluminum Chloride Wound Care: Bacitracin Dressing: bandage Destruction After The Procedure: No Type Of Destruction Used: Curettage Curettage Text: The wound bed was treated with curettage after the biopsy was performed. Cryotherapy Text: The wound bed was treated with cryotherapy after the biopsy was performed. Electrodesiccation Text: The wound bed was treated with electrodesiccation after the biopsy was performed. Electrodesiccation And Curettage Text: The wound bed was treated with electrodesiccation and curettage after the biopsy was performed. Silver Nitrate Text: The wound bed was treated with silver nitrate after the biopsy was performed. Lab: [id number] Consent: Written consent was obtained and risks were reviewed including but not limited to scarring, infection, bleeding, scabbing, incomplete removal, nerve damage and allergy to anesthesia. Post-Care Instructions: I reviewed with the patient in detail post-care instructions. Patient is to keep the biopsy site dry overnight, and then apply bacitracin twice daily until healed. Patient may apply hydrogen peroxide soaks to remove any crusting. Notification Instructions: Patient will be notified of biopsy results. However, patient instructed to call the office if not contacted within 2 weeks. Billing Type: Third-Party Bill Information: Selecting Yes will display possible errors in your note based on the variables you have selected. This validation is only offered as a suggestion for you. PLEASE NOTE THAT THE VALIDATION TEXT WILL BE REMOVED WHEN YOU FINALIZE YOUR NOTE. IF YOU WANT TO FAX A PRELIMINARY NOTE YOU WILL NEED TO TOGGLE THIS TO 'NO' IF YOU DO NOT WANT IT IN YOUR FAXED NOTE.

## 2023-12-12 ENCOUNTER — TELEPHONE (OUTPATIENT)
Dept: INTERNAL MEDICINE | Facility: CLINIC | Age: 53
End: 2023-12-12
Payer: MEDICAID

## 2023-12-12 NOTE — TELEPHONE ENCOUNTER
Caller: Marguerite Anderson    Relationship: Self    Best call back number: [phone]     What was the call regarding: PATIENT WOULD LIKE A CALL BACK TO CHECK ON THE STATUS OF A REFERRAL SHE HAS HAD FOR A PAIN CLINIC.

## 2023-12-12 NOTE — TELEPHONE ENCOUNTER
Notified patient that it is being worked on. Call back if she hasn't heard anything by end of week. She said that the clinic has nothing.

## 2023-12-19 ENCOUNTER — TELEPHONE (OUTPATIENT)
Dept: INTERNAL MEDICINE | Facility: CLINIC | Age: 53
End: 2023-12-19

## 2023-12-19 ENCOUNTER — TELEPHONE (OUTPATIENT)
Age: 53
End: 2023-12-19
Payer: MEDICAID

## 2023-12-19 NOTE — TELEPHONE ENCOUNTER
Hub staff attempted to follow warm transfer process and was unsuccessful     Caller: Marguerite Anderson    Relationship to patient: Self    Best call back number: [phone]    Patient is needing: AN UPDATE ON REFERRAL WITH DR KRISTA ESTES, PAIN MANAGEMENT

## 2023-12-20 RX ORDER — ALBUTEROL SULFATE 90 UG/1
AEROSOL, METERED RESPIRATORY (INHALATION)
Qty: 18 G | Refills: 1 | Status: SHIPPED | OUTPATIENT
Start: 2023-12-20

## 2023-12-21 ENCOUNTER — TELEPHONE (OUTPATIENT)
Dept: INTERNAL MEDICINE | Facility: CLINIC | Age: 53
End: 2023-12-21
Payer: MEDICAID

## 2023-12-21 NOTE — TELEPHONE ENCOUNTER
Mammo on 12/15 recommends an ultrasound on Right breast due to new findings. Will you order? Or let  address when he returns?

## 2023-12-25 DIAGNOSIS — R92.8 ABNORMAL MAMMOGRAM: Primary | ICD-10-CM

## 2023-12-28 ENCOUNTER — APPOINTMENT (OUTPATIENT)
Dept: GENERAL RADIOLOGY | Facility: HOSPITAL | Age: 53
End: 2023-12-28
Payer: MEDICAID

## 2023-12-28 ENCOUNTER — HOSPITAL ENCOUNTER (EMERGENCY)
Facility: HOSPITAL | Age: 53
Discharge: HOME | End: 2023-12-28
Attending: EMERGENCY MEDICINE
Payer: MEDICAID

## 2023-12-28 ENCOUNTER — TELEPHONE (OUTPATIENT)
Dept: OBSTETRICS AND GYNECOLOGY | Facility: CLINIC | Age: 53
End: 2023-12-28
Payer: MEDICAID

## 2023-12-28 VITALS
TEMPERATURE: 97.9 F | RESPIRATION RATE: 18 BRPM | DIASTOLIC BLOOD PRESSURE: 79 MMHG | SYSTOLIC BLOOD PRESSURE: 109 MMHG | HEIGHT: 64 IN | OXYGEN SATURATION: 94 % | WEIGHT: 174 LBS | BODY MASS INDEX: 29.71 KG/M2 | HEART RATE: 66 BPM

## 2023-12-28 DIAGNOSIS — N63.0 BREAST NODULE: Primary | ICD-10-CM

## 2023-12-28 DIAGNOSIS — M54.41 RIGHT-SIDED LOW BACK PAIN WITH RIGHT-SIDED SCIATICA, UNSPECIFIED CHRONICITY: Primary | ICD-10-CM

## 2023-12-28 PROCEDURE — 99283 EMERGENCY DEPT VISIT LOW MDM: CPT

## 2023-12-28 PROCEDURE — 96372 THER/PROPH/DIAG INJ SC/IM: CPT

## 2023-12-28 PROCEDURE — 72100 X-RAY EXAM L-S SPINE 2/3 VWS: CPT

## 2023-12-28 PROCEDURE — 25010000002 KETOROLAC TROMETHAMINE PER 15 MG: Performed by: NURSE PRACTITIONER

## 2023-12-28 RX ORDER — HYDROCODONE BITARTRATE AND ACETAMINOPHEN 5; 325 MG/1; MG/1
1 TABLET ORAL ONCE
Status: COMPLETED | OUTPATIENT
Start: 2023-12-28 | End: 2023-12-28

## 2023-12-28 RX ORDER — LIDOCAINE 4 G/G
1 PATCH TOPICAL DAILY
Qty: 15 PATCH | Refills: 0 | Status: SHIPPED | OUTPATIENT
Start: 2023-12-28 | End: 2024-01-12

## 2023-12-28 RX ORDER — PREDNISONE 20 MG/1
20 TABLET ORAL 2 TIMES DAILY
Qty: 10 TABLET | Refills: 0 | Status: SHIPPED | OUTPATIENT
Start: 2023-12-28 | End: 2024-01-02

## 2023-12-28 RX ORDER — KETOROLAC TROMETHAMINE 30 MG/ML
30 INJECTION, SOLUTION INTRAMUSCULAR; INTRAVENOUS ONCE
Status: COMPLETED | OUTPATIENT
Start: 2023-12-28 | End: 2023-12-28

## 2023-12-28 RX ADMIN — HYDROCODONE BITARTRATE AND ACETAMINOPHEN 1 TABLET: 5; 325 TABLET ORAL at 17:03

## 2023-12-28 RX ADMIN — KETOROLAC TROMETHAMINE 30 MG: 30 INJECTION, SOLUTION INTRAMUSCULAR; INTRAVENOUS at 15:09

## 2023-12-28 NOTE — TELEPHONE ENCOUNTER
Caller: Marguerite Anderson    Relationship: Self    Best call back number: [phone] / LVM    Caller requesting test results: PT    What test was performed: MAMMO & U/S    When was the test performed: 12/27/23    Additional notes: PT WOULD LIKE FOR DR THOMPSON TO GO OVER HER MAMMO AND U/S RESULTS AND TO CALL THE PT TO LET HER IF SHE NEEDS TO BE SEEN SOONER THAN 02/01/24    PT WOULD LIKE A CALL BACK TO GO OVER RESULTS    THANK YOU!

## 2023-12-28 NOTE — ED PROVIDER NOTES
Subjective  History of Present Illness:    Chief Complaint: Low back pain  History of Present Illness: This is a 53-year-old female patient comes into the ED today complaining of low back pain that started approximately 6 days ago and is progressively worsened over the interval.  Patient states she has a history of chronic back issues but still had a couple days where she was cleaning her home and started having a back flareup.  Patient states is progressively worsened over that time and is having short stabbing pains down her right leg      Nurses Notes reviewed and agree, including vitals, allergies, social history and prior medical history.       Allergies:    Patient has no known allergies.      Past Surgical History:   Procedure Laterality Date    BILATERAL SALPINGO OOPHORECTOMY  03/10/2015    DR NATALY THOMPSON    BREAST LUMPECTOMY       SECTION      COLONOSCOPY      COSMETIC SURGERY  2018    basal cell removal R Ear    HYSTERECTOMY  03/10/2015    [facility] (DR NATALY THOMPSON)    HYSTEROSCOPY      complete    INGUINAL HERNIA REPAIR Right 2008    LAPAROSCOPIC ASSISTED VAGINAL HYSTERECTOMY SALPINGO OOPHORECTOMY      to remove cyst    LYSIS OF ABDOMINAL ADHESIONS  03/10/2015    DR NATALY THOMPSON    SUBTOTAL HYSTERECTOMY  3/2013    Left ovary    TUBAL ABDOMINAL LIGATION  2010    WISDOM TOOTH EXTRACTION  1986         Social History     Socioeconomic History    Marital status:     Number of children: 1    Highest education level: High school graduate   Tobacco Use    Smoking status: Light Smoker     Packs/day: 0.50     Years: 18.00     Additional pack years: 0.00     Total pack years: 9.00     Types: Cigarettes     Passive exposure: Current    Smokeless tobacco: Never    Tobacco comments:     In process of quitting, using nicotine patches   Vaping Use    Vaping Use: Never used   Substance and Sexual Activity    Alcohol use: Not Currently    Drug use: No    Sexual activity: Defer     Partners: Male      Birth control/protection: Surgical, Post-menopausal         Family History   Problem Relation Age of Onset    Alzheimer's disease Other     Cancer Other     Dementia Other     Hypertension Other     Parkinsonism Other     Osteoporosis Mother     Diabetes Mother     Hypertension Mother     Obesity Mother     Depression Mother     Arthritis Mother     COPD Mother     Hearing loss Mother     Hyperlipidemia Mother     Breast cancer Maternal Grandmother     Osteoporosis Maternal Grandmother     Diabetes Maternal Grandmother     Dementia Maternal Grandmother     Cancer Maternal Grandmother         Breast Cancer    Hypertension Maternal Grandmother     ADD / ADHD Sister     Alcohol abuse Sister     Anxiety disorder Sister     Bipolar disorder Sister     Depression Sister     Drug abuse Sister     Depression Father     Anxiety disorder Father     Other Father          parkinsons disease    OCD Neg Hx     Paranoid behavior Neg Hx     Schizophrenia Neg Hx     Seizures Neg Hx     Self-Injurious Behavior  Neg Hx     Suicide Attempts Neg Hx        REVIEW OF SYSTEMS: All systems reviewed and not pertinent unless noted.    Review of Systems   Musculoskeletal:  Positive for back pain and myalgias.   All other systems reviewed and are negative.      Objective    Physical Exam  Vitals and nursing note reviewed.   Constitutional:       Appearance: Normal appearance.   HENT:      Head: Normocephalic and atraumatic.   Eyes:      Extraocular Movements: Extraocular movements intact.      Pupils: Pupils are equal, round, and reactive to light.   Cardiovascular:      Rate and Rhythm: Normal rate and regular rhythm.      Pulses: Normal pulses.      Heart sounds: Normal heart sounds.   Pulmonary:      Effort: Pulmonary effort is normal.      Breath sounds: Normal breath sounds.   Abdominal:      General: Abdomen is flat. Bowel sounds are normal.      Palpations: Abdomen is soft.   Musculoskeletal:         General: Tenderness present.       Cervical back: Normal range of motion and neck supple.      Comments: Mild tenderness to lumbar spine   Skin:     Capillary Refill: Capillary refill takes less than 2 seconds.   Neurological:      General: No focal deficit present.      Mental Status: She is alert and oriented to person, place, and time. Mental status is at baseline.      GCS: GCS eye subscore is 4. GCS verbal subscore is 5. GCS motor subscore is 6.      Sensory: Sensation is intact.      Motor: Motor function is intact.      Gait: Gait is intact.   Psychiatric:         Attention and Perception: Attention and perception normal.         Mood and Affect: Mood and affect normal.         Speech: Speech normal.         Behavior: Behavior normal. Behavior is cooperative.           Procedures    ED Course:         Lab Results (last 24 hours)       ** No results found for the last 24 hours. **             XR Spine Lumbar 2 or 3 View    Result Date: 12/28/2023  PROCEDURE: XR SPINE LUMBAR 2 OR 3 VW-  HISTORY: Back pain  3 views  FINDINGS:  No fracture is identified. Disc spaces are well preserved. Alignment is normal.      Impression: Unremarkable lumbar spine series.   This report was signed and finalized on 12/28/2023 3:31 PM by Francis Mora MD.      US Breast Right Limited    Result Date: 12/27/2023  US BREAST LIMITED RIGHT ON 12/27/2023 HISTORY: Additional imaging right breast secondary to finding seen on screening exam from 12/15/2023 COMPARISON: Mammogram 12/15/2023 LIMITATIONS: None RISK ASSESSMENT: 10.5% TECHNIQUE: Targeted ultrasound right breast FINDINGS: ULTRASOUND RIGHT BREAST: Ultrasound of the right breast was performed from 9-12 o'clock in the posterior depth. At 11:00 o'clock, 10 cm from the nipple there is a hypoechoic 14 x 9 mm lesion with internal vascularity possibly an intramammary lymph node corresponding to size and location the mammographic abnormality. As the positioning is slightly different on mammography is compared to ultrasound  6-month follow-up right diagnostic mammogram and breast ultrasound is recommended to ensure stability. Please note that mammography is not 100% accurate in diagnosing breast cancer. A routine breast exam is recommended to correlate with mammographic findings. Any palpable abnormality must be assessed clinically.  If the patient has a new palpable abnormality, then a Diagnostic Mammogram and/or Breast Ultrasound is indicated if clinically appropriate.    Impression: RIGHT BREAST: Probably benign findings right breast. RECOMMENDATIONS: 6-month follow-up right diagnostic mammogram and breast ultrasound is recommended to ensure stability. These findings and recommendations were relayed to the patient at the time and date of dictation. ASSESSMENT: BI-RADS 3 Probably Benign RECOMMENDATION: 6 month follow up.      Medical Decision Making  This is a 53-year-old female patient comes into the ED today complaining of low back pain that started approximately 6 days ago and is progressively worsened over the interval.  Patient states she has a history of chronic back issues but still had a couple days where she was cleaning her home and started having a back flareup.  Patient states is progressively worsened over that time and is having short stabbing pains down her right leg    DDX: includes but is not limited to: Lumbago, sciatica, back fracture, lumbar radiculopathy    Problems Addressed:  Right-sided low back pain with right-sided sciatica, unspecified chronicity: complicated acute illness or injury    Amount and/or Complexity of Data Reviewed  Radiology: ordered. Decision-making details documented in ED Course.     Details: I have personally reviewed and documented all results  Discussion of management or test interpretation with external provider(s): Discussed assessment, treatment and plan with ER attending    Risk  OTC drugs.  Prescription drug management.  Risk Details: I have discussed with patient the finding of the  test preformed today. Patient has been diagnosed with right sided low back pain with right-sided sciatica and will be discharged home.  Patient requested to follow-up with primary care provider within the next 7 days for reevaluation. Strict return precautions have been given and patient verbalizes understanding                  Final diagnoses:   Right-sided low back pain with right-sided sciatica, unspecified chronicity          Steve Hobson, CHRIS  12/28/23 171

## 2024-01-04 ENCOUNTER — OFFICE VISIT (OUTPATIENT)
Age: 54
End: 2024-01-04
Payer: MEDICAID

## 2024-01-04 VITALS
BODY MASS INDEX: 30.07 KG/M2 | SYSTOLIC BLOOD PRESSURE: 126 MMHG | DIASTOLIC BLOOD PRESSURE: 84 MMHG | HEART RATE: 80 BPM | OXYGEN SATURATION: 97 % | WEIGHT: 176.1 LBS | HEIGHT: 64 IN

## 2024-01-04 DIAGNOSIS — F41.1 GENERALIZED ANXIETY DISORDER: ICD-10-CM

## 2024-01-04 DIAGNOSIS — F33.1 MAJOR DEPRESSIVE DISORDER, RECURRENT EPISODE, MODERATE DEGREE: ICD-10-CM

## 2024-01-04 RX ORDER — DULOXETIN HYDROCHLORIDE 60 MG/1
60 CAPSULE, DELAYED RELEASE ORAL 2 TIMES DAILY
Qty: 60 CAPSULE | Refills: 0 | Status: SHIPPED | OUTPATIENT
Start: 2024-01-04

## 2024-01-04 RX ORDER — CLONAZEPAM 0.5 MG/1
0.5 TABLET ORAL NIGHTLY PRN
Qty: 30 TABLET | Refills: 0 | Status: SHIPPED | OUTPATIENT
Start: 2024-01-04

## 2024-01-04 RX ORDER — CYCLOBENZAPRINE HCL 10 MG
1 TABLET ORAL 3 TIMES DAILY
COMMUNITY
Start: 2023-12-16

## 2024-01-04 RX ORDER — TRAZODONE HYDROCHLORIDE 100 MG/1
100 TABLET ORAL NIGHTLY
Qty: 30 TABLET | Refills: 0 | Status: SHIPPED | OUTPATIENT
Start: 2024-01-04

## 2024-01-04 RX ORDER — LURASIDONE HYDROCHLORIDE 80 MG/1
80 TABLET, FILM COATED ORAL EVERY EVENING
Qty: 30 TABLET | Refills: 0 | Status: SHIPPED | OUTPATIENT
Start: 2024-01-04

## 2024-01-04 NOTE — PROGRESS NOTES
[facility]             Follow Up Office Visit      Date: 2023   Patient Name: Marguerite Anderson  : 1970   MRN: [id number]     Referring Provider: Juan Clemens MD    Chief Complaint:      ICD-10-CM ICD-9-CM   1. Major depressive disorder, recurrent episode, moderate degree  F33.1 296.32   2. Generalized anxiety disorder  F41.1 300.02     History of Present Illness:   Marguerite Anderson is a 53 y.o. female who is here today for follow up with MDD/JAY.    Patient is seen and evaluated in the office.  She appears to be well groomed.  She is more dysphoric today, but is cooperative with the evaluation and exhibits a linear and goal-directed thought process.  Patient immediately becomes tearful when talking about the holidays.  She states that although she made it through and got everything done, it was not enjoyable for her.  She states that it was fun to see people, but it was a lot of stress on her because people came to her house for dinner.  She states that she was in pain with her back the whole time.  She states that now that the holidays are over she feels as though her weight is lifted off of her shoulders.  She admits that she did feel good that she was able to get her [employer] shopping done early this year, so that was when the stress on her.  Patient states that she still feels as though she has so much to do.  She has to get out of the house more recently, and she does not believe this is good for her.  She has a lot of doctor's appointments.  Patient had a mammogram in December, and she states that they found the mass.  She has been worrying and over thinking about this.  She has an upcoming appointment with a surgeon regarding treatment plans.  Patient states that she is also in PT, which she describes as being very painful.  We talked about how as she progresses this will get easier for her because muscles will start to loosen up more and strengthen.   Patient is agreeable.  She does feel as though she is sleeping better with the Latuda.  She had been feeling noticeable effects with the medication, but now has felt a regression.  She has experienced passive suicidal ideation, but no intent.  Today, we discussed medication options.  We will increase Latuda to 80 mg, and follow-up in 1 month to see if this adjustment has helped with mood.    Previous Medication Trials:  Klonopin, Celexa, Wellbutrin, Lexapro, Abilify, Seroquel    Subjective     Review of Systems:   Review of Systems   Constitutional:  Negative for chills, fatigue and fever.   HENT:  Negative for congestion, hearing loss, sore throat and trouble swallowing.    Eyes:  Negative for blurred vision and double vision.   Respiratory:  Negative for cough, chest tightness and shortness of breath.    Cardiovascular:  Negative for chest pain and palpitations.   Gastrointestinal:  Negative for abdominal pain, constipation, diarrhea, nausea and vomiting.   Endocrine: Negative for polydipsia and polyuria.   Genitourinary:  Negative for hematuria and urgency.   Musculoskeletal:  Negative for arthralgias.   Skin:  Negative for skin lesions and bruise.   Neurological:  Negative for dizziness, tremors, seizures and light-headedness.   Hematological:  Negative for adenopathy.     Medications:     Current Outpatient Medications:     Advair Diskus 250-50 MCG/ACT DISKUS, INHALE 1 PUFF BY MOUTH DAILY, Disp: 60 each, Rfl: 11    albuterol sulfate  (90 Base) MCG/ACT inhaler, INHALE 2 PUFFS INTO THE LUNGS EVERY 4 HOURS AS NEEDED, Disp: 18 g, Rfl: 1    clonazePAM (KlonoPIN) 0.5 MG tablet, Take 1 tablet by mouth At Night As Needed for Anxiety., Disp: 30 tablet, Rfl: 0    cyclobenzaprine (FLEXERIL) 10 MG tablet, Take 1 tablet by mouth 3 times a day., Disp: , Rfl:     Diclofenac Sodium (VOLTAREN) 1 % gel gel, APPLY 4 GRAMS TOPICALLY TO AFFECTED AREA 4 TIMES A DAY AS NEEDED FOR PAIN, Disp: 100 g, Rfl: 3    DULoxetine  "(CYMBALTA) 60 MG capsule, Take 1 capsule by mouth 2 (Two) Times a Day., Disp: 60 capsule, Rfl: 0    estradiol 0.5 MG/0.5GM gel, Place 1 application  on the skin as directed by provider Daily., Disp: 30 each, Rfl: 3    levETIRAcetam (KEPPRA) 750 MG tablet, Take 1 tablet by mouth 2 (Two) Times a Day., Disp: 180 tablet, Rfl: 1    Lidocaine 4 %, Place 1 patch on the skin as directed by provider Daily for 15 doses., Disp: 15 patch, Rfl: 0    nicotine (Nicoderm CQ) 21 MG/24HR patch, Place 1 patch on the skin as directed by provider Daily., Disp: 30 patch, Rfl: 4    Rimegepant Sulfate (Nurtec) 75 MG tablet dispersible tablet, Place 1 tablet under the tongue Take As Directed., Disp: 16 tablet, Rfl: 5    rOPINIRole (REQUIP) 0.5 MG tablet, Take 1 tablet by mouth Every Night. Take 1 hour before bedtime., Disp: 30 tablet, Rfl: 2    topiramate (TOPAMAX) 100 MG tablet, Take 1 tablet by mouth 2 (Two) Times a Day., Disp: 180 tablet, Rfl: 1    traZODone (DESYREL) 100 MG tablet, Take 1 tablet by mouth Every Night., Disp: 30 tablet, Rfl: 0    valACYclovir (VALTREX) 500 MG tablet, Take 1 tablet by mouth 2 (Two) Times a Day As Needed (as needed for fever blisters)., Disp: 90 tablet, Rfl: 1    vitamin B-12 (CYANOCOBALAMIN) 1000 MCG tablet, Take 1 tablet by mouth Daily., Disp: 90 tablet, Rfl: 3    vitamin B-6 (PYRIDOXINE) 25 MG tablet, Take 1 tablet by mouth Daily., Disp: 90 tablet, Rfl: 3    Lurasidone HCl (Latuda) 80 MG tablet tablet, Take 1 tablet by mouth Every Evening., Disp: 30 tablet, Rfl: 0    Medication Considerations:  ASYA reviewed and appropriate.     Allergies:   No Known Allergies    Objective     Vital Signs:   Vitals:    01/04/24 1403   BP: 126/84   Pulse: 80   SpO2: 97%   Weight: 79.9 kg (176 lb 1.6 oz)   Height: 162.6 cm (64.02\")       Body mass index is 30.21 kg/m².     Mental Status Exam:   MENTAL STATUS EXAM   General Appearance:  Cleanly groomed and dressed  Eye Contact:  Good eye contact  Attitude:  " Cooperative  Motor Activity:  Normal gait, posture  Muscle Strength:  Normal  Speech:  Soft spoken  Language:  Spontaneous  Mood and affect:  Anxious and depressed  Hopelessness:  7  Thought Process:  Logical and goal-directed  Associations/ Thought Content:  No delusions  Hallucinations:  None  Suicidal Ideations:  Passive ideation  Homicidal Ideation:  Not present  Sensorium:  Alert  Orientation:  Person, place, time and situation  Immediate Recall, Recent, and Remote Memory:  Intact  Attention Span/ Concentration:  Good  Fund of Knowledge:  Appropriate for age and educational level  Intellectual Functioning:  Average range  Insight:  Fair  Judgement:  Fair  Reliability:  Fair  Impulse Control:  Fair      SUICIDE RISK ASSESSMENT/CSSRS:  1. Does patient have thoughts of suicide? passive  2. Does patient have intent for suicide? denies  3. Does patient have a current plan for suicide? denies  4. History of suicide attempts: yes; in 2010 when 1st  was diagnosed with liver cirrhosis  5. Family history of suicide or attempts: denies  6. History of violent behaviors towards others or property or thoughts of committing suicide: denies  7. History of sexual aggression toward others: denies  8. Access to firearms or weapons: denies    Assessment / Plan      Visit Diagnosis/Orders Placed This Visit:  Diagnoses and all orders for this visit:    1. Major depressive disorder, recurrent episode, moderate degree (Primary)  2. Generalized anxiety disorder  Re-ordered EKG  Increase Latuda to 80 mg po qpm  Continue Trazodone 100 mg po qpm   Continue Klonopin 0.5 mg po daily for now (plan to DC in future)  Continue Cymbalta 60 mg po BID for now (plan to titrate off of this as well)  Continue therapy  UDS Reviewed: UDS from 11/2/23 reviewed  Labs Reviewed : labs from11/3/23 reviewed  Chart Reviewed      Functional Status: Mild impairment      Prognosis: Good with Ongoing Treatment     Impression/Formulation:  Patient appeared  alert and oriented.  Patient is voluntarily requesting to begin outpatient therapy at [facility].  Patient is receptive to assistance with maintaining a stable lifestyle.  Patient presents with history of     ICD-10-CM ICD-9-CM   1. Major depressive disorder, recurrent episode, moderate degree  F33.1 296.32   2. Generalized anxiety disorder  F41.1 300.02     Treatment Plan:     Patient will continue supportive psychotherapy efforts and medications as indicated. Clinic will obtain release of information for current treatment team for continuity of care as needed. Patient will contact this office, call 911 or present to the nearest emergency room should suicidal or homicidal ideations occur.  Discussed medication options and treatment plan of prescribed medication(s) as well as the risks, benefits, and potential side effects. Patient ackowledged and verbally consented to continue with current treatment plan and was educated on the importance of compliance with treatment and follow-up appointments.     Quality Measures:  Tobacco: Marguerite Anderson  reports that she quit smoking about 3 weeks ago. Her smoking use included cigarettes. She has a 9.00 pack-year smoking history. She has been exposed to tobacco smoke. She has never used smokeless tobacco.. I have educated her on the risk of diseases from using tobacco products such as cancer, COPD, and heart disease.     I advised her to quit and she is not willing to quit.    I spent 5 minutes counseling the patient.    Depression (PHQ >11): Patient screened positive for depression based on a PHQ-9 score of 20 on 1/4/2024. Follow-up recommendations include:  follow up with writer in 1 mo, continue medications as prescribed, continue therapy .     Follow Up:   Return in about 1 month (around 2/4/2024) for Medication Management, follow up with therapy.    Alissa Flynn MD  [facility]     This is electronically  signed by Alissa Flynn MD   12/05/2023 14:47 EST

## 2024-01-08 ENCOUNTER — OFFICE VISIT (OUTPATIENT)
Dept: NEUROLOGY | Facility: CLINIC | Age: 54
End: 2024-01-08
Payer: MEDICAID

## 2024-01-08 VITALS
TEMPERATURE: 97.1 F | BODY MASS INDEX: 30.29 KG/M2 | DIASTOLIC BLOOD PRESSURE: 82 MMHG | SYSTOLIC BLOOD PRESSURE: 110 MMHG | HEART RATE: 75 BPM | HEIGHT: 64 IN | WEIGHT: 177.4 LBS | OXYGEN SATURATION: 99 %

## 2024-01-08 DIAGNOSIS — F44.5 PSYCHOGENIC NONEPILEPTIC SEIZURE: ICD-10-CM

## 2024-01-08 DIAGNOSIS — G40.219: Primary | ICD-10-CM

## 2024-01-08 DIAGNOSIS — G43.019 INTRACTABLE MIGRAINE WITHOUT AURA AND WITHOUT STATUS MIGRAINOSUS: ICD-10-CM

## 2024-01-08 PROCEDURE — 99214 OFFICE O/P EST MOD 30 MIN: CPT | Performed by: NURSE PRACTITIONER

## 2024-01-08 PROCEDURE — 1159F MED LIST DOCD IN RCRD: CPT | Performed by: NURSE PRACTITIONER

## 2024-01-08 PROCEDURE — 3074F SYST BP LT 130 MM HG: CPT | Performed by: NURSE PRACTITIONER

## 2024-01-08 PROCEDURE — 3079F DIAST BP 80-89 MM HG: CPT | Performed by: NURSE PRACTITIONER

## 2024-01-08 PROCEDURE — 1160F RVW MEDS BY RX/DR IN RCRD: CPT | Performed by: NURSE PRACTITIONER

## 2024-01-08 RX ORDER — OXYCODONE HYDROCHLORIDE AND ACETAMINOPHEN 5; 325 MG/1; MG/1
1 TABLET ORAL EVERY 12 HOURS SCHEDULED
COMMUNITY
Start: 2024-01-05

## 2024-01-08 RX ORDER — LEVETIRACETAM 750 MG/1
750 TABLET ORAL 2 TIMES DAILY
Qty: 180 TABLET | Refills: 3 | Status: SHIPPED | OUTPATIENT
Start: 2024-01-08

## 2024-01-08 NOTE — PROGRESS NOTES
Patient: Marguerite Anderson    YOB: 1970    Date: 01/09/2024    Primary Care Provider: Juan Clemens MD    Chief Complaint   Patient presents with    Breast Mass       Subjective .     History of present illness: Patient here for evaluation of an abnormal mammogram.  She appreciates no masses or nipple discharge.  Has had a prior fibroadenoma removed from the right breast.    The following portions of the patient's history were reviewed and updated as appropriate: allergies, current medications, past family history, past medical history, past social history, past surgical history and problem list.    Review of Systems   Constitutional:  Negative for chills, fever and unexpected weight change.   HENT:  Negative for hearing loss, trouble swallowing and voice change.    Eyes:  Negative for visual disturbance.   Respiratory:  Negative for apnea, cough, chest tightness, shortness of breath and wheezing.    Cardiovascular:  Negative for chest pain, palpitations and leg swelling.   Gastrointestinal:  Negative for abdominal distention, abdominal pain, anal bleeding, blood in stool, constipation, diarrhea, nausea, rectal pain and vomiting.   Endocrine: Negative for cold intolerance and heat intolerance.   Genitourinary:  Negative for difficulty urinating, dysuria and flank pain.   Musculoskeletal:  Negative for back pain and gait problem.   Skin:  Negative for color change, rash and wound.   Neurological:  Negative for dizziness, syncope, speech difficulty, weakness, light-headedness, numbness and headaches.   Hematological:  Negative for adenopathy. Does not bruise/bleed easily.   Psychiatric/Behavioral:  Negative for confusion. The patient is not nervous/anxious.        History:  Past Medical History:   Diagnosis Date    Abdominal pain, epigastric     Abnormal Pap smear of cervix hpv    Acute sinusitis     Acute UTI (urinary tract infection)     ADHD (attention deficit hyperactivity disorder) 2008     asked to be tested not diagnosed officially    Angina, intestinal     Anxiety     Anxiety     Arthritis     Asthma not sure    COPD    Back pain     chronic    Bipolar disorder 2019    Breast mass, right     Cancer 2018    basal cell R ear    Cervical dysplasia     was frozen off    Chronic hepatitis C virus infection     COPD (chronic obstructive pulmonary disease)     Depression     Diarrhea     Diverticulosis     Elevated LFTs     Fatigue     Glaucoma 2020    Hepatitis C 1994    cured with meds    History of echocardiogram     History of endometriosis     History of Papanicolaou smear of cervix 2014    NORMAL     History of stress test     denies any current palpitations, chest pain, dizziness - 23    HPV (human papilloma virus) infection     HTN (hypertension)     IBS (irritable bowel syndrome)     Inflammatory bowel disease     Insomnia     Kidney stone     Low back pain     Menopausal symptoms     Migraine headache     Nausea & vomiting     Neck pain     Osteopenia     Other hyperlipidemia     Ovarian cyst     Pelvic adhesive disease     Radicular pain of left lower extremity     Restless leg syndrome     Seizure disorder     grand mal infrequent; focal siezures as often as daily - has a neurologist    Tobacco abuse     Trochanteric bursitis     Urinary incontinence     Visual impairment     wear contacts/glasses    Vitamin B 12 deficiency           Past Surgical History:   Procedure Laterality Date    BILATERAL SALPINGO OOPHORECTOMY  03/10/2015    DR NATALY THOMPSON    BREAST LUMPECTOMY       SECTION      COLONOSCOPY      COSMETIC SURGERY  2018    basal cell removal R Ear    HYSTERECTOMY  03/10/2015    [facility] (DR NATALY THOMPSON)    HYSTEROSCOPY  2013    complete    INGUINAL HERNIA REPAIR Right 2008    LAPAROSCOPIC ASSISTED VAGINAL HYSTERECTOMY SALPINGO OOPHORECTOMY      to remove cyst    LYSIS OF ABDOMINAL ADHESIONS  03/10/2015    DR NATALY THOMPSON    SUBTOTAL HYSTERECTOMY   3/2013    Left ovary    TUBAL ABDOMINAL LIGATION      WISDOM TOOTH EXTRACTION  1986       Family History   Problem Relation Age of Onset    Alzheimer's disease Other     Cancer Other     Dementia Other     Hypertension Other     Parkinsonism Other     Osteoporosis Mother     Diabetes Mother     Hypertension Mother     Obesity Mother     Depression Mother     Arthritis Mother     COPD Mother     Hearing loss Mother     Hyperlipidemia Mother     Breast cancer Maternal Grandmother     Osteoporosis Maternal Grandmother     Diabetes Maternal Grandmother     Dementia Maternal Grandmother     Cancer Maternal Grandmother         Breast Cancer    Hypertension Maternal Grandmother     ADD / ADHD Sister     Alcohol abuse Sister     Anxiety disorder Sister     Bipolar disorder Sister     Depression Sister     Drug abuse Sister     Depression Father     Anxiety disorder Father     Other Father          parkinsons disease    OCD Neg Hx     Paranoid behavior Neg Hx     Schizophrenia Neg Hx     Seizures Neg Hx     Self-Injurious Behavior  Neg Hx     Suicide Attempts Neg Hx        Social History     Tobacco Use    Smoking status: Former     Packs/day: 0.50     Years: 18.00     Additional pack years: 0.00     Total pack years: 9.00     Types: Cigarettes     Quit date: 2023     Years since quittin.0     Passive exposure: Current    Smokeless tobacco: Never    Tobacco comments:     In process of quitting, using nicotine patches   Vaping Use    Vaping Use: Never used   Substance Use Topics    Alcohol use: Yes     Comment: occas    Drug use: No       Allergies:  No Known Allergies    Medications:     Current Outpatient Medications:     Advair Diskus 250-50 MCG/ACT DISKUS, INHALE 1 PUFF BY MOUTH DAILY, Disp: 60 each, Rfl: 11    albuterol sulfate  (90 Base) MCG/ACT inhaler, INHALE 2 PUFFS INTO THE LUNGS EVERY 4 HOURS AS NEEDED, Disp: 18 g, Rfl: 1    clonazePAM (KlonoPIN) 0.5 MG tablet, Take 1 tablet by mouth At  Night As Needed for Anxiety., Disp: 30 tablet, Rfl: 0    cyclobenzaprine (FLEXERIL) 10 MG tablet, Take 1 tablet by mouth 3 times a day., Disp: , Rfl:     Diclofenac Sodium (VOLTAREN) 1 % gel gel, APPLY 4 GRAMS TOPICALLY TO AFFECTED AREA 4 TIMES A DAY AS NEEDED FOR PAIN, Disp: 100 g, Rfl: 3    DULoxetine (CYMBALTA) 60 MG capsule, Take 1 capsule by mouth 2 (Two) Times a Day., Disp: 60 capsule, Rfl: 0    estradiol 0.5 MG/0.5GM gel, Place 1 application  on the skin as directed by provider Daily., Disp: 30 each, Rfl: 3    levETIRAcetam (KEPPRA) 750 MG tablet, Take 1 tablet by mouth 2 (Two) Times a Day., Disp: 180 tablet, Rfl: 3    Lidocaine 4 %, Place 1 patch on the skin as directed by provider Daily for 15 doses., Disp: 15 patch, Rfl: 0    Lurasidone HCl (Latuda) 80 MG tablet tablet, Take 1 tablet by mouth Every Evening., Disp: 30 tablet, Rfl: 0    nicotine (Nicoderm CQ) 21 MG/24HR patch, Place 1 patch on the skin as directed by provider Daily., Disp: 30 patch, Rfl: 4    oxyCODONE-acetaminophen (PERCOCET) 5-325 MG per tablet, Take 1 tablet by mouth Every 12 (Twelve) Hours., Disp: , Rfl:     Rimegepant Sulfate (Nurtec) 75 MG tablet dispersible tablet, Place 1 tablet under the tongue Take As Directed., Disp: 16 tablet, Rfl: 5    rOPINIRole (REQUIP) 0.5 MG tablet, Take 1 tablet by mouth Every Night. Take 1 hour before bedtime., Disp: 30 tablet, Rfl: 2    topiramate (TOPAMAX) 100 MG tablet, Take 1 tablet by mouth 2 (Two) Times a Day., Disp: 180 tablet, Rfl: 1    traZODone (DESYREL) 100 MG tablet, Take 1 tablet by mouth Every Night., Disp: 30 tablet, Rfl: 0    valACYclovir (VALTREX) 500 MG tablet, Take 1 tablet by mouth 2 (Two) Times a Day As Needed (as needed for fever blisters)., Disp: 90 tablet, Rfl: 1    vitamin B-12 (CYANOCOBALAMIN) 1000 MCG tablet, Take 1 tablet by mouth Daily., Disp: 90 tablet, Rfl: 3    vitamin B-6 (PYRIDOXINE) 25 MG tablet, Take 1 tablet by mouth Daily., Disp: 90 tablet, Rfl: 3    Objective  "    Vital Signs:   Vitals:    01/09/24 1132   BP: 120/84   Pulse: 74   Temp: 97.5 °F (36.4 °C)   SpO2: 97%   Weight: 81.2 kg (179 lb)   Height: 162.6 cm (64.02\")       Physical Exam:     General Appearance:    Alert, cooperative, in no acute distress   Head:    Normocephalic, without obvious abnormality, atraumatic   Eyes:            Normal.  No scleral icterus.  PERRLA    Lungs:     Clear to auscultation,respirations regular, even and                  unlabored    Heart:    Regular rhythm and normal rate, normal S1 and S2, no            murmur   Extremities:   Moves all extremities well, no edema, no cyanosis, no             redness   Skin:   No bleeding, bruising or rash   Neurologic:   Normal without gross deficits.   Psychiatric: No evidence of depression or anxiety   Breast exam: Bilateral breast exam is normal.         Results Review:   I reviewed the patient's new clinical results.  Today's ultrasound was reviewed as were the outside mammogram and ultrasound.    Review of Systems was reviewed and confirmed as accurate as documented by the MA.    Assessment / Plan:    1. Abnormal mammogram        No obvious concerning abnormalities visualized on ultrasound today or on physical examination.  Findings may be consistent with an intramammary lymph node again seen today on ultrasound.  I recommend she follow-up for repeat mammogram and ultrasound as recommended 6 months from her previous mammogram.  I will follow her up after that.    Electronically signed by Juan Bentley MD  01/09/24  12:17 EST                  "

## 2024-01-08 NOTE — PROGRESS NOTES
"     Follow Up Office Visit      Patient Name: Marguerite Anderson  : 1970   MRN: [id number]     Chief Complaint:    Chief Complaint   Patient presents with    Follow-up     Patient in office to follow up on migraines and seizures.       Seizures     Patient states her last seizure was in November.        History of Present Illness: Marguerite Anderson is a 53 y.o. female who is here today to establish care for seizure disorder and was last seen on 7/10/2023 by MART Medeiros, [facility], for migraines.  She is taking Keppra 750mg BID and reports good compliance and toleration.  She feels the Keppra helps with her seizures.  She states, \"I also think what has helped is my psychiatrist has had me on Clonazepam\".  Says her psychiatrist is getting ready to wean her off that because she is seeing a pain clinic and being prescribed Oxycodone.  She is also taking Topiramate 100mg BID for migraines.  Her first seizure was in .  Her most recent seizure was in 2023- described as \"I got the silver flakies in my vision and I tried to dulce them, I sat down on my couch and I woke up on the floor\"; no urine continence; no tongue bites.  She has had urine incontinence with previous seizures.  Additional risk factors- BMI 30, vape use and nicotine patch (trying to stop smoking cigarette), mood disorder, COPD, chronic pain, RLS, insomnia.   *MIGRAINES- awaiting Botox approval (previously had Botox injections and they worked very well for her); having at least 15/30 migraine days per month; taking Nurtec 75mg every other day and it does help with migraines; taking Topiramate 100mg BID.   *She saw Dr. Loera, in , and had an EMU stay- it was thought her episodes of loss of time were most likely psychogenic nonepileptic seizures.    *MRI brain with and without contrast on 2019 showed-   IMPRESSION:  1. No evidence of acute infarct.  2. Isolated small right frontal lobe developmental venous anomaly.  3. " "Prominent perivascular space versus small old lacunar infarct within  the left basal ganglia.  4. Nonspecific punctate focus of T2/FLAIR hyperintensity adjacent to the  right lateral ventricle, which likely relates to developing age-related  changes/microangiopathy.    Following taken from previous visit note with MART Medeiros:  Marguerite Anderson is a 53 y.o. female who is here today to follow up with headaches. She was last seen here on 10/19/2022. She says she would like to get Botox again as her headache days are increasing back. She reports having had Botox a couple years ago and this really decreased her HA days significantly. She is on Nurtec currently. The migraines can be either side of her head and describes as a pulsating and throbbing. The Nurtec helps to decrease the length of her migraine and it will subside \"within the hour\" and without the Nurtec she says she has to go to bed and they will last 6-8 hrs. She has +photophobia and nausea associated with these. She is currently on Topamax, She has tried and failed Duloxetine, gabapentin, Propranolol, Relpax, Maxalt, SSRIs, AEDs, NSAIDs, Muscle Relaxers, Tylenol.    She reports \"light\" seizure last week and reports \"I come to on the floor but I don't think I lost consciousness. I was able to get myself back up to the couch..I have a lot of absent seizures\". She says she felt drowsy after this x 15 minutes. She did not loose control of her bowels or bladder. She reports she has not missed any doses of her medication.  She is currently on Keppra 750 mg twice daily along with Topamax 100 mg twice daily . She reports she does not live alone. She has been counseled on status epilepticus and making sure that her family is aware to call 911 if she is nearing 5 minutes of sz activity.   I reviewed both CTA of the head on 3/9/2020 and MRI with and without contrast of the brain on 7/15/2019.  I also reviewed her EEG on 4/23/2019 that was normal awake and asleep study. " "   Pertinent Medical History: Sz, chronic neck and low back pain, anxiety, depression.    Following taken from visit on 4/27/2021 with [facility] neurology:  Ms. Anderson is here today for follow-up on seizure disorder.  She reports to me a long history of seizures, previously diagnosed with partial complex seizures and absence seizures.  She has been a long-term patient of Dr. Ho for many years.  She is been on Keppra 750 mg twice a day as well as Topamax 100 mg twice a day for quite some time.  She is here asking that we fill out some papers for disability.  She reports that she has about 1 convulsive seizure every couple months and a couple \"absence seizures\" every couple months.  She saw Dr. Cross back in 2019, she was admitted for inpatient monitoring, her witnessed episodes were nonepileptic in nature, at that time Dr. Cross recommended that she continue her Keppra and Topamax as she was having rare convulsive seizures and he also sent her to psychiatry.  Her last mild seizure was about a week ago, occurred after her dog was killed, short duration with postictal time by report.     Subjective      Review of Systems:   Review of Systems   Neurological:  Positive for seizures and headache.       I have reviewed and the following portions of the patient's history were updated as appropriate: past family history, past medical history, past social history, past surgical history and problem list.    Medications:     Current Outpatient Medications:     levETIRAcetam (KEPPRA) 750 MG tablet, Take 1 tablet by mouth 2 (Two) Times a Day., Disp: 180 tablet, Rfl: 3    oxyCODONE-acetaminophen (PERCOCET) 5-325 MG per tablet, Take 1 tablet by mouth Every 12 (Twelve) Hours., Disp: , Rfl:     Advair Diskus 250-50 MCG/ACT DISKUS, INHALE 1 PUFF BY MOUTH DAILY, Disp: 60 each, Rfl: 11    albuterol sulfate  (90 Base) MCG/ACT inhaler, INHALE 2 PUFFS INTO THE LUNGS EVERY 4 HOURS AS NEEDED, Disp: 18 g, Rfl: " 1    clonazePAM (KlonoPIN) 0.5 MG tablet, Take 1 tablet by mouth At Night As Needed for Anxiety., Disp: 30 tablet, Rfl: 0    cyclobenzaprine (FLEXERIL) 10 MG tablet, Take 1 tablet by mouth 3 times a day., Disp: , Rfl:     Diclofenac Sodium (VOLTAREN) 1 % gel gel, APPLY 4 GRAMS TOPICALLY TO AFFECTED AREA 4 TIMES A DAY AS NEEDED FOR PAIN, Disp: 100 g, Rfl: 3    DULoxetine (CYMBALTA) 60 MG capsule, Take 1 capsule by mouth 2 (Two) Times a Day., Disp: 60 capsule, Rfl: 0    estradiol 0.5 MG/0.5GM gel, Place 1 application  on the skin as directed by provider Daily., Disp: 30 each, Rfl: 3    Lidocaine 4 %, Place 1 patch on the skin as directed by provider Daily for 15 doses., Disp: 15 patch, Rfl: 0    Lurasidone HCl (Latuda) 80 MG tablet tablet, Take 1 tablet by mouth Every Evening., Disp: 30 tablet, Rfl: 0    nicotine (Nicoderm CQ) 21 MG/24HR patch, Place 1 patch on the skin as directed by provider Daily., Disp: 30 patch, Rfl: 4    Rimegepant Sulfate (Nurtec) 75 MG tablet dispersible tablet, Place 1 tablet under the tongue Take As Directed., Disp: 16 tablet, Rfl: 5    rOPINIRole (REQUIP) 0.5 MG tablet, Take 1 tablet by mouth Every Night. Take 1 hour before bedtime., Disp: 30 tablet, Rfl: 2    topiramate (TOPAMAX) 100 MG tablet, Take 1 tablet by mouth 2 (Two) Times a Day., Disp: 180 tablet, Rfl: 1    traZODone (DESYREL) 100 MG tablet, Take 1 tablet by mouth Every Night., Disp: 30 tablet, Rfl: 0    valACYclovir (VALTREX) 500 MG tablet, Take 1 tablet by mouth 2 (Two) Times a Day As Needed (as needed for fever blisters)., Disp: 90 tablet, Rfl: 1    vitamin B-12 (CYANOCOBALAMIN) 1000 MCG tablet, Take 1 tablet by mouth Daily., Disp: 90 tablet, Rfl: 3    vitamin B-6 (PYRIDOXINE) 25 MG tablet, Take 1 tablet by mouth Daily., Disp: 90 tablet, Rfl: 3    Allergies:   No Known Allergies    Objective     Physical Exam:  Vital Signs:   Vitals:    01/08/24 1506   BP: 110/82   BP Location: Right arm   Patient Position: Sitting   Cuff Size:  "Adult   Pulse: 75   Temp: 97.1 °F (36.2 °C)   SpO2: 99%   Weight: 80.5 kg (177 lb 6.4 oz)   Height: 162.6 cm (64\")   PainSc:   7   PainLoc: Neck  Comment: neck and right hip.     Body mass index is 30.45 kg/m².    Physical Exam  Vitals and nursing note reviewed.   Constitutional:       General: She is not in acute distress.     Appearance: Normal appearance. She is well-developed. She is obese. She is not diaphoretic.   HENT:      Head: Normocephalic and atraumatic.   Eyes:      Extraocular Movements: Extraocular movements intact.      Conjunctiva/sclera: Conjunctivae normal.   Pulmonary:      Effort: Pulmonary effort is normal. No respiratory distress.   Musculoskeletal:         General: Normal range of motion.   Skin:     General: Skin is warm and dry.   Neurological:      Mental Status: She is alert and oriented to person, place, and time.      Cranial Nerves: Cranial nerves 2-12 are intact.   Psychiatric:         Mood and Affect: Mood normal.         Behavior: Behavior normal.         Thought Content: Thought content normal.         Judgment: Judgment normal.      Comments: Drowsy         Neurologic Exam     Mental Status   Oriented to person, place, and time.     Cranial Nerves   Cranial nerves II through XII intact.        Assessment / Plan      Assessment/Plan:   Diagnoses and all orders for this visit:    1. Intractable partial complex seizure disorder (Primary)  -     levETIRAcetam (KEPPRA) 750 MG tablet; Take 1 tablet by mouth 2 (Two) Times a Day.  Dispense: 180 tablet; Refill: 3    2. Psychogenic nonepileptic seizure    3. Intractable migraine without aura and without status migrainosus    4. BMI 30.0-30.9,adult    *Patient is aware of [place] laws regarding seizures and driving- she does not drive.   *Seizure precautions discussed and encouraged- 911 to be called for any seizure activity lasting more than 5 minutes or for any cluster seizures.    *Will have  check on her Botox approval.   *Will " continue Topiramate and Nurtec as prescribed.     Follow Up:   Return in about 6 months (around 7/8/2024) for Follow Up.    CHRIS Malhotra, FNP-C  [facility]

## 2024-01-09 ENCOUNTER — PATIENT ROUNDING (BHMG ONLY) (OUTPATIENT)
Dept: SURGERY | Facility: CLINIC | Age: 54
End: 2024-01-09
Payer: MEDICAID

## 2024-01-09 ENCOUNTER — OFFICE VISIT (OUTPATIENT)
Dept: SURGERY | Facility: CLINIC | Age: 54
End: 2024-01-09
Payer: MEDICAID

## 2024-01-09 ENCOUNTER — TELEPHONE (OUTPATIENT)
Dept: UROLOGY | Facility: CLINIC | Age: 54
End: 2024-01-09

## 2024-01-09 VITALS
SYSTOLIC BLOOD PRESSURE: 120 MMHG | HEART RATE: 74 BPM | BODY MASS INDEX: 30.56 KG/M2 | DIASTOLIC BLOOD PRESSURE: 84 MMHG | HEIGHT: 64 IN | OXYGEN SATURATION: 97 % | WEIGHT: 179 LBS | TEMPERATURE: 97.5 F

## 2024-01-09 DIAGNOSIS — R92.8 ABNORMAL MAMMOGRAM: Primary | ICD-10-CM

## 2024-01-09 PROCEDURE — 1159F MED LIST DOCD IN RCRD: CPT | Performed by: SURGERY

## 2024-01-09 PROCEDURE — 99203 OFFICE O/P NEW LOW 30 MIN: CPT | Performed by: SURGERY

## 2024-01-09 PROCEDURE — 3079F DIAST BP 80-89 MM HG: CPT | Performed by: SURGERY

## 2024-01-09 PROCEDURE — 1160F RVW MEDS BY RX/DR IN RCRD: CPT | Performed by: SURGERY

## 2024-01-09 PROCEDURE — 3074F SYST BP LT 130 MM HG: CPT | Performed by: SURGERY

## 2024-01-09 NOTE — TELEPHONE ENCOUNTER
Caller: MERISSA ARJPUT    Relationship: SELF    Best call back number: [phone]    What is your medical concern? PT IS WANTING TO KNOW IF SHE NEEDS TO BE SCHEDULED FOR PRE-OP BEFORE HER SURGERY ON 1/18.

## 2024-01-09 NOTE — PROGRESS NOTES
January 9, 2024    Hello, may I speak with Marguerite Anderson?    My name is Marisol    I am  with [facility]  [address]  [facility] [address] [phone].    Before we get started may I verify your date of birth? 1970    I am calling to officially welcome you to our practice and ask about your recent visit. Is this a good time to talk? yes    Tell me about your visit with us. What things went well?  Great Visit, Great News       We're always looking for ways to make our patients' experiences even better. Do you have recommendations on ways we may improve?  no    Overall were you satisfied with your first visit to our practice? yes       I appreciate you taking the time to speak with me today. Is there anything else I can do for you? no      Thank you, and have a great day.

## 2024-01-10 NOTE — PRE-PROCEDURE INSTRUCTIONS
PAT phone history completed with patient for upcoming procedure on 1/18/24 with Dr. Louis.    PAT PASS reviewed with patient and they verbalize understanding of the following:     Do not eat or drink anything after midnight the night before procedure unless otherwise instructed by physician/surgeon's office, this includes no gum, candy, mints, tobacco products or e-cigarettes.  Do not shave the area to be operated on at least 48 hours prior to procedure.  Do not wear makeup, lotion, hair products, or nail polish.  Do not wear any jewelry and remove all piercings.  Do not wear any adhesive if you wear dentures.  Do not wear contacts; bring in glasses if needed.  Only take medications on the morning of procedure as instructed by PAT nurse per anesthesia guidelines or as instructed by physician's office.  If you are on any blood thinners reach out to the physician/surgeon's office for instructions on when/if they will need to be stopped prior to procedure.  Bring in picture ID and insurance card, advanced directive copies if applicable, CPAP/BIPAP/Inhalers if indicated morning of procedure, leave any other valuables at home.  Ensure you have arranged for someone to drive you home the day of your procedure and someone to care for you at home afterwards. It is recommended that you do not drive, drink alcohol, or make any major legal decisions for at least 24 hours after your procedure is complete.    Instructions given on hospital entrance and registration location.

## 2024-01-18 ENCOUNTER — APPOINTMENT (OUTPATIENT)
Dept: GENERAL RADIOLOGY | Facility: HOSPITAL | Age: 54
End: 2024-01-18
Payer: MEDICAID

## 2024-01-18 ENCOUNTER — HOSPITAL ENCOUNTER (OUTPATIENT)
Facility: HOSPITAL | Age: 54
Setting detail: HOSPITAL OUTPATIENT SURGERY
Discharge: HOME | End: 2024-01-18
Attending: UROLOGY | Admitting: UROLOGY
Payer: MEDICAID

## 2024-01-18 ENCOUNTER — ANESTHESIA (OUTPATIENT)
Dept: PERIOP | Facility: HOSPITAL | Age: 54
End: 2024-01-18
Payer: MEDICAID

## 2024-01-18 ENCOUNTER — ANESTHESIA EVENT (OUTPATIENT)
Dept: PERIOP | Facility: HOSPITAL | Age: 54
End: 2024-01-18
Payer: MEDICAID

## 2024-01-18 VITALS
HEART RATE: 70 BPM | BODY MASS INDEX: 30.56 KG/M2 | RESPIRATION RATE: 16 BRPM | HEIGHT: 64 IN | WEIGHT: 179 LBS | DIASTOLIC BLOOD PRESSURE: 69 MMHG | SYSTOLIC BLOOD PRESSURE: 130 MMHG | OXYGEN SATURATION: 94 % | TEMPERATURE: 97.8 F

## 2024-01-18 DIAGNOSIS — N39.41 URGE INCONTINENCE OF URINE: ICD-10-CM

## 2024-01-18 PROCEDURE — 64590 INS/RPL PRPH SAC/GSTR NPG/R: CPT | Performed by: UROLOGY

## 2024-01-18 PROCEDURE — S0260 H&P FOR SURGERY: HCPCS | Performed by: UROLOGY

## 2024-01-18 PROCEDURE — C1778 LEAD, NEUROSTIMULATOR: HCPCS | Performed by: UROLOGY

## 2024-01-18 PROCEDURE — 25010000002 MIDAZOLAM PER 1MG: Performed by: NURSE ANESTHETIST, CERTIFIED REGISTERED

## 2024-01-18 PROCEDURE — 25010000002 PROPOFOL 10 MG/ML EMULSION: Performed by: NURSE ANESTHETIST, CERTIFIED REGISTERED

## 2024-01-18 PROCEDURE — 76000 FLUOROSCOPY <1 HR PHYS/QHP: CPT

## 2024-01-18 PROCEDURE — 76000 FLUOROSCOPY <1 HR PHYS/QHP: CPT | Performed by: UROLOGY

## 2024-01-18 PROCEDURE — 25010000002 ONDANSETRON PER 1 MG: Performed by: NURSE ANESTHETIST, CERTIFIED REGISTERED

## 2024-01-18 PROCEDURE — C1767 GENERATOR, NEURO NON-RECHARG: HCPCS | Performed by: UROLOGY

## 2024-01-18 PROCEDURE — 25010000002 FENTANYL CITRATE PF 50 MCG/ML SOLUTION PREFILLED SYRINGE: Performed by: NURSE ANESTHETIST, CERTIFIED REGISTERED

## 2024-01-18 PROCEDURE — C1787 PATIENT PROGR, NEUROSTIM: HCPCS | Performed by: UROLOGY

## 2024-01-18 PROCEDURE — 25810000003 LACTATED RINGERS PER 1000 ML: Performed by: UROLOGY

## 2024-01-18 PROCEDURE — 64561 IMPLANT NEUROELECTRODES: CPT | Performed by: UROLOGY

## 2024-01-18 PROCEDURE — 25010000002 CLINDAMYCIN 900 MG/50ML SOLUTION: Performed by: UROLOGY

## 2024-01-18 DEVICE — KT LD NEUROSTM INTERSTIM SURESCAN FULLBDY 4.32MM: Type: IMPLANTABLE DEVICE | Site: BACK | Status: FUNCTIONAL

## 2024-01-18 DEVICE — NEUROSTM SACRAL/NRV INTERSTIMX NONRECHG: Type: IMPLANTABLE DEVICE | Site: BACK | Status: FUNCTIONAL

## 2024-01-18 RX ORDER — BUPIVACAINE HYDROCHLORIDE AND EPINEPHRINE 2.5; 5 MG/ML; UG/ML
INJECTION, SOLUTION EPIDURAL; INFILTRATION; INTRACAUDAL; PERINEURAL AS NEEDED
Status: DISCONTINUED | OUTPATIENT
Start: 2024-01-18 | End: 2024-01-18 | Stop reason: HOSPADM

## 2024-01-18 RX ORDER — SODIUM CHLORIDE, SODIUM LACTATE, POTASSIUM CHLORIDE, CALCIUM CHLORIDE 600; 310; 30; 20 MG/100ML; MG/100ML; MG/100ML; MG/100ML
1000 INJECTION, SOLUTION INTRAVENOUS CONTINUOUS
Status: DISCONTINUED | OUTPATIENT
Start: 2024-01-18 | End: 2024-01-18 | Stop reason: HOSPADM

## 2024-01-18 RX ORDER — KETAMINE HCL IN NACL, ISO-OSM 100MG/10ML
SYRINGE (ML) INJECTION AS NEEDED
Status: DISCONTINUED | OUTPATIENT
Start: 2024-01-18 | End: 2024-01-18 | Stop reason: SURG

## 2024-01-18 RX ORDER — PROPOFOL 10 MG/ML
VIAL (ML) INTRAVENOUS AS NEEDED
Status: DISCONTINUED | OUTPATIENT
Start: 2024-01-18 | End: 2024-01-18 | Stop reason: SURG

## 2024-01-18 RX ORDER — ONDANSETRON 2 MG/ML
INJECTION INTRAMUSCULAR; INTRAVENOUS AS NEEDED
Status: DISCONTINUED | OUTPATIENT
Start: 2024-01-18 | End: 2024-01-18 | Stop reason: SURG

## 2024-01-18 RX ORDER — SULFAMETHOXAZOLE AND TRIMETHOPRIM 800; 160 MG/1; MG/1
1 TABLET ORAL 2 TIMES DAILY
Qty: 6 TABLET | Refills: 0 | Status: SHIPPED | OUTPATIENT
Start: 2024-01-18

## 2024-01-18 RX ORDER — LIDOCAINE HYDROCHLORIDE AND EPINEPHRINE 10; 10 MG/ML; UG/ML
INJECTION, SOLUTION INFILTRATION; PERINEURAL AS NEEDED
Status: DISCONTINUED | OUTPATIENT
Start: 2024-01-18 | End: 2024-01-18 | Stop reason: HOSPADM

## 2024-01-18 RX ORDER — FENTANYL CITRATE 50 UG/ML
INJECTION, SOLUTION INTRAMUSCULAR; INTRAVENOUS AS NEEDED
Status: DISCONTINUED | OUTPATIENT
Start: 2024-01-18 | End: 2024-01-18 | Stop reason: SURG

## 2024-01-18 RX ORDER — CLINDAMYCIN PHOSPHATE 900 MG/50ML
900 INJECTION, SOLUTION INTRAVENOUS ONCE
Status: COMPLETED | OUTPATIENT
Start: 2024-01-18 | End: 2024-01-18

## 2024-01-18 RX ORDER — MIDAZOLAM HYDROCHLORIDE 2 MG/2ML
INJECTION, SOLUTION INTRAMUSCULAR; INTRAVENOUS AS NEEDED
Status: DISCONTINUED | OUTPATIENT
Start: 2024-01-18 | End: 2024-01-18 | Stop reason: SURG

## 2024-01-18 RX ADMIN — Medication 20 MG: at 08:43

## 2024-01-18 RX ADMIN — CLINDAMYCIN IN 5 PERCENT DEXTROSE 900 MG: 18 INJECTION, SOLUTION INTRAVENOUS at 08:40

## 2024-01-18 RX ADMIN — PROPOFOL 200 MCG/KG/MIN: 10 INJECTION, EMULSION INTRAVENOUS at 08:45

## 2024-01-18 RX ADMIN — FENTANYL CITRATE 50 MCG: 50 INJECTION, SOLUTION INTRAMUSCULAR; INTRAVENOUS at 08:43

## 2024-01-18 RX ADMIN — SODIUM CHLORIDE, POTASSIUM CHLORIDE, SODIUM LACTATE AND CALCIUM CHLORIDE 1000 ML: 600; 310; 30; 20 INJECTION, SOLUTION INTRAVENOUS at 07:19

## 2024-01-18 RX ADMIN — PROPOFOL 30 MG: 10 INJECTION, EMULSION INTRAVENOUS at 08:43

## 2024-01-18 RX ADMIN — ONDANSETRON 4 MG: 2 INJECTION INTRAMUSCULAR; INTRAVENOUS at 09:16

## 2024-01-18 RX ADMIN — MIDAZOLAM HYDROCHLORIDE 2 MG: 1 INJECTION, SOLUTION INTRAMUSCULAR; INTRAVENOUS at 08:43

## 2024-01-18 NOTE — H&P
CC  No chief complaint on file.       HPI  Marguerite Anderson is a 53 y.o. with history of   1. Urge incontinence of urine         No recent fevers or new LUTS  Does not take any blood thinners    Past Medical History  Past Medical History:   Diagnosis Date    Abdominal pain, epigastric     Abnormal Pap smear of cervix hpv    Acute sinusitis     Acute UTI (urinary tract infection)     ADHD (attention deficit hyperactivity disorder) 2008    asked to be tested not diagnosed officially    Angina, intestinal     Anxiety     Anxiety     Arthritis     Asthma not sure    COPD    Back pain     chronic    Bipolar disorder 2019    Breast mass, right     Cancer 12/20/2018    basal cell R ear    Cervical dysplasia 1996    was frozen off    Chronic hepatitis C virus infection     was treated    COPD (chronic obstructive pulmonary disease)     Depression     Diarrhea     Diverticulosis     Elevated LFTs     Fatigue     Glaucoma 05/05/2020    Hepatitis C 1994    cured with meds    History of echocardiogram     History of endometriosis     History of Papanicolaou smear of cervix 04/02/2014    NORMAL     History of stress test     denies any current palpitations, chest pain, dizziness-1/10/24    HPV (human papilloma virus) infection 1996    HTN (hypertension)     IBS (irritable bowel syndrome)     Inflammatory bowel disease     Insomnia     Kidney stone     Low back pain     Menopausal symptoms     Migraine headache     Nausea & vomiting     Neck pain     Osteopenia     Other hyperlipidemia     Ovarian cyst     Pelvic adhesive disease     PONV (postoperative nausea and vomiting)     Radicular pain of left lower extremity     Restless leg syndrome     Seizure disorder     grand mal infrequent; focal siezures as often as daily - has a neurologist.  Last seizure November 5 2023.    Tobacco abuse     Trochanteric bursitis     Urinary incontinence     Visual impairment     wear contacts/glasses    Vitamin B 12 deficiency     Wears contact  lenses     Wears glasses        Past Surgical History  Past Surgical History:   Procedure Laterality Date    BILATERAL SALPINGO OOPHORECTOMY  03/10/2015    DR NATALY THOMPSON    BREAST LUMPECTOMY Right     benign     SECTION      COLONOSCOPY      COSMETIC SURGERY  2018    basal cell removal R Ear    HYSTERECTOMY  03/10/2015    [facility] (DR NATALY THOMPSON)    HYSTEROSCOPY  2013    complete    INGUINAL HERNIA REPAIR Right 2008    LAPAROSCOPIC ASSISTED VAGINAL HYSTERECTOMY SALPINGO OOPHORECTOMY  2012    to remove cyst    LYSIS OF ABDOMINAL ADHESIONS  03/10/2015    DR NATALY THOMPSON    SKIN BIOPSY      SUBTOTAL HYSTERECTOMY  3/2013    Left ovary    TUBAL ABDOMINAL LIGATION      WISDOM TOOTH EXTRACTION  1986       Medications    Current Facility-Administered Medications:     clindamycin (CLEOCIN) 900 mg in dextrose 5% 50 mL IVPB (premix), 900 mg, Intravenous, Once, Adarsh Louis MD    lactated ringers infusion 1,000 mL, 1,000 mL, Intravenous, Continuous, Adarsh Louis MD, Last Rate: 25 mL/hr at 24 0719, 1,000 mL at 24 0719    Allergies  No Known Allergies    Social History  Social History     Socioeconomic History    Marital status:     Number of children: 1    Highest education level: High school graduate   Tobacco Use    Smoking status: Former     Packs/day: 0.50     Years: 18.00     Additional pack years: 0.00     Total pack years: 9.00     Types: Cigarettes     Quit date: 2023     Years since quittin.0     Passive exposure: Current    Smokeless tobacco: Never    Tobacco comments:     In process of quitting, using nicotine patches   Vaping Use    Vaping Use: Every day    Substances: Nicotine    Devices: Pre-filled or refillable cartridge   Substance and Sexual Activity    Alcohol use: Yes     Comment: occas    Drug use: Defer    Sexual activity: Defer       Review of Systems  Constitutional: No fevers or chills  Skin: Negative for rash  Endocrine: No heat/cold  "intolerance   Cardiovascular: Negative for chest pain or dyspnea on exertion  Respiratory: Negative for shortness of breath or wheezing  Gastrointestinal: No constipation, nausea or vomiting  Genitourinary: Negative for new lower urinary tract symptoms, current gross hematuria or dysuria.  Musculoskeletal: No flank pain  Neurological:  Negative for frequent headaches or dizziness  Lymph/Heme: Negative for leg swelling or calf pain.    Physical Exam  Visit Vitals  /90 (BP Location: Right arm, Patient Position: Sitting)   Pulse 82   Temp 97.6 °F (36.4 °C) (Temporal)   Resp 18   Ht 162.6 cm (64\")   Wt 81.2 kg (179 lb)   SpO2 97%   BMI 30.73 kg/m²     Constitutional: NAD, WDWN.   HEENT: NCAT. Conjunctivae normal.  MMM.    Cardiovascular: Regular rate.  Pulmonary/Chest: Respirations are even and unlabored bilaterally.  Abdominal: Soft. No distension, tenderness, masses or guarding. No CVA tenderness.  Neurological: A + O x 3.  Cranial Nerves II-XII grossly intact. Normal gait.  Extremities: JAYNE x 4, Warm. No clubbing.  No cyanosis.    Skin: Pink, warm and dry.  No rashes noted.  Psychiatric:  Normal mood and affect    Labs & Imaging  Lab Results   Component Value Date    GLUCOSE 88 11/03/2023    CALCIUM 9.2 11/03/2023     11/03/2023    K 5.0 11/03/2023    CO2 22 11/03/2023     11/03/2023    BUN 7 11/03/2023    CREATININE 0.83 11/03/2023    EGFRIFAFRI 108 09/14/2021    EGFRIFNONA 94 09/14/2021    BCR 8 (L) 11/03/2023    ANIONGAP 8.9 06/23/2022     Lab Results   Component Value Date    WBC 10.4 11/03/2023    HGB 14.0 11/03/2023    HCT 41.8 11/03/2023    MCV 94 11/03/2023     11/03/2023     Brief Urine Lab Results  (Last result in the past 365 days)        Color   Clarity   Blood   Leuk Est   Nitrite   Protein   CREAT   Urine HCG        11/06/23 1402 Yellow   Clear   Trace   Negative   Negative   Negative                 Urine Culture          2/23/2023    12:04   Urine Culture   Urine Culture Final " report         US breast right limited    Result Date: 1/9/2024  Ultrasound was performed of the left breast due to abnormal mammogram.  A right axillary 1.4 cm benign-appearing lymph node is appreciated.  No other abnormality is seen on ultrasound.  Of note, patient does have somewhat dense fibroglandular breast tissue.     XR Spine Lumbar 2 or 3 View    Result Date: 12/28/2023  PROCEDURE: XR SPINE LUMBAR 2 OR 3 VW-  HISTORY: Back pain  3 views  FINDINGS:  No fracture is identified. Disc spaces are well preserved. Alignment is normal.      Unremarkable lumbar spine series.   This report was signed and finalized on 12/28/2023 3:31 PM by Francis Mora MD.      US Breast Right Limited    Result Date: 12/27/2023  US BREAST LIMITED RIGHT ON 12/27/2023 HISTORY: Additional imaging right breast secondary to finding seen on screening exam from 12/15/2023 COMPARISON: Mammogram 12/15/2023 LIMITATIONS: None RISK ASSESSMENT: 10.5% TECHNIQUE: Targeted ultrasound right breast FINDINGS: ULTRASOUND RIGHT BREAST: Ultrasound of the right breast was performed from 9-12 o'clock in the posterior depth. At 11:00 o'clock, 10 cm from the nipple there is a hypoechoic 14 x 9 mm lesion with internal vascularity possibly an intramammary lymph node corresponding to size and location the mammographic abnormality. As the positioning is slightly different on mammography is compared to ultrasound 6-month follow-up right diagnostic mammogram and breast ultrasound is recommended to ensure stability. Please note that mammography is not 100% accurate in diagnosing breast cancer. A routine breast exam is recommended to correlate with mammographic findings. Any palpable abnormality must be assessed clinically.  If the patient has a new palpable abnormality, then a Diagnostic Mammogram and/or Breast Ultrasound is indicated if clinically appropriate.    RIGHT BREAST: Probably benign findings right breast. RECOMMENDATIONS: 6-month follow-up right diagnostic  mammogram and breast ultrasound is recommended to ensure stability. These findings and recommendations were relayed to the patient at the time and date of dictation. ASSESSMENT: BI-RADS 3 Probably Benign RECOMMENDATION: 6 month follow up.          Assessment  Marguerite Anderson is a 53 y.o. female who presents with the following diagnosis:  1. Urge incontinence of urine         Plan  1. To OR for INTERSTIM STAGES 1 AND 2 LEAD AND GENERATOR PLACEMENT     Adarsh Louis MD

## 2024-01-18 NOTE — OP NOTE
Operative Report      SURGEON: Adarsh Louis MD    PREOPERATIVE DIAGNOSIS: Refractory Urge Urinary Incontinence     POSTOPERATIVE DIAGNOSIS: Same     PROCEDURE PERFORMED:   1. First stage InterStim (placement of left S3 nerve stimulator lead). (CPT 24700)  2. Fluoroscopic guidance for needle placement. (55732-91)  3. Implantable pulse generator placement (59915)  4. Intra operative device programming    ANESTHESIA: Monitored anesthesia care    BLOOD LOSS: Minimal.     SPECIMEN: None.     COMPLICATION: None.     INDICATION FOR PROCEDURE: Pt Marguerite Anderson is a 53 y.o. y.o.-year-old with Refractory Overactive bladder / UUI.  After discussion a decision was made to proceed with a trial of InterStim placement. The patient had received greater than 50% benefit from the PNE placement, Therefore the decision was made to proceed with a combined stage I and stage II InterStim placement. We discussed perioperative complication including bleeding, infection, lead migration, lead malfunction, pain at the leak site and possible need to replace the battery. An informed consent was obtained and placed in chart.     DESCRIPTION OF THE PROCEDURE:   The patient was taken to the operating theater, Identified by name and medical record number, and placed in supine position. Sequential compression  devices applied to both lower extremities. Pt underwent monitored anesthesia. The patient received intravenous antibiotics prior to the surgical procedure. An operative time-out was performed identifying the patient, the procedure to be performed and the surgeon. The patient was placed in prone position, carefully padding all pressure points. Surgical site was sterilely prepped and draped in a standard manner.     We performed fluoroscopy to tommy anatomical landmarks including the midline, ischial spine and S3 foramen. Local anesthetic was injected on right and left side at the level of S3 foramen site. We then used a spinal needle to  intubate the foramen on the right and left sides. We were able to successfully intubate both the S3 foramen, and this was tested to verify the exact location of S3 foramen.     On lead testing, we got an excellent motor response on the above side. At this point, a decision  was made to proceed with lead placement on that side. We used a  bidirectional needle through the spinal needle under fluoroscopy control. Following this, we used the lead introducer sheath over the bidirectional needle. Then we placed tined leads under fluoroscopy control, making sure that lead #3 is just at the level of anterior plate on lateral film. We tested all 4 electrodes and we got excellent motor response on all the 4 electrodes. The leads were deployed by releasing the lead itroducer sheath.     We created a future generator site by creating a pocket on the left side, approximately 3 cm in dimension. This incision was made with the help of a 15-knife and this deepened with the help of  Bovie electrocautery. With a combination of blunt and sharp dissection, we were able to create a nice pocket just above the gluteus  hakeem muscle into the subcutaneous tissue. The pocket was then irrigated copiously with antibiotic solution. With the help of a tunneling device, we tunneled the wire. The IPG was connected to the tined lead wire and this was  secured in place. The generator was placed in the previously created pocket. The subcutaneous tissue was closed with 3-0 vicryl and the skin was closed with 4-0 vicryl suture in a running fashion.     We tested the device intraoperatively with the help of [employer] test generator device and all circuits were functioning well. There was no evidence of short circuit, no impedance. This concluded the procedure. The patient was transferred to PACU in stable condition. The instrument, needle and sponge count was correct x2.

## 2024-01-18 NOTE — ANESTHESIA POSTPROCEDURE EVALUATION
Patient: Marguerite Anderson    Procedure Summary       Date: 01/18/24 Room / Location: [facility] OR 3 /  [facility] OR    Anesthesia Start: 0838 Anesthesia Stop: 0928    Procedure: INTERSTIM STAGES 1 AND 2 LEAD AND GENERATOR PLACEMENT Diagnosis:       Urge incontinence of urine      (Urge incontinence of urine [N39.41])    Surgeons: Adarsh Louis MD Provider: Ambrocio Gay CRNA    Anesthesia Type: MAC ASA Status: 3            Anesthesia Type: MAC    Vitals  Vitals Value Taken Time   /69 01/18/24 1000   Temp 97.8 °F (36.6 °C) 01/18/24 0930   Pulse 70 01/18/24 1000   Resp 16 01/18/24 0930   SpO2 94 % 01/18/24 1000           Post Anesthesia Care and Evaluation    Patient location during evaluation: PHASE II  Patient participation: complete - patient participated  Level of consciousness: awake and alert  Pain management: adequate    Airway patency: patent  Anesthetic complications: No anesthetic complications  PONV Status: none  Cardiovascular status: acceptable  Respiratory status: acceptable  Hydration status: acceptable  No anesthesia care post op

## 2024-01-18 NOTE — DISCHARGE INSTRUCTIONS
Home Care After Interstim placement  The following instructions will help you care for yourself, or be cared for upon your return home today.  These are guidelines for your care right after surgery only.     Diet  Drink plenty of liquids and eat light meals today.    Start your regular diet tomorrow.    Activity  Start normal activities in twenty-four (24) hours.    Wound Care and Hygiene  No restrictions, start normal routine.  Keep dressing on until follow up visit.    Shower and bathing  You may shower 48 hours after surgery  You may take a bath in 2 weeks  Please avoid swimming for 2-4 weeks    Anesthesia Precautions & Expectations  After anesthesia, rest for 24 hours.  Do not drive, drink alcoholic beverages or make any important decisions during this time.  General anesthesia may cause a sore throat, jaw discomfort or muscle aches.  These symptoms can last for one or two days.     What to Expect after Surgery  Soreness over the incision.  Mild bleeding at the incision site.    Call your Doctor  Severe pain not controlled by oral medication  Temperature above 101.5 degrees  Inability to urinate within eight (8) hours after surgery  Drainage from the incision    Other Contacts  Urology Office:  [address]   [place] [id number] [phone] office  [phone] fax    Follow up Appointment  Please call to schedule a follow-up appointment in 4 weeks with Dr. Louis's nurse practitioner or PA if you do not already have one     No pushing, pulling, tugging,  heavy lifting, or strenuous activity.  No major decision making, driving, or drinking alcoholic beverages for 24 hours. ( due to the medications you have  received)  Always use good hand hygiene/washing techniques.  NO driving while taking pain medications.    * if you have an incision:  Check your incision area every day for signs of infection.   Check for:  * more redness, swelling, or pain  *more fluid or blood  *warmth  *pus or bad  smell

## 2024-01-18 NOTE — ANESTHESIA PREPROCEDURE EVALUATION
Anesthesia Evaluation     Patient summary reviewed and Nursing notes reviewed   history of anesthetic complications:  PONV  NPO Solid Status: > 8 hours  NPO Liquid Status: > 8 hours           Airway   Mallampati: II  TM distance: >3 FB  Neck ROM: full  Possible difficult intubation  Dental - normal exam     Pulmonary - normal exam   (+) a smoker Former, COPD, asthma,shortness of breath  Cardiovascular - normal exam    ECG reviewed    (+) hypertension, angina, hyperlipidemia    ROS comment: Stress test 11/20/2018:  No ECG evidence of myocardial ischemia.Negative clinical evidence of myocardial ischemia. Findings consistent with a normal ECG stress test.    Neuro/Psych  (+) seizures (Last in Nov 2022), headaches, psychiatric history Anxiety, Depression, Bipolar and ADHD  GI/Hepatic/Renal/Endo    (+) hepatitis (Had treatment) C, liver disease, renal disease- stones    Musculoskeletal     (+) back pain, neck pain  Abdominal    Substance History   (+) alcohol use     OB/GYN          Other   arthritis,     ROS/Med Hx Other: RLS    Echo EF 68%                Anesthesia Plan    ASA 3     MAC     (Risks and benefits discussed including risk of aspiration, recall and dental damage. All patient questions answered.    Will continue with plan of care.)  intravenous induction     Anesthetic plan, risks, benefits, and alternatives have been provided, discussed and informed consent has been obtained with: patient.  Pre-procedure education provided  Plan discussed with CRNA.    CODE STATUS:

## 2024-01-19 ENCOUNTER — TELEPHONE (OUTPATIENT)
Dept: UROLOGY | Facility: CLINIC | Age: 54
End: 2024-01-19
Payer: MEDICAID

## 2024-01-19 NOTE — TELEPHONE ENCOUNTER
Patient calling because she is having a lot of pain.  Says she is currently prescribed two 5mg oxycodone from the pain clinic and is taking those , but they are not touching her pain. She says if she takes more than her pill count ,will be off for the pain clinic. I advised patient to alternate ibuprofen and try heating pad and taking it easy, that the first day or so is the worst. I told her I would send message but physician is out of office today. I also advised to reach out to Reshma to see if there is anything she can recommend. Also if patient has uncontrolled increased pain to go to ER.    GARLAND Starr

## 2024-01-21 DIAGNOSIS — N39.41 URGE INCONTINENCE OF URINE: Primary | ICD-10-CM

## 2024-01-30 DIAGNOSIS — F33.1 MAJOR DEPRESSIVE DISORDER, RECURRENT EPISODE, MODERATE DEGREE: ICD-10-CM

## 2024-01-30 DIAGNOSIS — F41.1 GENERALIZED ANXIETY DISORDER: ICD-10-CM

## 2024-01-30 RX ORDER — TRAZODONE HYDROCHLORIDE 100 MG/1
100 TABLET ORAL NIGHTLY
Qty: 30 TABLET | Refills: 0 | Status: SHIPPED | OUTPATIENT
Start: 2024-01-30 | End: 2024-02-02

## 2024-01-30 RX ORDER — LURASIDONE HYDROCHLORIDE 80 MG/1
80 TABLET, FILM COATED ORAL EVERY EVENING
Qty: 30 TABLET | Refills: 0 | Status: SHIPPED | OUTPATIENT
Start: 2024-01-30 | End: 2024-02-02 | Stop reason: SDUPTHER

## 2024-01-30 RX ORDER — ESTRADIOL 0.5 MG/.5G
GEL TOPICAL
Qty: 30 EACH | Refills: 3 | Status: SHIPPED | OUTPATIENT
Start: 2024-01-30 | End: 2024-02-01

## 2024-01-30 NOTE — TELEPHONE ENCOUNTER
Rx Refill Note  Requested Prescriptions     Pending Prescriptions Disp Refills    Lurasidone HCl (Latuda) 80 MG tablet tablet 30 tablet 0     Sig: Take 1 tablet by mouth Every Evening.    traZODone (DESYREL) 100 MG tablet 30 tablet 0     Sig: Take 1 tablet by mouth Every Night.      Last office visit with prescribing clinician: 1/4/2024   Next office visit with prescribing clinician: 2/2/2024

## 2024-01-31 NOTE — TELEPHONE ENCOUNTER
Patient is dong better. She got the diclofenac and her pain has resolved for the most part. Will keep f/u on 2-20-24 w/ Pamela.  EITAN Starr

## 2024-02-01 ENCOUNTER — OFFICE VISIT (OUTPATIENT)
Dept: OBSTETRICS AND GYNECOLOGY | Facility: CLINIC | Age: 54
End: 2024-02-01
Payer: MEDICAID

## 2024-02-01 VITALS
DIASTOLIC BLOOD PRESSURE: 70 MMHG | WEIGHT: 174 LBS | BODY MASS INDEX: 27.97 KG/M2 | HEIGHT: 66 IN | SYSTOLIC BLOOD PRESSURE: 120 MMHG

## 2024-02-01 DIAGNOSIS — Z01.411 ENCOUNTER FOR GYNECOLOGICAL EXAMINATION WITH ABNORMAL FINDING: Primary | ICD-10-CM

## 2024-02-01 DIAGNOSIS — N39.3 SUI (STRESS URINARY INCONTINENCE, FEMALE): ICD-10-CM

## 2024-02-01 DIAGNOSIS — Z12.31 ENCOUNTER FOR SCREENING MAMMOGRAM FOR BREAST CANCER: ICD-10-CM

## 2024-02-01 DIAGNOSIS — N95.2 POSTMENOPAUSAL ATROPHIC VAGINITIS: ICD-10-CM

## 2024-02-01 DIAGNOSIS — N95.1 MENOPAUSAL SYMPTOMS: ICD-10-CM

## 2024-02-01 DIAGNOSIS — Z79.890 HORMONE REPLACEMENT THERAPY (HRT): ICD-10-CM

## 2024-02-01 RX ORDER — MIRTAZAPINE 7.5 MG/1
1 TABLET, FILM COATED ORAL NIGHTLY
COMMUNITY
End: 2024-02-02

## 2024-02-01 RX ORDER — SOLIFENACIN SUCCINATE 10 MG/1
1 TABLET, FILM COATED ORAL DAILY
COMMUNITY

## 2024-02-01 RX ORDER — ESTRADIOL 0.5 MG/.5G
GEL TOPICAL DAILY
Qty: 30 EACH | Refills: 3 | Status: CANCELLED | OUTPATIENT
Start: 2024-02-01

## 2024-02-01 RX ORDER — NALOXONE HYDROCHLORIDE 4 MG/.1ML
SPRAY NASAL
COMMUNITY

## 2024-02-01 RX ORDER — ARIPIPRAZOLE 20 MG/1
1 TABLET ORAL DAILY
COMMUNITY
End: 2024-02-02

## 2024-02-01 RX ORDER — HYDROXYZINE PAMOATE 25 MG/1
CAPSULE ORAL
COMMUNITY
End: 2024-02-02

## 2024-02-01 RX ORDER — HYDROXYZINE HYDROCHLORIDE 10 MG/1
TABLET, FILM COATED ORAL
COMMUNITY
End: 2024-02-02

## 2024-02-01 RX ORDER — PROPRANOLOL HYDROCHLORIDE 20 MG/1
1 TABLET ORAL DAILY
COMMUNITY
End: 2024-02-02

## 2024-02-01 RX ORDER — GABAPENTIN 100 MG/1
CAPSULE ORAL
COMMUNITY

## 2024-02-01 RX ORDER — QUETIAPINE FUMARATE 25 MG/1
TABLET, FILM COATED ORAL
COMMUNITY
End: 2024-02-02

## 2024-02-01 RX ORDER — ESTRADIOL 0.75 MG/.75G
1 GEL TOPICAL DAILY
Qty: 30 EACH | Refills: 11 | Status: SHIPPED | OUTPATIENT
Start: 2024-02-01

## 2024-02-01 RX ORDER — TIZANIDINE 2 MG/1
1 TABLET ORAL 4 TIMES DAILY PRN
COMMUNITY

## 2024-02-02 ENCOUNTER — CLINICAL SUPPORT (OUTPATIENT)
Age: 54
End: 2024-02-02
Payer: MEDICAID

## 2024-02-02 ENCOUNTER — OFFICE VISIT (OUTPATIENT)
Age: 54
End: 2024-02-02
Payer: MEDICAID

## 2024-02-02 VITALS
WEIGHT: 174 LBS | SYSTOLIC BLOOD PRESSURE: 126 MMHG | HEART RATE: 70 BPM | BODY MASS INDEX: 27.97 KG/M2 | HEIGHT: 66 IN | OXYGEN SATURATION: 100 % | DIASTOLIC BLOOD PRESSURE: 84 MMHG

## 2024-02-02 DIAGNOSIS — F33.1 MAJOR DEPRESSIVE DISORDER, RECURRENT EPISODE, MODERATE DEGREE: Primary | ICD-10-CM

## 2024-02-02 DIAGNOSIS — F41.1 GENERALIZED ANXIETY DISORDER: ICD-10-CM

## 2024-02-02 RX ORDER — LURASIDONE HYDROCHLORIDE 80 MG/1
80 TABLET, FILM COATED ORAL EVERY EVENING
Qty: 30 TABLET | Refills: 0 | Status: SHIPPED | OUTPATIENT
Start: 2024-02-02

## 2024-02-02 RX ORDER — LAMOTRIGINE 25 MG/1
TABLET ORAL
Qty: 45 TABLET | Refills: 0 | Status: SHIPPED | OUTPATIENT
Start: 2024-02-02

## 2024-02-02 RX ORDER — DULOXETIN HYDROCHLORIDE 60 MG/1
60 CAPSULE, DELAYED RELEASE ORAL 2 TIMES DAILY
Qty: 60 CAPSULE | Refills: 0 | Status: SHIPPED | OUTPATIENT
Start: 2024-02-02

## 2024-02-02 NOTE — PROGRESS NOTES
[facility]             Follow Up Office Visit      Date: 2023   Patient Name: Marguerite Anderson  : 1970   MRN: [id number]     Referring Provider: Juan Clemens MD    Chief Complaint:      ICD-10-CM ICD-9-CM   1. Major depressive disorder, recurrent episode, moderate degree  F33.1 296.32   2. Generalized anxiety disorder  F41.1 300.02       History of Present Illness:   Marguerite Anderson is a 54 y.o. female who is here today for follow up with MDD/JAY.    Patient is seen and evaluated in the office.  She appears to be well groomed.  She is more dysphoric today, but is cooperative with the evaluation and exhibits a linear and goal-directed thought process.  Patient states that she has not been doing well over the past month.  She feels very overwhelmed.  She has had an increased number of doctor visits.  She had an outpatient surgery done recently for pacemaker for her bladder.  She feels as though she is constantly running even though she does not like leaving the house.  She is also in physical therapy right now.  She has been doing this for at least a month and has not seen much of a difference.  She states that she has to get transportation to get to any of these visits.  She has noticed small improvements in mood since starting Latuda.  She states that it does help with sleep and she was able to stop taking the trazodone.  She still needs more help with depression/anxiety.  Today, we will start Lamictal to augment Latuda.  She will start with 25 mg a day for 2 weeks and then increase to 50 mg.  Patient aware of watching for her main side effect of rash.  We talked about other risk/benefits/side effects.  Patient is agreeable with this treatment.    Previous Medication Trials:  Klonopin, Celexa, Wellbutrin, Lexapro, Abilify, Seroquel    Subjective     Review of Systems:   Review of Systems   Constitutional:  Negative for chills, fatigue and fever.   HENT:   Negative for congestion, hearing loss, sore throat and trouble swallowing.    Eyes:  Negative for blurred vision and double vision.   Respiratory:  Negative for cough, chest tightness and shortness of breath.    Cardiovascular:  Negative for chest pain and palpitations.   Gastrointestinal:  Negative for abdominal pain, constipation, diarrhea, nausea and vomiting.   Endocrine: Negative for polydipsia and polyuria.   Genitourinary:  Negative for hematuria and urgency.   Musculoskeletal:  Negative for arthralgias.   Skin:  Negative for skin lesions and bruise.   Neurological:  Negative for dizziness, tremors, seizures and light-headedness.   Hematological:  Negative for adenopathy.     Screening Scores:   PHQ-9 : 21  JAY-7 : 19    Medications:     Current Outpatient Medications:     DULoxetine (CYMBALTA) 60 MG capsule, Take 1 capsule by mouth 2 (Two) Times a Day., Disp: 60 capsule, Rfl: 0    Lurasidone HCl (Latuda) 80 MG tablet tablet, Take 1 tablet by mouth Every Evening., Disp: 30 tablet, Rfl: 0    Advair Diskus 250-50 MCG/ACT DISKUS, INHALE 1 PUFF BY MOUTH DAILY, Disp: 60 each, Rfl: 11    albuterol sulfate  (90 Base) MCG/ACT inhaler, INHALE 2 PUFFS INTO THE LUNGS EVERY 4 HOURS AS NEEDED, Disp: 18 g, Rfl: 1    cyclobenzaprine (FLEXERIL) 10 MG tablet, Take 1 tablet by mouth 3 (Three) Times a Day As Needed., Disp: , Rfl:     diclofenac (VOLTAREN) 50 MG EC tablet, Take 1 tablet by mouth 2 (Two) Times a Day. For up to 1 week, Disp: 30 tablet, Rfl: 0    Diclofenac Sodium (VOLTAREN) 1 % gel gel, APPLY 4 GRAMS TOPICALLY TO AFFECTED AREA 4 TIMES A DAY AS NEEDED FOR PAIN, Disp: 100 g, Rfl: 3    Estradiol 0.75 MG/0.75GM gel, Place 1 each on the skin as directed by provider Daily., Disp: 30 each, Rfl: 11    gabapentin (NEURONTIN) 100 MG capsule, TK 1 C PO QHS, Disp: , Rfl:     lamoTRIgine (LaMICtal) 25 MG tablet, 1 tablet po daily for 2 weeks then increase to 2 tablets daily, Disp: 45 tablet, Rfl: 0    levETIRAcetam  (KEPPRA) 750 MG tablet, Take 1 tablet by mouth 2 (Two) Times a Day., Disp: 180 tablet, Rfl: 3    Methylnaltrexone Bromide (Relistor) 150 MG tablet, Take 1 tablet by mouth Daily., Disp: , Rfl:     naloxone (Narcan) 4 MG/0.1ML nasal spray, , Disp: , Rfl:     nicotine (Nicoderm CQ) 21 MG/24HR patch, Place 1 patch on the skin as directed by provider Daily., Disp: 30 patch, Rfl: 4    oxyCODONE-acetaminophen (PERCOCET) 5-325 MG per tablet, Take 1 tablet by mouth Every 12 (Twelve) Hours., Disp: , Rfl:     Rimegepant Sulfate (Nurtec) 75 MG tablet dispersible tablet, Place 1 tablet under the tongue Take As Directed., Disp: 16 tablet, Rfl: 5    rOPINIRole (REQUIP) 0.5 MG tablet, Take 1 tablet by mouth Every Night. Take 1 hour before bedtime., Disp: 30 tablet, Rfl: 2    solifenacin (VESICARE) 10 MG tablet, Take 1 tablet by mouth Daily., Disp: , Rfl:     sulfamethoxazole-trimethoprim (Bactrim DS) 800-160 MG per tablet, Take 1 tablet by mouth 2 (Two) Times a Day., Disp: 6 tablet, Rfl: 0    tiZANidine (ZANAFLEX) 2 MG tablet, Take 1 tablet by mouth 4 (Four) Times a Day As Needed., Disp: , Rfl:     topiramate (TOPAMAX) 100 MG tablet, Take 1 tablet by mouth 2 (Two) Times a Day., Disp: 180 tablet, Rfl: 1    valACYclovir (VALTREX) 500 MG tablet, Take 1 tablet by mouth 2 (Two) Times a Day As Needed (as needed for fever blisters)., Disp: 90 tablet, Rfl: 1    vitamin B-12 (CYANOCOBALAMIN) 1000 MCG tablet, Take 1 tablet by mouth Daily., Disp: 90 tablet, Rfl: 3    vitamin B-6 (PYRIDOXINE) 25 MG tablet, Take 1 tablet by mouth Daily., Disp: 90 tablet, Rfl: 3    Medication Considerations:  ASYA reviewed and appropriate.     Allergies:   No Known Allergies    Objective     Vital Signs:   There were no vitals filed for this visit.      There is no height or weight on file to calculate BMI.     Mental Status Exam:   MENTAL STATUS EXAM   General Appearance:  Cleanly groomed and dressed  Eye Contact:  Good eye contact  Attitude:   Cooperative  Motor Activity:  Normal gait, posture  Muscle Strength:  Normal  Speech:  Soft spoken  Language:  Spontaneous  Mood and affect:  Anxious and depressed  Hopelessness:  7  Thought Process:  Logical and goal-directed  Associations/ Thought Content:  No delusions  Hallucinations:  None  Suicidal Ideations:  Passive ideation  Homicidal Ideation:  Not present  Sensorium:  Alert  Orientation:  Person, place, time and situation  Immediate Recall, Recent, and Remote Memory:  Intact  Attention Span/ Concentration:  Good  Fund of Knowledge:  Appropriate for age and educational level  Intellectual Functioning:  Average range  Insight:  Fair  Judgement:  Fair  Reliability:  Fair  Impulse Control:  Fair      SUICIDE RISK ASSESSMENT/CSSRS:  1. Does patient have thoughts of suicide? passive  2. Does patient have intent for suicide? denies  3. Does patient have a current plan for suicide? denies  4. History of suicide attempts: yes; in 2010 when 1st  was diagnosed with liver cirrhosis  5. Family history of suicide or attempts: denies  6. History of violent behaviors towards others or property or thoughts of committing suicide: denies  7. History of sexual aggression toward others: denies  8. Access to firearms or weapons: denies    Assessment / Plan      Visit Diagnosis/Orders Placed This Visit:  Diagnoses and all orders for this visit:    1. Major depressive disorder, recurrent episode, moderate degree (Primary)  2. Generalized anxiety disorder  Re-ordered EKG  Continue Latuda to 80 mg po qpm  Start Lamictal 25 mg po daily for 2 weeks then increase to 50 mg po daily  DC Trazodone 100 mg po qpm   Continue Klonopin 0.5 mg po daily for now (plan to DC in future)  Continue Cymbalta 60 mg po BID for now (plan to titrate off of this as well)  Long term disability form completed  Continue therapy  UDS Reviewed: UDS from 11/2/23 reviewed  Labs Reviewed : labs from11/3/23 reviewed  Chart Reviewed      Functional Status:  Mild impairment      Prognosis: Good with Ongoing Treatment     Impression/Formulation:  Patient appeared alert and oriented.  Patient is voluntarily requesting to begin outpatient therapy at [facility].  Patient is receptive to assistance with maintaining a stable lifestyle.  Patient presents with history of     ICD-10-CM ICD-9-CM   1. Major depressive disorder, recurrent episode, moderate degree  F33.1 296.32   2. Generalized anxiety disorder  F41.1 300.02     Treatment Plan:     Patient will continue supportive psychotherapy efforts and medications as indicated. Clinic will obtain release of information for current treatment team for continuity of care as needed. Patient will contact this office, call 911 or present to the nearest emergency room should suicidal or homicidal ideations occur.  Discussed medication options and treatment plan of prescribed medication(s) as well as the risks, benefits, and potential side effects. Patient ackowledged and verbally consented to continue with current treatment plan and was educated on the importance of compliance with treatment and follow-up appointments.     Quality Measures:  Tobacco: Marguerite Anderson  reports that she quit smoking about 7 weeks ago. Her smoking use included cigarettes. She has been exposed to tobacco smoke. She has never used smokeless tobacco.. I have educated her on the risk of diseases from using tobacco products such as cancer, COPD, and heart disease.     I advised her to quit and she is not willing to quit.    I spent 5 minutes counseling the patient.    Depression (PHQ >11): Patient screened positive for depression based on a PHQ-9 score of 21 on 2/2/2024. Follow-up recommendations include:  follow up with writer in 1 mo, continue medications as prescribed, continue therapy .     Follow Up:   Return in about 1 month (around 3/2/2024) for Medication Management, follow up with therapy.    Alissa Flynn MD  [facility]     This is electronically signed by Alissa Flynn MD   12/05/2023 14:47 EST

## 2024-02-02 NOTE — PROGRESS NOTES
Chief Complaint  Gynecologic Exam     History of Present Illness:  Patient is 54 y.o.  who presents to [facility] here for her annual examination.  Patient reports having continued menopausal symptoms.  She has been on her estradiol gel.  She reports having continued hot flashes as well as night sweats.  She would like to consider increasing her dose.  She did have recent insertion and placement of InterStim this month.  She reports having improvement in her urgency but she has continued to have stress urinary incontinence.  She does have a follow-up back with urology.  She did have her mammogram in December.  She has had a colonoscopy in .  Patient also had a bone density scan last year.  She sees Dr. Tripathi for her primary care.    History  Past Medical History:   Diagnosis Date    Abdominal pain, epigastric     Abnormal Pap smear of cervix hpv    Acute sinusitis     Acute UTI (urinary tract infection)     ADHD (attention deficit hyperactivity disorder)     asked to be tested not diagnosed officially    Angina, intestinal     Anxiety     Anxiety     Arthritis     Asthma not sure    COPD    Back pain     chronic    Bipolar disorder     Breast mass, right     Cancer 2018    basal cell R ear    Cervical dysplasia 1996    was frozen off    Chronic hepatitis C virus infection     was treated    COPD (chronic obstructive pulmonary disease)     Depression     Diarrhea     Diverticulosis     Elevated LFTs     Endometriosis 2012    Fatigue     Glaucoma 2020    Hepatitis C 1994    cured with meds    Herpes     Had surgery inter-stem placement    History of echocardiogram     History of endometriosis     History of Papanicolaou smear of cervix 2014    NORMAL     History of stress test     denies any current palpitations, chest pain, dizziness-1/10/24    HPV (human papilloma virus) infection     HTN (hypertension)     IBS (irritable bowel syndrome)      Inflammatory bowel disease     Insomnia     Kidney stone     Low back pain     Menopausal symptoms     Migraine headache     Nausea & vomiting     Neck pain     Osteopenia     Other hyperlipidemia     Ovarian cyst     Pelvic adhesive disease     PONV (postoperative nausea and vomiting)     Radicular pain of left lower extremity     Restless leg syndrome     Seizure disorder     grand mal infrequent; focal siezures as often as daily - has a neurologist.  Last seizure November 5 2023.    Tobacco abuse     Trochanteric bursitis     Urinary incontinence     Varicella Unknown    Had as a child    Visual impairment     wear contacts/glasses    Vitamin B 12 deficiency     Wears contact lenses     Wears glasses      Current Outpatient Medications on File Prior to Visit   Medication Sig Dispense Refill    Advair Diskus 250-50 MCG/ACT DISKUS INHALE 1 PUFF BY MOUTH DAILY 60 each 11    albuterol sulfate  (90 Base) MCG/ACT inhaler INHALE 2 PUFFS INTO THE LUNGS EVERY 4 HOURS AS NEEDED 18 g 1    ARIPiprazole (ABILIFY) 20 MG tablet Take 1 tablet by mouth Daily.      clonazePAM (KlonoPIN) 0.5 MG tablet Take 1 tablet by mouth At Night As Needed for Anxiety. 30 tablet 0    cyclobenzaprine (FLEXERIL) 10 MG tablet Take 1 tablet by mouth 3 (Three) Times a Day As Needed.      diclofenac (VOLTAREN) 50 MG EC tablet Take 1 tablet by mouth 2 (Two) Times a Day. For up to 1 week 30 tablet 0    Diclofenac Sodium (VOLTAREN) 1 % gel gel APPLY 4 GRAMS TOPICALLY TO AFFECTED AREA 4 TIMES A DAY AS NEEDED FOR PAIN 100 g 3    DULoxetine (CYMBALTA) 60 MG capsule Take 1 capsule by mouth 2 (Two) Times a Day. 60 capsule 0    gabapentin (NEURONTIN) 100 MG capsule TK 1 C PO QHS      hydrOXYzine (ATARAX) 10 MG tablet TAKE 1 TO 2 TABLETS BY MOUTH EVERY DAY AS NEEDED FOR PANIC      hydrOXYzine pamoate (VISTARIL) 25 MG capsule TK 1 C PO TID PRF ANXIETY      levETIRAcetam (KEPPRA) 750 MG tablet Take 1 tablet by mouth 2 (Two) Times a Day. 180 tablet 3     Lurasidone HCl (Latuda) 80 MG tablet tablet Take 1 tablet by mouth Every Evening. 30 tablet 0    Methylnaltrexone Bromide (Relistor) 150 MG tablet Take 1 tablet by mouth Daily.      mirtazapine (REMERON) 7.5 MG tablet Take 1 tablet by mouth Every Night.      naloxone (Narcan) 4 MG/0.1ML nasal spray       nicotine (Nicoderm CQ) 21 MG/24HR patch Place 1 patch on the skin as directed by provider Daily. 30 patch 4    oxyCODONE-acetaminophen (PERCOCET) 5-325 MG per tablet Take 1 tablet by mouth Every 12 (Twelve) Hours.      propranolol (INDERAL) 20 MG tablet Take 1 tablet by mouth Daily.      QUEtiapine (SEROquel) 25 MG tablet TAKE ONE TABLET BY MOUTH IN LATE AFTERNOON BEFORE SUPPER AND TWO TABLETS BY MOUTH AT BEDTIME.      Rimegepant Sulfate (Nurtec) 75 MG tablet dispersible tablet Place 1 tablet under the tongue Take As Directed. 16 tablet 5    rOPINIRole (REQUIP) 0.5 MG tablet Take 1 tablet by mouth Every Night. Take 1 hour before bedtime. 30 tablet 2    sertraline (ZOLOFT) 50 MG tablet Take 1 tablet by mouth Daily.      solifenacin (VESICARE) 10 MG tablet Take 1 tablet by mouth Daily.      sulfamethoxazole-trimethoprim (Bactrim DS) 800-160 MG per tablet Take 1 tablet by mouth 2 (Two) Times a Day. 6 tablet 0    tiZANidine (ZANAFLEX) 2 MG tablet Take 1 tablet by mouth 4 (Four) Times a Day As Needed.      topiramate (TOPAMAX) 100 MG tablet Take 1 tablet by mouth 2 (Two) Times a Day. 180 tablet 1    traZODone (DESYREL) 100 MG tablet Take 1 tablet by mouth Every Night. 30 tablet 0    valACYclovir (VALTREX) 500 MG tablet Take 1 tablet by mouth 2 (Two) Times a Day As Needed (as needed for fever blisters). 90 tablet 1    vitamin B-12 (CYANOCOBALAMIN) 1000 MCG tablet Take 1 tablet by mouth Daily. 90 tablet 3    vitamin B-6 (PYRIDOXINE) 25 MG tablet Take 1 tablet by mouth Daily. 90 tablet 3     No current facility-administered medications on file prior to visit.     No Known Allergies  Past Surgical History:   Procedure  Laterality Date    BILATERAL SALPINGO OOPHORECTOMY  03/10/2015    DR NATALY THOMPSON    BREAST LUMPECTOMY Right     benign     SECTION      COLONOSCOPY      COSMETIC SURGERY  2018    basal cell removal R Ear    HYSTERECTOMY  03/10/2015    [facility] (DR NATALY THOMPSON)    HYSTEROSCOPY  2013    complete    INGUINAL HERNIA REPAIR Right 2008    INTERSTIM PLACEMENT N/A 2024    Procedure: INTERSTIM STAGES 1 AND 2 LEAD AND GENERATOR PLACEMENT;  Surgeon: Adarsh Louis MD;  Location: [facility];  Service: Urology;  Laterality: N/A;    LAPAROSCOPIC ASSISTED VAGINAL HYSTERECTOMY SALPINGO OOPHORECTOMY      to remove cyst    LYSIS OF ABDOMINAL ADHESIONS  03/10/2015    DR NATALY THOMPSON    SKIN BIOPSY      SUBTOTAL HYSTERECTOMY  3/2013    Left ovary    TUBAL ABDOMINAL LIGATION      WISDOM TOOTH EXTRACTION  1986     Family History   Problem Relation Age of Onset    Alzheimer's disease Other     Cancer Other     Dementia Other     Hypertension Other     Parkinsonism Other     Osteoporosis Mother     Diabetes Mother     Hypertension Mother     Obesity Mother     Depression Mother     Arthritis Mother     COPD Mother     Hearing loss Mother     Hyperlipidemia Mother     Breast cancer Maternal Grandmother     Osteoporosis Maternal Grandmother     Diabetes Maternal Grandmother     Dementia Maternal Grandmother     Cancer Maternal Grandmother         Breast Cancer    Hypertension Maternal Grandmother     ADD / ADHD Sister     Alcohol abuse Sister     Anxiety disorder Sister     Bipolar disorder Sister     Depression Sister     Drug abuse Sister     Depression Father     Anxiety disorder Father     Other Father          parkinsons disease    OCD Neg Hx     Paranoid behavior Neg Hx     Schizophrenia Neg Hx     Seizures Neg Hx     Self-Injurious Behavior  Neg Hx     Suicide Attempts Neg Hx      Social History     Socioeconomic History    Marital status:     Number of children: 1    Highest education level: High  "school graduate   Tobacco Use    Smoking status: Former     Packs/day: 0.00     Years: 15.00     Additional pack years: 0.00     Total pack years: 0.00     Types: Cigarettes     Quit date: 2023     Years since quittin.1     Passive exposure: Current    Smokeless tobacco: Never    Tobacco comments:     I quit smoking   Vaping Use    Vaping Use: Every day    Substances: Nicotine    Devices: Pre-filled or refillable cartridge   Substance and Sexual Activity    Alcohol use: Yes     Comment: Drink very rarely on a special occasion    Drug use: Never    Sexual activity: Yes     Partners: Male     Birth control/protection: Other, Post-menopausal, Tubal ligation, Hysterectomy       Physical Examination:  Vital Signs: /70   Ht 167.6 cm (66\")   Wt 78.9 kg (174 lb)   BMI 28.08 kg/m²     General Appearance: alert, appears stated age, and cooperative  Breasts: Examined in supine position  Symmetric without masses or skin dimpling  Nipples normal without inversion, lesions or discharge  There are no palpable axillary nodes  Abdomen: no masses, no hepatomegaly, no splenomegaly, soft non-tender, no guarding, and no rebound tenderness  Pelvic: Clinical staff was present for exam  External genitalia:  normal appearance of the external genitalia including [place]th[place]'s and [place]'s glands.  :  urethral meatus normal;  Vaginal:  atrophic mucosal changes are present;  Cervix:  absent.  Uterus:  absent.  Adnexa:  non palpable bilaterally.  Pap smear done and specimen sent using Thin-Prep technique    Data Review:  The following data was reviewed by: Sandra Rangel MD on 2024:     Labs:  ToxAssure Flex 23, Ur - (2023 13:29)  Oxycodone Class, MS, Ur RFX - (2023 13:29)  CBC (No Diff) (2023 15:49)  Comprehensive Metabolic Panel (2023 15:49)  Calcitriol (1,25 di-OH Vitamin D) (2023 15:49)  Vitamin B12 & Folate (2023 15:49)  TSH (2023 15:49)  T4, free (2023 " 15:49)  Urinalysis, Microscopic Only - (11/05/2023 14:20)  Urinalysis With Culture If Indicated - (11/05/2023 14:20)  Imaging:  Mammo Screening Digital Tomosynthesis Bilateral With CAD (12/15/2023 11:58)   US Breast Right Limited (12/27/2023 12:13)  XR Spine Lumbar 2 or 3 View (12/28/2023 15:28)  US breast right limited (01/09/2024 15:17)  Medical Records:  Op Note by Adarsh Louis MD (01/18/2024 09:39)     Assessment and Plan   1. Encounter for gynecological examination with abnormal finding  Pap was done today.  If she does not receive the results of the Pap within 2 weeks  time, she was instructed to call to find out the results.  I explained to Marguerite that the recommendations for Pap smear interval in a low risk patient has lengthened to 3 years time if cytology alone normal or  5 years time if both cytology and HPV testing were normal.  I encouraged her to be seen yearly for a full physical exam including breast and pelvic exam even during the off years when PAP's will not be performed.   - LIQUID-BASED PAP SMEAR WITH HPV GENOTYPING IF ASCUS ([facility])    2. Encounter for screening mammogram for breast cancer  It is recommended per ACOG, for women at average risk to start annual mammogram screening at the age of 40 until the age of 75 and an individualized decision be made for women after age 75.  She was encouraged to continue getting yearly mammograms.  She should report any palpable breast lump(s) or skin changes regardless of mammographic findings.  I explained to Marguerite that notification regarding her mammogram results will come from the center performing the study.  Our office will not be routinely calling with mammogram results.  It is her responsibility to make sure that the results from the mammogram are communicated to her by the breast center.  If she has any questions about the results, she is welcome to call our office anytime.  The patient reports she has done her mammogram and results  are noted.    Marguerite was counseled regarding having clinical breast exams and breast self-awareness.  Women aged 29-39 years of age should have clinical breast exams every 1-3 years and yearly aged 40 and older.  The patient was counseled regarding breast self-awareness focusing on having a sense of what is normal for her breasts so that she can tell if there are changes.  Even small changes should be reported to provider.     3. Menopausal symptoms  The various options for the management of menopausal symptoms was discussed.  The medical treatment options discussed include HRT, SSRIs, SSNRIs, clonidine, and gabapentin.  The risks and benefits were discussed including the findings from the WHI study.  The increased risk of breast cancer, CAD, stroke, and VTE events were discussed for combination therapy vs the increased risk of CV events and breast cancer not being seen in the estrogen only group.  The lowest effective dose for the shortest duration of treatment was discussed in regards to HRT.  Other alternatives including otc supplements and lifestyle changes were also discussed.  Local estrogen therapy to relieve atrophic vaginal symptoms was discussed was well as other alternatives.   After discussion of the risk versus benefits of systemic hormone replacement therapy the patient desires to increase her estradiol dose.  Prescription is given as noted.  Instructions and precautions have been given.      4. Hormone replacement therapy (HRT)  I have discussed with the patient the risk versus benefits of continuation of her systemic hormone replacement therapy.  Patient understands it is recommended to be on the lowest dose for the shortest period of time to control her symptoms.  Prescription has been given as noted.      5. Postmenopausal atrophic vaginitis  Discussed various options for relief of atrophic vaginal symptoms related to menopause. Discussed local therapy for treatment of vaginal symptoms only.   Discussed the different formulation options including cream, ring, and tablets.  Discussed the low risk of systemic absorption in postmenopausal women with atrophy using 25 mcgs of estradiol on a daily basis.  Recommend low dose use 2-3x/wk for maintenance of treatment.  Other treatment options were discussed including the use of water-based and silicone-based vaginal lubricants and moisturizers.  Also discussed was the FDA approved treatment option of ospemifene for moderate to severe dyspareunia.   Patient is to call if she desires any treatment in the future.      6. MADELEINE (stress urinary incontinence, female)  Patient will keep her follow-up appointment with urology as noted.  Patient is also to consider topical estrogen cream intravaginally as well as periurethral area.    Follow Up/Instructions:  Follow up as noted.  Patient was given instructions and counseling regarding her condition or for health maintenance advice. Please see specific information pulled into the AVS if appropriate.     Note: Speech recognition transcription software may have been used to dictate portions of this document.  An attempt at proofreading has been made though minor errors in transcription may still be present.    This note was electronically signed.  Sandra Rangel M.D.

## 2024-02-05 ENCOUNTER — TELEMEDICINE (OUTPATIENT)
Dept: PSYCHIATRY | Facility: CLINIC | Age: 54
End: 2024-02-05
Payer: MEDICAID

## 2024-02-05 DIAGNOSIS — F41.1 GAD (GENERALIZED ANXIETY DISORDER): ICD-10-CM

## 2024-02-05 DIAGNOSIS — F33.1 MODERATE EPISODE OF RECURRENT MAJOR DEPRESSIVE DISORDER: Primary | ICD-10-CM

## 2024-02-05 PROCEDURE — 90834 PSYTX W PT 45 MINUTES: CPT | Performed by: COUNSELOR

## 2024-02-05 PROCEDURE — 1160F RVW MEDS BY RX/DR IN RCRD: CPT | Performed by: COUNSELOR

## 2024-02-05 PROCEDURE — 1159F MED LIST DOCD IN RCRD: CPT | Performed by: COUNSELOR
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
## 2024-02-06 LAB — REF LAB TEST METHOD: NORMAL

## 2024-02-07 ENCOUNTER — SPECIALTY PHARMACY (OUTPATIENT)
Dept: PHARMACY | Facility: HOSPITAL | Age: 54
End: 2024-02-07
Payer: MEDICAID

## 2024-02-07 ENCOUNTER — DISEASE STATE MANAGEMENT VISIT (OUTPATIENT)
Dept: PHARMACY | Facility: HOSPITAL | Age: 54
End: 2024-02-07
Payer: MEDICAID

## 2024-02-07 VITALS
BODY MASS INDEX: 29.09 KG/M2 | SYSTOLIC BLOOD PRESSURE: 130 MMHG | HEART RATE: 79 BPM | OXYGEN SATURATION: 96 % | WEIGHT: 181 LBS | DIASTOLIC BLOOD PRESSURE: 84 MMHG | TEMPERATURE: 97.1 F | HEIGHT: 66 IN

## 2024-02-07 DIAGNOSIS — G43.719 INTRACTABLE CHRONIC MIGRAINE WITHOUT AURA AND WITHOUT STATUS MIGRAINOSUS: Primary | ICD-10-CM

## 2024-02-07 DIAGNOSIS — G43.009 MIGRAINE WITHOUT AURA AND WITHOUT STATUS MIGRAINOSUS, NOT INTRACTABLE: Primary | ICD-10-CM

## 2024-02-07 PROCEDURE — 25010000002 ONABOTULINUMTOXINA 200 UNITS RECONSTITUTED SOLUTION: Performed by: NURSE PRACTITIONER

## 2024-02-07 RX ADMIN — ONABOTULINUMTOXINA 165 UNITS: 200 INJECTION, POWDER, LYOPHILIZED, FOR SOLUTION INTRADERMAL; INTRAMUSCULAR at 14:12

## 2024-02-07 NOTE — PROGRESS NOTES
Botox Procedure Note       Patient Name: Marguerite Anderson  : 1970   MRN: [id number]     Chief Complaint:  Here for Botox injections and the Botox is facility supplied (will be billed by the hospital).      History of Present Illness: Marguerite Anderson is a 54 y.o. female who is here today for Botox injections for migraines.  This will be her first set of Botox since 2022.  She had 70% improvement in her migraines with Botox injections.     We have discussed risk and benefits of this Botox procedure and common side effects including headache, neck pain, neck stiffness or weakness, ptosis, flu-like symptoms as well as more serious possible adverse effects including possible dysphagia, respiratory distress or even death (death has only been reported once with adults for Botox for migraines in another state when mixed with lidocaine solution which we do not use lidocaine solution in our practice for mixing Botox). The patient verbally understands and accepts risks and agrees with moving forward with Botox injections for chronic migraine prevention.    Verbal and written consent has been obtained from the patient prior to beginning treatment injections.     Subjective      Review of Systems:   Review of Systems    The following portions of the patient's history were reviewed an updated as appropriate: past family history, past medical history, past social history, past surgical history.     Medications:     Current Outpatient Medications:     Advair Diskus 250-50 MCG/ACT DISKUS, INHALE 1 PUFF BY MOUTH DAILY, Disp: 60 each, Rfl: 11    albuterol sulfate  (90 Base) MCG/ACT inhaler, INHALE 2 PUFFS INTO THE LUNGS EVERY 4 HOURS AS NEEDED, Disp: 18 g, Rfl: 1    cyclobenzaprine (FLEXERIL) 10 MG tablet, Take 1 tablet by mouth 3 (Three) Times a Day As Needed., Disp: , Rfl:     diclofenac (VOLTAREN) 50 MG EC tablet, Take 1 tablet by mouth 2 (Two) Times a Day. For up to 1 week, Disp: 30 tablet, Rfl: 0     Diclofenac Sodium (VOLTAREN) 1 % gel gel, APPLY 4 GRAMS TOPICALLY TO AFFECTED AREA 4 TIMES A DAY AS NEEDED FOR PAIN, Disp: 100 g, Rfl: 3    DULoxetine (CYMBALTA) 60 MG capsule, Take 1 capsule by mouth 2 (Two) Times a Day., Disp: 60 capsule, Rfl: 0    Estradiol 0.75 MG/0.75GM gel, Place 1 each on the skin as directed by provider Daily., Disp: 30 each, Rfl: 11    gabapentin (NEURONTIN) 100 MG capsule, Take 1 capsule by mouth every night at bedtime., Disp: , Rfl:     lamoTRIgine (LaMICtal) 25 MG tablet, 1 tablet po daily for 2 weeks then increase to 2 tablets daily, Disp: 45 tablet, Rfl: 0    levETIRAcetam (KEPPRA) 750 MG tablet, Take 1 tablet by mouth 2 (Two) Times a Day., Disp: 180 tablet, Rfl: 3    Lurasidone HCl (Latuda) 80 MG tablet tablet, Take 1 tablet by mouth Every Evening., Disp: 30 tablet, Rfl: 0    Methylnaltrexone Bromide (Relistor) 150 MG tablet, Take 1 tablet by mouth Daily., Disp: , Rfl:     naloxone (Narcan) 4 MG/0.1ML nasal spray, , Disp: , Rfl:     nicotine (Nicoderm CQ) 21 MG/24HR patch, Place 1 patch on the skin as directed by provider Daily., Disp: 30 patch, Rfl: 4    oxyCODONE-acetaminophen (PERCOCET) 5-325 MG per tablet, Take 1 tablet by mouth Every 12 (Twelve) Hours., Disp: , Rfl:     Rimegepant Sulfate (Nurtec) 75 MG tablet dispersible tablet, Place 1 tablet under the tongue Take As Directed., Disp: 16 tablet, Rfl: 5    rOPINIRole (REQUIP) 0.5 MG tablet, Take 1 tablet by mouth Every Night. Take 1 hour before bedtime., Disp: 30 tablet, Rfl: 2    solifenacin (VESICARE) 10 MG tablet, Take 1 tablet by mouth Daily., Disp: , Rfl:     tiZANidine (ZANAFLEX) 2 MG tablet, Take 1 tablet by mouth 4 (Four) Times a Day As Needed., Disp: , Rfl:     topiramate (TOPAMAX) 100 MG tablet, Take 1 tablet by mouth 2 (Two) Times a Day., Disp: 180 tablet, Rfl: 1    valACYclovir (VALTREX) 500 MG tablet, Take 1 tablet by mouth 2 (Two) Times a Day As Needed (as needed for fever blisters)., Disp: 90 tablet, Rfl: 1     "vitamin B-12 (CYANOCOBALAMIN) 1000 MCG tablet, Take 1 tablet by mouth Daily., Disp: 90 tablet, Rfl: 3    vitamin B-6 (PYRIDOXINE) 25 MG tablet, Take 1 tablet by mouth Daily., Disp: 90 tablet, Rfl: 3    Current Facility-Administered Medications:     OnabotulinumtoxinA 200 Units, 200 Units, Intramuscular, Q3 Months, Hannah Pringle APRN    Allergies:   No Known Allergies    Objective     Physical Exam:  Vital Signs:   Vitals:    02/07/24 1353   BP: 130/84   BP Location: Right arm   Patient Position: Sitting   Cuff Size: Adult   Pulse: 79   Temp: 97.1 °F (36.2 °C)   SpO2: 96%   Weight: 82.1 kg (181 lb)   Height: 167.6 cm (66\")   PainSc:   8   PainLoc: Back  Comment: hip     Body mass index is 29.21 kg/m².     Physical Exam    Neurologic Exam    BOTOX PROCEDURE:     Date of Last Injection: June 2022  Response: Good  Side Effects: None  : Training Advisor  Lot #: [id number]  Expiration Date: 06/2026  NDC: [id number]    200 unit vial Botox was re-constituted with 4 mL 0.9 NS to 5 unit/0.1 mL using standard techniques.  10 units were given at the  muscle in 2 divided sites  5 units were given at the Procerus muscle  20 units were given at the Frontalis muscle in 4 divided sites  40 units were given at the Temporalis muscle in 8 divided sites  30 units were given at the Occipitalis muscle in 6 divided sites  20 units were given at the Cervical paraspinal muscle in 4 divided sites  30 units were given at the Trapezius muscle in 6 divided sites  10 units were given at the Masseter muscle in 2 divided sites.   For a total of 165 units divided between 33 sites and 35 units of wastage    Patient tolerated procedure well with no immediate complications.     Assessment / Plan      Assessment/Plan:   Diagnoses and all orders for this visit:    1. Intractable chronic migraine without aura and without status migrainosus (Primary)         Follow Up:   It has been determined that Marguerite Anderson has chronic migraine " headaches. We will proceed with a 12 week regimen of 200 units of Botox injections, to treat the patient's chronic migraines.      Return in about 3 months (around 5/7/2024) for Botox.      CHRIS Malhotra, FNP-C  [facility]

## 2024-02-07 NOTE — PROGRESS NOTES
[facility]  Chronic Migraine - Botox Assessment     Marguerite Anderson is a 54 y.o. female diagnosed with chronic migraine and enrolled in the [facility] for treatment. An initial assessment of therapy appropriateness was conducted for Botox injections.    Patient reports having 16/30 headache days. Has tried and failed duloxetine, gabapentin, propranolol, Relpax, Maxalt, AEDs, NSAIDs, Nurtec, Topamax, Imitrex. She has had botox injections in the past that worked well for migraines and would like to resume these.    Relevant Past Medical History and Comorbidities  Past Medical History:   Diagnosis Date    Abdominal pain, epigastric     Abnormal Pap smear of cervix hpv    Acute sinusitis     Acute UTI (urinary tract infection)     ADHD (attention deficit hyperactivity disorder) 2008    asked to be tested not diagnosed officially    Angina, intestinal     Anxiety     Anxiety     Arthritis     Asthma not sure    COPD    Back pain     chronic    Bipolar disorder 2019    Breast mass, right     Cancer 12/20/2018    basal cell R ear    Cervical dysplasia 1996    was frozen off    Chronic hepatitis C virus infection     was treated    COPD (chronic obstructive pulmonary disease)     Depression     Diarrhea     Diverticulosis     Elevated LFTs     Endometriosis 2012    Fatigue     Glaucoma 05/05/2020    Hepatitis C 1994    cured with meds    Herpes 2022    Had surgery inter-stem placement    History of echocardiogram     History of endometriosis     History of Papanicolaou smear of cervix 04/02/2014    NORMAL     History of stress test     denies any current palpitations, chest pain, dizziness-1/10/24    HPV (human papilloma virus) infection 1996    HTN (hypertension)     IBS (irritable bowel syndrome)     Inflammatory bowel disease     Insomnia     Kidney stone     Low back pain     Menopausal symptoms     Migraine headache     Nausea & vomiting     Neck pain     Osteopenia     Other  hyperlipidemia     Ovarian cyst     Pelvic adhesive disease     PONV (postoperative nausea and vomiting)     Radicular pain of left lower extremity     Restless leg syndrome     Seizure disorder     grand mal infrequent; focal siezures as often as daily - has a neurologist.  Last seizure 2023.    Substance abuse     Tobacco abuse     Trochanteric bursitis     Urinary incontinence     Varicella Unknown    Had as a child    Visual impairment     wear contacts/glasses    Vitamin B 12 deficiency     Wears contact lenses     Wears glasses      Social History     Socioeconomic History    Marital status:     Number of children: 1    Highest education level: High school graduate   Tobacco Use    Smoking status: Former     Packs/day: 0.00     Years: 15.00     Additional pack years: 0.00     Total pack years: 0.00     Types: Cigarettes     Quit date: 2023     Years since quittin.1     Passive exposure: Current    Smokeless tobacco: Never    Tobacco comments:     I quit smoking   Vaping Use    Vaping Use: Every day    Substances: Nicotine    Devices: Pre-filled or refillable cartridge   Substance and Sexual Activity    Alcohol use: Yes     Comment: Drink very rarely on a special occasion    Drug use: Never    Sexual activity: Yes     Partners: Male     Birth control/protection: Other, Post-menopausal, Tubal ligation, Hysterectomy       Allergies  Patient has no known allergies.    Insurance Coverage & Financial Support: [facility]    Current Medication List:    Current Outpatient Medications:     Advair Diskus 250-50 MCG/ACT DISKUS, INHALE 1 PUFF BY MOUTH DAILY, Disp: 60 each, Rfl: 11    albuterol sulfate  (90 Base) MCG/ACT inhaler, INHALE 2 PUFFS INTO THE LUNGS EVERY 4 HOURS AS NEEDED, Disp: 18 g, Rfl: 1    cyclobenzaprine (FLEXERIL) 10 MG tablet, Take 1 tablet by mouth 3 (Three) Times a Day As Needed., Disp: , Rfl:     diclofenac (VOLTAREN) 50 MG EC tablet, Take 1 tablet by mouth 2 (Two)  Times a Day. For up to 1 week, Disp: 30 tablet, Rfl: 0    Diclofenac Sodium (VOLTAREN) 1 % gel gel, APPLY 4 GRAMS TOPICALLY TO AFFECTED AREA 4 TIMES A DAY AS NEEDED FOR PAIN, Disp: 100 g, Rfl: 3    DULoxetine (CYMBALTA) 60 MG capsule, Take 1 capsule by mouth 2 (Two) Times a Day., Disp: 60 capsule, Rfl: 0    Estradiol 0.75 MG/0.75GM gel, Place 1 each on the skin as directed by provider Daily., Disp: 30 each, Rfl: 11    gabapentin (NEURONTIN) 100 MG capsule, Take 1 capsule by mouth every night at bedtime., Disp: , Rfl:     lamoTRIgine (LaMICtal) 25 MG tablet, 1 tablet po daily for 2 weeks then increase to 2 tablets daily, Disp: 45 tablet, Rfl: 0    levETIRAcetam (KEPPRA) 750 MG tablet, Take 1 tablet by mouth 2 (Two) Times a Day., Disp: 180 tablet, Rfl: 3    Lurasidone HCl (Latuda) 80 MG tablet tablet, Take 1 tablet by mouth Every Evening., Disp: 30 tablet, Rfl: 0    Methylnaltrexone Bromide (Relistor) 150 MG tablet, Take 1 tablet by mouth Daily., Disp: , Rfl:     naloxone (Narcan) 4 MG/0.1ML nasal spray, , Disp: , Rfl:     nicotine (Nicoderm CQ) 21 MG/24HR patch, Place 1 patch on the skin as directed by provider Daily., Disp: 30 patch, Rfl: 4    oxyCODONE-acetaminophen (PERCOCET) 5-325 MG per tablet, Take 1 tablet by mouth Every 12 (Twelve) Hours., Disp: , Rfl:     Rimegepant Sulfate (Nurtec) 75 MG tablet dispersible tablet, Place 1 tablet under the tongue Take As Directed., Disp: 16 tablet, Rfl: 5    rOPINIRole (REQUIP) 0.5 MG tablet, Take 1 tablet by mouth Every Night. Take 1 hour before bedtime., Disp: 30 tablet, Rfl: 2    solifenacin (VESICARE) 10 MG tablet, Take 1 tablet by mouth Daily., Disp: , Rfl:     tiZANidine (ZANAFLEX) 2 MG tablet, Take 1 tablet by mouth 4 (Four) Times a Day As Needed., Disp: , Rfl:     topiramate (TOPAMAX) 100 MG tablet, Take 1 tablet by mouth 2 (Two) Times a Day., Disp: 180 tablet, Rfl: 1    valACYclovir (VALTREX) 500 MG tablet, Take 1 tablet by mouth 2 (Two) Times a Day As Needed (as  needed for fever blisters)., Disp: 90 tablet, Rfl: 1    vitamin B-12 (CYANOCOBALAMIN) 1000 MCG tablet, Take 1 tablet by mouth Daily., Disp: 90 tablet, Rfl: 3    vitamin B-6 (PYRIDOXINE) 25 MG tablet, Take 1 tablet by mouth Daily., Disp: 90 tablet, Rfl: 3    Current Facility-Administered Medications:     OnabotulinumtoxinA 200 Units, 200 Units, Intramuscular, Q3 Months, Hannah Pringle APRN    Drug Interactions:  Medication list was reviewed for drug-drug interactions with Botox. Noted interactions with cyclobenzaprine and solifenacin (anticholinergic). Monitor closely for anticholinergic ADRs such as dry mouth, dry eyes, urinary retention, etc.     Relevant Laboratory Values:  Lab Results   Component Value Date    GLUCOSE 88 11/03/2023    CALCIUM 9.2 11/03/2023     11/03/2023    K 5.0 11/03/2023    CO2 22 11/03/2023     11/03/2023    BUN 7 11/03/2023    CREATININE 0.83 11/03/2023    EGFRIFAFRI 108 09/14/2021    EGFRIFNONA 94 09/14/2021    BCR 8 (L) 11/03/2023    ANIONGAP 8.9 06/23/2022    AST 13 11/03/2023    ALT 8 11/03/2023     Lab Results   Component Value Date    WBC 10.4 11/03/2023    HGB 14.0 11/03/2023    HCT 41.8 11/03/2023    MCV 94 11/03/2023     11/03/2023       Patient identified as having ? 15 migraines per month: Yes    Assessment & Plan:  Patient diagnosed with chronic migraine and enrolled in the [facility] to receive Botox (onabotulinumtoxinA) injections.  Patient has been prescribed OnabotulinumtoxinA 200 unit injections administered by provider every 3 months. Therapy appropriate based on assessment conducted as above. Education per provider on date of initial botox injeciton visit.  Patient scheduled for first clinic visit on 2/7/24.   David Kathleen RPH  2/7/2024  13:15 EST

## 2024-02-09 RX ORDER — GABAPENTIN 100 MG/1
100 CAPSULE ORAL
OUTPATIENT
Start: 2024-02-09

## 2024-02-09 RX ORDER — VALACYCLOVIR HYDROCHLORIDE 500 MG/1
500 TABLET, FILM COATED ORAL 2 TIMES DAILY PRN
Qty: 90 TABLET | Refills: 1 | Status: SHIPPED | OUTPATIENT
Start: 2024-02-09

## 2024-02-20 ENCOUNTER — OFFICE VISIT (OUTPATIENT)
Dept: UROLOGY | Facility: CLINIC | Age: 54
End: 2024-02-20
Payer: MEDICAID

## 2024-02-20 VITALS
SYSTOLIC BLOOD PRESSURE: 118 MMHG | BODY MASS INDEX: 29.09 KG/M2 | WEIGHT: 181 LBS | TEMPERATURE: 97.8 F | HEIGHT: 66 IN | DIASTOLIC BLOOD PRESSURE: 76 MMHG

## 2024-02-20 DIAGNOSIS — N39.41 URGE INCONTINENCE OF URINE: Primary | ICD-10-CM

## 2024-02-20 LAB
BILIRUB BLD-MCNC: NEGATIVE MG/DL
CLARITY, POC: CLEAR
COLOR UR: YELLOW
EXPIRATION DATE: ABNORMAL
GLUCOSE UR STRIP-MCNC: NEGATIVE MG/DL
KETONES UR QL: ABNORMAL
LEUKOCYTE EST, POC: NEGATIVE
Lab: ABNORMAL
NITRITE UR-MCNC: NEGATIVE MG/ML
PH UR: 6 [PH] (ref 5–8)
PROT UR STRIP-MCNC: ABNORMAL MG/DL
RBC # UR STRIP: ABNORMAL /UL
SP GR UR: 1.01 (ref 1–1.03)
UROBILINOGEN UR QL: NORMAL

## 2024-02-20 NOTE — PROGRESS NOTES
Office Visit General Established Female Patient     Patient Name: Marguerite Anderson  : 1970   MRN: [id number]     Chief Complaint:   Chief Complaint   Patient presents with    Follow-up     1 month post interstim       Referring Provider: No ref. provider found    History of Present Illness: Marguerite Anderson is a 54 y.o. female with history below in assessment, who presents for follow up.   Set on Program 1 at 0.9   Denies painful stimulation   Had a couple of accidents because she delayed going   Using 1-2 pads daily but they are not saturated had been wearing overnight pull ups 3-4 daily        Subjective      Review of System:   As noted in HPI     Past Medical History:   Past Medical History:   Diagnosis Date    Abdominal pain, epigastric     Abnormal Pap smear of cervix hpv    Acute sinusitis     Acute UTI (urinary tract infection)     ADHD (attention deficit hyperactivity disorder)     asked to be tested not diagnosed officially    Angina, intestinal     Anxiety     Anxiety     Arthritis     Asthma not sure    COPD    Back pain     chronic    Bipolar disorder 2019    Breast mass, right     Cancer 2018    basal cell R ear    Cervical dysplasia     was frozen off    Chronic hepatitis C virus infection     was treated    COPD (chronic obstructive pulmonary disease)     Depression     Diarrhea     Diverticulosis     Elevated LFTs     Endometriosis 2012    Fatigue     Glaucoma 2020    Hepatitis C 1994    cured with meds    Herpes     Had surgery inter-stem placement    History of echocardiogram     History of endometriosis     History of Papanicolaou smear of cervix 2014    NORMAL     History of stress test     denies any current palpitations, chest pain, dizziness-1/10/24    HPV (human papilloma virus) infection     HTN (hypertension)     IBS (irritable bowel syndrome)     Inflammatory bowel disease     Insomnia     Kidney stone     Low back pain     Menopausal  symptoms     Migraine headache     Nausea & vomiting     Neck pain     Osteopenia     Other hyperlipidemia     Ovarian cyst     Pelvic adhesive disease     PONV (postoperative nausea and vomiting)     Radicular pain of left lower extremity     Restless leg syndrome     Seizure disorder     grand mal infrequent; focal siezures as often as daily - has a neurologist.  Last seizure 2023.    Substance abuse     Tobacco abuse     Trochanteric bursitis     Urinary incontinence     Varicella Unknown    Had as a child    Visual impairment     wear contacts/glasses    Vitamin B 12 deficiency     Wears contact lenses     Wears glasses        Past Surgical History:   Past Surgical History:   Procedure Laterality Date    BILATERAL SALPINGO OOPHORECTOMY  03/10/2015    DR NATALY THOMPSON    BREAST LUMPECTOMY Right     benign     SECTION      COLONOSCOPY      COSMETIC SURGERY  2018    basal cell removal R Ear    HYSTERECTOMY  03/10/2015    [facility] (DR NATALY THOMPSON)    HYSTEROSCOPY      complete    INGUINAL HERNIA REPAIR Right 2008    INTERSTIM PLACEMENT N/A 2024    Procedure: INTERSTIM STAGES 1 AND 2 LEAD AND GENERATOR PLACEMENT;  Surgeon: Adarsh Louis MD;  Location: [facility];  Service: Urology;  Laterality: N/A;    LAPAROSCOPIC ASSISTED VAGINAL HYSTERECTOMY SALPINGO OOPHORECTOMY      to remove cyst    LYSIS OF ABDOMINAL ADHESIONS  03/10/2015    DR NATALY THOMPSON    SKIN BIOPSY      SUBTOTAL HYSTERECTOMY  3/2013    Left ovary    TUBAL ABDOMINAL LIGATION  2010    WISDOM TOOTH EXTRACTION         Family History:   Family History   Problem Relation Age of Onset    Alzheimer's disease Other     Cancer Other     Dementia Other     Hypertension Other     Parkinsonism Other     Osteoporosis Mother     Diabetes Mother     Hypertension Mother     Obesity Mother     Depression Mother     Arthritis Mother     COPD Mother     Hearing loss Mother     Hyperlipidemia Mother     Breast cancer Maternal  Grandmother     Osteoporosis Maternal Grandmother     Diabetes Maternal Grandmother     Dementia Maternal Grandmother     Cancer Maternal Grandmother         Breast Cancer    Hypertension Maternal Grandmother     ADD / ADHD Sister     Alcohol abuse Sister     Anxiety disorder Sister     Bipolar disorder Sister     Depression Sister     Drug abuse Sister     Depression Father     Anxiety disorder Father     Other Father          parkinsons disease    OCD Neg Hx     Paranoid behavior Neg Hx     Schizophrenia Neg Hx     Seizures Neg Hx     Self-Injurious Behavior  Neg Hx     Suicide Attempts Neg Hx        Social History:   Social History     Socioeconomic History    Marital status:     Number of children: 1    Highest education level: High school graduate   Tobacco Use    Smoking status: Former     Packs/day: 0.00     Years: 15.00     Additional pack years: 0.00     Total pack years: 0.00     Types: Cigarettes     Quit date: 2023     Years since quittin.1     Passive exposure: Current    Smokeless tobacco: Never    Tobacco comments:     I quit smoking   Vaping Use    Vaping Use: Every day    Substances: Nicotine    Devices: Pre-filled or refillable cartridge   Substance and Sexual Activity    Alcohol use: Yes     Comment: Drink very rarely on a special occasion    Drug use: Never    Sexual activity: Yes     Partners: Male     Birth control/protection: Other, Post-menopausal, Tubal ligation, Hysterectomy       Medications:     Current Outpatient Medications:     Advair Diskus 250-50 MCG/ACT DISKUS, INHALE 1 PUFF BY MOUTH DAILY, Disp: 60 each, Rfl: 11    albuterol sulfate  (90 Base) MCG/ACT inhaler, INHALE 2 PUFFS INTO THE LUNGS EVERY 4 HOURS AS NEEDED, Disp: 18 g, Rfl: 1    cyclobenzaprine (FLEXERIL) 10 MG tablet, Take 1 tablet by mouth 3 (Three) Times a Day As Needed., Disp: , Rfl:     diclofenac (VOLTAREN) 50 MG EC tablet, Take 1 tablet by mouth 2 (Two) Times a Day. For up to 1 week, Disp:  30 tablet, Rfl: 0    Diclofenac Sodium (VOLTAREN) 1 % gel gel, APPLY 4 GRAMS TOPICALLY TO AFFECTED AREA 4 TIMES A DAY AS NEEDED FOR PAIN, Disp: 100 g, Rfl: 3    DULoxetine (CYMBALTA) 60 MG capsule, Take 1 capsule by mouth 2 (Two) Times a Day., Disp: 60 capsule, Rfl: 0    Estradiol 0.75 MG/0.75GM gel, Place 1 each on the skin as directed by provider Daily., Disp: 30 each, Rfl: 11    lamoTRIgine (LaMICtal) 25 MG tablet, 1 tablet po daily for 2 weeks then increase to 2 tablets daily, Disp: 45 tablet, Rfl: 0    levETIRAcetam (KEPPRA) 750 MG tablet, Take 1 tablet by mouth 2 (Two) Times a Day., Disp: 180 tablet, Rfl: 3    Lurasidone HCl (Latuda) 80 MG tablet tablet, Take 1 tablet by mouth Every Evening., Disp: 30 tablet, Rfl: 0    Methylnaltrexone Bromide (Relistor) 150 MG tablet, Take 1 tablet by mouth Daily., Disp: , Rfl:     naloxone (Narcan) 4 MG/0.1ML nasal spray, , Disp: , Rfl:     nicotine (Nicoderm CQ) 21 MG/24HR patch, Place 1 patch on the skin as directed by provider Daily., Disp: 30 patch, Rfl: 4    oxyCODONE-acetaminophen (PERCOCET) 5-325 MG per tablet, Take 1 tablet by mouth Every 12 (Twelve) Hours., Disp: , Rfl:     Rimegepant Sulfate (Nurtec) 75 MG tablet dispersible tablet, Place 1 tablet under the tongue Take As Directed., Disp: 16 tablet, Rfl: 5    rOPINIRole (REQUIP) 0.5 MG tablet, Take 1 tablet by mouth Every Night. Take 1 hour before bedtime., Disp: 30 tablet, Rfl: 2    tiZANidine (ZANAFLEX) 2 MG tablet, Take 1 tablet by mouth 4 (Four) Times a Day As Needed., Disp: , Rfl:     topiramate (TOPAMAX) 100 MG tablet, Take 1 tablet by mouth 2 (Two) Times a Day., Disp: 180 tablet, Rfl: 1    valACYclovir (VALTREX) 500 MG tablet, Take 1 tablet by mouth 2 (Two) Times a Day As Needed (as needed for fever blisters)., Disp: 90 tablet, Rfl: 1    vitamin B-12 (CYANOCOBALAMIN) 1000 MCG tablet, Take 1 tablet by mouth Daily., Disp: 90 tablet, Rfl: 3    vitamin B-6 (PYRIDOXINE) 25 MG tablet, Take 1 tablet by mouth  "Daily., Disp: 90 tablet, Rfl: 3    Current Facility-Administered Medications:     OnabotulinumtoxinA 200 Units, 200 Units, Intramuscular, Q3 Months, Hannah Pringle APRN, 165 Units at 02/07/24 1412    Allergies:   No Known Allergies    Objective     Physical Exam:   Vital Signs:   Visit Vitals  /76   Temp 97.8 °F (36.6 °C)   Ht 167.6 cm (65.98\")   Wt 82.1 kg (181 lb)   BMI 29.23 kg/m²      Body mass index is 29.23 kg/m².     Sacrum: Incision healing well, no redness swelling or discharge.  Small suture expelling from sacral incision.    Labs  Brief Urine Lab Results  (Last result in the past 365 days)        Color   Clarity   Blood   Leuk Est   Nitrite   Protein   CREAT   Urine HCG        11/06/23 1402 Yellow   Clear   Trace   Negative   Negative   Negative                   Lab Results   Component Value Date    GLUCOSE 88 11/03/2023    CALCIUM 9.2 11/03/2023     11/03/2023    K 5.0 11/03/2023    CO2 22 11/03/2023     11/03/2023    BUN 7 11/03/2023    CREATININE 0.83 11/03/2023    EGFRIFAFRI 108 09/14/2021    EGFRIFNONA 94 09/14/2021    BCR 8 (L) 11/03/2023    ANIONGAP 8.9 06/23/2022       Lab Results   Component Value Date    WBC 10.4 11/03/2023    HGB 14.0 11/03/2023    HCT 41.8 11/03/2023    MCV 94 11/03/2023     11/03/2023            Radiographic Studies  XR Spine Lumbar 2 or 3 View    Result Date: 12/28/2023  Unremarkable lumbar spine series.   This report was signed and finalized on 12/28/2023 3:31 PM by Francis Mora MD.      US Breast Right Limited    Result Date: 12/27/2023  RIGHT BREAST: Probably benign findings right breast. RECOMMENDATIONS: 6-month follow-up right diagnostic mammogram and breast ultrasound is recommended to ensure stability. These findings and recommendations were relayed to the patient at the time and date of dictation. ASSESSMENT: BI-RADS 3 Probably Benign RECOMMENDATION: 6 month follow up.    Mammo Screening Digital Tomosynthesis Bilateral With " CAD    Result Date: 12/15/2023  RIGHT BREAST: Incomplete exam. Additional imaging with ultrasound is required. LEFT BREAST: No direct or indirect signs of malignancy. ASSESSMENT: BI-RADS 0 Incomplete - Need Additional Imaging Evaluation. RECOMMENDATIONS: See body of report.   The patient will be informed of these results via letter and telephone call from a representative at [facility] mammography department. Please note that mammography is not 100% accurate in diagnosing breast cancer. A routine breast exam is recommended to correlate with mammographic findings. Any palpable abnormality must be assessed clinically.  If the patient has a new palpable abnormality, then a Diagnostic Mammogram and/or Breast Ultrasound is indicated if clinically appropriate.    CT Abdomen Pelvis Stone Protocol    Result Date: 11/6/2023  Impression: 1. Right renal nephrolithiasis without obstructive uropathy. Electronically Signed: Pat Solorzano MD  11/6/2023 2:13 PM CST  Workstation ID: [id number]    XR Spine Thoracic 2 View    Result Date: 10/30/2023  No acute process.    Images were reviewed, interpreted, and dictated by Dr. Francis Mora MD Transcribed by Lulu Hill PA-C.  This report was signed and finalized on 10/30/2023 12:54 PM by Francis Mora MD.      XR Hips Bilateral With or Without Pelvis 3-4 View    Result Date: 10/30/2023  No acute fracture.       Images were reviewed, interpreted, and dictated by Dr. Francis Mora MD Transcribed by Lulu Hill PA-C.  This report was signed and finalized on 10/30/2023 12:48 PM by Francis Mora MD.      XR Spine Lumbar AP & Lateral    Result Date: 10/30/2023  No acute process.    Images were reviewed, interpreted, and dictated by Dr. Francis Mora MD Transcribed by Lulu Hill PA-C.  This report was signed and finalized on 10/30/2023 12:45 PM by Francis Mora MD.        I have reviewed the above labs and imaging.     PVR  Post-void residual performed with  ultrasound scanner by staff and interpreted by me - andrea     Assessment / Plan      Assessment/Plan:   Diagnoses and all orders for this visit:    1. Urge incontinence of urine (Primary)  -     POC Urinalysis Dipstick, Automated    54-year-old female 1 month status post InterStim satisfied with results she has decreased from 3-4 pull-ups daily to 1-2 pads daily.  Incisions healing well small suture being expelled was trimmed because it was poking patient.  She is comfortable making changes to her programming and increasing amplitude if necessary.  She is also advised she can return to our office or call [employer] [facility]'s Katia or Reshma if she needs guidance.  Patient will follow-up yearly for InterStim battery and impedance checks    Current settings program 1 at 0.9  Check impedance and battery 1 year    Follow Up:   Return in about 1 year (around 2/20/2025) for Follow up Pamela.    CHRIS Cuellar, NP-C  [facility] [place]

## 2024-03-04 ENCOUNTER — OFFICE VISIT (OUTPATIENT)
Age: 54
End: 2024-03-04
Payer: MEDICAID

## 2024-03-04 ENCOUNTER — TELEMEDICINE (OUTPATIENT)
Dept: PSYCHIATRY | Facility: CLINIC | Age: 54
End: 2024-03-04
Payer: MEDICAID

## 2024-03-04 VITALS
WEIGHT: 170.6 LBS | BODY MASS INDEX: 27.42 KG/M2 | SYSTOLIC BLOOD PRESSURE: 128 MMHG | DIASTOLIC BLOOD PRESSURE: 84 MMHG | HEIGHT: 66 IN | HEART RATE: 65 BPM | OXYGEN SATURATION: 100 %

## 2024-03-04 DIAGNOSIS — F41.1 GAD (GENERALIZED ANXIETY DISORDER): ICD-10-CM

## 2024-03-04 DIAGNOSIS — F33.1 MAJOR DEPRESSIVE DISORDER, RECURRENT EPISODE, MODERATE DEGREE: ICD-10-CM

## 2024-03-04 DIAGNOSIS — F33.1 MODERATE EPISODE OF RECURRENT MAJOR DEPRESSIVE DISORDER: Primary | ICD-10-CM

## 2024-03-04 DIAGNOSIS — F41.1 GENERALIZED ANXIETY DISORDER: Primary | ICD-10-CM

## 2024-03-04 PROCEDURE — 3079F DIAST BP 80-89 MM HG: CPT | Performed by: STUDENT IN AN ORGANIZED HEALTH CARE EDUCATION/TRAINING PROGRAM

## 2024-03-04 PROCEDURE — 3074F SYST BP LT 130 MM HG: CPT | Performed by: STUDENT IN AN ORGANIZED HEALTH CARE EDUCATION/TRAINING PROGRAM

## 2024-03-04 PROCEDURE — 1159F MED LIST DOCD IN RCRD: CPT | Performed by: COUNSELOR

## 2024-03-04 PROCEDURE — 1160F RVW MEDS BY RX/DR IN RCRD: CPT | Performed by: STUDENT IN AN ORGANIZED HEALTH CARE EDUCATION/TRAINING PROGRAM

## 2024-03-04 PROCEDURE — 99214 OFFICE O/P EST MOD 30 MIN: CPT | Performed by: STUDENT IN AN ORGANIZED HEALTH CARE EDUCATION/TRAINING PROGRAM

## 2024-03-04 PROCEDURE — 1159F MED LIST DOCD IN RCRD: CPT | Performed by: STUDENT IN AN ORGANIZED HEALTH CARE EDUCATION/TRAINING PROGRAM

## 2024-03-04 PROCEDURE — 90834 PSYTX W PT 45 MINUTES: CPT | Performed by: COUNSELOR

## 2024-03-04 PROCEDURE — 1160F RVW MEDS BY RX/DR IN RCRD: CPT | Performed by: COUNSELOR

## 2024-03-04 RX ORDER — LAMOTRIGINE 100 MG/1
100 TABLET ORAL DAILY
Qty: 90 TABLET | Refills: 1 | Status: SHIPPED | OUTPATIENT
Start: 2024-03-04

## 2024-03-04 RX ORDER — LURASIDONE HYDROCHLORIDE 80 MG/1
80 TABLET, FILM COATED ORAL EVERY EVENING
Qty: 90 TABLET | Refills: 0 | Status: SHIPPED | OUTPATIENT
Start: 2024-03-04

## 2024-03-04 RX ORDER — DULOXETIN HYDROCHLORIDE 60 MG/1
60 CAPSULE, DELAYED RELEASE ORAL 2 TIMES DAILY
Qty: 180 CAPSULE | Refills: 0 | Status: SHIPPED | OUTPATIENT
Start: 2024-03-04

## 2024-03-04 NOTE — PROGRESS NOTES
[facility]             Follow Up Office Visit      Date: 2023   Patient Name: Marguerite Anderson  : 1970   MRN: [id number]     Referring Provider: Juan Clemens MD    Chief Complaint:      ICD-10-CM ICD-9-CM   1. Generalized anxiety disorder  F41.1 300.02   2. Major depressive disorder, recurrent episode, moderate degree  F33.1 296.32       History of Present Illness:   Marguerite Anderson is a 54 y.o. female who is here today for follow up with MDD/JAY.    Patient is seen and evaluated in the office.  She appears to be well groomed.  She is pleasant and cooperative with the evaluation and exhibits a linear and goal-directed thought process.  Patient states that since last visit, she has discontinued Klonopin.  She states that this was not as difficult for her as she thought it would be.  She did not experience any symptoms of withdrawal.  She does not feel as though she experienced any intense rebound anxiety.  She does still feel as though mood overall this past month has been about the same.  She does not feel as though Lamictal has made much of an improvement yet.  However, we talked about how use of Lamictal could have been helping her a little bit while she was tapering off of her Klonopin.  Patient is agreeable with this.  She does admit that she has not been sleeping well.  However, she has been going to bed at 8:00 at night because her  goes to bed early.  She then wakes up a few hours later, and states that she feels ready for the day.  Her  wakes up at 2 AM to go to work, so she often gets up at this time with him.  She then will try to go back to sleep from approximately 4 to 8 AM.  Her sleep is very broken, and not consistent.  We talked about better sleep hygiene.  She will work on trying to push off going to sleep until after 9 or 9:30 at night.  We also talked about setting her 's alarm on his phone or getting him an alarm clock  for his side of the bed so that it is not disturbing her.  She is agreeable to these changes.  She speaks with writer regarding an upcoming baby shower that she is looking forward to.  One of her best friends is having a new grandchild.  She is looking forward to this as she will be able to reconnect with all of her friends.  We also talked about how the change of weather may benefit mood as well.  She denies experiencing any suicidal ideation, plan, intent at this time.  We will increase dose of Lamictal and follow-up in 1 month to see how she is doing.    Previous Medication Trials:  Klonopin, Celexa, Wellbutrin, Lexapro, Abilify, Seroquel    Subjective     Review of Systems:   Review of Systems   Constitutional:  Negative for chills, fatigue and fever.   HENT:  Negative for congestion, hearing loss, sore throat and trouble swallowing.    Eyes:  Negative for blurred vision and double vision.   Respiratory:  Negative for cough, chest tightness and shortness of breath.    Cardiovascular:  Negative for chest pain and palpitations.   Gastrointestinal:  Negative for abdominal pain, constipation, diarrhea, nausea and vomiting.   Endocrine: Negative for polydipsia and polyuria.   Genitourinary:  Negative for hematuria and urgency.   Musculoskeletal:  Negative for arthralgias.   Skin:  Negative for skin lesions and bruise.   Neurological:  Negative for dizziness, tremors, seizures and light-headedness.   Hematological:  Negative for adenopathy.     Screening Scores:   PHQ-9 : 18  JAY-7 : 20    Medications:     Current Outpatient Medications:     Advair Diskus 250-50 MCG/ACT DISKUS, INHALE 1 PUFF BY MOUTH DAILY, Disp: 60 each, Rfl: 11    albuterol sulfate  (90 Base) MCG/ACT inhaler, INHALE 2 PUFFS INTO THE LUNGS EVERY 4 HOURS AS NEEDED, Disp: 18 g, Rfl: 1    diclofenac (VOLTAREN) 50 MG EC tablet, Take 1 tablet by mouth 2 (Two) Times a Day. For up to 1 week, Disp: 30 tablet, Rfl: 0    DULoxetine (CYMBALTA) 60 MG  capsule, Take 1 capsule by mouth 2 (Two) Times a Day., Disp: 60 capsule, Rfl: 0    Estradiol 0.75 MG/0.75GM gel, Place 1 each on the skin as directed by provider Daily., Disp: 30 each, Rfl: 11    lamoTRIgine (LaMICtal) 25 MG tablet, 1 tablet po daily for 2 weeks then increase to 2 tablets daily, Disp: 45 tablet, Rfl: 0    levETIRAcetam (KEPPRA) 750 MG tablet, Take 1 tablet by mouth 2 (Two) Times a Day., Disp: 180 tablet, Rfl: 3    Lurasidone HCl (Latuda) 80 MG tablet tablet, Take 1 tablet by mouth Every Evening., Disp: 30 tablet, Rfl: 0    naloxone (Narcan) 4 MG/0.1ML nasal spray, , Disp: , Rfl:     nicotine (Nicoderm CQ) 21 MG/24HR patch, Place 1 patch on the skin as directed by provider Daily., Disp: 30 patch, Rfl: 4    oxyCODONE-acetaminophen (PERCOCET) 5-325 MG per tablet, Take 1 tablet by mouth Every 12 (Twelve) Hours., Disp: , Rfl:     Rimegepant Sulfate (Nurtec) 75 MG tablet dispersible tablet, Place 1 tablet under the tongue Take As Directed., Disp: 16 tablet, Rfl: 5    rOPINIRole (REQUIP) 0.5 MG tablet, Take 1 tablet by mouth Every Night. Take 1 hour before bedtime., Disp: 30 tablet, Rfl: 2    topiramate (TOPAMAX) 100 MG tablet, Take 1 tablet by mouth 2 (Two) Times a Day., Disp: 180 tablet, Rfl: 1    valACYclovir (VALTREX) 500 MG tablet, Take 1 tablet by mouth 2 (Two) Times a Day As Needed (as needed for fever blisters)., Disp: 90 tablet, Rfl: 1    vitamin B-12 (CYANOCOBALAMIN) 1000 MCG tablet, Take 1 tablet by mouth Daily., Disp: 90 tablet, Rfl: 3    vitamin B-6 (PYRIDOXINE) 25 MG tablet, Take 1 tablet by mouth Daily., Disp: 90 tablet, Rfl: 3    Current Facility-Administered Medications:     OnabotulinumtoxinA 200 Units, 200 Units, Intramuscular, Q3 Months, Hannah Pringle, APRN, 165 Units at 02/07/24 1412    Medication Considerations:  ASYA reviewed and appropriate.     Allergies:   No Known Allergies    Objective     Vital Signs:   Vitals:    03/04/24 1350   BP: 128/84   Pulse: 65   SpO2: 100%  "  Weight: 77.4 kg (170 lb 9.6 oz)   Height: 167.6 cm (65.98\")       Body mass index is 27.55 kg/m².     Mental Status Exam:   MENTAL STATUS EXAM   General Appearance:  Cleanly groomed and dressed  Eye Contact:  Good eye contact  Attitude:  Cooperative  Motor Activity:  Normal gait, posture  Muscle Strength:  Normal  Speech:  Soft spoken  Language:  Spontaneous  Mood and affect:  Normal, pleasant  Hopelessness:  6  Thought Process:  Logical and goal-directed  Associations/ Thought Content:  No delusions  Hallucinations:  None  Suicidal Ideations:  Not present  Homicidal Ideation:  Not present  Sensorium:  Alert  Orientation:  Person, place, time and situation  Immediate Recall, Recent, and Remote Memory:  Intact  Attention Span/ Concentration:  Good  Fund of Knowledge:  Appropriate for age and educational level  Intellectual Functioning:  Average range  Insight:  Fair  Judgement:  Fair  Reliability:  Fair  Impulse Control:  Fair      SUICIDE RISK ASSESSMENT/CSSRS:  1. Does patient have thoughts of suicide? denies  2. Does patient have intent for suicide? denies  3. Does patient have a current plan for suicide? denies  4. History of suicide attempts: yes; in 2010 when 1st  was diagnosed with liver cirrhosis  5. Family history of suicide or attempts: denies  6. History of violent behaviors towards others or property or thoughts of committing suicide: denies  7. History of sexual aggression toward others: denies  8. Access to firearms or weapons: denies    Assessment / Plan      Visit Diagnosis/Orders Placed This Visit:  Diagnoses and all orders for this visit:    1. Major depressive disorder, recurrent episode, moderate degree (Primary)  2. Generalized anxiety disorder  Re-ordered EKG last visit  Continue Latuda 80 mg po qpm  Increase Lamictal to 100 mg po daily  Patient has DC Klonopin since last visit  Continue Cymbalta 60 mg po BID for now (plan to titrate off of this as well)  Continue therapy  UDS Reviewed: " UDS from 11/2/23 reviewed  Labs Reviewed : labs from11/3/23 reviewed  Chart Reviewed      Functional Status: Mild impairment      Prognosis: Good with Ongoing Treatment     Impression/Formulation:  Patient appeared alert and oriented.  Patient is voluntarily requesting to begin outpatient therapy at [facility].  Patient is receptive to assistance with maintaining a stable lifestyle.  Patient presents with history of     ICD-10-CM ICD-9-CM   1. Generalized anxiety disorder  F41.1 300.02   2. Major depressive disorder, recurrent episode, moderate degree  F33.1 296.32       Treatment Plan:     Patient will continue supportive psychotherapy efforts and medications as indicated. Clinic will obtain release of information for current treatment team for continuity of care as needed. Patient will contact this office, call 911 or present to the nearest emergency room should suicidal or homicidal ideations occur.  Discussed medication options and treatment plan of prescribed medication(s) as well as the risks, benefits, and potential side effects. Patient ackowledged and verbally consented to continue with current treatment plan and was educated on the importance of compliance with treatment and follow-up appointments.     Quality Measures:  Tobacco: Marguerite Anderson  reports that she quit smoking about 15 years ago. Her smoking use included cigarettes. She has been exposed to tobacco smoke. She has never used smokeless tobacco.. I have educated her on the risk of diseases from using tobacco products such as cancer, COPD, and heart disease.     I advised her to quit and she is not willing to quit.    I spent 5 minutes counseling the patient.    Depression (PHQ >11): Patient screened positive for depression based on a PHQ-9 score of 18 on 3/4/2024. Follow-up recommendations include:  follow up with writer in 1 mo, continue medications as prescribed, continue therapy .     Follow Up:   Return in about 1  month (around 4/4/2024) for Medication Management, follow up with therapy.    Alissa Flynn MD  [facility]     This is electronically signed by Alissa Flynn MD   12/05/2023 14:47 EST

## 2024-03-04 NOTE — PROGRESS NOTES
Telehealth Encounter Note:  Date of Service: 2024  Patient Name: Marguerite Anderson  : 1970   MRN: [id number]   Time In: 9:25 AM   Time Out: 10:02 AM     This provider is located at [facility] [address], [place] [address] using a secure [employer] Video Visit through [facility]. Patient is being seen remotely via telehealth at home address in [place] and stated they are in a secure environment for this session. The patient's condition being diagnosed/treated is appropriate for telemedicine. The provider identified herself as well as her credentials. The patient, and/or patients guardian, consent to be seen remotely, and when consent is given they understand that the consent allows for patient identifiable information to be sent to a third party as needed. They may refuse to be seen remotely at any time. The electronic data is encrypted and password protected, and the patient and/or guardian has been advised of the potential risks to privacy not withstanding such measures.     You have chosen to receive care through a telehealth visit.  Do you consent to use a video/audio connection for your medical care today? Yes    Referring Provider: Juan Clemens MD    PROGRESS NOTE    History of Present Illness:   Marguerite Anderosn is a 54 y.o. female who is being seen today for follow up individual Psychotherapy session.     Chief Complaint:  Pt reports she has had good days and bad days.  She reports she still does not go out much due to anxiety and continues to fear going out - not knowing what is going to happen.  Pt reports she went to her disability hearing which was anxiety provoking because she had to go in by herself.  Pt reports her psychiatrist provided a report on her behalf.  Due to her anxiety during hearing the  decided to postpone for a second hearing to not overwhelm pt.  Pt reports she has a baby shower to go to coming up which will create anxiety due to leaving the  home and getting out around others.  Pt reports she is not sleeping well, wakes up at various times throughout the night and takes awhile to fall back to sleep - feels wide awake.  Pt reports she is trying to avoid her couch at night because she spends the most of her day there.  Pt feels that her lamictal is not helping her anxiety - only on 50mgs.  Pt reports her surgery has continued to due well at helping control her bladder.  Pt reports ongoing relational stressors with family.  Pt reports her mother will put her worries/stress on her.  Pt reports her nieces are in residential treatment while their mother is in senior living.                ICD-10-CM ICD-9-CM   1. Moderate episode of recurrent major depressive disorder  F33.1 296.32   2. JAY (generalized anxiety disorder)  F41.1 300.02      Clinical Maneuvering/Intervention:   Assisted patient in processing above session content; acknowledged and normalized patient’s thoughts, feelings, and concerns by utilizing a person-centered approach in efforts to build appropriate rapport and a positive therapeutic relationship with open and honest communication.  Processed and rationalized patients thoughts and feelings regarding current stressors, managing MH.  Discussed triggers associated with patient's anxiety/depression.  Also discussed coping skills for patient to implement such as identifying things to look forward to; challenge negative thoughts created by anxiety; engaging in means for relaxation; leaning on positive supports; healthy boundaries for own inner peace.  Pt reports she bought a yoga mat and is thinking about starting this practice.      Allowed patient to freely discuss issues without interruption or judgment. Provided safe, confidential environment to facilitate the development of positive therapeutic relationship and encourage open, honest communication. Assisted patient in identifying risk factors which would indicate the need for higher level of care  including thoughts to harm self or others and/or self-harming behavior and encouraged patient to contact this office, call 911, or present to the nearest emergency room should any of these events occur. Discussed crisis intervention services and means to access. Patient adamantly and convincingly denies current suicidal or homicidal ideation or perceptual disturbance.    Mental Status Exam:   Hygiene:   good  Cooperation:  Cooperative  Eye Contact:  Good  Psychomotor Behavior:  Appropriate  Affect:  Appropriate  Mood: anxious  Speech:  Normal  Thought Process:  Goal directed and Linear  Thought Content:  Mood congruent  Suicidal:  passive ideation at times.  No intent or planning.  Homicidal:  None  Hallucinations:  None  Delusion:  None  Memory:  Intact  Orientation:  Person, Place, Time, and Situation  Reliability:  good  Insight:  Good  Judgement:  Good  Impulse Control:  Good  Physical/Medical Issues:  Yes        Patient's Support Network Includes:   and extended family    Functional Status: Moderate impairment  - severe impairment     Progress toward goal: Not at goal    Prognosis: Fair with Ongoing Treatment     Medications:     Current Outpatient Medications:     Advair Diskus 250-50 MCG/ACT DISKUS, INHALE 1 PUFF BY MOUTH DAILY, Disp: 60 each, Rfl: 11    albuterol sulfate  (90 Base) MCG/ACT inhaler, INHALE 2 PUFFS INTO THE LUNGS EVERY 4 HOURS AS NEEDED, Disp: 18 g, Rfl: 1    cyclobenzaprine (FLEXERIL) 10 MG tablet, Take 1 tablet by mouth 3 (Three) Times a Day As Needed., Disp: , Rfl:     diclofenac (VOLTAREN) 50 MG EC tablet, Take 1 tablet by mouth 2 (Two) Times a Day. For up to 1 week, Disp: 30 tablet, Rfl: 0    Diclofenac Sodium (VOLTAREN) 1 % gel gel, APPLY 4 GRAMS TOPICALLY TO AFFECTED AREA 4 TIMES A DAY AS NEEDED FOR PAIN, Disp: 100 g, Rfl: 3    DULoxetine (CYMBALTA) 60 MG capsule, Take 1 capsule by mouth 2 (Two) Times a Day., Disp: 60 capsule, Rfl: 0    Estradiol 0.75 MG/0.75GM gel, Place  1 each on the skin as directed by provider Daily., Disp: 30 each, Rfl: 11    lamoTRIgine (LaMICtal) 25 MG tablet, 1 tablet po daily for 2 weeks then increase to 2 tablets daily, Disp: 45 tablet, Rfl: 0    levETIRAcetam (KEPPRA) 750 MG tablet, Take 1 tablet by mouth 2 (Two) Times a Day., Disp: 180 tablet, Rfl: 3    Lurasidone HCl (Latuda) 80 MG tablet tablet, Take 1 tablet by mouth Every Evening., Disp: 30 tablet, Rfl: 0    Methylnaltrexone Bromide (Relistor) 150 MG tablet, Take 1 tablet by mouth Daily., Disp: , Rfl:     naloxone (Narcan) 4 MG/0.1ML nasal spray, , Disp: , Rfl:     nicotine (Nicoderm CQ) 21 MG/24HR patch, Place 1 patch on the skin as directed by provider Daily., Disp: 30 patch, Rfl: 4    oxyCODONE-acetaminophen (PERCOCET) 5-325 MG per tablet, Take 1 tablet by mouth Every 12 (Twelve) Hours., Disp: , Rfl:     Rimegepant Sulfate (Nurtec) 75 MG tablet dispersible tablet, Place 1 tablet under the tongue Take As Directed., Disp: 16 tablet, Rfl: 5    rOPINIRole (REQUIP) 0.5 MG tablet, Take 1 tablet by mouth Every Night. Take 1 hour before bedtime., Disp: 30 tablet, Rfl: 2    tiZANidine (ZANAFLEX) 2 MG tablet, Take 1 tablet by mouth 4 (Four) Times a Day As Needed., Disp: , Rfl:     topiramate (TOPAMAX) 100 MG tablet, Take 1 tablet by mouth 2 (Two) Times a Day., Disp: 180 tablet, Rfl: 1    valACYclovir (VALTREX) 500 MG tablet, Take 1 tablet by mouth 2 (Two) Times a Day As Needed (as needed for fever blisters)., Disp: 90 tablet, Rfl: 1    vitamin B-12 (CYANOCOBALAMIN) 1000 MCG tablet, Take 1 tablet by mouth Daily., Disp: 90 tablet, Rfl: 3    vitamin B-6 (PYRIDOXINE) 25 MG tablet, Take 1 tablet by mouth Daily., Disp: 90 tablet, Rfl: 3    Current Facility-Administered Medications:     OnabotulinumtoxinA 200 Units, 200 Units, Intramuscular, Q3 Months, Hannah Pringle, APRN, 165 Units at 02/07/24 1412    Visit Diagnosis/Orders Placed This Visit:    ICD-10-CM ICD-9-CM   1. Moderate episode of recurrent  major depressive disorder  F33.1 296.32   2. JAY (generalized anxiety disorder)  F41.1 300.02        PLAN:  Safety: No acute safety concerns SI without intent or planning.  Pt reports on bad days she feels like she just does not want to be here, due to depression.  She tries to focus on positive things to manage these thoughts.    Risk Assessment: Risk of self-harm acutely is low. Risk of self-harm chronically is also low, but could be further elevated in the event of treatment noncompliance and/or AODA.    Crisis Plan:  Symptoms and/or behaviors to indicate a crisis: Excessive worry or fear, Feeling sad or low, and Thinking about suicide    What calming techniques or other strategies will patient use to de-escalate and stay safe: slow down, breathe, visualize calming self, think it though, listen to music, change focus, take a walk    Who is one person patient can contact to assist with de-escalation?     Treatment Plan/Goals: Patient will continue supportive psychotherapy efforts and medication regimen as prescribed. Therapist will provide Cognitive Behavioral Therapy to assist patient in improving functioning and gaining coping skills, maintaining stability, and avoiding decompensation and the need for higher level of care. Plan for treatment was discussed during today's visit. Patient acknowledged and verbally consented to continue with current treatment plan and was educated on the importance of compliance with treatment and follow-up appointments.     Patient will contact this office, call 911 or present to the nearest emergency room should suicidal or homicidal ideations occur.     Follow Up:   Return in about 4 weeks (around 4/1/2024) for Therapy session.      QUOC Peña   [facility]     This document has been electronically signed by QUOC Peña   March 4, 2024 09:59 EST

## 2024-03-28 RX ORDER — ALBUTEROL SULFATE 90 UG/1
AEROSOL, METERED RESPIRATORY (INHALATION)
Qty: 18 G | Refills: 1 | Status: SHIPPED | OUTPATIENT
Start: 2024-03-28

## 2024-03-30 DIAGNOSIS — G25.81 RLS (RESTLESS LEGS SYNDROME): ICD-10-CM

## 2024-03-30 RX ORDER — ROPINIROLE 0.5 MG/1
0.5 TABLET, FILM COATED ORAL NIGHTLY
Qty: 30 TABLET | Refills: 2 | Status: SHIPPED | OUTPATIENT
Start: 2024-03-30

## 2024-04-09 ENCOUNTER — OFFICE VISIT (OUTPATIENT)
Age: 54
End: 2024-04-09
Payer: MEDICAID

## 2024-04-09 VITALS
HEIGHT: 66 IN | OXYGEN SATURATION: 100 % | WEIGHT: 170 LBS | HEART RATE: 86 BPM | DIASTOLIC BLOOD PRESSURE: 82 MMHG | SYSTOLIC BLOOD PRESSURE: 136 MMHG | BODY MASS INDEX: 27.32 KG/M2

## 2024-04-09 DIAGNOSIS — F33.1 MAJOR DEPRESSIVE DISORDER, RECURRENT EPISODE, MODERATE DEGREE: Primary | ICD-10-CM

## 2024-04-09 DIAGNOSIS — F41.1 GENERALIZED ANXIETY DISORDER: ICD-10-CM

## 2024-04-09 RX ORDER — LAMOTRIGINE 150 MG/1
150 TABLET ORAL DAILY
Qty: 30 TABLET | Refills: 0 | Status: SHIPPED | OUTPATIENT
Start: 2024-04-09

## 2024-04-09 NOTE — PROGRESS NOTES
"      [facility]             Follow Up Office Visit      Date: 2023   Patient Name: Marguerite Anderson  : 1970   MRN: [id number]     Referring Provider: Juan Clemens MD    Chief Complaint:      ICD-10-CM ICD-9-CM   1. Major depressive disorder, recurrent episode, moderate degree  F33.1 296.32   2. Generalized anxiety disorder  F41.1 300.02     History of Present Illness:   Marguerite Anderson is a 54 y.o. female who is here today for follow up with MDD/JYA.    Patient is seen and evaluated in the office.  She appears to be well groomed.  She is pleasant and cooperative with the evaluation and exhibits a linear and goal-directed thought process.  Patient states that she is feeling very anxious today because she is going to [place] with her  after this appointment. She does not like going to stores/going out in public places. She states that she is unsure of how much improvement she has noticed with lamictal so far. She feels as though \"it is doing what it is supposed to be doing\", however, she still feels as though she is having more bad days than good days. Sleep has improved some since she has started putting bedtime off until 9-10pm. There are still many mornings that she wakes around 2am and cannot fall back asleep until 4am. Although sleep is still broken, she feels as though she is getting more hours than she was previously. She has tried Trazodone in the past, which was not helpful for her. She is open to trying Melatonin. She denies experiencing any suicidal ideation, plan, intent at this time.  We will increase dose of Lamictal and follow-up in 1 month to see how she is doing.    Previous Medication Trials:  Klonopin, Celexa, Wellbutrin, Lexapro, Abilify, Seroquel    Subjective     Review of Systems:   Review of Systems   Constitutional:  Negative for chills, fatigue and fever.   HENT:  Negative for congestion, hearing loss, sore throat and trouble " swallowing.    Eyes:  Negative for blurred vision and double vision.   Respiratory:  Negative for cough, chest tightness and shortness of breath.    Cardiovascular:  Negative for chest pain and palpitations.   Gastrointestinal:  Negative for abdominal pain, constipation, diarrhea, nausea and vomiting.   Endocrine: Negative for polydipsia and polyuria.   Genitourinary:  Negative for hematuria and urgency.   Musculoskeletal:  Negative for arthralgias.   Skin:  Negative for skin lesions and bruise.   Neurological:  Negative for dizziness, tremors, seizures and light-headedness.   Hematological:  Negative for adenopathy.     Screening Scores:   PHQ-9 : 19  JAY-7 : 17    Medications:     Current Outpatient Medications:     Advair Diskus 250-50 MCG/ACT DISKUS, INHALE 1 PUFF BY MOUTH DAILY, Disp: 60 each, Rfl: 11    diclofenac (VOLTAREN) 50 MG EC tablet, Take 1 tablet by mouth 2 (Two) Times a Day. For up to 1 week, Disp: 30 tablet, Rfl: 0    DULoxetine (CYMBALTA) 60 MG capsule, Take 1 capsule by mouth 2 (Two) Times a Day., Disp: 180 capsule, Rfl: 0    Estradiol 0.75 MG/0.75GM gel, Place 1 each on the skin as directed by provider Daily., Disp: 30 each, Rfl: 11    levETIRAcetam (KEPPRA) 750 MG tablet, Take 1 tablet by mouth 2 (Two) Times a Day., Disp: 180 tablet, Rfl: 3    Lurasidone HCl (Latuda) 80 MG tablet tablet, Take 1 tablet by mouth Every Evening., Disp: 90 tablet, Rfl: 0    naloxone (Narcan) 4 MG/0.1ML nasal spray, , Disp: , Rfl:     nicotine (Nicoderm CQ) 21 MG/24HR patch, Place 1 patch on the skin as directed by provider Daily., Disp: 30 patch, Rfl: 4    oxyCODONE-acetaminophen (PERCOCET) 5-325 MG per tablet, Take 1 tablet by mouth Every 12 (Twelve) Hours., Disp: , Rfl:     Rimegepant Sulfate (Nurtec) 75 MG tablet dispersible tablet, Place 1 tablet under the tongue Take As Directed., Disp: 16 tablet, Rfl: 5    rOPINIRole (REQUIP) 0.5 MG tablet, TAKE 1 TABLET BY MOUTH EVERY NIGHT 1 HOUR BEFORE BEDTIME, Disp: 30  "tablet, Rfl: 2    topiramate (TOPAMAX) 100 MG tablet, Take 1 tablet by mouth 2 (Two) Times a Day., Disp: 180 tablet, Rfl: 1    valACYclovir (VALTREX) 500 MG tablet, Take 1 tablet by mouth 2 (Two) Times a Day As Needed (as needed for fever blisters)., Disp: 90 tablet, Rfl: 1    Ventolin  (90 Base) MCG/ACT inhaler, INHALE 2 PUFFS INTO THE LUNGS EVERY 4 HOURS AS NEEDED, Disp: 18 g, Rfl: 1    vitamin B-12 (CYANOCOBALAMIN) 1000 MCG tablet, Take 1 tablet by mouth Daily., Disp: 90 tablet, Rfl: 3    vitamin B-6 (PYRIDOXINE) 25 MG tablet, Take 1 tablet by mouth Daily., Disp: 90 tablet, Rfl: 3    Current Facility-Administered Medications:     OnabotulinumtoxinA 200 Units, 200 Units, Intramuscular, Q3 Months, Yary, Hannah Krishnamurthya, APRN, 165 Units at 02/07/24 1412    Medication Considerations:  ASYA reviewed and appropriate.     Allergies:   No Known Allergies    Objective     Vital Signs:   Vitals:    04/09/24 1048   BP: 136/82   Pulse: 86   SpO2: 100%   Weight: 77.1 kg (170 lb)   Height: 167.5 cm (65.95\")     Body mass index is 27.48 kg/m².     Mental Status Exam:   MENTAL STATUS EXAM   General Appearance:  Cleanly groomed and dressed  Eye Contact:  Good eye contact  Attitude:  Cooperative  Motor Activity:  Normal gait, posture  Muscle Strength:  Normal  Speech:  Soft spoken  Language:  Spontaneous  Mood and affect:  Normal, pleasant  Hopelessness:  6  Thought Process:  Logical and goal-directed  Associations/ Thought Content:  No delusions  Hallucinations:  None  Suicidal Ideations:  Not present  Homicidal Ideation:  Not present  Sensorium:  Alert  Orientation:  Person, place, time and situation  Immediate Recall, Recent, and Remote Memory:  Intact  Attention Span/ Concentration:  Good  Fund of Knowledge:  Appropriate for age and educational level  Intellectual Functioning:  Average range  Insight:  Fair  Judgement:  Fair  Reliability:  Fair  Impulse Control:  Fair      SUICIDE RISK ASSESSMENT/CSSRS:  1. Does " patient have thoughts of suicide? denies  2. Does patient have intent for suicide? denies  3. Does patient have a current plan for suicide? denies  4. History of suicide attempts: yes; in 2010 when 1st  was diagnosed with liver cirrhosis  5. Family history of suicide or attempts: denies  6. History of violent behaviors towards others or property or thoughts of committing suicide: denies  7. History of sexual aggression toward others: denies  8. Access to firearms or weapons: denies    Assessment / Plan      Visit Diagnosis/Orders Placed This Visit:  Diagnoses and all orders for this visit:    1. Major depressive disorder, recurrent episode, moderate degree (Primary)  2. Generalized anxiety disorder  Re-ordered EKG last visit  Continue Latuda 80 mg po qpm  Increase Lamictal to 150 mg po daily  Continue Cymbalta 60 mg po BID for now (plan to titrate off of this as well)  Continue therapy  UDS Reviewed: UDS from 11/2/23 reviewed  Labs Reviewed : labs from11/3/23 reviewed  Chart Reviewed      Functional Status: Mild impairment      Prognosis: Good with Ongoing Treatment     Impression/Formulation:  Patient appeared alert and oriented.  Patient is voluntarily requesting to begin outpatient therapy at [facility].  Patient is receptive to assistance with maintaining a stable lifestyle.  Patient presents with history of     ICD-10-CM ICD-9-CM   1. Major depressive disorder, recurrent episode, moderate degree  F33.1 296.32   2. Generalized anxiety disorder  F41.1 300.02     Treatment Plan:     Patient will continue supportive psychotherapy efforts and medications as indicated. Clinic will obtain release of information for current treatment team for continuity of care as needed. Patient will contact this office, call 911 or present to the nearest emergency room should suicidal or homicidal ideations occur.  Discussed medication options and treatment plan of prescribed medication(s) as well as  the risks, benefits, and potential side effects. Patient ackowledged and verbally consented to continue with current treatment plan and was educated on the importance of compliance with treatment and follow-up appointments.     Quality Measures:  Tobacco: Marguerite Anderson  reports that she quit smoking about 15 years ago. Her smoking use included cigarettes. She has been exposed to tobacco smoke. She has never used smokeless tobacco.. I have educated her on the risk of diseases from using tobacco products such as cancer, COPD, and heart disease.     I advised her to quit and she is not willing to quit.    I spent 5 minutes counseling the patient.    Depression (PHQ >11): Patient screened positive for depression based on a PHQ-9 score of 19 on 4/9/2024. Follow-up recommendations include:  follow up with writer in 1 mo, continue medications as prescribed, continue therapy .     Follow Up:   Return in about 1 month (around 5/9/2024) for follow up with therapy, Medication Management.    Alissa Flynn MD  [facility]     This is electronically signed by Alissa Flynn MD   12/05/2023 14:47 EST

## 2024-04-17 ENCOUNTER — TELEMEDICINE (OUTPATIENT)
Dept: PSYCHIATRY | Facility: CLINIC | Age: 54
End: 2024-04-17
Payer: MEDICAID

## 2024-04-17 DIAGNOSIS — F41.1 GAD (GENERALIZED ANXIETY DISORDER): ICD-10-CM

## 2024-04-17 DIAGNOSIS — F33.1 MODERATE EPISODE OF RECURRENT MAJOR DEPRESSIVE DISORDER: Primary | ICD-10-CM

## 2024-04-17 NOTE — PROGRESS NOTES
Telehealth Encounter Note:  Date of Service: 2024  Patient Name: Marguerite Anderson  : 1970   MRN: [id number]   Time In: 9:06 AM  Time Out: 9:37 AM    This provider is located at [facility] [address], [place] [address] using a secure [employer] Video Visit through [employer]. Patient is being seen remotely via telehealth at home address in [place] and stated they are in a secure environment for this session. The patient's condition being diagnosed/treated is appropriate for telemedicine. The provider identified herself as well as her credentials. The patient, and/or patients guardian, consent to be seen remotely, and when consent is given they understand that the consent allows for patient identifiable information to be sent to a third party as needed. They may refuse to be seen remotely at any time. The electronic data is encrypted and password protected, and the patient and/or guardian has been advised of the potential risks to privacy not withstanding such measures.     You have chosen to receive care through a telehealth visit.  Do you consent to use a video/audio connection for your medical care today? Yes    Referring Provider: Juan Clemens MD    PROGRESS NOTE    History of Present Illness:   Marguerite Anderson is a 54 y.o. female who is being seen today for follow up individual Psychotherapy session.     Chief Complaint:  Pt reports depression is improving - contributes this increase in Lamictal.  Pt reports she has been have good days and happy days.  Pt reports needing less naps during the day.  Pt reports anxiety continues to be a struggle and worries a great deal - struggles with sleep.  Pt reports she was previously going to bed at 7/8 with .  Pt is working on staying up later however is still getting up at 2am when  gets up for work.  Pt sometimes goes back to sleep at 5am. Pt reports she continues to take Latuda at night.  Pt reports her mother had a  knee replacement and is currently in nursing care for rehab.  Pt reports it is a constant hickman to get her mother to stay at the nursing home.  Pt is concerned if she comes out too soon she may injure herself further or have to return to rehab for longer.  Pt reports she is worried for her MRI results as she is scheduled with the surgeon to discuss results of her hips.  Pt is worried she may need a replacement.  Pt reports anxiety due to anticipation of waiting to hear back from disability hearing.  Pt reports she has been having her sister help her 1x per week in cleaning the house.               ICD-10-CM ICD-9-CM   1. Moderate episode of recurrent major depressive disorder  F33.1 296.32   2. JAY (generalized anxiety disorder)  F41.1 300.02      Clinical Maneuvering/Intervention:   Assisted patient in processing above session content; acknowledged and normalized patient’s thoughts, feelings, and concerns by utilizing a person-centered approach in efforts to build appropriate rapport and a positive therapeutic relationship with open and honest communication.  Processed and rationalized patients thoughts and feelings regarding current stressors, managing mental health and physical health.  Discussed triggers associated with patient's anxiety.  Also discussed coping skills for patient to implement such as challenging negative thought patterns due to anxiety (avoid predicting the future); postponing future worries for a later date; leaning on positive supports; continue working on sleep schedule.  Pt is attending psych appts in person per provider which is a good push for pts anxiety to get out of the house.      Allowed patient to freely discuss issues without interruption or judgment. Provided safe, confidential environment to facilitate the development of positive therapeutic relationship and encourage open, honest communication. Assisted patient in identifying risk factors which would indicate the need for higher  level of care including thoughts to harm self or others and/or self-harming behavior and encouraged patient to contact this office, call 911, or present to the nearest emergency room should any of these events occur. Discussed crisis intervention services and means to access. Patient adamantly and convincingly denies current suicidal or homicidal ideation or perceptual disturbance.    Mental Status Exam:   Hygiene:   good  Cooperation:  Cooperative  Eye Contact:  Good  Psychomotor Behavior:  Appropriate  Affect:  Appropriate  Mood: anxious  Speech:  Normal  Thought Process:  Goal directed and Linear  Thought Content:  Normal and Mood congruent  Suicidal:  None  Homicidal:  None  Hallucinations:  None  Delusion:  None  Memory:  Intact  Orientation:  Person, Place, Time, and Situation  Reliability:  good  Insight:  Good  Judgement:  Good  Impulse Control:  Good  Physical/Medical Issues:  Yes        Patient's Support Network Includes:  , mother, and extended family friends     Functional Status: Severe impairment    Progress toward goal: Not at goal    Prognosis: Fair with Ongoing Treatment     Medications:     Current Outpatient Medications:     Advair Diskus 250-50 MCG/ACT DISKUS, INHALE 1 PUFF BY MOUTH DAILY, Disp: 60 each, Rfl: 11    diclofenac (VOLTAREN) 50 MG EC tablet, Take 1 tablet by mouth 2 (Two) Times a Day. For up to 1 week, Disp: 30 tablet, Rfl: 0    DULoxetine (CYMBALTA) 60 MG capsule, Take 1 capsule by mouth 2 (Two) Times a Day., Disp: 180 capsule, Rfl: 0    Estradiol 0.75 MG/0.75GM gel, Place 1 each on the skin as directed by provider Daily., Disp: 30 each, Rfl: 11    lamoTRIgine (LaMICtal) 150 MG tablet, Take 1 tablet by mouth Daily., Disp: 30 tablet, Rfl: 0    levETIRAcetam (KEPPRA) 750 MG tablet, Take 1 tablet by mouth 2 (Two) Times a Day., Disp: 180 tablet, Rfl: 3    Lurasidone HCl (Latuda) 80 MG tablet tablet, Take 1 tablet by mouth Every Evening., Disp: 90 tablet, Rfl: 0    naloxone  (Narcan) 4 MG/0.1ML nasal spray, , Disp: , Rfl:     nicotine (Nicoderm CQ) 21 MG/24HR patch, Place 1 patch on the skin as directed by provider Daily., Disp: 30 patch, Rfl: 4    oxyCODONE-acetaminophen (PERCOCET) 5-325 MG per tablet, Take 1 tablet by mouth Every 12 (Twelve) Hours., Disp: , Rfl:     Rimegepant Sulfate (Nurtec) 75 MG tablet dispersible tablet, Place 1 tablet under the tongue Take As Directed., Disp: 16 tablet, Rfl: 5    rOPINIRole (REQUIP) 0.5 MG tablet, TAKE 1 TABLET BY MOUTH EVERY NIGHT 1 HOUR BEFORE BEDTIME, Disp: 30 tablet, Rfl: 2    topiramate (TOPAMAX) 100 MG tablet, Take 1 tablet by mouth 2 (Two) Times a Day., Disp: 180 tablet, Rfl: 1    valACYclovir (VALTREX) 500 MG tablet, Take 1 tablet by mouth 2 (Two) Times a Day As Needed (as needed for fever blisters)., Disp: 90 tablet, Rfl: 1    Ventolin  (90 Base) MCG/ACT inhaler, INHALE 2 PUFFS INTO THE LUNGS EVERY 4 HOURS AS NEEDED, Disp: 18 g, Rfl: 1    vitamin B-12 (CYANOCOBALAMIN) 1000 MCG tablet, Take 1 tablet by mouth Daily., Disp: 90 tablet, Rfl: 3    vitamin B-6 (PYRIDOXINE) 25 MG tablet, Take 1 tablet by mouth Daily., Disp: 90 tablet, Rfl: 3    Current Facility-Administered Medications:     OnabotulinumtoxinA 200 Units, 200 Units, Intramuscular, Q3 Months, Hannah Pringle APRN, 165 Units at 02/07/24 1412    Visit Diagnosis/Orders Placed This Visit:    ICD-10-CM ICD-9-CM   1. Moderate episode of recurrent major depressive disorder  F33.1 296.32   2. JAY (generalized anxiety disorder)  F41.1 300.02        PLAN:  Safety: No acute safety concerns  Risk Assessment: Risk of self-harm acutely is low. Risk of self-harm chronically is also low, but could be further elevated in the event of treatment noncompliance and/or AODA.    Crisis Plan:  Symptoms and/or behaviors to indicate a crisis: Excessive worry or fear, Feeling sad or low, Lack of sleep, and Self-doubt    What calming techniques or other strategies will patient use to  de-escalate and stay safe: slow down, breathe, visualize calming self, think it though, listen to music, change focus, take a walk    Who is one person patient can contact to assist with de-escalation?      Treatment Plan/Goals: Patient will continue supportive psychotherapy efforts and medication regimen as prescribed. Therapist will provide Cognitive Behavioral Therapy to assist patient in improving functioning and gaining coping skills, maintaining stability, and avoiding decompensation and the need for higher level of care. Plan for treatment was discussed during today's visit. Patient acknowledged and verbally consented to continue with current treatment plan and was educated on the importance of compliance with treatment and follow-up appointments.     Patient will contact this office, call 911 or present to the nearest emergency room should suicidal or homicidal ideations occur.     Follow Up:   Return in about 4 weeks (around 5/15/2024) for Therapy session.      QUOC Peña   [facility] [place]     This document has been electronically signed by QUOC Peña   April 17, 2024 09:31 EDT

## 2024-04-29 DIAGNOSIS — G40.909 SEIZURE DISORDER: ICD-10-CM

## 2024-04-29 DIAGNOSIS — G43.009 MIGRAINE WITHOUT AURA AND WITHOUT STATUS MIGRAINOSUS, NOT INTRACTABLE: ICD-10-CM

## 2024-04-29 RX ORDER — DIPHENHYDRAMINE HYDROCHLORIDE 25 MG/1
25 CAPSULE ORAL DAILY
Qty: 90 TABLET | Refills: 3 | Status: SHIPPED | OUTPATIENT
Start: 2024-04-29

## 2024-04-29 RX ORDER — TOPIRAMATE 100 MG/1
100 TABLET, FILM COATED ORAL 2 TIMES DAILY
Qty: 180 TABLET | Refills: 1 | Status: SHIPPED | OUTPATIENT
Start: 2024-04-29

## 2024-04-29 RX ORDER — LANOLIN ALCOHOL/MO/W.PET/CERES
1000 CREAM (GRAM) TOPICAL DAILY
Qty: 90 TABLET | Refills: 3 | Status: SHIPPED | OUTPATIENT
Start: 2024-04-29

## 2024-05-01 ENCOUNTER — DISEASE STATE MANAGEMENT VISIT (OUTPATIENT)
Dept: PHARMACY | Facility: HOSPITAL | Age: 54
End: 2024-05-01
Payer: MEDICAID

## 2024-05-01 VITALS
DIASTOLIC BLOOD PRESSURE: 83 MMHG | BODY MASS INDEX: 28.07 KG/M2 | TEMPERATURE: 97.3 F | HEIGHT: 64 IN | HEART RATE: 76 BPM | WEIGHT: 164.4 LBS | SYSTOLIC BLOOD PRESSURE: 121 MMHG | OXYGEN SATURATION: 98 %

## 2024-05-01 DIAGNOSIS — G43.719 INTRACTABLE CHRONIC MIGRAINE WITHOUT AURA AND WITHOUT STATUS MIGRAINOSUS: Primary | ICD-10-CM

## 2024-05-01 PROCEDURE — 25010000002 ONABOTULINUMTOXINA 200 UNITS RECONSTITUTED SOLUTION: Performed by: NURSE PRACTITIONER

## 2024-05-01 RX ADMIN — ONABOTULINUMTOXINA 165 UNITS: 200 INJECTION, POWDER, LYOPHILIZED, FOR SOLUTION INTRADERMAL; INTRAMUSCULAR at 13:31

## 2024-05-01 NOTE — PROGRESS NOTES
Botox Procedure Note       Patient Name: Marguerite Anderson  : 1970   MRN: [id number]     Chief Complaint:  Here for Botox injections and the Botox is facility supplied (will be billed by the hospital).    History of Present Illness: Marguerite Anderson is a 54 y.o. female who is here today for Botox injections for migraines and she has had 50% improvement in her migraines with Botox injections.     We have discussed risk and benefits of this Botox procedure and common side effects including headache, neck pain, neck stiffness or weakness, ptosis, flu-like symptoms as well as more serious possible adverse effects including possible dysphagia, respiratory distress or even death (death has only been reported once with adults for Botox for migraines in another state when mixed with lidocaine solution which we do not use lidocaine solution in our practice for mixing Botox). The patient verbally understands and accepts risks and agrees with moving forward with Botox injections for chronic migraine prevention.    Verbal and written consent has been obtained from the patient prior to beginning treatment injections.     Subjective      Review of Systems:   Review of Systems    The following portions of the patient's history were reviewed an updated as appropriate: past family history, past medical history, past social history, past surgical history.     Medications:     Current Outpatient Medications:     Advair Diskus 250-50 MCG/ACT DISKUS, INHALE 1 PUFF BY MOUTH DAILY, Disp: 60 each, Rfl: 11    diclofenac (VOLTAREN) 50 MG EC tablet, Take 1 tablet by mouth 2 (Two) Times a Day. For up to 1 week, Disp: 30 tablet, Rfl: 0    DULoxetine (CYMBALTA) 60 MG capsule, Take 1 capsule by mouth 2 (Two) Times a Day., Disp: 180 capsule, Rfl: 0    Estradiol 0.75 MG/0.75GM gel, Place 1 each on the skin as directed by provider Daily., Disp: 30 each, Rfl: 11    lamoTRIgine (LaMICtal) 150 MG tablet, Take 1 tablet by mouth Daily.,  "Disp: 30 tablet, Rfl: 0    levETIRAcetam (KEPPRA) 750 MG tablet, Take 1 tablet by mouth 2 (Two) Times a Day., Disp: 180 tablet, Rfl: 3    Lurasidone HCl (Latuda) 80 MG tablet tablet, Take 1 tablet by mouth Every Evening., Disp: 90 tablet, Rfl: 0    naloxone (Narcan) 4 MG/0.1ML nasal spray, , Disp: , Rfl:     nicotine (Nicoderm CQ) 21 MG/24HR patch, Place 1 patch on the skin as directed by provider Daily., Disp: 30 patch, Rfl: 4    oxyCODONE-acetaminophen (PERCOCET) 5-325 MG per tablet, Take 1 tablet by mouth Every 12 (Twelve) Hours., Disp: , Rfl:     Rimegepant Sulfate (Nurtec) 75 MG tablet dispersible tablet, Place 1 tablet under the tongue Take As Directed., Disp: 16 tablet, Rfl: 5    rOPINIRole (REQUIP) 0.5 MG tablet, TAKE 1 TABLET BY MOUTH EVERY NIGHT 1 HOUR BEFORE BEDTIME, Disp: 30 tablet, Rfl: 2    topiramate (TOPAMAX) 100 MG tablet, Take 1 tablet by mouth 2 (Two) Times a Day., Disp: 180 tablet, Rfl: 1    valACYclovir (VALTREX) 500 MG tablet, Take 1 tablet by mouth 2 (Two) Times a Day As Needed (as needed for fever blisters)., Disp: 90 tablet, Rfl: 1    Ventolin  (90 Base) MCG/ACT inhaler, INHALE 2 PUFFS INTO THE LUNGS EVERY 4 HOURS AS NEEDED, Disp: 18 g, Rfl: 1    vitamin B-12 (CYANOCOBALAMIN) 1000 MCG tablet, TAKE 1 TABLET BY MOUTH EVERY DAY, Disp: 90 tablet, Rfl: 3    vitamin B-6 (PYRIDOXINE) 25 MG tablet, TAKE 1 TABLET BY MOUTH DAILY, Disp: 90 tablet, Rfl: 3    Current Facility-Administered Medications:     OnabotulinumtoxinA 200 Units, 200 Units, Intramuscular, Q3 Months, Hannah Pringle APRN, 165 Units at 02/07/24 1412    Allergies:   No Known Allergies    Objective     Physical Exam:  Vital Signs:   Vitals:    05/01/24 1320   BP: 121/83   BP Location: Right arm   Patient Position: Sitting   Cuff Size: Adult   Pulse: 76   Temp: 97.3 °F (36.3 °C)   SpO2: 98%   Weight: 74.6 kg (164 lb 6.4 oz)   Height: 162.6 cm (64\")   PainSc:   8   PainLoc: Head     Body mass index is 28.22 kg/m². "     Physical Exam    Neurologic Exam    BOTOX PROCEDURE:     Date of Last Injection:   Response: Good  Side Effects: None  : Metailmarin  Lot #: [id number]  Expiration Date: 06/2026  NDC: 6875012737    200 unit vial Botox was re-constituted with 4 mL 0.9 NS to 5 unit/0.1 mL using standard techniques.  10 units were given at the  muscle in 2 divided sites  5 units were given at the Procerus muscle  20 units were given at the Frontalis muscle in 4 divided sites  40 units were given at the Temporalis muscle in 8 divided sites  30 units were given at the Occipitalis muscle in 6 divided sites  20 units were given at the Cervical paraspinal muscle in 4 divided sites  30 units were given at the Trapezius muscle in 6 divided sites  10 units were given at the Masseter muscle in 2 divided sites.   For a total of 165 units divided between 33 sites and 35 units of wastage    Patient tolerated procedure well with no immediate complications.     Assessment / Plan      Assessment/Plan:   Diagnoses and all orders for this visit:    1. Intractable chronic migraine without aura and without status migrainosus (Primary)         Follow Up:   It has been determined that Marguerite Anderson has chronic migraine headaches. We will proceed with a 12 week regimen of 200 units of Botox injections, to treat the patient's chronic migraines.      Return in about 3 months (around 8/1/2024) for Botox.      CHRIS Malhotra, FNP-C  [facility]

## 2024-05-09 ENCOUNTER — TELEMEDICINE (OUTPATIENT)
Age: 54
End: 2024-05-09
Payer: MEDICAID

## 2024-05-09 DIAGNOSIS — F33.1 MAJOR DEPRESSIVE DISORDER, RECURRENT EPISODE, MODERATE DEGREE: ICD-10-CM

## 2024-05-09 DIAGNOSIS — F41.1 GENERALIZED ANXIETY DISORDER: Primary | ICD-10-CM

## 2024-05-09 RX ORDER — LAMOTRIGINE 200 MG/1
200 TABLET ORAL DAILY
Qty: 30 TABLET | Refills: 1 | Status: SHIPPED | OUTPATIENT
Start: 2024-05-09

## 2024-05-09 RX ORDER — DULOXETIN HYDROCHLORIDE 60 MG/1
60 CAPSULE, DELAYED RELEASE ORAL 2 TIMES DAILY
Qty: 180 CAPSULE | Refills: 0 | Status: SHIPPED | OUTPATIENT
Start: 2024-05-09

## 2024-05-09 RX ORDER — BUSPIRONE HYDROCHLORIDE 5 MG/1
5 TABLET ORAL 2 TIMES DAILY
Qty: 60 TABLET | Refills: 0 | Status: SHIPPED | OUTPATIENT
Start: 2024-05-09

## 2024-05-09 RX ORDER — LURASIDONE HYDROCHLORIDE 80 MG/1
80 TABLET, FILM COATED ORAL EVERY EVENING
Qty: 90 TABLET | Refills: 0 | Status: SHIPPED | OUTPATIENT
Start: 2024-05-09

## (undated) DEVICE — SPNG GZ WOVN 4X4IN 12PLY 10/BX STRL

## (undated) DEVICE — KT PT/PROG SMRT INTERSTIM/X NEUROSTM W/COMMUNIC

## (undated) DEVICE — TOWEL,OR,DSP,ST,BLUE,STD,4/PK,20PK/CS: Brand: MEDLINE

## (undated) DEVICE — INTENDED FOR TISSUE SEPARATION, AND OTHER PROCEDURES THAT REQUIRE A SHARP SURGICAL BLADE TO PUNCTURE OR CUT.: Brand: BARD-PARKER ® CARBON RIB-BACK BLADES

## (undated) DEVICE — PAD,ARMBOARD,CONV,FOAM,2X8X20",12PR/CS: Brand: MEDLINE

## (undated) DEVICE — CVR TRANSD CIV FLX TPR 11.9 TO 3.8X61CM

## (undated) DEVICE — GLV SURG SENSICARE PI LF PF 7.5 GRN STRL

## (undated) DEVICE — 450 ML BOTTLE OF 0.05% CHLORHEXIDINE GLUCONATE IN 99.95% STERILE WATER FOR IRRIGATION, USP AND APPLICATOR.: Brand: IRRISEPT ANTIMICROBIAL WOUND LAVAGE

## (undated) DEVICE — SUT VIC 3/0 SH 27IN J416H

## (undated) DEVICE — 3M™ IOBAN™ 2 ANTIMICROBIAL INCISE DRAPE 6650EZ: Brand: IOBAN™ 2

## (undated) DEVICE — CLAVICLE STRAP: Brand: DEROYAL

## (undated) DEVICE — RICH MAJOR PROCEDURE: Brand: MEDLINE INDUSTRIES, INC.

## (undated) DEVICE — SKIN AFFIX SURG ADHESIVE 72/CS 0.55ML: Brand: MEDLINE

## (undated) DEVICE — GLOVE,SURG,SENSICARE,ALOE,LF,PF,7: Brand: MEDLINE

## (undated) DEVICE — 1000 S-DRAPE TOWEL DRAPE 10/BX: Brand: STERI-DRAPE™

## (undated) DEVICE — HDRST POSTN SLOTTED A/

## (undated) DEVICE — SYR LL TP 10ML STRL

## (undated) DEVICE — SHEET,DRAPE,70X100,STERILE: Brand: MEDLINE

## (undated) DEVICE — NDL HYPO ECLPS SFTY 22G 1 1/2IN

## (undated) DEVICE — ANTIBACTERIAL UNDYED BRAIDED (POLYGLACTIN 910), SYNTHETIC ABSORBABLE SUTURE: Brand: COATED VICRYL